# Patient Record
Sex: MALE | Race: WHITE | NOT HISPANIC OR LATINO | Employment: OTHER | URBAN - METROPOLITAN AREA
[De-identification: names, ages, dates, MRNs, and addresses within clinical notes are randomized per-mention and may not be internally consistent; named-entity substitution may affect disease eponyms.]

---

## 2018-09-20 DEVICE — LEAD NOVUS CAPSURE FIX 45CM: Type: IMPLANTABLE DEVICE | Site: CHEST | Status: FUNCTIONAL

## 2018-09-20 DEVICE — IMPLANTABLE DEVICE: Type: IMPLANTABLE DEVICE | Site: CHEST | Status: FUNCTIONAL

## 2018-09-26 DEVICE — IMPLANTABLE DEVICE: Type: IMPLANTABLE DEVICE | Site: CHEST | Status: FUNCTIONAL

## 2018-10-09 PROBLEM — I50.9 HEART FAILURE: Status: ACTIVE | Noted: 2018-10-09

## 2018-10-10 ENCOUNTER — TELEPHONE (OUTPATIENT)
Dept: TRANSPLANT | Facility: CLINIC | Age: 73
End: 2018-10-10

## 2018-10-10 ENCOUNTER — HOSPITAL ENCOUNTER (INPATIENT)
Facility: HOSPITAL | Age: 73
LOS: 13 days | Discharge: HOME | DRG: 286 | End: 2018-10-23
Attending: INTERNAL MEDICINE | Admitting: INTERNAL MEDICINE
Payer: MEDICARE

## 2018-10-10 DIAGNOSIS — R00.0 WIDE-COMPLEX TACHYCARDIA: ICD-10-CM

## 2018-10-10 DIAGNOSIS — I25.5 ISCHEMIC CARDIOMYOPATHY: ICD-10-CM

## 2018-10-10 DIAGNOSIS — I25.10 CORONARY ARTERY DISEASE INVOLVING NATIVE CORONARY ARTERY OF NATIVE HEART WITHOUT ANGINA PECTORIS: ICD-10-CM

## 2018-10-10 DIAGNOSIS — I47.19 ATRIAL TACHYCARDIA: ICD-10-CM

## 2018-10-10 DIAGNOSIS — I50.9 CHF (CONGESTIVE HEART FAILURE): ICD-10-CM

## 2018-10-10 DIAGNOSIS — N17.9 AKI (ACUTE KIDNEY INJURY): ICD-10-CM

## 2018-10-10 DIAGNOSIS — I50.23 ACUTE ON CHRONIC SYSTOLIC HEART FAILURE: ICD-10-CM

## 2018-10-10 DIAGNOSIS — I25.10 CORONARY ARTERY DISEASE, ANGINA PRESENCE UNSPECIFIED, UNSPECIFIED VESSEL OR LESION TYPE, UNSPECIFIED WHETHER NATIVE OR TRANSPLANTED HEART: ICD-10-CM

## 2018-10-10 DIAGNOSIS — R00.0 SINUS TACHYCARDIA: ICD-10-CM

## 2018-10-10 DIAGNOSIS — K76.1 HEPATIC CONGESTION: ICD-10-CM

## 2018-10-10 DIAGNOSIS — Z79.899 MEDICATION MANAGEMENT: ICD-10-CM

## 2018-10-10 DIAGNOSIS — I50.43 ACUTE ON CHRONIC COMBINED SYSTOLIC AND DIASTOLIC CONGESTIVE HEART FAILURE: ICD-10-CM

## 2018-10-10 DIAGNOSIS — Z51.81 VISIT FOR MONITORING TIKOSYN THERAPY: ICD-10-CM

## 2018-10-10 DIAGNOSIS — I49.9 CARDIAC RHYTHM DISORDER OR DISTURBANCE OR CHANGE: ICD-10-CM

## 2018-10-10 DIAGNOSIS — Z79.899 VISIT FOR MONITORING TIKOSYN THERAPY: ICD-10-CM

## 2018-10-10 DIAGNOSIS — I49.9 ARRHYTHMIA: ICD-10-CM

## 2018-10-10 DIAGNOSIS — Z01.818 PRE-TRANSPLANT EVALUATION FOR HEART TRANSPLANT: ICD-10-CM

## 2018-10-10 DIAGNOSIS — I50.40 CHF (CONGESTIVE HEART FAILURE), NYHA CLASS I, UNSPECIFIED FAILURE CHRONICITY, COMBINED: ICD-10-CM

## 2018-10-10 DIAGNOSIS — Z95.810 ICD (IMPLANTABLE CARDIOVERTER-DEFIBRILLATOR) IN PLACE: ICD-10-CM

## 2018-10-10 DIAGNOSIS — I50.9 HEART FAILURE: ICD-10-CM

## 2018-10-10 DIAGNOSIS — E11.9 TYPE 2 DIABETES MELLITUS WITHOUT COMPLICATION, UNSPECIFIED WHETHER LONG TERM INSULIN USE: ICD-10-CM

## 2018-10-10 DIAGNOSIS — I50.20 SYSTOLIC CONGESTIVE HEART FAILURE: ICD-10-CM

## 2018-10-10 DIAGNOSIS — I50.20 HFREF (HEART FAILURE WITH REDUCED EJECTION FRACTION): ICD-10-CM

## 2018-10-10 DIAGNOSIS — I48.91 ATRIAL FIBRILLATION STATUS POST CARDIOVERSION: ICD-10-CM

## 2018-10-10 DIAGNOSIS — I48.92 ATRIAL FLUTTER: ICD-10-CM

## 2018-10-10 LAB
ALBUMIN SERPL BCP-MCNC: 3.4 G/DL
ALP SERPL-CCNC: 117 U/L
ALT SERPL W/O P-5'-P-CCNC: 17 U/L
ANION GAP SERPL CALC-SCNC: 16 MMOL/L
APTT BLDCRRT: 22.3 SEC
AST SERPL-CCNC: 20 U/L
BASOPHILS # BLD AUTO: 0.08 K/UL
BASOPHILS # BLD AUTO: 0.09 K/UL
BASOPHILS NFR BLD: 0.7 %
BASOPHILS NFR BLD: 0.8 %
BILIRUB SERPL-MCNC: 1.6 MG/DL
BNP SERPL-MCNC: 4674 PG/ML
BUN SERPL-MCNC: 54 MG/DL
CALCIUM SERPL-MCNC: 10.9 MG/DL
CHLORIDE SERPL-SCNC: 99 MMOL/L
CO2 SERPL-SCNC: 24 MMOL/L
CREAT SERPL-MCNC: 1.8 MG/DL
DIASTOLIC DYSFUNCTION: YES
DIFFERENTIAL METHOD: ABNORMAL
DIFFERENTIAL METHOD: ABNORMAL
EOSINOPHIL # BLD AUTO: 0.1 K/UL
EOSINOPHIL # BLD AUTO: 0.2 K/UL
EOSINOPHIL NFR BLD: 1.3 %
EOSINOPHIL NFR BLD: 1.5 %
ERYTHROCYTE [DISTWIDTH] IN BLOOD BY AUTOMATED COUNT: 14.8 %
ERYTHROCYTE [DISTWIDTH] IN BLOOD BY AUTOMATED COUNT: 14.9 %
EST. GFR  (AFRICAN AMERICAN): 42.8 ML/MIN/1.73 M^2
EST. GFR  (NON AFRICAN AMERICAN): 37 ML/MIN/1.73 M^2
ESTIMATED PA SYSTOLIC PRESSURE: 26.04
GLUCOSE SERPL-MCNC: 108 MG/DL
HCT VFR BLD AUTO: 41 %
HCT VFR BLD AUTO: 41.2 %
HGB BLD-MCNC: 12.7 G/DL
HGB BLD-MCNC: 12.8 G/DL
IMM GRANULOCYTES # BLD AUTO: 0.03 K/UL
IMM GRANULOCYTES # BLD AUTO: 0.04 K/UL
IMM GRANULOCYTES NFR BLD AUTO: 0.3 %
IMM GRANULOCYTES NFR BLD AUTO: 0.4 %
INR PPP: 1.1
LYMPHOCYTES # BLD AUTO: 1.8 K/UL
LYMPHOCYTES # BLD AUTO: 1.9 K/UL
LYMPHOCYTES NFR BLD: 16.8 %
LYMPHOCYTES NFR BLD: 17.4 %
MAGNESIUM SERPL-MCNC: 2.1 MG/DL
MCH RBC QN AUTO: 29.4 PG
MCH RBC QN AUTO: 29.5 PG
MCHC RBC AUTO-ENTMCNC: 30.8 G/DL
MCHC RBC AUTO-ENTMCNC: 31.2 G/DL
MCV RBC AUTO: 94 FL
MCV RBC AUTO: 96 FL
MITRAL VALVE REGURGITATION: ABNORMAL
MONOCYTES # BLD AUTO: 0.9 K/UL
MONOCYTES # BLD AUTO: 1 K/UL
MONOCYTES NFR BLD: 8.7 %
MONOCYTES NFR BLD: 8.9 %
NEUTROPHILS # BLD AUTO: 7.8 K/UL
NEUTROPHILS # BLD AUTO: 7.8 K/UL
NEUTROPHILS NFR BLD: 71.3 %
NEUTROPHILS NFR BLD: 71.9 %
NRBC BLD-RTO: 0 /100 WBC
NRBC BLD-RTO: 0 /100 WBC
PHOSPHATE SERPL-MCNC: 5.8 MG/DL
PLATELET # BLD AUTO: 183 K/UL
PLATELET # BLD AUTO: 196 K/UL
PMV BLD AUTO: 11.4 FL
PMV BLD AUTO: 11.9 FL
POCT GLUCOSE: 116 MG/DL (ref 70–110)
POCT GLUCOSE: 264 MG/DL (ref 70–110)
POTASSIUM SERPL-SCNC: 4.8 MMOL/L
PROT SERPL-MCNC: 7.8 G/DL
PROTHROMBIN TIME: 11.1 SEC
RBC # BLD AUTO: 4.31 M/UL
RBC # BLD AUTO: 4.35 M/UL
RETIRED EF AND QEF - SEE NOTES: 9 (ref 55–65)
SODIUM SERPL-SCNC: 139 MMOL/L
TRICUSPID VALVE REGURGITATION: ABNORMAL
WBC # BLD AUTO: 10.86 K/UL
WBC # BLD AUTO: 10.92 K/UL

## 2018-10-10 PROCEDURE — 93005 ELECTROCARDIOGRAM TRACING: CPT

## 2018-10-10 PROCEDURE — 85730 THROMBOPLASTIN TIME PARTIAL: CPT

## 2018-10-10 PROCEDURE — 25000003 PHARM REV CODE 250: Performed by: INTERNAL MEDICINE

## 2018-10-10 PROCEDURE — 93306 TTE W/DOPPLER COMPLETE: CPT | Mod: 26,,, | Performed by: INTERNAL MEDICINE

## 2018-10-10 PROCEDURE — 93010 ELECTROCARDIOGRAM REPORT: CPT | Mod: ,,, | Performed by: INTERNAL MEDICINE

## 2018-10-10 PROCEDURE — 20600001 HC STEP DOWN PRIVATE ROOM

## 2018-10-10 PROCEDURE — 63600175 PHARM REV CODE 636 W HCPCS: Performed by: INTERNAL MEDICINE

## 2018-10-10 PROCEDURE — A4216 STERILE WATER/SALINE, 10 ML: HCPCS | Performed by: INTERNAL MEDICINE

## 2018-10-10 PROCEDURE — 83880 ASSAY OF NATRIURETIC PEPTIDE: CPT

## 2018-10-10 PROCEDURE — 85610 PROTHROMBIN TIME: CPT

## 2018-10-10 PROCEDURE — 80053 COMPREHEN METABOLIC PANEL: CPT

## 2018-10-10 PROCEDURE — 36415 COLL VENOUS BLD VENIPUNCTURE: CPT

## 2018-10-10 PROCEDURE — 83735 ASSAY OF MAGNESIUM: CPT

## 2018-10-10 PROCEDURE — C8929 TTE W OR WO FOL WCON,DOPPLER: HCPCS

## 2018-10-10 PROCEDURE — 85025 COMPLETE CBC W/AUTO DIFF WBC: CPT | Mod: 91

## 2018-10-10 PROCEDURE — 84100 ASSAY OF PHOSPHORUS: CPT

## 2018-10-10 PROCEDURE — 99223 1ST HOSP IP/OBS HIGH 75: CPT | Mod: GC,,, | Performed by: INTERNAL MEDICINE

## 2018-10-10 RX ORDER — INSULIN ASPART 100 [IU]/ML
0-5 INJECTION, SOLUTION INTRAVENOUS; SUBCUTANEOUS EVERY 6 HOURS PRN
Status: DISCONTINUED | OUTPATIENT
Start: 2018-10-10 | End: 2018-10-11 | Stop reason: SDUPTHER

## 2018-10-10 RX ORDER — DOXYCYCLINE HYCLATE 100 MG
100 TABLET ORAL EVERY 12 HOURS
Status: ON HOLD | COMMUNITY
End: 2018-10-23 | Stop reason: HOSPADM

## 2018-10-10 RX ORDER — METOPROLOL SUCCINATE 25 MG/1
25 TABLET, EXTENDED RELEASE ORAL 2 TIMES DAILY
Status: ON HOLD | COMMUNITY
End: 2018-10-23 | Stop reason: SDUPTHER

## 2018-10-10 RX ORDER — ATORVASTATIN CALCIUM 80 MG/1
80 TABLET, FILM COATED ORAL NIGHTLY
COMMUNITY

## 2018-10-10 RX ORDER — ATORVASTATIN CALCIUM 20 MG/1
80 TABLET, FILM COATED ORAL DAILY
Status: DISCONTINUED | OUTPATIENT
Start: 2018-10-10 | End: 2018-10-23 | Stop reason: HOSPADM

## 2018-10-10 RX ORDER — ENOXAPARIN SODIUM 100 MG/ML
40 INJECTION SUBCUTANEOUS EVERY 24 HOURS
Status: DISCONTINUED | OUTPATIENT
Start: 2018-10-10 | End: 2018-10-18

## 2018-10-10 RX ORDER — INSULIN ASPART 100 [IU]/ML
2-10 INJECTION, SOLUTION INTRAVENOUS; SUBCUTANEOUS
Status: ON HOLD | COMMUNITY
End: 2018-10-23 | Stop reason: HOSPADM

## 2018-10-10 RX ORDER — GLUCAGON 1 MG
1 KIT INJECTION
Status: DISCONTINUED | OUTPATIENT
Start: 2018-10-10 | End: 2018-10-11 | Stop reason: SDUPTHER

## 2018-10-10 RX ORDER — AZELASTINE 1 MG/ML
1 SPRAY, METERED NASAL
COMMUNITY

## 2018-10-10 RX ORDER — NAPROXEN SODIUM 220 MG/1
81 TABLET, FILM COATED ORAL DAILY
COMMUNITY

## 2018-10-10 RX ORDER — FUROSEMIDE 40 MG/1
40 TABLET ORAL DAILY
Status: ON HOLD | COMMUNITY
End: 2018-10-23 | Stop reason: SDUPTHER

## 2018-10-10 RX ORDER — FUROSEMIDE 10 MG/ML
80 INJECTION INTRAMUSCULAR; INTRAVENOUS ONCE
Status: COMPLETED | OUTPATIENT
Start: 2018-10-10 | End: 2018-10-10

## 2018-10-10 RX ORDER — DEXTROMETHORPHAN HYDROBROMIDE, GUAIFENESIN 5; 100 MG/5ML; MG/5ML
650 LIQUID ORAL EVERY 4 HOURS PRN
Status: ON HOLD | COMMUNITY
End: 2018-10-23 | Stop reason: HOSPADM

## 2018-10-10 RX ORDER — GLIPIZIDE 2.5 MG/1
5 TABLET, EXTENDED RELEASE ORAL 2 TIMES DAILY
COMMUNITY
End: 2018-11-27 | Stop reason: DRUGHIGH

## 2018-10-10 RX ORDER — BENZONATATE 200 MG/1
200 CAPSULE ORAL 3 TIMES DAILY PRN
COMMUNITY

## 2018-10-10 RX ORDER — SODIUM CHLORIDE 0.9 % (FLUSH) 0.9 %
3 SYRINGE (ML) INJECTION EVERY 8 HOURS
Status: DISCONTINUED | OUTPATIENT
Start: 2018-10-10 | End: 2018-10-23 | Stop reason: HOSPADM

## 2018-10-10 RX ORDER — ASPIRIN 81 MG/1
81 TABLET ORAL DAILY
Status: DISCONTINUED | OUTPATIENT
Start: 2018-10-10 | End: 2018-10-23 | Stop reason: HOSPADM

## 2018-10-10 RX ORDER — DOCUSATE SODIUM 100 MG/1
100 CAPSULE, LIQUID FILLED ORAL 2 TIMES DAILY PRN
COMMUNITY

## 2018-10-10 RX ORDER — FLUTICASONE PROPIONATE 50 MCG
2 SPRAY, SUSPENSION (ML) NASAL
COMMUNITY

## 2018-10-10 RX ORDER — ENOXAPARIN SODIUM 150 MG/ML
40 INJECTION SUBCUTANEOUS DAILY
Status: ON HOLD | COMMUNITY
End: 2018-10-23 | Stop reason: HOSPADM

## 2018-10-10 RX ORDER — PRASUGREL 10 MG/1
10 TABLET, FILM COATED ORAL DAILY
Status: DISCONTINUED | OUTPATIENT
Start: 2018-10-10 | End: 2018-10-23 | Stop reason: HOSPADM

## 2018-10-10 RX ORDER — METOPROLOL SUCCINATE 25 MG/1
25 TABLET, EXTENDED RELEASE ORAL DAILY
Status: DISCONTINUED | OUTPATIENT
Start: 2018-10-10 | End: 2018-10-14

## 2018-10-10 RX ADMIN — METOPROLOL SUCCINATE 25 MG: 25 TABLET, EXTENDED RELEASE ORAL at 08:10

## 2018-10-10 RX ADMIN — FUROSEMIDE 80 MG: 10 INJECTION, SOLUTION INTRAMUSCULAR; INTRAVENOUS at 01:10

## 2018-10-10 RX ADMIN — ENOXAPARIN SODIUM 40 MG: 100 INJECTION SUBCUTANEOUS at 05:10

## 2018-10-10 RX ADMIN — Medication 3 ML: at 09:10

## 2018-10-10 RX ADMIN — FUROSEMIDE 10 MG/HR: 10 INJECTION, SOLUTION INTRAMUSCULAR; INTRAVENOUS at 01:10

## 2018-10-10 RX ADMIN — ASPIRIN 81 MG: 81 TABLET, COATED ORAL at 08:10

## 2018-10-10 RX ADMIN — Medication 3 ML: at 01:10

## 2018-10-10 RX ADMIN — INSULIN ASPART 1 UNITS: 100 INJECTION, SOLUTION INTRAVENOUS; SUBCUTANEOUS at 11:10

## 2018-10-10 RX ADMIN — PRASUGREL 10 MG: 10 TABLET, FILM COATED ORAL at 08:10

## 2018-10-10 RX ADMIN — ATORVASTATIN CALCIUM 80 MG: 20 TABLET, FILM COATED ORAL at 08:10

## 2018-10-10 NOTE — PLAN OF CARE
Problem: Patient Care Overview  Goal: Plan of Care Review  Outcome: Ongoing (interventions implemented as appropriate)  Plan of care reviewed with pt. Pt remained free of falls, trauma, and injury. Lasix gtt initiated. Advanced options w/u continued. ECHO done, EF: 9%. Will continue to monitor.

## 2018-10-10 NOTE — NURSING
Patient arrived on unit via stretcher accompanied by EMS transport. He appears to be in no distress. He states he can walk, assisted to his bed. Fall precautions discussed. Patient given new gown allergies checked, telemetry applied and MD called to inform of patient arrival. Patient oriented to unit including how to use the call bell and other precautions. Will continue to monitor.

## 2018-10-10 NOTE — NURSING
Received report from attending nurse at [facility]. She can be reached at [phone].

## 2018-10-10 NOTE — HPI
"72 y/o male with PMH of CAD s/p CABG, ICM, HFrEF who presents as a transfer from OSH after he presented there for SUMNER, weight gain and palpitations. He presented to OSH after being discharged from the same facility 4 days prior to Oct 8. He was there initially for ADHF and KASEY. The patient recently had a revision of his CRT-D. The patient became SOB on Oct 7 but did not have CP. The patient felt his pulse and noted it to be rapid. He was brought to the ED at OSH by EMS; his HR was ~170. The ECG was c/w WCT (later documentation notes SVT with aberrancy after interrogation was negative for VT), and he was given one dose of adenosine. The HR came down to 110. His CXR was c/w pulmonary edema. A pro-BNP was at one point 20084.The patient's WBC was 17 and sCr 1.6. tBili was 1.5. An ECHO on 10/8: LVIDd 6.5 cm, EF 15%, IVC normal collapsibility and normal IVC size. An undated stress test notes: "mild reversibility noted in the cardiac apex and there lateral wall of the heart from cardiac apex to approximately mid wall suspicion for ischemia..mild reversibility noted in the mid to basilar inferior/inferoseptal wall of the heart also suspicious for ischemia". He had a LHC 6/2018: LIMA-LAD patent, % occluded at ostium, SVG-% occluded, SVG-D 100% occluded, pLCx 30%, 100% occluded OM, 100% LAD occlusion after takeoff of D1, D1 is tiny with 80% disease, SVG-OM 80% proximal stenosis with 50% at site of anastomosis. An SVG-OM OKSANA was placed at that time. The patient's WBC was 9.6 as of 10/9. Aldactone was held at some point 2/2 hyperkalemia. The patient believes he has been diuresed adequately and has no fluid left to remove.   "

## 2018-10-10 NOTE — SUBJECTIVE & OBJECTIVE
Past Medical History:   Diagnosis Date    CHF (congestive heart failure)     COPD (chronic obstructive pulmonary disease)     Coronary artery disease     Diabetes mellitus        Past Surgical History:   Procedure Laterality Date    APPENDECTOMY      CARDIAC SURGERY      EYE SURGERY      FRACTURE SURGERY      TONSILLECTOMY         Review of patient's allergies indicates:   Allergen Reactions    Amiodarone analogues Anaphylaxis    Ativan [lorazepam] Anxiety       Current Facility-Administered Medications   Medication    aspirin EC tablet 81 mg    atorvastatin tablet 80 mg    dextrose 50% injection 12.5 g    enoxaparin injection 40 mg    glucagon (human recombinant) injection 1 mg    insulin aspart U-100 pen 0-5 Units    metoprolol succinate (TOPROL-XL) 24 hr tablet 25 mg    prasugrel tablet 10 mg    sodium chloride 0.9% flush 3 mL     Family History     Problem Relation (Age of Onset)    Heart disease Father, Sister, Brother    Stroke Father        Tobacco Use    Smoking status: Former Smoker     Types: Cigarettes    Smokeless tobacco: Never Used   Substance and Sexual Activity    Alcohol use: No     Frequency: Never    Drug use: No    Sexual activity: Not on file     Review of Systems   Constitutional: Negative.    HENT: Negative.    Eyes: Negative.    Respiratory: Negative for shortness of breath.    Cardiovascular: Negative for chest pain, palpitations and leg swelling.   Gastrointestinal: Negative.    Endocrine: Negative.    Genitourinary: Negative.    Musculoskeletal: Negative.    Skin: Negative.    Allergic/Immunologic: Negative.    Neurological: Negative.    Hematological: Negative.    Psychiatric/Behavioral: Negative.      Objective:     Vital Signs (Most Recent):  Temp: 98.7 °F (37.1 °C) (10/10/18 0334)  Pulse: 103 (10/10/18 0334)  Resp: 20 (10/10/18 0334)  BP: 100/65 (10/10/18 0334)  SpO2: (!) 92 % (10/10/18 0334) Vital Signs (24h Range):  Temp:  [98 °F (36.7 °C)-98.7 °F (37.1 °C)]  98.7 °F (37.1 °C)  Pulse:  [101-103] 103  Resp:  [20] 20  SpO2:  [92 %-95 %] 92 %  BP: (100-102)/(51-65) 100/65     Patient Vitals for the past 72 hrs (Last 3 readings):   Weight   10/10/18 0334 76.2 kg (168 lb 1.6 oz)     Body mass index is 27.13 kg/m².    No intake or output data in the 24 hours ending 10/10/18 0426    Physical Exam   Constitutional: He is oriented to person, place, and time. He appears well-developed and well-nourished.   HENT:   Head: Normocephalic and atraumatic.   Eyes: Right eye exhibits no discharge. Left eye exhibits no discharge.   Neck: No tracheal deviation present.   Cardiovascular: Normal rate and regular rhythm. Exam reveals no gallop and no friction rub.   Pulmonary/Chest: Effort normal and breath sounds normal.   Abdominal: Soft. Bowel sounds are normal.   Musculoskeletal: He exhibits no edema.   Neurological: He is alert and oriented to person, place, and time.   Skin: Skin is warm and dry.   Psychiatric: He has a normal mood and affect.       Significant Labs:  CBC:  No results for input(s): WBC, RBC, HGB, HCT, PLT, MCV, MCH, MCHC in the last 168 hours.  BNP:  No results for input(s): BNP in the last 168 hours.    Invalid input(s): BNPTRIAGELBLO  CMP:  No results for input(s): GLU, CALCIUM, ALBUMIN, PROT, NA, K, CO2, CL, BUN, CREATININE, ALKPHOS, ALT, AST, BILITOT in the last 168 hours.   Coagulation:   No results for input(s): PT, INR, APTT in the last 168 hours.  LDH:  No results for input(s): LDH in the last 72 hours.  Microbiology:  Microbiology Results (last 7 days)     ** No results found for the last 168 hours. **

## 2018-10-10 NOTE — PROGRESS NOTES
Admit Note     Met with patient and spouse to assess needs. Patient is a 73 y.o.  male, admitted for heart failure, SUMNER, increased WT gain, palpitations. DM and CAD.    Pt's spouse reports the pt was in somewhat good health until his heart attack in June 2018. The pt's spouse reports the pt has been admitted into the hospital several times.  Pt's spouse reports the pt has only been home for six days since June.       Patient transfered from [facility] on 10/10/2018 .  At this time, patient presents as alert and oriented x 4, pleasant and calm.  At this time, patients caregiver presents as alert and oriented x 4, good eye contact and communicative.    Household/Family Systems     Patient resides with patient's spouse, at     [address] [place] [address].        Support system includes spouse, daughter and 10 yr old grand daughter.  The pt also has a son, however he lives out of town and information not provided by spouse.     Patient does not have dependents that are need of being cared for.     NOTE: The pt's spouse reports her daughter and granddaughter are planning on moving into the pt's home in order to provided assistance and support.        Patients primary caregiver is Yanira Alfred, patients spouse, phone number [phone].    No land line.  The pt has a cell phone, but usually does not answer the phone.  Emergency contact:  Jania Simon (daughter) [phone]    During admission, patient's caregiver plans to stay in the pt's home during the day, but return home at night.   Confirmed patient and patients caregivers do have access to reliable transportation.    Cognitive Status/Learning     Patient's spouse reports pt's reading ability as college and states patient does have difficulty with hearing and memory. The pt disagrees with spouse about hearing (no hearing aids). The pt's spouse reports the pt has experienced difficulty with short term memory since first heart attack in 1995. The pt's  spouse reports the pt will act like he will remember information, but he does not. Pt's spouse reports no difficulty with eyesight, reading, writing or learning.   Patient reports patient learns best by visual.     Needed: No.   Highest education level: Attended College/Technical School    Vocation/Disability   .  Working for Income: No  If no, reason not working: Patient Choice - Retired  Patient used to work as teacher in the workplace.  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    The pt's spouse was laid off earlier this year.      Adherence     Patient reports a high level of adherence to patients health care regimen. Pt has not been home long enough to keep up MD appointments.   Adherence counseling and education provided. Patient verbalizes understanding.    Substance Use    Patient reports the following substance usage.    Tobacco: none.  Pt quit smoking 1995.  Pt used to smoke 1ppd.   Alcohol: none since June, prior to June pt was drinking socially  Illicit Drugs/Non-prescribed Medications: none, patient denies any use.  Patient states clear understanding of the potential impact of substance use.  Substance abstinence/cessation counseling, education and resources provided and reviewed.     Services Utilizing/ADLS    Infusion Service: Prior to admission, patient utilizing? no  Home Health: Prior to admission, patient utilizing? no  DME: Prior to admission, no  Pulmonary/Cardiac Rehab: Prior to admission, no  Dialysis:  Prior to admission, no  Transplant Specialty Pharmacy:  Prior to admission, no.    Prior to admission, patient reports patient was not independent with ADLS and was not driving since June.  Pt's spouse drives.   Patient reports patient is not able to care for self at this time due to compromised medical condition (as documented in medical record) and physical weakness..  Patient indicates a willingness to care for self once medically cleared to do  so.    Insurance/Medications    Insured by   Payor/Plan Subscr  Sex Relation Sub. Ins. ID Effective Group Num   1. VET[employer] ADMI* EUGENIO JADE 1945 Male Self [id number] 1998                                    [address]   2. [employer] MANAGE* EUGENIO JADE 1945 Male  [id number] 1/1/18 [id number]                                   [address] [address]      Primary Insurance (for UNOS reporting): Veterans   Secondary Insurance (for UNOS reporting): Public Insurance - Medicare FFS (Fee For Service)     The pt's spouse reports when the pt is in the hospital the pt's primary insurance in Veterans and secondary is [employer], however when the pt is out pt the pt's primary insurance in [employer] and the secondary insurance is Veterans. Pt's spouse reports currently all home medications are being provided by [facility], however the pt's spouse is trying to get medications to be set up through [employer], but this has not been completed.     Patient reports patient is able to obtain and afford medications at this time and at time of discharge.    Living Will/Healthcare Power of     Patient states patient has a LW and/or HCPA.  Pt's spouse is the HCPA.  provided education regarding LW and HCPA and the completion of forms.    Coping/Mental Health    Patient is coping adequately with the aid of  family members.  Patient denies mental health difficulties.   Pt's spouse reports the pt is claustrophobic. Pt's spouse reports if the pt needs a LVAD she will be the caregiver and the pt's spouse would like the daughter to also be a caregiver.  The pt's spouse reports concerns about spending multiple nights in the hospital.  Pt's spouse reports she sleeps with a cpap and has multiple animals ( three dogs, one cat and a gerbil) that need care. The pt's spouse reports anxiety due to caregiver fatigue. Worker provided support.     Discharge Planning    At time of discharge, patient plans to return to patient's home under  the care of spouse.  Patients spouse will transport patient.  Per rounds today, expected discharge date has not been medically determined at this time. Patient and patients caregiver  verbalize understanding and are involved in treatment planning and discharge process.    Additional Concerns    Patient is being followed for needs, education, resources, information, emotional support, supportive counseling, and for supportive and skilled discharge plan of care.  providing ongoing psychosocial support, education, resources and d/c planning as needed.  SW remains available. Patient's caregiver verbalizes understanding and agreement with information reviewed,  availability and how to access available resources as needed.

## 2018-10-10 NOTE — H&P
"[facility]  Heart Transplant  H&P    Patient Name: Bharat Coker  MRN: [id number]  Admission Date: 10/10/2018  Attending Physician: Jony Hendrickson Jr.,*  Primary Care Provider: Ronnie Richardson Jr, MD  Principal Problem:<principal problem not specified>    Subjective:     History of Present Illness:  74 y/o male with PMH of CAD s/p CABG, ICM, HFrEF who presents as a transfer from OSH after he presented there for SUMNER, weight gain and palpitations. He presented to OSH after being discharged from the same facility 4 days prior to Oct 8. He was there initially for ADHF and KASEY. The patient recently had a revision of his CRT-D. The patient became SOB on Oct 7 but did not have CP. The patient felt his pulse and noted it to be rapid. He was brought to the ED at OSH by EMS; his HR was ~170. The ECG was c/w WCT (later documentation notes SVT with aberrancy after interrogation was negative for VT), and he was given one dose of adenosine. The HR came down to 110. His CXR was c/w pulmonary edema. A pro-BNP was at one point 20084.The patient's WBC was 17 and sCr 1.6. tBili was 1.5. An ECHO on 10/8: LVIDd 6.5 cm, EF 15%, IVC normal collapsibility and normal IVC size. An undated stress test notes: "mild reversibility noted in the cardiac apex and there lateral wall of the heart from cardiac apex to approximately mid wall suspicion for ischemia..mild reversibility noted in the mid to basilar inferior/inferoseptal wall of the heart also suspicious for ischemia". He had a LHC 6/2018: LIMA-LAD patent, % occluded at ostium, SVG-% occluded, SVG-D 100% occluded, pLCx 30%, 100% occluded OM, 100% LAD occlusion after takeoff of D1, D1 is tiny with 80% disease, SVG-OM 80% proximal stenosis with 50% at site of anastomosis. An SVG-OM OKSANA was placed at that time. The patient's WBC was 9.6 as of 10/9. Aldactone was held at some point 2/2 hyperkalemia. The patient believes he has been diuresed adequately " and has no fluid left to remove.     Past Medical History:   Diagnosis Date    CHF (congestive heart failure)     COPD (chronic obstructive pulmonary disease)     Coronary artery disease     Diabetes mellitus        Past Surgical History:   Procedure Laterality Date    APPENDECTOMY      CARDIAC SURGERY      EYE SURGERY      FRACTURE SURGERY      TONSILLECTOMY         Review of patient's allergies indicates:   Allergen Reactions    Amiodarone analogues Anaphylaxis    Ativan [lorazepam] Anxiety       Current Facility-Administered Medications   Medication    aspirin EC tablet 81 mg    atorvastatin tablet 80 mg    dextrose 50% injection 12.5 g    enoxaparin injection 40 mg    glucagon (human recombinant) injection 1 mg    insulin aspart U-100 pen 0-5 Units    metoprolol succinate (TOPROL-XL) 24 hr tablet 25 mg    prasugrel tablet 10 mg    sodium chloride 0.9% flush 3 mL     Family History     Problem Relation (Age of Onset)    Heart disease Father, Sister, Brother    Stroke Father        Tobacco Use    Smoking status: Former Smoker     Types: Cigarettes    Smokeless tobacco: Never Used   Substance and Sexual Activity    Alcohol use: No     Frequency: Never    Drug use: No    Sexual activity: Not on file     Review of Systems   Constitutional: Negative.    HENT: Negative.    Eyes: Negative.    Respiratory: Negative for shortness of breath.    Cardiovascular: Negative for chest pain, palpitations and leg swelling.   Gastrointestinal: Negative.    Endocrine: Negative.    Genitourinary: Negative.    Musculoskeletal: Negative.    Skin: Negative.    Allergic/Immunologic: Negative.    Neurological: Negative.    Hematological: Negative.    Psychiatric/Behavioral: Negative.      Objective:     Vital Signs (Most Recent):  Temp: 98.7 °F (37.1 °C) (10/10/18 0334)  Pulse: 103 (10/10/18 0334)  Resp: 20 (10/10/18 0334)  BP: 100/65 (10/10/18 0334)  SpO2: (!) 92 % (10/10/18 0334) Vital Signs (24h Range):  Temp:   [98 °F (36.7 °C)-98.7 °F (37.1 °C)] 98.7 °F (37.1 °C)  Pulse:  [101-103] 103  Resp:  [20] 20  SpO2:  [92 %-95 %] 92 %  BP: (100-102)/(51-65) 100/65     Patient Vitals for the past 72 hrs (Last 3 readings):   Weight   10/10/18 0334 76.2 kg (168 lb 1.6 oz)     Body mass index is 27.13 kg/m².    No intake or output data in the 24 hours ending 10/10/18 0426    Physical Exam   Constitutional: He is oriented to person, place, and time. He appears well-developed and well-nourished.   HENT:   Head: Normocephalic and atraumatic.   Eyes: Right eye exhibits no discharge. Left eye exhibits no discharge.   Neck: No tracheal deviation present.   Cardiovascular: Normal rate and regular rhythm. Exam reveals no gallop and no friction rub.   Pulmonary/Chest: Effort normal and breath sounds normal.   Abdominal: Soft. Bowel sounds are normal.   Musculoskeletal: He exhibits no edema.   Neurological: He is alert and oriented to person, place, and time.   Skin: Skin is warm and dry.   Psychiatric: He has a normal mood and affect.       Significant Labs:  CBC:  No results for input(s): WBC, RBC, HGB, HCT, PLT, MCV, MCH, MCHC in the last 168 hours.  BNP:  No results for input(s): BNP in the last 168 hours.    Invalid input(s): BNPTRIAGELBLO  CMP:  No results for input(s): GLU, CALCIUM, ALBUMIN, PROT, NA, K, CO2, CL, BUN, CREATININE, ALKPHOS, ALT, AST, BILITOT in the last 168 hours.   Coagulation:   No results for input(s): PT, INR, APTT in the last 168 hours.  LDH:  No results for input(s): LDH in the last 72 hours.  Microbiology:  Microbiology Results (last 7 days)     ** No results found for the last 168 hours. **            Assessment/Plan:     DM (diabetes mellitus)    - SSI        CAD (coronary artery disease)    - c/w DAPT, statin, and BB  - consider ACEi once renal function panel returns        HFrEF (heart failure with reduced ejection fraction)    - appear euvolemic on exam  - f/u BNP and CXR to confirm exam findings  - c/w BB and  consider ACEi once renal panel returns  - hold aldactone for now given past hyperkalemia issues  - f/u TTE with CFD            Pelon Venegas MD  Heart Transplant  [facility]

## 2018-10-10 NOTE — ASSESSMENT & PLAN NOTE
- appear euvolemic on exam  - f/u BNP and CXR to confirm exam findings  - c/w BB and consider ACEi once renal panel returns  - hold aldactone for now given past hyperkalemia issues  - f/u TTE with CFD

## 2018-10-10 NOTE — PROGRESS NOTES
Consent obtained. optison given ivp via left forearm sl for imaging. Denies transfusion rxn. Tolerated well. Sl flushed before and after with 10 cc ns.

## 2018-10-11 ENCOUNTER — EDUCATION (OUTPATIENT)
Dept: TRANSPLANT | Facility: CLINIC | Age: 73
End: 2018-10-11

## 2018-10-11 PROBLEM — Z95.810 ICD (IMPLANTABLE CARDIOVERTER-DEFIBRILLATOR) IN PLACE: Status: ACTIVE | Noted: 2018-10-11

## 2018-10-11 LAB
ABO + RH BLD: NORMAL
ALBUMIN SERPL BCP-MCNC: 3.3 G/DL
ALBUMIN SERPL BCP-MCNC: 3.3 G/DL
ALP SERPL-CCNC: 113 U/L
ALP SERPL-CCNC: 119 U/L
ALT SERPL W/O P-5'-P-CCNC: 16 U/L
ALT SERPL W/O P-5'-P-CCNC: 17 U/L
ANION GAP SERPL CALC-SCNC: 13 MMOL/L
ANION GAP SERPL CALC-SCNC: 14 MMOL/L
ANION GAP SERPL CALC-SCNC: 16 MMOL/L
AST SERPL-CCNC: 19 U/L
AST SERPL-CCNC: 22 U/L
BASOPHILS # BLD AUTO: 0.07 K/UL
BASOPHILS NFR BLD: 0.7 %
BILIRUB SERPL-MCNC: 0.9 MG/DL
BILIRUB SERPL-MCNC: 1.2 MG/DL
BLD GP AB SCN CELLS X3 SERPL QL: NORMAL
BUN SERPL-MCNC: 55 MG/DL
BUN SERPL-MCNC: 58 MG/DL
BUN SERPL-MCNC: 60 MG/DL
CALCIUM SERPL-MCNC: 10 MG/DL
CALCIUM SERPL-MCNC: 10.3 MG/DL
CALCIUM SERPL-MCNC: 9.9 MG/DL
CHLORIDE SERPL-SCNC: 96 MMOL/L
CHLORIDE SERPL-SCNC: 96 MMOL/L
CHLORIDE SERPL-SCNC: 97 MMOL/L
CO2 SERPL-SCNC: 22 MMOL/L
CO2 SERPL-SCNC: 26 MMOL/L
CO2 SERPL-SCNC: 28 MMOL/L
CREAT SERPL-MCNC: 1.7 MG/DL
CREAT SERPL-MCNC: 1.7 MG/DL
CREAT SERPL-MCNC: 1.8 MG/DL
DIFFERENTIAL METHOD: ABNORMAL
EOSINOPHIL # BLD AUTO: 0.2 K/UL
EOSINOPHIL NFR BLD: 2.4 %
ERYTHROCYTE [DISTWIDTH] IN BLOOD BY AUTOMATED COUNT: 14.7 %
EST. GFR  (AFRICAN AMERICAN): 42.8 ML/MIN/1.73 M^2
EST. GFR  (AFRICAN AMERICAN): 45.9 ML/MIN/1.73 M^2
EST. GFR  (AFRICAN AMERICAN): 45.9 ML/MIN/1.73 M^2
EST. GFR  (NON AFRICAN AMERICAN): 37 ML/MIN/1.73 M^2
EST. GFR  (NON AFRICAN AMERICAN): 39.7 ML/MIN/1.73 M^2
EST. GFR  (NON AFRICAN AMERICAN): 39.7 ML/MIN/1.73 M^2
ESTIMATED AVG GLUCOSE: 151 MG/DL
GLUCOSE SERPL-MCNC: 183 MG/DL
GLUCOSE SERPL-MCNC: 228 MG/DL
GLUCOSE SERPL-MCNC: 257 MG/DL
HBA1C MFR BLD HPLC: 6.9 %
HCT VFR BLD AUTO: 41.8 %
HGB BLD-MCNC: 13.2 G/DL
IMM GRANULOCYTES # BLD AUTO: 0.03 K/UL
IMM GRANULOCYTES NFR BLD AUTO: 0.3 %
LYMPHOCYTES # BLD AUTO: 1.2 K/UL
LYMPHOCYTES NFR BLD: 12.1 %
MAGNESIUM SERPL-MCNC: 1.8 MG/DL
MAGNESIUM SERPL-MCNC: 1.8 MG/DL
MCH RBC QN AUTO: 29.5 PG
MCHC RBC AUTO-ENTMCNC: 31.6 G/DL
MCV RBC AUTO: 94 FL
MONOCYTES # BLD AUTO: 0.8 K/UL
MONOCYTES NFR BLD: 8.2 %
NEUTROPHILS # BLD AUTO: 7.5 K/UL
NEUTROPHILS NFR BLD: 76.3 %
NRBC BLD-RTO: 0 /100 WBC
PHOSPHATE SERPL-MCNC: 5.4 MG/DL
PLATELET # BLD AUTO: 210 K/UL
PMV BLD AUTO: 11.4 FL
POCT GLUCOSE: 187 MG/DL (ref 70–110)
POCT GLUCOSE: 200 MG/DL (ref 70–110)
POCT GLUCOSE: 202 MG/DL (ref 70–110)
POCT GLUCOSE: 339 MG/DL (ref 70–110)
POTASSIUM SERPL-SCNC: 3.7 MMOL/L
POTASSIUM SERPL-SCNC: 3.9 MMOL/L
POTASSIUM SERPL-SCNC: 4.3 MMOL/L
PROT SERPL-MCNC: 7.7 G/DL
PROT SERPL-MCNC: 8 G/DL
RBC # BLD AUTO: 4.47 M/UL
SODIUM SERPL-SCNC: 134 MMOL/L
SODIUM SERPL-SCNC: 137 MMOL/L
SODIUM SERPL-SCNC: 137 MMOL/L
TSH SERPL DL<=0.005 MIU/L-ACNC: 2.12 UIU/ML
WBC # BLD AUTO: 9.82 K/UL

## 2018-10-11 PROCEDURE — 80048 BASIC METABOLIC PNL TOTAL CA: CPT

## 2018-10-11 PROCEDURE — 93005 ELECTROCARDIOGRAM TRACING: CPT

## 2018-10-11 PROCEDURE — 36415 COLL VENOUS BLD VENIPUNCTURE: CPT

## 2018-10-11 PROCEDURE — 85025 COMPLETE CBC W/AUTO DIFF WBC: CPT

## 2018-10-11 PROCEDURE — 83036 HEMOGLOBIN GLYCOSYLATED A1C: CPT

## 2018-10-11 PROCEDURE — 86850 RBC ANTIBODY SCREEN: CPT

## 2018-10-11 PROCEDURE — 83735 ASSAY OF MAGNESIUM: CPT

## 2018-10-11 PROCEDURE — 63600175 PHARM REV CODE 636 W HCPCS: Performed by: INTERNAL MEDICINE

## 2018-10-11 PROCEDURE — 25000003 PHARM REV CODE 250: Performed by: STUDENT IN AN ORGANIZED HEALTH CARE EDUCATION/TRAINING PROGRAM

## 2018-10-11 PROCEDURE — 20600001 HC STEP DOWN PRIVATE ROOM

## 2018-10-11 PROCEDURE — 63600175 PHARM REV CODE 636 W HCPCS: Performed by: STUDENT IN AN ORGANIZED HEALTH CARE EDUCATION/TRAINING PROGRAM

## 2018-10-11 PROCEDURE — 25000003 PHARM REV CODE 250: Performed by: INTERNAL MEDICINE

## 2018-10-11 PROCEDURE — 99233 SBSQ HOSP IP/OBS HIGH 50: CPT | Mod: GC,,, | Performed by: INTERNAL MEDICINE

## 2018-10-11 PROCEDURE — 84100 ASSAY OF PHOSPHORUS: CPT

## 2018-10-11 PROCEDURE — 80053 COMPREHEN METABOLIC PANEL: CPT | Mod: 91

## 2018-10-11 PROCEDURE — 93010 ELECTROCARDIOGRAM REPORT: CPT | Mod: ,,, | Performed by: INTERNAL MEDICINE

## 2018-10-11 PROCEDURE — A4216 STERILE WATER/SALINE, 10 ML: HCPCS | Performed by: INTERNAL MEDICINE

## 2018-10-11 PROCEDURE — 83735 ASSAY OF MAGNESIUM: CPT | Mod: 91

## 2018-10-11 PROCEDURE — 84443 ASSAY THYROID STIM HORMONE: CPT

## 2018-10-11 PROCEDURE — 80053 COMPREHEN METABOLIC PANEL: CPT

## 2018-10-11 RX ORDER — FUROSEMIDE 10 MG/ML
80 INJECTION INTRAMUSCULAR; INTRAVENOUS ONCE
Status: DISCONTINUED | OUTPATIENT
Start: 2018-10-11 | End: 2018-10-11

## 2018-10-11 RX ORDER — IBUPROFEN 200 MG
16 TABLET ORAL
Status: DISCONTINUED | OUTPATIENT
Start: 2018-10-11 | End: 2018-10-23 | Stop reason: HOSPADM

## 2018-10-11 RX ORDER — GLUCAGON 1 MG
1 KIT INJECTION
Status: DISCONTINUED | OUTPATIENT
Start: 2018-10-11 | End: 2018-10-11 | Stop reason: SDUPTHER

## 2018-10-11 RX ORDER — LANOLIN ALCOHOL/MO/W.PET/CERES
400 CREAM (GRAM) TOPICAL ONCE
Status: COMPLETED | OUTPATIENT
Start: 2018-10-11 | End: 2018-10-11

## 2018-10-11 RX ORDER — GLUCAGON 1 MG
1 KIT INJECTION
Status: DISCONTINUED | OUTPATIENT
Start: 2018-10-11 | End: 2018-10-23 | Stop reason: HOSPADM

## 2018-10-11 RX ORDER — IBUPROFEN 200 MG
16 TABLET ORAL
Status: DISCONTINUED | OUTPATIENT
Start: 2018-10-11 | End: 2018-10-11 | Stop reason: SDUPTHER

## 2018-10-11 RX ORDER — INSULIN ASPART 100 [IU]/ML
0-5 INJECTION, SOLUTION INTRAVENOUS; SUBCUTANEOUS
Status: DISCONTINUED | OUTPATIENT
Start: 2018-10-11 | End: 2018-10-11 | Stop reason: SDUPTHER

## 2018-10-11 RX ORDER — FUROSEMIDE 10 MG/ML
80 INJECTION INTRAMUSCULAR; INTRAVENOUS ONCE
Status: COMPLETED | OUTPATIENT
Start: 2018-10-11 | End: 2018-10-11

## 2018-10-11 RX ORDER — IBUPROFEN 200 MG
24 TABLET ORAL
Status: DISCONTINUED | OUTPATIENT
Start: 2018-10-11 | End: 2018-10-11 | Stop reason: SDUPTHER

## 2018-10-11 RX ORDER — POTASSIUM CHLORIDE 20 MEQ/1
40 TABLET, EXTENDED RELEASE ORAL DAILY
Status: DISCONTINUED | OUTPATIENT
Start: 2018-10-11 | End: 2018-10-12

## 2018-10-11 RX ORDER — IBUPROFEN 200 MG
24 TABLET ORAL
Status: DISCONTINUED | OUTPATIENT
Start: 2018-10-11 | End: 2018-10-23 | Stop reason: HOSPADM

## 2018-10-11 RX ORDER — INSULIN ASPART 100 [IU]/ML
1-10 INJECTION, SOLUTION INTRAVENOUS; SUBCUTANEOUS
Status: DISCONTINUED | OUTPATIENT
Start: 2018-10-11 | End: 2018-10-23 | Stop reason: HOSPADM

## 2018-10-11 RX ADMIN — INSULIN ASPART 4 UNITS: 100 INJECTION, SOLUTION INTRAVENOUS; SUBCUTANEOUS at 10:10

## 2018-10-11 RX ADMIN — FUROSEMIDE 80 MG: 10 INJECTION, SOLUTION INTRAMUSCULAR; INTRAVENOUS at 11:10

## 2018-10-11 RX ADMIN — POTASSIUM CHLORIDE 40 MEQ: 1500 TABLET, EXTENDED RELEASE ORAL at 08:10

## 2018-10-11 RX ADMIN — FUROSEMIDE 10 MG/HR: 10 INJECTION, SOLUTION INTRAMUSCULAR; INTRAVENOUS at 06:10

## 2018-10-11 RX ADMIN — METOPROLOL SUCCINATE 25 MG: 25 TABLET, EXTENDED RELEASE ORAL at 08:10

## 2018-10-11 RX ADMIN — FUROSEMIDE 10 MG/HR: 10 INJECTION, SOLUTION INTRAMUSCULAR; INTRAVENOUS at 12:10

## 2018-10-11 RX ADMIN — Medication 3 ML: at 04:10

## 2018-10-11 RX ADMIN — Medication 3 ML: at 09:10

## 2018-10-11 RX ADMIN — ATORVASTATIN CALCIUM 80 MG: 20 TABLET, FILM COATED ORAL at 08:10

## 2018-10-11 RX ADMIN — ENOXAPARIN SODIUM 40 MG: 100 INJECTION SUBCUTANEOUS at 05:10

## 2018-10-11 RX ADMIN — INSULIN ASPART 4 UNITS: 100 INJECTION, SOLUTION INTRAVENOUS; SUBCUTANEOUS at 11:10

## 2018-10-11 RX ADMIN — MAGNESIUM OXIDE TAB 400 MG (241.3 MG ELEMENTAL MG) 400 MG: 400 (241.3 MG) TAB at 09:10

## 2018-10-11 RX ADMIN — ASPIRIN 81 MG: 81 TABLET, COATED ORAL at 08:10

## 2018-10-11 RX ADMIN — PRASUGREL 10 MG: 10 TABLET, FILM COATED ORAL at 08:10

## 2018-10-11 RX ADMIN — FUROSEMIDE 20 MG/HR: 10 INJECTION, SOLUTION INTRAMUSCULAR; INTRAVENOUS at 09:10

## 2018-10-11 NOTE — PROGRESS NOTES
"[facility] Hospital Medicine  Progress Note    Patient Name: Bharat Coker  MRN: [id number]  Patient Class: IP- Inpatient   Admission Date: 10/10/2018  Length of Stay: 1 days  Attending Physician: Jony Hendrickson Jr.,*  Primary Care Provider: Ronnie Richardson Jr, MD    [facility] Medicine Team: Networked reference to record PCT  Isaiah Sheppard MD    Subjective:     Principal Problem:HFrEF (heart failure with reduced ejection fraction)    Hospital Course:  74 y/o male with PMH of CAD s/p CABG, ICM, HFrEF who presents as a transfer from OSH after he presented there for SUMNER, weight gain and palpitations. He presented to OSH after being discharged from the same facility 4 days prior to Oct 8. He was there initially for ADHF and KASEY. The patient recently had a revision of his CRT-D. The patient became SOB on Oct 7 but did not have CP. The patient felt his pulse and noted it to be rapid. He was brought to the ED at OSH by EMS; his HR was ~170. The ECG was c/w WCT (later documentation notes SVT with aberrancy after interrogation was negative for VT), and he was given one dose of adenosine. The HR came down to 110. His CXR was c/w pulmonary edema. A pro-BNP was at one point 20084.The patient's WBC was 17 and sCr 1.6. tBili was 1.5. An ECHO on 10/8: LVIDd 6.5 cm, EF 15%, IVC normal collapsibility and normal IVC size. An undated stress test notes: "mild reversibility noted in the cardiac apex and there lateral wall of the heart from cardiac apex to approximately mid wall suspicion for ischemia..mild reversibility noted in the mid to basilar inferior/inferoseptal wall of the heart also suspicious for ischemia". He had a LHC 6/2018: LIMA-LAD patent, % occluded at ostium, SVG-% occluded, SVG-D 100% occluded, pLCx 30%, 100% occluded OM, 100% LAD occlusion after takeoff of D1, D1 is tiny with 80% disease, SVG-OM 80% proximal stenosis with 50% at site of anastomosis. An SVG-OM OKSANA was placed " at that time. The patient's WBC was 9.6 as of 10/9. Aldactone was held at some point 2/2 hyperkalemia. The patient believes he has been diuresed adequately and has no fluid left to remove.         Interval History:   NAEON. Patient reports SOB is breathing. Diuresed 2L over 24 hrs, negative 870cc.   Tele: NS VT & PVCs      Objective:     Vital Signs (Most Recent):  Temp: 96.9 °F (36.1 °C) (10/11/18 1146)  Pulse: 103 (10/11/18 1146)  Resp: 20 (10/11/18 1146)  BP: 98/67 (10/11/18 1146)  SpO2: 98 % (10/11/18 1146) Vital Signs (24h Range):  Temp:  [96.4 °F (35.8 °C)-97.8 °F (36.6 °C)] 96.9 °F (36.1 °C)  Pulse:  [] 103  Resp:  [16-20] 20  SpO2:  [92 %-98 %] 98 %  BP: ()/(58-71) 98/67     Weight: 71.8 kg (158 lb 4.6 oz)  Body mass index is 25.55 kg/m².    Intake/Output Summary (Last 24 hours) at 10/11/2018 1459  Last data filed at 10/11/2018 0622  Gross per 24 hour   Intake 545.92 ml   Output 1705 ml   Net -1159.08 ml      Physical Exam   Constitutional: He is oriented to person, place, and time. He appears well-developed and well-nourished.   HENT:   Head: Normocephalic.   Eyes: EOM are normal. Pupils are equal, round, and reactive to light.   Neck: Normal range of motion. Neck supple. JVD present.   Cardiovascular: Normal rate. Exam reveals gallop and S3.   Pulmonary/Chest: He has rales in the right lower field and the left lower field.   Abdominal: Soft. Bowel sounds are normal. He exhibits no distension.   Musculoskeletal: Normal range of motion. He exhibits no edema.   Neurological: He is alert and oriented to person, place, and time.       Significant Labs:   Bilirubin:   Recent Labs   Lab  10/10/18   0453  10/11/18   0458   BILITOT  1.6*  1.2*     Blood Culture: No results for input(s): LABBLOO in the last 48 hours.  CBC:   Recent Labs   Lab  10/10/18   0453  10/11/18   0458   WBC  10.92  10.86  9.82   HGB  12.8*  12.7*  13.2*   HCT  41.0  41.2  41.8   PLT  183  196  210     CMP:   Recent Labs   Lab   10/10/18   0453  10/11/18   0458   NA  139  137   K  4.8  3.7   CL  99  97   CO2  24  26   GLU  108  183*   BUN  54*  55*   CREATININE  1.8*  1.7*   CALCIUM  10.9*  10.0   PROT  7.8  7.7   ALBUMIN  3.4*  3.3*   BILITOT  1.6*  1.2*   ALKPHOS  117  119   AST  20  19   ALT  17  16   ANIONGAP  16  14   EGFRNONAA  37.0*  39.7*     Cardiac Markers:   Recent Labs   Lab  10/10/18   0453   BNP  4,674*     Lactic Acid: No results for input(s): LACTATE in the last 48 hours.  Troponin: No results for input(s): TROPONINI in the last 48 hours.  All pertinent labs within the past 24 hours have been reviewed.    Significant Imaging: I have reviewed all pertinent imaging results/findings within the past 24 hours.    Assessment/Plan:      * HFrEF (heart failure with reduced ejection fraction)    Mr. Coker is a 73 y.o.  M with a history of CAD s/p CABG, ICM (EF >10, LVEDD 7.8cm), who was transferred to [facility] for decompensated HF, +/- cardiogenic shock, MODS & evaluation for adanced tx options.                - BNP 4674    - 2D Echo (10/10/18) EF <10%, Diastolic dysfunction elevated LAP (grade 2), RV 4.5cm & TAPSE 1.3, LVEDD 7.8 cm                 - Admit to HTS service, Dr. Mathis   - Lasix 80mg IV bolus followed by IV 20mg/hr  - Will need RHC once euvolemic; planning for Monday  - Start patient on pathway 1 to LVAD/Transplant  - Consult CTS surgery for transplant/LVAD  - Cardiac diet 2g NS, 1.5L fluid restriction, daily weights, strict I/Os  - BMP, keep K>4.0, Mg>2.0  - Resume HF medications   - Maintain on telemetry and daily EKGs   - SCDs, TEDs, Nursing communication to elevated LE           ICD (implantable cardioverter-defibrillator) in place    - Episodes of NS VT & PVC on tele  - ICD-D programming               DM (diabetes mellitus)    Moderate insulin dose sliding scale  POCT glucose with meals and bedtime             CAD (coronary artery disease)    CAD s/p CABG,                    - Troponin negative on admission   -  Continue GDMT ASA, lipitor, metoprolol, effient              VTE Risk Mitigation (From admission, onward)        Ordered     enoxaparin injection 40 mg  Daily      10/10/18 0345     IP VTE HIGH RISK PATIENT  Once      10/10/18 0345              Isaiah Sheppard MD  Department of Hospital Medicine   [facility]

## 2018-10-11 NOTE — HOSPITAL COURSE
"74 y/o male with PMH of CAD s/p CABG, ICM, HFrEF who presents as a transfer from OSH after he presented there for SUMNER, weight gain and palpitations. He presented to OSH after being discharged from the same facility 4 days prior to Oct 8. He was there initially for ADHF and KASEY. The patient recently had a revision of his CRT-D. The patient became SOB on Oct 7 but did not have CP. The patient felt his pulse and noted it to be rapid. He was brought to the ED at OSH by EMS; his HR was ~170. The ECG was c/w WCT (later documentation notes SVT with aberrancy after interrogation was negative for VT), and he was given one dose of adenosine. The HR came down to 110. His CXR was c/w pulmonary edema. A pro-BNP was at one point 20084.The patient's WBC was 17 and sCr 1.6. tBili was 1.5. An ECHO on 10/8: LVIDd 6.5 cm, EF 15%, IVC normal collapsibility and normal IVC size. An undated stress test notes: "mild reversibility noted in the cardiac apex and there lateral wall of the heart from cardiac apex to approximately mid wall suspicion for ischemia..mild reversibility noted in the mid to basilar inferior/inferoseptal wall of the heart also suspicious for ischemia". He had a LHC 6/2018: LIMA-LAD patent, % occluded at ostium, SVG-% occluded, SVG-D 100% occluded, pLCx 30%, 100% occluded OM, 100% LAD occlusion after takeoff of D1, D1 is tiny with 80% disease, SVG-OM 80% proximal stenosis with 50% at site of anastomosis. An SVG-OM OKSANA was placed at that time. The patient's WBC was 9.6 as of 10/9. Aldactone was held at some point 2/2 hyperkalemia. The patient believes he has been diuresed adequately and has no fluid left to remove.       "

## 2018-10-11 NOTE — PROGRESS NOTES
PRE-EDUCATION BOOKLET NOTE:    Met with Bharat Coker and his wife, Nava Simon, and had a brief discussion regarding the advanced heart failure evaluation process including options for heart transplantation and/or left ventricular assist device (LVAD) implantation.     Heart Transplant Educational Booklet given to patient, which included the following handouts:  · Treatment options for Advanced Heart Failure: A Referral Guide for patients  · Pre Heart Transplant Coordinator Team Contact Information   · Recipient Informed Consent   · Wellness Contract  · Multiple Listing Protocol and [facility] toll free numbers   · VAD Education Packet   · Advanced Directives  · 8 Step Plan for Heart Failure Patients     Significant History:  · Prior sternotomies: yes  · ICD (if yes, indicate brand of device): Yes: Medtronic  · Blood transfusions (if yes, enter date and location): Yes: 1995, [facility]  · Angiography (if yes, enter date and location): Yes: 2018 [facility]  · Colonoscopy (if yes, enter date and location): Yes: Unknown, [facility]  · Pap smear (if yes, enter date and location): N/A  · Mammogram (if yes, enter date and location): N/A  · Tobacco history (if yes, enter amount and date quit if applicable): yes, 7 packs per week for ? years. Pt reports quitting [id number].    Question and answer session was conducted.  Patient was advised to bring the educational packet to future appointments and/or admissions.  Patient was encouraged to contact the coordinator team with any questions or concerns.  Understanding verbalized. Tx Coordinator contact information/business card provided.

## 2018-10-11 NOTE — SUBJECTIVE & OBJECTIVE
Interval History:   NAEON. Patient reports SOB is breathing. Diuresed 2L over 24 hrs, negative 870cc.   Tele: NS VT & PVCs      Objective:     Vital Signs (Most Recent):  Temp: 96.9 °F (36.1 °C) (10/11/18 1146)  Pulse: 103 (10/11/18 1146)  Resp: 20 (10/11/18 1146)  BP: 98/67 (10/11/18 1146)  SpO2: 98 % (10/11/18 1146) Vital Signs (24h Range):  Temp:  [96.4 °F (35.8 °C)-97.8 °F (36.6 °C)] 96.9 °F (36.1 °C)  Pulse:  [] 103  Resp:  [16-20] 20  SpO2:  [92 %-98 %] 98 %  BP: ()/(58-71) 98/67     Weight: 71.8 kg (158 lb 4.6 oz)  Body mass index is 25.55 kg/m².    Intake/Output Summary (Last 24 hours) at 10/11/2018 1459  Last data filed at 10/11/2018 0622  Gross per 24 hour   Intake 545.92 ml   Output 1705 ml   Net -1159.08 ml      Physical Exam   Constitutional: He is oriented to person, place, and time. He appears well-developed and well-nourished.   HENT:   Head: Normocephalic.   Eyes: EOM are normal. Pupils are equal, round, and reactive to light.   Neck: Normal range of motion. Neck supple. JVD present.   Cardiovascular: Normal rate. Exam reveals gallop and S3.   Pulmonary/Chest: He has rales in the right lower field and the left lower field.   Abdominal: Soft. Bowel sounds are normal. He exhibits no distension.   Musculoskeletal: Normal range of motion. He exhibits no edema.   Neurological: He is alert and oriented to person, place, and time.       Significant Labs:   Bilirubin:   Recent Labs   Lab  10/10/18   0453  10/11/18   0458   BILITOT  1.6*  1.2*     Blood Culture: No results for input(s): LABBLOO in the last 48 hours.  CBC:   Recent Labs   Lab  10/10/18   0453  10/11/18   0458   WBC  10.92  10.86  9.82   HGB  12.8*  12.7*  13.2*   HCT  41.0  41.2  41.8   PLT  183  196  210     CMP:   Recent Labs   Lab  10/10/18   0453  10/11/18   0458   NA  139  137   K  4.8  3.7   CL  99  97   CO2  24  26   GLU  108  183*   BUN  54*  55*   CREATININE  1.8*  1.7*   CALCIUM  10.9*  10.0   PROT  7.8  7.7    ALBUMIN  3.4*  3.3*   BILITOT  1.6*  1.2*   ALKPHOS  117  119   AST  20  19   ALT  17  16   ANIONGAP  16  14   EGFRNONAA  37.0*  39.7*     Cardiac Markers:   Recent Labs   Lab  10/10/18   0453   BNP  4,674*     Lactic Acid: No results for input(s): LACTATE in the last 48 hours.  Troponin: No results for input(s): TROPONINI in the last 48 hours.  All pertinent labs within the past 24 hours have been reviewed.    Significant Imaging: I have reviewed all pertinent imaging results/findings within the past 24 hours.

## 2018-10-11 NOTE — ASSESSMENT & PLAN NOTE
CAD s/p CABG,                    - Troponin negative on admission   - Continue GDMT ASA, lipitor, metoprolol, effient

## 2018-10-11 NOTE — ASSESSMENT & PLAN NOTE
Mr. Coker is a 74 y/o male with PMH of CAD s/p CABG, ICM/HFrEF (LVef <10%, LVEDD 7.8) who was a transfer for cardiogenic shock / MODS:    - BNP 4674   - [facility] 6/2018: LIMA-LAD patent, % occluded at ostium, SVG-% occluded, SVG-D 100% occluded, pLCx 30%, 100% occluded OM, 100% LAD occlusion after takeoff of D1, D1 is tiny with 80% disease, SVG-OM 80% proximal stenosis with 50% at site of anastomosis. An SVG-OM OKSANA was placed at that time.    - TTE EF <10%, G2DD, LVH (LVEDD 7.8 / LVESD 7.3 ), reduced RVSF (LV 4.5cm / TAPSE 1.3), PASP 26mmHG, Mod MR     - Started on Pathway for VAD only, pending CT H/CAP  - CT surgery consultation pending, appreciate assistance   - Increased Lasix 20mg/hr, continue closely monitor UOP,   - Continue PT / OT, ambulate as tolerated, elevated LE   - Fluid restriction at 1500 cc with strict I/Os and daily STANDING weights  - Check Electrolytes, keep Mag >2 & K+ >4  - Continue PTA HF GDMT

## 2018-10-11 NOTE — SUBJECTIVE & OBJECTIVE
Interval History:   No overnight events, doing well   -1L on Lasix ggt  Spouse at bedside  Ambulatory     Continuous Infusions:   furosemide (LASIX) 2 mg/mL infusion (non-titrating) 20 mg/hr (10/11/18 1125)     Scheduled Meds:   aspirin  81 mg Oral Daily    atorvastatin  80 mg Oral Daily    enoxaparin  40 mg Subcutaneous Daily    metoprolol succinate  25 mg Oral Daily    potassium chloride  40 mEq Oral Daily    prasugrel  10 mg Oral Daily    sodium chloride 0.9%  3 mL Intravenous Q8H     PRN Meds:dextrose 50%, dextrose 50%, glucagon (human recombinant), glucose, glucose, insulin aspart U-100    Review of patient's allergies indicates:   Allergen Reactions    Amiodarone analogues Anaphylaxis    Ativan [lorazepam] Anxiety     Objective:     Vital Signs (Most Recent):  Temp: 97.9 °F (36.6 °C) (10/11/18 1530)  Pulse: (!) 126 (10/11/18 1530)  Resp: 20 (10/11/18 1530)  BP: 96/63 (10/11/18 1530)  SpO2: 97 % (10/11/18 1530) Vital Signs (24h Range):  Temp:  [96.4 °F (35.8 °C)-97.9 °F (36.6 °C)] 97.9 °F (36.6 °C)  Pulse:  [] 126  Resp:  [16-20] 20  SpO2:  [92 %-98 %] 97 %  BP: ()/(58-71) 96/63     Patient Vitals for the past 72 hrs (Last 3 readings):   Weight   10/11/18 0800 71.8 kg (158 lb 4.6 oz)   10/10/18 0804 72.9 kg (160 lb 11.5 oz)   10/10/18 0334 76.2 kg (168 lb 1.6 oz)     Body mass index is 25.55 kg/m².      Intake/Output Summary (Last 24 hours) at 10/11/2018 1619  Last data filed at 10/11/2018 0622  Gross per 24 hour   Intake 545.92 ml   Output 1705 ml   Net -1159.08 ml       Hemodynamic Parameters:       Telemetry:   Sinus tach with frequent ectopy (NSVT / PVCs)    Physical Exam   Constitutional: He is oriented to person, place, and time. He appears well-developed and well-nourished.   HENT:   Head: Normocephalic.   Eyes: EOM are normal. Pupils are equal, round, and reactive to light.   Neck: Normal range of motion. Neck supple. JVD present.   Cardiovascular: Normal rate. Exam reveals gallop  and S3.   Pulmonary/Chest: He has rales in the right lower field and the left lower field.   Abdominal: Soft. Bowel sounds are normal. He exhibits no distension.   Musculoskeletal: Normal range of motion. He exhibits no edema.   Neurological: He is alert and oriented to person, place, and time.         Significant Labs:  CBC:  Recent Labs   Lab  10/10/18   0453  10/11/18   0458   WBC  10.92  10.86  9.82   RBC  4.35*  4.31*  4.47*   HGB  12.8*  12.7*  13.2*   HCT  41.0  41.2  41.8   PLT  183  196  210   MCV  94  96  94   MCH  29.4  29.5  29.5   MCHC  31.2*  30.8*  31.6*     BNP:  Recent Labs   Lab  10/10/18   0453   BNP  4,674*     CMP:  Recent Labs   Lab  10/10/18   0453  10/11/18   0458   GLU  108  183*   CALCIUM  10.9*  10.0   ALBUMIN  3.4*  3.3*   PROT  7.8  7.7   NA  139  137   K  4.8  3.7   CO2  24  26   CL  99  97   BUN  54*  55*   CREATININE  1.8*  1.7*   ALKPHOS  117  119   ALT  17  16   AST  20  19   BILITOT  1.6*  1.2*      Coagulation:   Recent Labs   Lab  10/10/18   0453   INR  1.1   APTT  22.3     LDH:  No results for input(s): LDH in the last 72 hours.  Microbiology:  Microbiology Results (last 7 days)     ** No results found for the last 168 hours. **          ABGs: No results for input(s): PH, PCO2, HCO3, POCSATURATED, BE in the last 72 hours.  BMP:   Recent Labs   Lab  10/10/18   0453  10/11/18   0458   GLU  108  183*   NA  139  137   K  4.8  3.7   CL  99  97   CO2  24  26   BUN  54*  55*   CREATININE  1.8*  1.7*   CALCIUM  10.9*  10.0   MG  2.1   --      Cardiac Markers: No results for input(s): CKMB, TROPONINT, MYOGLOBIN in the last 72 hours.  Coagulation:   Recent Labs   Lab  10/10/18   0453   INR  1.1   APTT  22.3     I have reviewed all pertinent labs within the past 24 hours.    Estimated Creatinine Clearance: 36 mL/min (A) (based on SCr of 1.7 mg/dL (H)).    Diagnostic Results:  I have reviewed all pertinent imaging results/findings within the past 24 hours.

## 2018-10-11 NOTE — NURSING
Pt HR sustained in the 120's notified Dr. Viramontes. HR resulted on its own. Will continue to monitor.

## 2018-10-11 NOTE — NURSING
Pt had 6 beats of vtach and pt heart rate is sustained in the 130s. Notified Jeremy. Will continue to monitor.

## 2018-10-11 NOTE — ASSESSMENT & PLAN NOTE
Medtronic CRT-D (09/20/18)  - Interrogated today consistent with NSVT, Afib / AF  - Closely  Monitor for further ectopy   - Will consider EP

## 2018-10-11 NOTE — PLAN OF CARE
Problem: Patient Care Overview  Goal: Plan of Care Review  Outcome: Ongoing (interventions implemented as appropriate)  Plan of care reviewed with pt. Pt remained free of falls, trauma, and injury. VSS. Pt having vtach, electrolytes replaced. Bolus of lasix given and lasix gtt increased to 20 mg. VSS. Will continue to monitor.

## 2018-10-11 NOTE — PROGRESS NOTES
Pt has a nonsustained run of vtach x 15.  Pt asymptomatic resting comfortably; VSS.  Dr. Venegas notifed;draw morning labs early replace mag/k as needed.  no further orders at this time.  Will continue to monitor.

## 2018-10-11 NOTE — ASSESSMENT & PLAN NOTE
Mr. Coker is a 73 y.o. M with a history of CAD s/p CABG, ICM (EF >10, LVEDD 7.8cm), who was transferred to [facility] for decompensated HF, +/- cardiogenic shock, MODS & evaluation for adanced tx options.                - BNP 4674    - 2D Echo (10/10/18) EF <10%, Diastolic dysfunction elevated LAP (grade 2), RV 4.5cm & TAPSE 1.3, LVEDD 7.8 cm                 - Admit to HTS service, Dr. Mathis   - Lasix 80mg IV bolus followed by IV 20mg/hr  - Will need RHC once euvolemic; planning for Monday  - Start patient on pathway 1 to LVAD/Transplant  - Consult CTS surgery for transplant/LVAD  - Cardiac diet 2g NS, 1.5L fluid restriction, daily weights, strict I/Os  - BMP, keep K>4.0, Mg>2.0  - Resume HF medications   - Maintain on telemetry and daily EKGs   - SCDs, TEDs, Nursing communication to elevated KRISTEL

## 2018-10-11 NOTE — PROGRESS NOTES
[facility]  Heart Transplant  Progress Note    Patient Name: Bharat Coker  MRN: [id number]  Admission Date: 10/10/2018  Hospital Length of Stay: 1 days  Attending Physician: Jony Hendrickson Jr.,*  Primary Care Provider: Ronnie Richardson Jr, MD  Principal Problem:HFrEF (heart failure with reduced ejection fraction)    Subjective:     Interval History:   No overnight events, doing well   -1L on Lasix ggt  Spouse at bedside  Ambulatory     Continuous Infusions:   furosemide (LASIX) 2 mg/mL infusion (non-titrating) 20 mg/hr (10/11/18 1125)     Scheduled Meds:   aspirin  81 mg Oral Daily    atorvastatin  80 mg Oral Daily    enoxaparin  40 mg Subcutaneous Daily    metoprolol succinate  25 mg Oral Daily    potassium chloride  40 mEq Oral Daily    prasugrel  10 mg Oral Daily    sodium chloride 0.9%  3 mL Intravenous Q8H     PRN Meds:dextrose 50%, dextrose 50%, glucagon (human recombinant), glucose, glucose, insulin aspart U-100    Review of patient's allergies indicates:   Allergen Reactions    Amiodarone analogues Anaphylaxis    Ativan [lorazepam] Anxiety     Objective:     Vital Signs (Most Recent):  Temp: 97.9 °F (36.6 °C) (10/11/18 1530)  Pulse: (!) 126 (10/11/18 1530)  Resp: 20 (10/11/18 1530)  BP: 96/63 (10/11/18 1530)  SpO2: 97 % (10/11/18 1530) Vital Signs (24h Range):  Temp:  [96.4 °F (35.8 °C)-97.9 °F (36.6 °C)] 97.9 °F (36.6 °C)  Pulse:  [] 126  Resp:  [16-20] 20  SpO2:  [92 %-98 %] 97 %  BP: ()/(58-71) 96/63     Patient Vitals for the past 72 hrs (Last 3 readings):   Weight   10/11/18 0800 71.8 kg (158 lb 4.6 oz)   10/10/18 0804 72.9 kg (160 lb 11.5 oz)   10/10/18 0334 76.2 kg (168 lb 1.6 oz)     Body mass index is 25.55 kg/m².      Intake/Output Summary (Last 24 hours) at 10/11/2018 1619  Last data filed at 10/11/2018 0622  Gross per 24 hour   Intake 545.92 ml   Output 1705 ml   Net -1159.08 ml       Hemodynamic Parameters:       Telemetry:   Sinus tach with  frequent ectopy (NSVT / PVCs)    Physical Exam   Constitutional: He is oriented to person, place, and time. He appears well-developed and well-nourished.   HENT:   Head: Normocephalic.   Eyes: EOM are normal. Pupils are equal, round, and reactive to light.   Neck: Normal range of motion. Neck supple. JVD present.   Cardiovascular: Normal rate. Exam reveals gallop and S3.   Pulmonary/Chest: He has rales in the right lower field and the left lower field.   Abdominal: Soft. Bowel sounds are normal. He exhibits no distension.   Musculoskeletal: Normal range of motion. He exhibits no edema.   Neurological: He is alert and oriented to person, place, and time.         Significant Labs:  CBC:  Recent Labs   Lab  10/10/18   0453  10/11/18   0458   WBC  10.92  10.86  9.82   RBC  4.35*  4.31*  4.47*   HGB  12.8*  12.7*  13.2*   HCT  41.0  41.2  41.8   PLT  183  196  210   MCV  94  96  94   MCH  29.4  29.5  29.5   MCHC  31.2*  30.8*  31.6*     BNP:  Recent Labs   Lab  10/10/18   0453   BNP  4,674*     CMP:  Recent Labs   Lab  10/10/18   0453  10/11/18   0458   GLU  108  183*   CALCIUM  10.9*  10.0   ALBUMIN  3.4*  3.3*   PROT  7.8  7.7   NA  139  137   K  4.8  3.7   CO2  24  26   CL  99  97   BUN  54*  55*   CREATININE  1.8*  1.7*   ALKPHOS  117  119   ALT  17  16   AST  20  19   BILITOT  1.6*  1.2*      Coagulation:   Recent Labs   Lab  10/10/18   0453   INR  1.1   APTT  22.3     LDH:  No results for input(s): LDH in the last 72 hours.  Microbiology:  Microbiology Results (last 7 days)     ** No results found for the last 168 hours. **          ABGs: No results for input(s): PH, PCO2, HCO3, POCSATURATED, BE in the last 72 hours.  BMP:   Recent Labs   Lab  10/10/18   0453  10/11/18   0458   GLU  108  183*   NA  139  137   K  4.8  3.7   CL  99  97   CO2  24  26   BUN  54*  55*   CREATININE  1.8*  1.7*   CALCIUM  10.9*  10.0   MG  2.1   --      Cardiac Markers: No results for input(s): CKMB, TROPONINT, MYOGLOBIN in the  "last 72 hours.  Coagulation:   Recent Labs   Lab  10/10/18   0453   INR  1.1   APTT  22.3     I have reviewed all pertinent labs within the past 24 hours.    Estimated Creatinine Clearance: 36 mL/min (A) (based on SCr of 1.7 mg/dL (H)).    Diagnostic Results:  I have reviewed all pertinent imaging results/findings within the past 24 hours.    Assessment and Plan:     74 y/o male with PMH of CAD s/p CABG, ICM, HFrEF who presents as a transfer from OSH after he presented there for SUMNER, weight gain and palpitations. He presented to OSH after being discharged from the same facility 4 days prior to Oct 8. He was there initially for ADHF and KASEY. The patient recently had a revision of his CRT-D. The patient became SOB on Oct 7 but did not have CP. The patient felt his pulse and noted it to be rapid. He was brought to the ED at OSH by EMS; his HR was ~170. The ECG was c/w WCT (later documentation notes SVT with aberrancy after interrogation was negative for VT), and he was given one dose of adenosine. The HR came down to 110. His CXR was c/w pulmonary edema. A pro-BNP was at one point 20084.The patient's WBC was 17 and sCr 1.6. tBili was 1.5. An ECHO on 10/8: LVIDd 6.5 cm, EF 15%, IVC normal collapsibility and normal IVC size. An undated stress test notes: "mild reversibility noted in the cardiac apex and there lateral wall of the heart from cardiac apex to approximately mid wall suspicion for ischemia..mild reversibility noted in the mid to basilar inferior/inferoseptal wall of the heart also suspicious for ischemia". He had a LHC 6/2018: LIMA-LAD patent, % occluded at ostium, SVG-% occluded, SVG-D 100% occluded, pLCx 30%, 100% occluded OM, 100% LAD occlusion after takeoff of D1, D1 is tiny with 80% disease, SVG-OM 80% proximal stenosis with 50% at site of anastomosis. An SVG-OM OKSANA was placed at that time. The patient's WBC was 9.6 as of 10/9. Aldactone was held at some point 2/2 hyperkalemia. The patient " believes he has been diuresed adequately and has no fluid left to remove.     * HFrEF (heart failure with reduced ejection fraction)    Mr. Coker is a 72 y/o male with PMH of CAD s/p CABG, ICM/HFrEF (LVef <10%, LVEDD 7.8) who was a transfer for cardiogenic shock / MODS:    - BNP 4674   - C 6/2018: LIMA-LAD patent, % occluded at ostium, SVG-% occluded, SVG-D 100% occluded, pLCx 30%, 100% occluded OM, 100% LAD occlusion after takeoff of D1, D1 is tiny with 80% disease, SVG-OM 80% proximal stenosis with 50% at site of anastomosis. An SVG-OM OKSANA was placed at that time.    - TTE EF <10%, G2DD, LVH (LVEDD 7.8 / LVESD 7.3 ), reduced RVSF (LV 4.5cm / TAPSE 1.3), PASP 26mmHG, Mod MR     - Started on Pathway for VAD only, pending CT H/CAP  - CT surgery consultation pending, appreciate assistance   - Increased Lasix 20mg/hr, continue closely monitor UOP,   - Continue PT / OT, ambulate as tolerated, elevated LE   - Fluid restriction at 1500 cc with strict I/Os and daily STANDING weights  - Check Electrolytes, keep Mag >2 & K+ >4  - Continue PTA HF GDMT          ICD (implantable cardioverter-defibrillator) in place    Medtronic CRT-D (09/20/18)  - Interrogated today consistent with NSVT, Afib / AF  - Closely  Monitor for further ectopy   - Will consider EP          DM (diabetes mellitus)     A1c 6.9,   - SSI with accuchecks  - will consider endocrine consult with VAD work up         CAD (coronary artery disease)    Recent MI 06/2018, ICM / HFrEF / CAD s/p CABG:   - Continue DAPT, statin, and BB  - consider ACEi once renal function panel returns            Rj Pacheco MD  Heart Transplant  [facility]

## 2018-10-11 NOTE — ASSESSMENT & PLAN NOTE
Recent MI 06/2018, ICM / HFrEF / CAD s/p CABG:   - Continue DAPT, statin, and BB  - consider ACEi once renal function panel returns

## 2018-10-11 NOTE — PLAN OF CARE
Problem: Patient Care Overview  Goal: Plan of Care Review  Outcome: Revised  Pt free of falls/trauma/injuries.  Denies c/o SOB, CP, or discomfort.  Generalized skin remains CDI; No edema noted.  Pt being diuresed with IV Lasix gtts; diuresing well.   Wt decreased since yesterday.  Pt tolerating plan of care.

## 2018-10-12 PROBLEM — J18.9 PNA (PNEUMONIA): Status: ACTIVE | Noted: 2018-10-12

## 2018-10-12 LAB
ALBUMIN SERPL BCP-MCNC: 3.5 G/DL
ALP SERPL-CCNC: 114 U/L
ALT SERPL W/O P-5'-P-CCNC: 17 U/L
ANION GAP SERPL CALC-SCNC: 14 MMOL/L
ANION GAP SERPL CALC-SCNC: 18 MMOL/L
AST SERPL-CCNC: 21 U/L
BASOPHILS # BLD AUTO: 0.05 K/UL
BASOPHILS NFR BLD: 0.5 %
BILIRUB SERPL-MCNC: 1.1 MG/DL
BUN SERPL-MCNC: 55 MG/DL
BUN SERPL-MCNC: 57 MG/DL
CALCIUM SERPL-MCNC: 10.1 MG/DL
CALCIUM SERPL-MCNC: 9.8 MG/DL
CHLORIDE SERPL-SCNC: 96 MMOL/L
CHLORIDE SERPL-SCNC: 96 MMOL/L
CO2 SERPL-SCNC: 26 MMOL/L
CO2 SERPL-SCNC: 29 MMOL/L
CREAT SERPL-MCNC: 1.6 MG/DL
CREAT SERPL-MCNC: 1.6 MG/DL
DIFFERENTIAL METHOD: ABNORMAL
EOSINOPHIL # BLD AUTO: 0.3 K/UL
EOSINOPHIL NFR BLD: 2.8 %
ERYTHROCYTE [DISTWIDTH] IN BLOOD BY AUTOMATED COUNT: 14.6 %
EST. GFR  (AFRICAN AMERICAN): 49.4 ML/MIN/1.73 M^2
EST. GFR  (AFRICAN AMERICAN): 49.4 ML/MIN/1.73 M^2
EST. GFR  (NON AFRICAN AMERICAN): 42.7 ML/MIN/1.73 M^2
EST. GFR  (NON AFRICAN AMERICAN): 42.7 ML/MIN/1.73 M^2
GLUCOSE SERPL-MCNC: 157 MG/DL
GLUCOSE SERPL-MCNC: 179 MG/DL
HCT VFR BLD AUTO: 43.2 %
HGB BLD-MCNC: 14.1 G/DL
IMM GRANULOCYTES # BLD AUTO: 0.03 K/UL
IMM GRANULOCYTES NFR BLD AUTO: 0.3 %
LYMPHOCYTES # BLD AUTO: 1.2 K/UL
LYMPHOCYTES NFR BLD: 13 %
MAGNESIUM SERPL-MCNC: 1.7 MG/DL
MAGNESIUM SERPL-MCNC: 1.7 MG/DL
MCH RBC QN AUTO: 29.7 PG
MCHC RBC AUTO-ENTMCNC: 32.6 G/DL
MCV RBC AUTO: 91 FL
MONOCYTES # BLD AUTO: 0.8 K/UL
MONOCYTES NFR BLD: 8.6 %
NEUTROPHILS # BLD AUTO: 6.8 K/UL
NEUTROPHILS NFR BLD: 74.8 %
NRBC BLD-RTO: 0 /100 WBC
PHOSPHATE SERPL-MCNC: 5.4 MG/DL
PLATELET # BLD AUTO: 190 K/UL
PMV BLD AUTO: 11.9 FL
POCT GLUCOSE: 174 MG/DL (ref 70–110)
POCT GLUCOSE: 188 MG/DL (ref 70–110)
POCT GLUCOSE: 294 MG/DL (ref 70–110)
POTASSIUM SERPL-SCNC: 3.3 MMOL/L
POTASSIUM SERPL-SCNC: 3.4 MMOL/L
PROT SERPL-MCNC: 8.1 G/DL
RBC # BLD AUTO: 4.74 M/UL
SODIUM SERPL-SCNC: 139 MMOL/L
SODIUM SERPL-SCNC: 140 MMOL/L
WBC # BLD AUTO: 9.15 K/UL

## 2018-10-12 PROCEDURE — 99232 SBSQ HOSP IP/OBS MODERATE 35: CPT | Mod: ,,, | Performed by: NURSE PRACTITIONER

## 2018-10-12 PROCEDURE — 63600175 PHARM REV CODE 636 W HCPCS: Performed by: STUDENT IN AN ORGANIZED HEALTH CARE EDUCATION/TRAINING PROGRAM

## 2018-10-12 PROCEDURE — 63600175 PHARM REV CODE 636 W HCPCS: Performed by: INTERNAL MEDICINE

## 2018-10-12 PROCEDURE — 25000003 PHARM REV CODE 250: Performed by: INTERNAL MEDICINE

## 2018-10-12 PROCEDURE — 97165 OT EVAL LOW COMPLEX 30 MIN: CPT

## 2018-10-12 PROCEDURE — 84100 ASSAY OF PHOSPHORUS: CPT

## 2018-10-12 PROCEDURE — 80053 COMPREHEN METABOLIC PANEL: CPT

## 2018-10-12 PROCEDURE — 85025 COMPLETE CBC W/AUTO DIFF WBC: CPT

## 2018-10-12 PROCEDURE — 97161 PT EVAL LOW COMPLEX 20 MIN: CPT

## 2018-10-12 PROCEDURE — 80048 BASIC METABOLIC PNL TOTAL CA: CPT

## 2018-10-12 PROCEDURE — 20600001 HC STEP DOWN PRIVATE ROOM

## 2018-10-12 PROCEDURE — A4216 STERILE WATER/SALINE, 10 ML: HCPCS | Performed by: INTERNAL MEDICINE

## 2018-10-12 PROCEDURE — 25000003 PHARM REV CODE 250: Performed by: STUDENT IN AN ORGANIZED HEALTH CARE EDUCATION/TRAINING PROGRAM

## 2018-10-12 PROCEDURE — 83735 ASSAY OF MAGNESIUM: CPT

## 2018-10-12 PROCEDURE — G8978 MOBILITY CURRENT STATUS: HCPCS | Mod: CJ

## 2018-10-12 PROCEDURE — 99232 SBSQ HOSP IP/OBS MODERATE 35: CPT | Mod: GC,,, | Performed by: INTERNAL MEDICINE

## 2018-10-12 PROCEDURE — G8979 MOBILITY GOAL STATUS: HCPCS | Mod: CH

## 2018-10-12 PROCEDURE — 36415 COLL VENOUS BLD VENIPUNCTURE: CPT

## 2018-10-12 RX ORDER — POTASSIUM CHLORIDE 20 MEQ/1
60 TABLET, EXTENDED RELEASE ORAL ONCE
Status: DISCONTINUED | OUTPATIENT
Start: 2018-10-12 | End: 2018-10-12

## 2018-10-12 RX ORDER — LANOLIN ALCOHOL/MO/W.PET/CERES
800 CREAM (GRAM) TOPICAL ONCE
Status: COMPLETED | OUTPATIENT
Start: 2018-10-12 | End: 2018-10-12

## 2018-10-12 RX ORDER — POTASSIUM CHLORIDE 20 MEQ/1
40 TABLET, EXTENDED RELEASE ORAL DAILY
Status: DISCONTINUED | OUTPATIENT
Start: 2018-10-12 | End: 2018-10-14

## 2018-10-12 RX ORDER — POTASSIUM CHLORIDE 20 MEQ/1
80 TABLET, EXTENDED RELEASE ORAL ONCE
Status: COMPLETED | OUTPATIENT
Start: 2018-10-12 | End: 2018-10-12

## 2018-10-12 RX ORDER — LANOLIN ALCOHOL/MO/W.PET/CERES
400 CREAM (GRAM) TOPICAL 2 TIMES DAILY
Status: DISCONTINUED | OUTPATIENT
Start: 2018-10-12 | End: 2018-10-15

## 2018-10-12 RX ADMIN — FUROSEMIDE 20 MG/HR: 10 INJECTION, SOLUTION INTRAMUSCULAR; INTRAVENOUS at 08:10

## 2018-10-12 RX ADMIN — INSULIN ASPART 2 UNITS: 100 INJECTION, SOLUTION INTRAVENOUS; SUBCUTANEOUS at 08:10

## 2018-10-12 RX ADMIN — POTASSIUM CHLORIDE 40 MEQ: 1500 TABLET, EXTENDED RELEASE ORAL at 08:10

## 2018-10-12 RX ADMIN — INSULIN ASPART 3 UNITS: 100 INJECTION, SOLUTION INTRAVENOUS; SUBCUTANEOUS at 09:10

## 2018-10-12 RX ADMIN — INSULIN ASPART 2 UNITS: 100 INJECTION, SOLUTION INTRAVENOUS; SUBCUTANEOUS at 05:10

## 2018-10-12 RX ADMIN — POTASSIUM CHLORIDE 80 MEQ: 1500 TABLET, EXTENDED RELEASE ORAL at 09:10

## 2018-10-12 RX ADMIN — ENOXAPARIN SODIUM 40 MG: 100 INJECTION SUBCUTANEOUS at 05:10

## 2018-10-12 RX ADMIN — ATORVASTATIN CALCIUM 80 MG: 20 TABLET, FILM COATED ORAL at 08:10

## 2018-10-12 RX ADMIN — ASPIRIN 81 MG: 81 TABLET, COATED ORAL at 08:10

## 2018-10-12 RX ADMIN — PRASUGREL 10 MG: 10 TABLET, FILM COATED ORAL at 08:10

## 2018-10-12 RX ADMIN — Medication 3 ML: at 02:10

## 2018-10-12 RX ADMIN — INSULIN DETEMIR 5 UNITS: 100 INJECTION, SOLUTION SUBCUTANEOUS at 09:10

## 2018-10-12 RX ADMIN — FUROSEMIDE 20 MG/HR: 10 INJECTION, SOLUTION INTRAMUSCULAR; INTRAVENOUS at 09:10

## 2018-10-12 RX ADMIN — MAGNESIUM OXIDE TAB 400 MG (241.3 MG ELEMENTAL MG) 400 MG: 400 (241.3 MG) TAB at 08:10

## 2018-10-12 RX ADMIN — METOPROLOL SUCCINATE 25 MG: 25 TABLET, EXTENDED RELEASE ORAL at 08:10

## 2018-10-12 RX ADMIN — INSULIN ASPART 6 UNITS: 100 INJECTION, SOLUTION INTRAVENOUS; SUBCUTANEOUS at 12:10

## 2018-10-12 RX ADMIN — MAGNESIUM OXIDE TAB 400 MG (241.3 MG ELEMENTAL MG) 800 MG: 400 (241.3 MG) TAB at 09:10

## 2018-10-12 NOTE — NURSING
Consulted with Dr. Luna regarding standing order of extra dose of potassium 60meq po x1. Physician order to d/c extra dose due to pt already had 40meq potassium on board.

## 2018-10-12 NOTE — PT/OT/SLP EVAL
"Physical Therapy Evaluation    Patient Name:  Bharat Coker   MRN:  [id number]    Co-Eval with OT   Recommendations:     Discharge Recommendations:  home with home health   Discharge Equipment Recommendations: none   Barriers to discharge: None    Assessment:     Bharat Coker is a 73 y.o. male admitted with a medical diagnosis of HFrEF (heart failure with reduced ejection fraction).  He presents with the following impairments/functional limitations:  impaired endurance, impaired functional mobilty, gait instability, impaired balance, impaired sensation, impaired cardiopulmonary response to activity. Pt tolerated activity with no SOB, no dizziness. Pt reported legs "feeling funny" and "off" while ambulating. Upon examination, pt demo'd symptoms consistent with drop foot in R LE, including trace contraction of R ankle dorsiflexion, diminished sensation over dorsum of R foot. Pt with scissoring gait pattern, with 1 LOB requiring CGA to recover. Pt demo's decreased safety awareness with impulsive behavior.  Pt would continue to benefit from acute skilled therapy intervention to address deficits and progress toward prior level of function.       Rehab Prognosis:  good; patient would benefit from acute skilled PT services to address these deficits and reach maximum level of function.      Recent Surgery: * No surgery found *      Plan:     During this hospitalization, patient to be seen 3 x/week to address the above listed problems via gait training, therapeutic activities, therapeutic exercises, neuromuscular re-education  · Plan of Care Expires:  11/12/18   Plan of Care Reviewed with: patient    Subjective     Communicated with RN prior to session.  Patient found standin gin room, attempting to walk, IV pole plugged into wall, IV line wrapped around table upon PT entry to room, agreeable to evaluation.      Chief Complaint: soreness indicated in low back, attributes to sitting up for prolonged time   Patient " comments/goals: to get better and return home   Pain/Comfort:  · Pain Rating 1: 0/10  · Pain Rating Post-Intervention 1: (Pt reporting soreness in low back, attributes from sitting up, does not quantify. )    Patients cultural, spiritual, Temple conflicts given the current situation: none reported     Living Environment:  Pt lives with wife in [facility] with 3 SILVIANO with R HR.   Prior to admission, patients level of function was independent with all mobility and ADLs, pt drives independently.  Patient has the following equipment: none.  DME owned (not currently used): none.  Upon discharge, patient will have assistance from wife.    Objective:     Patient found with: telemetry, peripheral IV     General Precautions: Standard, fall   Orthopedic Precautions:N/A   Braces: N/A     Exams:  · Cognitive Exam:  Patient is oriented to Person, Place, Time and Situation  · Sensation:  Intact, except diminished sensation over dorsum of R foot over 3rd metatarsal   · RLE ROM: WFL  · RLE Strength: WFL except trace contraction for R ankle dorsiflexion   · LLE ROM: WFL  · LLE Strength: WFL    Functional Mobility:  · Bed Mobility:   NT 2/2 pt standing upon arrival   · Transfers:   Stand to sit- independent   · Gait: Pt ambulated 300 ft with no AD and SBA. Pt demo'd significant scissoring gait pattern with decreased clearance of R foot. Pt experienced one significant LOB, able to recover with CGA. Pt unaware of narrowed base of support or scissoring gait, did not improve with verbal cuing.     AM-PAC 6 CLICK MOBILITY  Total Score:22       Therapeutic Activities and Exercises:   Pt educated on role of PT/POC, safety with mobility. Pt educated and encouraged not to perform OOB activity with out assistance from nursing/therapy. Pt verbalized understanding.     Patient left sitting EOB  with all lines intact and call button in reach. RN notified.     GOALS:   Multidisciplinary Problems     Physical Therapy Goals        Problem: Physical  Therapy Goal    Goal Priority Disciplines Outcome Goal Variances Interventions   Physical Therapy Goal     PT, PT/OT Ongoing (interventions implemented as appropriate)     Description:  Goals to be met by: 10/26/2018     Patient will increase functional independence with mobility by performin. Supine to sit with [place]  2. Sit to stand transfer with [place]  3. Gait  x 400 feet with [place] using no AD with no LOB.   4. Ascend/descend 3 stair with left Handrails Supervision using no AD.                       History:     Past Medical History:   Diagnosis Date    CHF (congestive heart failure)     COPD (chronic obstructive pulmonary disease)     Coronary artery disease     Diabetes mellitus        Past Surgical History:   Procedure Laterality Date    APPENDECTOMY      CARDIAC SURGERY      EYE SURGERY      FRACTURE SURGERY      TONSILLECTOMY         Clinical Decision Making:     History  Co-morbidities and personal factors that may impact the plan of care Examination  Body Structures and Functions, activity limitations and participation restrictions that may impact the plan of care Clinical Presentation   Decision Making/ Complexity Score   Co-morbidities:   [] Time since onset of injury / illness / exacerbation  [] Status of current condition  []Patient's cognitive status and safety concerns    [] Multiple Medical Problems (see med hx)  Personal Factors:   [] Patient's age  [] Prior Level of function   [] Patient's home situation (environment and family support)  [] Patient's level of motivation  [] Expected progression of patient      HISTORY:(criteria)    [] 94688 - no personal factors/history    [] 35039 - has 1-2 personal factor/comorbidity     [] 22136 - has >3 personal factor/comorbidity     Body Regions:  [] Objective examination findings  [] Head     []  Neck  [] Trunk   [] Upper Extremity  [] Lower Extremity    Body Systems:  [] For communication ability, affect, cognition,  language, and learning style: the assessment of the ability to make needs known, consciousness, orientation (person, place, and time), expected emotional /behavioral responses, and learning preferences (eg, learning barriers, education  needs)  [] For the neuromuscular system: a general assessment of gross coordinated movement (eg, balance, gait, locomotion, transfers, and transitions) and motor function  (motor control and motor learning)  [] For the musculoskeletal system: the assessment of gross symmetry, gross range of motion, gross strength, height, and weight  [] For the integumentary system: the assessment of pliability(texture), presence of scar formation, skin color, and skin integrity  [] For cardiovascular/pulmonary system: the assessment of heart rate, respiratory rate, blood pressure, and edema     Activity limitations:    [] Patient's cognitive status and saf ety concerns          [] Status of current condition      [] Weight bearing restriction  [] Cardiopulmunary Restriction    Participation Restrictions:   [] Goals and goal agreement with the patient     [] Rehab potential (prognosis) and probable outcome      Examination of Body System: (criteria)    [] 98264 - addressing 1-2 elements    [] 38893 - addressing a total of 3 or more elements     [] 04545 -  Addressing a total of 4 or more elements         Clinical Presentation: (criteria)  Choose one     On examination of body system using standardized tests and measures patient presents with 1-2 elements from any of the following: body structures and functions, activity limitations, and/or participation restrictions.  Leading to a clinical presentation that is considered stable and/or uncomplicated                              Clinical Decision Making  (Eval Complexity):  Low- 23124     Time Tracking:     PT Received On: 10/12/18  PT Start Time: 0902     PT Stop Time: 0916  PT Total Time (min): 14 min     Billable Minutes: Evaluation 14  mins      Liliane Mulligan, PT  10/12/2018

## 2018-10-12 NOTE — PLAN OF CARE
Problem: Occupational Therapy Goal  Goal: Occupational Therapy Goal  Outcome: Outcome(s) achieved Date Met: 10/12/18  OT evaluation completed and pt d/c'ed 10/12/2018 as pt is at/close to their functional baseline with no further acute OT needs at this time. Please re-consult if there is a change in functional status.

## 2018-10-12 NOTE — PLAN OF CARE
Problem: Physical Therapy Goal  Goal: Physical Therapy Goal  Goals to be met by: 10/26/2018     Patient will increase functional independence with mobility by performin. Supine to sit with [place]  2. Sit to stand transfer with [place]  3. Gait  x 400 feet with [place] using no AD with no LOB.   4. Ascend/descend 3 stair with left Handrails Supervision using no AD.     Outcome: Ongoing (interventions implemented as appropriate)  Pt evaluated and appropriate goals established.

## 2018-10-12 NOTE — SUBJECTIVE & OBJECTIVE
Interval History:   No issues overnight,   Continues to diurese minimally   Ambulatory  Tolerating PO diet     Continuous Infusions:   furosemide (LASIX) 2 mg/mL infusion (non-titrating) 20 mg/hr (10/11/18 2108)     Scheduled Meds:   aspirin  81 mg Oral Daily    atorvastatin  80 mg Oral Daily    enoxaparin  40 mg Subcutaneous Daily    magnesium oxide  400 mg Oral BID    metoprolol succinate  25 mg Oral Daily    potassium chloride  40 mEq Oral Daily    potassium chloride  60 mEq Oral Once    prasugrel  10 mg Oral Daily    sodium chloride 0.9%  3 mL Intravenous Q8H     PRN Meds:dextrose 50%, dextrose 50%, glucagon (human recombinant), glucose, glucose, insulin aspart U-100    Review of patient's allergies indicates:   Allergen Reactions    Amiodarone analogues Anaphylaxis    Ativan [lorazepam] Anxiety     Objective:     Vital Signs (Most Recent):  Temp: 97.5 °F (36.4 °C) (10/12/18 0441)  Pulse: 88 (10/12/18 0739)  Resp: 18 (10/12/18 0739)  BP: 111/80 (10/12/18 0739)  SpO2: (!) 90 % (10/12/18 0739) Vital Signs (24h Range):  Temp:  [96.9 °F (36.1 °C)-97.9 °F (36.6 °C)] 97.5 °F (36.4 °C)  Pulse:  [] 88  Resp:  [16-20] 18  SpO2:  [90 %-100 %] 90 %  BP: ()/(54-80) 111/80     Patient Vitals for the past 72 hrs (Last 3 readings):   Weight   10/12/18 0700 71.8 kg (158 lb 4.8 oz)   10/11/18 0800 71.8 kg (158 lb 4.6 oz)   10/10/18 0804 72.9 kg (160 lb 11.5 oz)     Body mass index is 25.55 kg/m².      Intake/Output Summary (Last 24 hours) at 10/12/2018 0817  Last data filed at 10/12/2018 0500  Gross per 24 hour   Intake 1110 ml   Output 1250 ml   Net -140 ml       Hemodynamic Parameters:       Telemetry:   NSR/ST with frequent NSVT, PVCs HR 90-110s    Physical Exam   Constitutional: He is oriented to person, place, and time. He appears well-developed and well-nourished.   HENT:   Head: Normocephalic.   Eyes: EOM are normal. Pupils are equal, round, and reactive to light.   Neck: Normal range of motion.  Neck supple. JVD present.   Cardiovascular: Normal rate. Exam reveals gallop and S3.   Pulmonary/Chest: He has rales in the right lower field and the left lower field.       Abdominal: Soft. Bowel sounds are normal. He exhibits no distension.   Musculoskeletal: Normal range of motion. He exhibits no edema.   Neurological: He is alert and oriented to person, place, and time.          Significant Labs:  CBC:  Recent Labs   Lab  10/10/18   0453  10/11/18   0458  10/12/18   0411   WBC  10.92  10.86  9.82  9.15   RBC  4.35*  4.31*  4.47*  4.74   HGB  12.8*  12.7*  13.2*  14.1   HCT  41.0  41.2  41.8  43.2   PLT  183  196  210  190   MCV  94  96  94  91   MCH  29.4  29.5  29.5  29.7   MCHC  31.2*  30.8*  31.6*  32.6     BNP:  Recent Labs   Lab  10/10/18   0453   BNP  4,674*     CMP:  Recent Labs   Lab  10/11/18   0458  10/11/18   1801  10/11/18   2303  10/12/18   0411   GLU  183*  257*  228*  157*   CALCIUM  10.0  9.9  10.3  10.1   ALBUMIN  3.3*   --   3.3*  3.5   PROT  7.7   --   8.0  8.1   NA  137  134*  137  140   K  3.7  3.9  4.3  3.3*   CO2  26  22*  28  26   CL  97  96  96  96   BUN  55*  58*  60*  55*   CREATININE  1.7*  1.7*  1.8*  1.6*   ALKPHOS  119   --   113  114   ALT  16   --   17  17   AST  19   --   22  21   BILITOT  1.2*   --   0.9  1.1*      Coagulation:   Recent Labs   Lab  10/10/18   0453   INR  1.1   APTT  22.3     LDH:  No results for input(s): LDH in the last 72 hours.  Microbiology:  Microbiology Results (last 7 days)     ** No results found for the last 168 hours. **          ABGs: No results for input(s): PH, PCO2, HCO3, POCSATURATED, BE in the last 72 hours.  BMP:   Recent Labs   Lab  10/12/18   0411   GLU  157*   NA  140   K  3.3*   CL  96   CO2  26   BUN  55*   CREATININE  1.6*   CALCIUM  10.1   MG  1.7     Cardiac Markers: No results for input(s): CKMB, TROPONINT, MYOGLOBIN in the last 72 hours.  Coagulation:   Recent Labs   Lab  10/10/18   0453   INR  1.1   APTT  22.3     I have  reviewed all pertinent labs within the past 24 hours.    Estimated Creatinine Clearance: 38.2 mL/min (A) (based on SCr of 1.6 mg/dL (H)).    Diagnostic Results:  I have reviewed all pertinent imaging results/findings within the past 24 hours.

## 2018-10-12 NOTE — PROGRESS NOTES
RN Proactive Rounding Note  Time of Visit:     Admit Date: 10/10/2018  LOS: 1  Code Status: Full Code   Date of Visit: 10/11/2018  : 1945  Age: 73 y.o.  Sex: male  Bed: 328/328 A:   MRN: [id number]  Was the patient discharged from an ICU this admission? no   Was the patient discharged from a PACU within last 24 hours? no  Did the patient receive conscious sedation/general anesthesia in last 24 hours? no  Was the patient in the ED within the past 24 hours? yes  Was the patient started on NIPPV within the past 24 hours? no  Attending Physician: Jony Hendrickson Jr.,*  Primary Service: Networked reference to record PCT       ASSESSMENT:     Abnormal Vital Signs:   Clinical Issues: Circulatory     INTERVENTIONS/ RECOMMENDATIONS:     Asked to see patient due to increase HR on telemetry to low 100s. Pt in hospital for LVAD w/u and CHF decompensation. Pt resting comfortably in chair on exam, crackles noted to LLL, LS otherwise clear. Pt denies SOB. Patient diuresing well on current lasix gtt. Frequent ectopy noted to telemetry orders for CMP, Mg obtained.      Discussed plan of care with NICOL Perez, contact MD for decrease in UO or increase in WOB    PHYSICIAN ESCALATION:     Yes/No Yes    Orders received and case discussed with Dr Venegas     Disposition: remain on floor    FOLLOW-UP/CONTINGENCY:       Call back the Rapid Response Nurse at [phone]  for additional questions or concerns

## 2018-10-12 NOTE — ASSESSMENT & PLAN NOTE
Medtronic CRT-D (09/20/18)  - Interrogated today consistent with NSVT, Afib / AF  - Closely  Monitor for further ectopy   - Will consider EP consultation if continues to persist   - Replete electrolytes

## 2018-10-12 NOTE — PROGRESS NOTES
Update:    SW to pt's room for update. Pt and wife aaox4, calm, and pleasant. Pt's 10 year old granddtr present as well. Pt's wife tearful at times when discussing LVAD surgery. Pt's wife reports she has discussed care giving with her dtr, and pt's dtr will move into pt's home to assist with with pt's care post VAD. Pt's wife reports pt has had poor short term memory for over 20 years, and she and pt have developed many work arounds to assist pt with daily life. Pt's wife reports pt is able to retain important information with much repetition.   Pt's wife had many questions about LVAD. SW answered all questions. Pt reports no questions for SW at this time. Pt reports coping well at this time. Pt's wife expressed some feeling of overwhelm. SW providing emotional support to pt's wife. SW encouraging self care and encouraging pt's wife to sleep at home in the evenings. Pt's wife reports no other questions or concerns for SW at this time. SW providing ongoing psychosocial and counseling support, education, assistance, resources, and discharge planning as indicated. SW continuing to follow and remains available.

## 2018-10-12 NOTE — ASSESSMENT & PLAN NOTE
Mr. Coker is a 72 y/o male with PMH of CAD s/p CABG, ICM/HFrEF (LVef <10%, LVEDD 7.8) who was a transfer for cardiogenic shock / MODS:    - BNP 4674   - [facility] 6/2018: LIMA-LAD patent, % occluded at ostium, SVG-% occluded, SVG-D 100% occluded, pLCx 30%, 100% occluded OM, 100% LAD occlusion after takeoff of D1, D1 is tiny with 80% disease, SVG-OM 80% proximal stenosis with 50% at site of anastomosis. An SVG-OM OKSANA was placed at that time.    - TTE EF <10%, G2DD, LVH (LVEDD 7.8 / LVESD 7.3 ), reduced RVSF (LV 4.5cm / TAPSE 1.3), PASP 26mmHG, Mod MR    - CT head: no acute changes, old lacunar infract   - CT CAP: Moderate atherosclerosis of the thoracic, abdominal aorta and CAD.  Mild calcification of the aortic valve and aortic and mitral valve annuli.    - Started on Pathway for VAD only,   - CT surgery consultation pending, appreciate assistance    - MELISSA/DEAN to assist with financial clearance  - Need OSH records from   - Plan for RHC on Monday   - Continue Lasix 20mg/hr, continue closely monitor UOP,   - Continue PT / OT, ambulate as tolerated, elevated LE   - Fluid restriction at 1500 cc with strict I/Os and daily STANDING weights  - Check Electrolytes, keep Mag >2 & K+ >4  - Continue PTA HF GDMT

## 2018-10-12 NOTE — HPI
"74 y/o male with PMH of CAD s/p CABG, ICM, HFrEF who presents as a transfer from OSH after he presented there for SUMNER, weight gain and palpitations. He presented to OSH after being discharged from the same facility 4 days prior to Oct 8. He was there initially for ADHF and KASEY. The patient recently had a revision of his CRT-D. The patient became SOB on Oct 7 but did not have CP. The patient felt his pulse and noted it to be rapid. He was brought to the ED at OSH by EMS; his HR was ~170. The ECG was c/w WCT (later documentation notes SVT with aberrancy after interrogation was negative for VT), and he was given one dose of adenosine. The HR came down to 110. His CXR was c/w pulmonary edema. A pro-BNP was at one point 20084.The patient's WBC was 17 and sCr 1.6. tBili was 1.5. An ECHO on 10/8: LVIDd 6.5 cm, EF 15%, IVC normal collapsibility and normal IVC size. An undated stress test notes: "mild reversibility noted in the cardiac apex and there lateral wall of the heart from cardiac apex to approximately mid wall suspicion for ischemia..mild reversibility noted in the mid to basilar inferior/inferoseptal wall of the heart also suspicious for ischemia". He had a LHC 6/2018: LIMA-LAD patent, % occluded at ostium, SVG-% occluded, SVG-D 100% occluded, pLCx 30%, 100% occluded OM, 100% LAD occlusion after takeoff of D1, D1 is tiny with 80% disease, SVG-OM 80% proximal stenosis with 50% at site of anastomosis. An SVG-OM OKSANA was placed at that time.      "

## 2018-10-12 NOTE — SUBJECTIVE & OBJECTIVE
No current facility-administered medications on file prior to encounter.      Current Outpatient Medications on File Prior to Encounter   Medication Sig    aspirin 81 MG Chew Take 81 mg by mouth once daily.    atorvastatin (LIPITOR) 80 MG tablet Take 80 mg by mouth nightly.    azelastine (ASTELIN) 137 mcg (0.1 %) nasal spray 1 spray by Nasal route 2 (two) times daily.    benzonatate (TESSALON) 200 MG capsule Take 200 mg by mouth 3 (three) times daily as needed for Cough.    docusate sodium (COLACE) 100 MG capsule Take 100 mg by mouth 2 (two) times daily as needed for Constipation.    doxycycline (VIBRA-TABS) 100 MG tablet Take 100 mg by mouth every 12 (twelve) hours.    enoxaparin (LOVENOX) 150 mg/mL Syrg Inject 40 mg into the skin once daily.    fluticasone (FLONASE) 50 mcg/actuation nasal spray 2 sprays by Each Nare route once daily.    furosemide (LASIX) 40 MG tablet Take 40 mg by mouth once daily.    glipiZIDE (GLUCOTROL) 2.5 MG TR24 Take 5 mg by mouth 2 (two) times daily.    insulin aspart U-100 (NOVOLOG) 100 unit/mL injection Inject 2-10 Units into the skin 4 (four) times daily with meals and nightly.    metoprolol succinate (TOPROL-XL) 25 MG 24 hr tablet Take 25 mg by mouth 2 (two) times daily.    prasugrel HCl (EFFIENT ORAL) Take 10 mg by mouth once daily.    ranitidine (ZANTAC) 150 MG tablet Take 150 mg by mouth once daily.    SITagliptin (JANUVIA) 50 MG Tab Take 100 mg by mouth once daily.    acetaminophen (TYLENOL) 650 MG TbSR Take 650 mg by mouth every 4 (four) hours as needed.       Review of patient's allergies indicates:   Allergen Reactions    Amiodarone analogues Anaphylaxis    Ativan [lorazepam] Anxiety       Past Medical History:   Diagnosis Date    CHF (congestive heart failure)     COPD (chronic obstructive pulmonary disease)     Coronary artery disease     Diabetes mellitus      Past Surgical History:   Procedure Laterality Date    APPENDECTOMY      CARDIAC SURGERY       EYE SURGERY      FRACTURE SURGERY      TONSILLECTOMY       Family History     Problem Relation (Age of Onset)    Heart disease Father, Sister, Brother    Stroke Father        Tobacco Use    Smoking status: Former Smoker     Types: Cigarettes    Smokeless tobacco: Never Used   Substance and Sexual Activity    Alcohol use: No     Frequency: Never    Drug use: No    Sexual activity: Not on file     Review of Systems   Constitutional: Negative for activity change and fatigue.   Respiratory: Negative for shortness of breath.    Cardiovascular: Negative for chest pain, palpitations and leg swelling.   Gastrointestinal: Negative for abdominal pain, diarrhea and vomiting.   Endocrine: Negative for polydipsia, polyphagia and polyuria.   Genitourinary: Negative for dysuria.   Musculoskeletal: Negative for gait problem.   Skin: Negative for rash.   Allergic/Immunologic: Negative for immunocompromised state.   Neurological: Negative for dizziness, syncope and weakness.   Hematological: Does not bruise/bleed easily.   Psychiatric/Behavioral: Negative for behavioral problems.     Objective:     Vital Signs (Most Recent):  Temp: 97.5 °F (36.4 °C) (10/12/18 0441)  Pulse: 88 (10/12/18 0739)  Resp: 18 (10/12/18 0739)  BP: 111/80 (10/12/18 0739)  SpO2: (!) 90 % (10/12/18 0739) Vital Signs (24h Range):  Temp:  [96.9 °F (36.1 °C)-97.9 °F (36.6 °C)] 97.5 °F (36.4 °C)  Pulse:  [] 88  Resp:  [16-20] 18  SpO2:  [90 %-100 %] 90 %  BP: ()/(54-80) 111/80     Weight: 71.8 kg (158 lb 4.8 oz)  Body mass index is 25.55 kg/m².    SpO2: (!) 90 %  O2 Device (Oxygen Therapy): nasal cannula     Intake/Output - Last 3 Shifts       10/10 0700 - 10/11 0659 10/11 0700 - 10/12 0659 10/12 0700 - 10/13 0659    P.O. 1140 1650     I.V. (mL/kg) 65.9 (0.9)      Total Intake(mL/kg) 1205.9 (16.5) 1650 (23)     Urine (mL/kg/hr) 2255 (1.3) 2100 (1.2)     Total Output 2255 2100     Net -1049.1 -450                   Lines/Drains/Airways      Peripheral Intravenous Line                 Peripheral IV - Double Lumen 10/08/18 Anterior;Left Antecubital 4 days         Peripheral IV - Single Lumen 10/11/18 1711 Right;Posterior Forearm less than 1 day              Physical Exam   Constitutional: He is oriented to person, place, and time. He appears well-developed and well-nourished.   Cardiovascular: Normal rate, regular rhythm and normal heart sounds.   Pulmonary/Chest: Effort normal and breath sounds normal.   Abdominal: Soft.   Neurological: He is alert and oriented to person, place, and time.   Skin: Skin is warm, dry and intact.   Psychiatric: He has a normal mood and affect.       Significant Labs:  BMP:   Recent Labs   Lab  10/12/18   0411   GLU  157*   NA  140   K  3.3*   CL  96   CO2  26   BUN  55*   CREATININE  1.6*   CALCIUM  10.1   MG  1.7     CBC:   Recent Labs   Lab  10/12/18   0411   WBC  9.15   RBC  4.74   HGB  14.1   HCT  43.2   PLT  190   MCV  91   MCH  29.7   MCHC  32.6       Significant Diagnostics:  CT: I have reviewed all pertinent results/findings within the past 24 hours     ECHO CONCLUSIONS     1 - Severely depressed left ventricular systolic function (EF <10%).     2 - Eccentric hypertrophy.     3 - Biatrial enlargement.     4 - Impaired LV relaxation, elevated LAP (grade 2 diastolic dysfunction).     5 - Right ventricular enlargement with moderately depressed systolic function.     6 - The estimated PA systolic pressure is 26 mmHg.     7 - Moderate mitral regurgitation.     8 - Mild tricuspid regurgitation.   LVEDD 7.8cm  TAPSE 1.3cm

## 2018-10-12 NOTE — CONSULTS
"[facility]  Cardiothoracic Surgery  Consult Note    Patient Name: Bharat Coker  MRN: [id number]  Admission Date: 10/10/2018  Attending Physician: Jony Hendrickson Jr.,*  Referring Provider: Ronnie Richardson Jr.,*    Patient information was obtained from patient and past medical records.     Inpatient consult to Cardiothoracic Surgery  Consult performed by: Kiah Herrera NP  Consult ordered by: Kristina De Oliveira PA-C  Reason for consult: Advanced options        Subjective:     Principal Problem: HFrEF (heart failure with reduced ejection fraction)    History of Present Illness: 72 y/o male with PMH of CAD s/p CABG, ICM, HFrEF who presents as a transfer from OSH after he presented there for SUMNER, weight gain and palpitations. He presented to OSH after being discharged from the same facility 4 days prior to Oct 8. He was there initially for ADHF and KASEY. The patient recently had a revision of his CRT-D. The patient became SOB on Oct 7 but did not have CP. The patient felt his pulse and noted it to be rapid. He was brought to the ED at OSH by EMS; his HR was ~170. The ECG was c/w WCT (later documentation notes SVT with aberrancy after interrogation was negative for VT), and he was given one dose of adenosine. The HR came down to 110. His CXR was c/w pulmonary edema. A pro-BNP was at one point 72981.The patient's WBC was 17 and sCr 1.6. tBili was 1.5. An ECHO on 10/8: LVIDd 6.5 cm, EF 15%, IVC normal collapsibility and normal IVC size. An undated stress test notes: "mild reversibility noted in the cardiac apex and there lateral wall of the heart from cardiac apex to approximately mid wall suspicion for ischemia..mild reversibility noted in the mid to basilar inferior/inferoseptal wall of the heart also suspicious for ischemia". He had a LHC 6/2018: LIMA-LAD patent, % occluded at ostium, SVG-% occluded, SVG-D 100% occluded, pLCx 30%, 100% occluded OM, 100% LAD occlusion after " takeoff of D1, D1 is tiny with 80% disease, SVG-OM 80% proximal stenosis with 50% at site of anastomosis. An SVG-OM OKSANA was placed at that time.        No current facility-administered medications on file prior to encounter.      Current Outpatient Medications on File Prior to Encounter   Medication Sig    aspirin 81 MG Chew Take 81 mg by mouth once daily.    atorvastatin (LIPITOR) 80 MG tablet Take 80 mg by mouth nightly.    azelastine (ASTELIN) 137 mcg (0.1 %) nasal spray 1 spray by Nasal route 2 (two) times daily.    benzonatate (TESSALON) 200 MG capsule Take 200 mg by mouth 3 (three) times daily as needed for Cough.    docusate sodium (COLACE) 100 MG capsule Take 100 mg by mouth 2 (two) times daily as needed for Constipation.    doxycycline (VIBRA-TABS) 100 MG tablet Take 100 mg by mouth every 12 (twelve) hours.    enoxaparin (LOVENOX) 150 mg/mL Syrg Inject 40 mg into the skin once daily.    fluticasone (FLONASE) 50 mcg/actuation nasal spray 2 sprays by Each Nare route once daily.    furosemide (LASIX) 40 MG tablet Take 40 mg by mouth once daily.    glipiZIDE (GLUCOTROL) 2.5 MG TR24 Take 5 mg by mouth 2 (two) times daily.    insulin aspart U-100 (NOVOLOG) 100 unit/mL injection Inject 2-10 Units into the skin 4 (four) times daily with meals and nightly.    metoprolol succinate (TOPROL-XL) 25 MG 24 hr tablet Take 25 mg by mouth 2 (two) times daily.    prasugrel HCl (EFFIENT ORAL) Take 10 mg by mouth once daily.    ranitidine (ZANTAC) 150 MG tablet Take 150 mg by mouth once daily.    SITagliptin (JANUVIA) 50 MG Tab Take 100 mg by mouth once daily.    acetaminophen (TYLENOL) 650 MG TbSR Take 650 mg by mouth every 4 (four) hours as needed.       Review of patient's allergies indicates:   Allergen Reactions    Amiodarone analogues Anaphylaxis    Ativan [lorazepam] Anxiety       Past Medical History:   Diagnosis Date    CHF (congestive heart failure)     COPD (chronic obstructive pulmonary disease)      Coronary artery disease     Diabetes mellitus      Past Surgical History:   Procedure Laterality Date    APPENDECTOMY      CARDIAC SURGERY      EYE SURGERY      FRACTURE SURGERY      TONSILLECTOMY       Family History     Problem Relation (Age of Onset)    Heart disease Father, Sister, Brother    Stroke Father        Tobacco Use    Smoking status: Former Smoker     Types: Cigarettes    Smokeless tobacco: Never Used   Substance and Sexual Activity    Alcohol use: No     Frequency: Never    Drug use: No    Sexual activity: Not on file     Review of Systems   Constitutional: Negative for activity change and fatigue.   Respiratory: Negative for shortness of breath.    Cardiovascular: Negative for chest pain, palpitations and leg swelling.   Gastrointestinal: Negative for abdominal pain, diarrhea and vomiting.   Endocrine: Negative for polydipsia, polyphagia and polyuria.   Genitourinary: Negative for dysuria.   Musculoskeletal: Negative for gait problem.   Skin: Negative for rash.   Allergic/Immunologic: Negative for immunocompromised state.   Neurological: Negative for dizziness, syncope and weakness.   Hematological: Does not bruise/bleed easily.   Psychiatric/Behavioral: Negative for behavioral problems.     Objective:     Vital Signs (Most Recent):  Temp: 97.5 °F (36.4 °C) (10/12/18 0441)  Pulse: 88 (10/12/18 0739)  Resp: 18 (10/12/18 0739)  BP: 111/80 (10/12/18 0739)  SpO2: (!) 90 % (10/12/18 0739) Vital Signs (24h Range):  Temp:  [96.9 °F (36.1 °C)-97.9 °F (36.6 °C)] 97.5 °F (36.4 °C)  Pulse:  [] 88  Resp:  [16-20] 18  SpO2:  [90 %-100 %] 90 %  BP: ()/(54-80) 111/80     Weight: 71.8 kg (158 lb 4.8 oz)  Body mass index is 25.55 kg/m².    SpO2: (!) 90 %  O2 Device (Oxygen Therapy): nasal cannula     Intake/Output - Last 3 Shifts       10/10 0700 - 10/11 0659 10/11 0700 - 10/12 0659 10/12 0700 - 10/13 0659    P.O. 1140 1650     I.V. (mL/kg) 65.9 (0.9)      Total Intake(mL/kg) 1205.9 (16.5)  1650 (23)     Urine (mL/kg/hr) 2255 (1.3) 2100 (1.2)     Total Output 2255 2100     Net -1049.1 -450                   Lines/Drains/Airways     Peripheral Intravenous Line                 Peripheral IV - Double Lumen 10/08/18 Anterior;Left Antecubital 4 days         Peripheral IV - Single Lumen 10/11/18 1711 Right;Posterior Forearm less than 1 day              Physical Exam   Constitutional: He is oriented to person, place, and time. He appears well-developed and well-nourished.   Cardiovascular: Normal rate, regular rhythm and normal heart sounds.   Pulmonary/Chest: Effort normal and breath sounds normal.   Abdominal: Soft.   Neurological: He is alert and oriented to person, place, and time.   Skin: Skin is warm, dry and intact.   Psychiatric: He has a normal mood and affect.       Significant Labs:  BMP:   Recent Labs   Lab  10/12/18   0411   GLU  157*   NA  140   K  3.3*   CL  96   CO2  26   BUN  55*   CREATININE  1.6*   CALCIUM  10.1   MG  1.7     CBC:   Recent Labs   Lab  10/12/18   0411   WBC  9.15   RBC  4.74   HGB  14.1   HCT  43.2   PLT  190   MCV  91   MCH  29.7   MCHC  32.6       Significant Diagnostics:  CT: I have reviewed all pertinent results/findings within the past 24 hours     ECHO CONCLUSIONS     1 - Severely depressed left ventricular systolic function (EF <10%).     2 - Eccentric hypertrophy.     3 - Biatrial enlargement.     4 - Impaired LV relaxation, elevated LAP (grade 2 diastolic dysfunction).     5 - Right ventricular enlargement with moderately depressed systolic function.     6 - The estimated PA systolic pressure is 26 mmHg.     7 - Moderate mitral regurgitation.     8 - Mild tricuspid regurgitation.   LVEDD 7.8cm  TAPSE 1.3cm      Assessment/Plan:     NYHA Score: NYHA IV: inability to carry on any physical activity without discomfort    * HFrEF (heart failure with reduced ejection fraction)    Pt is s/p CABG with diffuse calcifications in CT of chest. Dr. Khan to review CT and  staff            Thank you for your consult. I will follow-up with patient. Please contact us if you have any additional questions.    Kiah Herrera NP  Cardiothoracic Surgery  [facility]

## 2018-10-12 NOTE — PROGRESS NOTES
[facility]  Heart Transplant  Progress Note    Patient Name: Bharat Coker  MRN: [id number]  Admission Date: 10/10/2018  Hospital Length of Stay: 2 days  Attending Physician: Jony Hendrickson Jr.,*  Primary Care Provider: Ronnie Richardson Jr, MD  Principal Problem:HFrEF (heart failure with reduced ejection fraction)    Subjective:     Interval History:   No issues overnight,   Continues to diurese minimally   Ambulatory  Tolerating PO diet     Continuous Infusions:   furosemide (LASIX) 2 mg/mL infusion (non-titrating) 20 mg/hr (10/11/18 2108)     Scheduled Meds:   aspirin  81 mg Oral Daily    atorvastatin  80 mg Oral Daily    enoxaparin  40 mg Subcutaneous Daily    magnesium oxide  400 mg Oral BID    metoprolol succinate  25 mg Oral Daily    potassium chloride  40 mEq Oral Daily    potassium chloride  60 mEq Oral Once    prasugrel  10 mg Oral Daily    sodium chloride 0.9%  3 mL Intravenous Q8H     PRN Meds:dextrose 50%, dextrose 50%, glucagon (human recombinant), glucose, glucose, insulin aspart U-100    Review of patient's allergies indicates:   Allergen Reactions    Amiodarone analogues Anaphylaxis    Ativan [lorazepam] Anxiety     Objective:     Vital Signs (Most Recent):  Temp: 97.5 °F (36.4 °C) (10/12/18 0441)  Pulse: 88 (10/12/18 0739)  Resp: 18 (10/12/18 0739)  BP: 111/80 (10/12/18 0739)  SpO2: (!) 90 % (10/12/18 0739) Vital Signs (24h Range):  Temp:  [96.9 °F (36.1 °C)-97.9 °F (36.6 °C)] 97.5 °F (36.4 °C)  Pulse:  [] 88  Resp:  [16-20] 18  SpO2:  [90 %-100 %] 90 %  BP: ()/(54-80) 111/80     Patient Vitals for the past 72 hrs (Last 3 readings):   Weight   10/12/18 0700 71.8 kg (158 lb 4.8 oz)   10/11/18 0800 71.8 kg (158 lb 4.6 oz)   10/10/18 0804 72.9 kg (160 lb 11.5 oz)     Body mass index is 25.55 kg/m².      Intake/Output Summary (Last 24 hours) at 10/12/2018 0817  Last data filed at 10/12/2018 0500  Gross per 24 hour   Intake 1110 ml   Output 1250 ml   Net  -140 ml       Hemodynamic Parameters:       Telemetry:   NSR/ST with frequent NSVT, PVCs HR 90-110s    Physical Exam   Constitutional: He is oriented to person, place, and time. He appears well-developed and well-nourished.   HENT:   Head: Normocephalic.   Eyes: EOM are normal. Pupils are equal, round, and reactive to light.   Neck: Normal range of motion. Neck supple. JVD present.   Cardiovascular: Normal rate. Exam reveals gallop and S3.   Pulmonary/Chest: He has rales in the right lower field and the left lower field.       Abdominal: Soft. Bowel sounds are normal. He exhibits no distension.   Musculoskeletal: Normal range of motion. He exhibits no edema.   Neurological: He is alert and oriented to person, place, and time.          Significant Labs:  CBC:  Recent Labs   Lab  10/10/18   0453  10/11/18   0458  10/12/18   0411   WBC  10.92  10.86  9.82  9.15   RBC  4.35*  4.31*  4.47*  4.74   HGB  12.8*  12.7*  13.2*  14.1   HCT  41.0  41.2  41.8  43.2   PLT  183  196  210  190   MCV  94  96  94  91   MCH  29.4  29.5  29.5  29.7   MCHC  31.2*  30.8*  31.6*  32.6     BNP:  Recent Labs   Lab  10/10/18   0453   BNP  4,674*     CMP:  Recent Labs   Lab  10/11/18   0458  10/11/18   1801  10/11/18   2303  10/12/18   0411   GLU  183*  257*  228*  157*   CALCIUM  10.0  9.9  10.3  10.1   ALBUMIN  3.3*   --   3.3*  3.5   PROT  7.7   --   8.0  8.1   NA  137  134*  137  140   K  3.7  3.9  4.3  3.3*   CO2  26  22*  28  26   CL  97  96  96  96   BUN  55*  58*  60*  55*   CREATININE  1.7*  1.7*  1.8*  1.6*   ALKPHOS  119   --   113  114   ALT  16   --   17  17   AST  19   --   22  21   BILITOT  1.2*   --   0.9  1.1*      Coagulation:   Recent Labs   Lab  10/10/18   0453   INR  1.1   APTT  22.3     LDH:  No results for input(s): LDH in the last 72 hours.  Microbiology:  Microbiology Results (last 7 days)     ** No results found for the last 168 hours. **          ABGs: No results for input(s): PH, PCO2, HCO3, POCSATURATED,  "BE in the last 72 hours.  BMP:   Recent Labs   Lab  10/12/18   0411   GLU  157*   NA  140   K  3.3*   CL  96   CO2  26   BUN  55*   CREATININE  1.6*   CALCIUM  10.1   MG  1.7     Cardiac Markers: No results for input(s): CKMB, TROPONINT, MYOGLOBIN in the last 72 hours.  Coagulation:   Recent Labs   Lab  10/10/18   0453   INR  1.1   APTT  22.3     I have reviewed all pertinent labs within the past 24 hours.    Estimated Creatinine Clearance: 38.2 mL/min (A) (based on SCr of 1.6 mg/dL (H)).    Diagnostic Results:  I have reviewed all pertinent imaging results/findings within the past 24 hours.    Assessment and Plan:     72 y/o male with PMH of CAD s/p CABG, ICM, HFrEF who presents as a transfer from OSH after he presented there for [facility], weight gain and palpitations. He presented to OSH after being discharged from the same facility 4 days prior to Oct 8. He was there initially for ADHF and KASEY. The patient recently had a revision of his CRT-D. The patient became SOB on Oct 7 but did not have CP. The patient felt his pulse and noted it to be rapid. He was brought to the ED at OSH by EMS; his HR was ~170. The ECG was c/w WCT (later documentation notes SVT with aberrancy after interrogation was negative for VT), and he was given one dose of adenosine. The HR came down to 110. His CXR was c/w pulmonary edema. A pro-BNP was at one point 20084.The patient's WBC was 17 and sCr 1.6. tBili was 1.5. An ECHO on 10/8: LVIDd 6.5 cm, EF 15%, IVC normal collapsibility and normal IVC size. An undated stress test notes: "mild reversibility noted in the cardiac apex and there lateral wall of the heart from cardiac apex to approximately mid wall suspicion for ischemia..mild reversibility noted in the mid to basilar inferior/inferoseptal wall of the heart also suspicious for ischemia". He had a LHC 6/2018: LIMA-LAD patent, % occluded at ostium, SVG-% occluded, SVG-D 100% occluded, pLCx 30%, 100% occluded OM, 100% LAD " occlusion after takeoff of D1, D1 is tiny with 80% disease, SVG-OM 80% proximal stenosis with 50% at site of anastomosis. An SVG-OM OKSANA was placed at that time. The patient's WBC was 9.6 as of 10/9. Aldactone was held at some point 2/2 hyperkalemia. The patient believes he has been diuresed adequately and has no fluid left to remove.     * HFrEF (heart failure with reduced ejection fraction)    Mr. Coker is a 74 y/o male with PMH of CAD s/p CABG, ICM/HFrEF (LVef <10%, LVEDD 7.8) who was a transfer for cardiogenic shock / MODS:    - BNP 4674   - [facility] 6/2018: LIMA-LAD patent, % occluded at ostium, SVG-% occluded, SVG-D 100% occluded, pLCx 30%, 100% occluded OM, 100% LAD occlusion after takeoff of D1, D1 is tiny with 80% disease, SVG-OM 80% proximal stenosis with 50% at site of anastomosis. An SVG-OM OKSANA was placed at that time.    - TTE EF <10%, G2DD, LVH (LVEDD 7.8 / LVESD 7.3 ), reduced RVSF (LV 4.5cm / TAPSE 1.3), PASP 26mmHG, Mod MR    - CT head: no acute changes, old lacunar infract   - CT CAP: Moderate atherosclerosis of the thoracic, abdominal aorta and CAD.  Mild calcification of the aortic valve and aortic and mitral valve annuli.    - Started on Pathway for VAD only,   - CT surgery consultation pending, appreciate assistance    - MELISSA/DEAN to assist with financial clearance  - Need OSH records from   - Plan for RHC on Monday   - Continue Lasix 20mg/hr, continue closely monitor UOP,   - Continue PT / OT, ambulate as tolerated, elevated LE   - Fluid restriction at 1500 cc with strict I/Os and daily STANDING weights  - Check Electrolytes, keep Mag >2 & K+ >4  - Continue PTA HF GDMT          ICD (implantable cardioverter-defibrillator) in place    [employer] CRT-D (09/20/18)  - Interrogated today consistent with NSVT, Afib / AF  - Closely  Monitor for further ectopy   - Will consider EP consultation if continues to persist   - Replete electrolytes         DM (diabetes mellitus)     A1c 6.9,   - SSI  with accuchecks  - will consider endocrine consult with VAD work up         CAD (coronary artery disease)    Recent MI 06/2018, ICM / HFrEF / CAD s/p CABG:   - Continue DAPT, statin, and BB  - consider ACEi once renal function panel returns            Rj Pacheco MD  Heart Transplant  [facility]

## 2018-10-12 NOTE — PT/OT/SLP EVAL
Occupational Therapy   Evaluation and Discharge Note    Name: Bharat Coker  MRN: [id number]  Admitting Diagnosis:  HFrEF (heart failure with reduced ejection fraction)      Recommendations:     Discharge Recommendations: home with home health  Discharge Equipment Recommendations:  none  Barriers to discharge:  None    History:     Occupational Profile:  Living Environment: Pt lives with spouse in a H with 3STE and L Hrs. Bathroom has a tub/shower combo and walk in shower.   Previous level of function: PTA, pt was independent with functional mobility, ADLs, driving, and additional IADLs. Pt was driving and enjoys fishing red fish. 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   Roles and Routines: Pt is a father and  who enjoys fishing and values his independence.   Equipment Owned:  none  Assistance upon Discharge: Pt's spouse will assist at d/c.     Past Medical History:   Diagnosis Date    CHF (congestive heart failure)     COPD (chronic obstructive pulmonary disease)     Coronary artery disease     Diabetes mellitus        Past Surgical History:   Procedure Laterality Date    APPENDECTOMY      CARDIAC SURGERY      EYE SURGERY      FRACTURE SURGERY      TONSILLECTOMY         Subjective     Chief Complaint: RLE impairment, negatively impacting balance during mobility   Patient/Family stated goals: return home and to PLOF  Communicated with: RN prior to session. Pt agreeable to therapy evaluation. Pt noted to be in standing within room at start of session   Pain/Comfort:  · Pain Rating 1: 0/10  · Pain Addressed 1: Nurse notified  · Pain Rating Post-Intervention 1: (pt reporting pain in lumbar back s/t laying in the bed, however pain not rated )    Patients cultural, spiritual, [employer]ybeterian conflicts given the current situation: none reported     Objective:     Patient found with: telemetry, peripheral IV    General Precautions: Standard, fall   Orthopedic Precautions:N/A   Braces:  N/A     Occupational Performance:    Bed Mobility:    · N/A as pt found standing room with IV pole connected to wall outlet, attempting to engage in mobility to the bathroom for water     Functional Mobility/Transfers:  · Sit<>stand:  · x2 trials from/onto EOB with bed in lowest position and w/o the use of any AD   · Functional Mobility: Pt engaging in functional mobility to simulate household distances approx 300 ft with CGA and no AD in order to assess functional activity tolerance and balance required for engagement in occupations of choice.  · Pt noted with adduction with LE's during mobility, increasing instability with one LOB and CGA to recover      Activities of Daily Living:  · UB Dressing: setup assistance   · To don back gown while seated at EOB  · LB Dressing: CGA  · Pt with ability to access BLE's via anterior trunk flexion while seated at EOB  · CGA for standing dynamic balance     Cognitive/Visual Perceptual:  · Pt alert and oriented x4  · Pt with fair insight into condition and with good motivation to engage in therapy session  · Pt able to follow multi-step commands 100% of the time  · Effective verbal communication    · Intact short and long term memory  · Impaired safety awareness as pt noted with impulsivity with mobility as pt standing without assistance and unable to recognize instability with mobility initially   · No visual deficits present    Physical Exam:  Balance:  · Sitting: independent sitting at EOB  · Standing: CGA   Postural Examination: no deviations noted   Skin Integrity: intact   Edema: none noted   Sensation: pt reporting impaired sensation (numbness) in dorsal aspect of R foot   UE ROM:  · Right: WFL  · Left: WFL  UE Strength:  · Right: WFL  · Left: WFL   Strength:  · Right: WFL  · Left: WFL  Fine Motor Control: WFL  Gross Motor Control: WFL    Patient left seated at EOB with all lines intact, call button in reach and RN notified    AMPAC 6 Click:  AMPAC Total Score:  "22    Treatment & Education:  · Communicated OT POC  · Updated communication board  · Educated on importance of OOB mobility with assist, sitting up in chair, maximizing independence with ADLs, and continued engagement with therapy  · No family present during session  · Pt noted to have impaired dorsiflexion in RLE, impacting safety with functional mobility-RN notified and aware at end of session   Education:    Assessment:     Bharat Coker is a 73 y.o. male with a medical diagnosis of HFrEF (heart failure with reduced ejection fraction). At this time, patient is functioning at their prior level of function and does not require further acute OT services.     Clinical Decision Makin.  OT Low:  "Pt evaluation falls under low complexity for evaluation coding due to performance deficits noted in 1-3 areas as stated above and 0 co-morbities affecting current functional status. Data obtained from problem focused assessments. No modifications or assistance was required for completion of evaluation. Only brief occupational profile and history review completed."     Plan:     During this hospitalization, patient does not require further acute OT services.  Please re-consult if situation changes.    · Plan of Care Reviewed with: patient    This Plan of care has been discussed with the patient who was involved in its development and understands and is in agreement with the identified goals and treatment plan    GOALS:   Multidisciplinary Problems     Occupational Therapy Goals     Not on file          Multidisciplinary Problems (Resolved)        Problem: Occupational Therapy Goal    Goal Priority Disciplines Outcome Interventions   Occupational Therapy Goal   (Resolved)     OT, PT/OT Outcome(s) achieved                    Time Tracking:     OT Date of Treatment: 10/12/18  OT Start Time: 902  OT Stop Time: 916  OT Total Time (min): 14 min    Billable Minutes:Evaluation 14 minutes    Twyla Thurman, " OT  10/12/2018

## 2018-10-12 NOTE — PLAN OF CARE
Problem: Patient Care Overview  Goal: Plan of Care Review  Outcome: Ongoing (interventions implemented as appropriate)  Plan of Care reviewed/acknowledged with pt. Uneventful shift. Lasix continues at 20mg/hr (10 ml/hr). VSS with SBP maintaining 90's to low 100's. Pt sat up in chair most of the day. Pt on fluid restriction of 1500ml.  Fall precaution continues.

## 2018-10-13 LAB
ALBUMIN SERPL BCP-MCNC: 3.3 G/DL
ALP SERPL-CCNC: 110 U/L
ALT SERPL W/O P-5'-P-CCNC: 21 U/L
ANION GAP SERPL CALC-SCNC: 14 MMOL/L
AST SERPL-CCNC: 25 U/L
BASOPHILS # BLD AUTO: 0.07 K/UL
BASOPHILS NFR BLD: 0.7 %
BILIRUB SERPL-MCNC: 1 MG/DL
BUN SERPL-MCNC: 52 MG/DL
CALCIUM SERPL-MCNC: 10 MG/DL
CHLORIDE SERPL-SCNC: 96 MMOL/L
CO2 SERPL-SCNC: 29 MMOL/L
CREAT SERPL-MCNC: 1.6 MG/DL
DIFFERENTIAL METHOD: ABNORMAL
EOSINOPHIL # BLD AUTO: 0.3 K/UL
EOSINOPHIL NFR BLD: 3.2 %
ERYTHROCYTE [DISTWIDTH] IN BLOOD BY AUTOMATED COUNT: 14.7 %
EST. GFR  (AFRICAN AMERICAN): 49.4 ML/MIN/1.73 M^2
EST. GFR  (NON AFRICAN AMERICAN): 42.7 ML/MIN/1.73 M^2
GLUCOSE SERPL-MCNC: 203 MG/DL
HCT VFR BLD AUTO: 41.3 %
HGB BLD-MCNC: 13.2 G/DL
IMM GRANULOCYTES # BLD AUTO: 0.02 K/UL
IMM GRANULOCYTES NFR BLD AUTO: 0.2 %
LYMPHOCYTES # BLD AUTO: 1.1 K/UL
LYMPHOCYTES NFR BLD: 11.5 %
MAGNESIUM SERPL-MCNC: 1.8 MG/DL
MCH RBC QN AUTO: 29.5 PG
MCHC RBC AUTO-ENTMCNC: 32 G/DL
MCV RBC AUTO: 92 FL
MONOCYTES # BLD AUTO: 0.9 K/UL
MONOCYTES NFR BLD: 9.6 %
NEUTROPHILS # BLD AUTO: 7 K/UL
NEUTROPHILS NFR BLD: 74.8 %
NRBC BLD-RTO: 0 /100 WBC
PHOSPHATE SERPL-MCNC: 3.8 MG/DL
PLATELET # BLD AUTO: 202 K/UL
PMV BLD AUTO: 11.1 FL
POCT GLUCOSE: 202 MG/DL (ref 70–110)
POCT GLUCOSE: 210 MG/DL (ref 70–110)
POCT GLUCOSE: 291 MG/DL (ref 70–110)
POTASSIUM SERPL-SCNC: 4.3 MMOL/L
PROT SERPL-MCNC: 7.8 G/DL
RBC # BLD AUTO: 4.48 M/UL
SODIUM SERPL-SCNC: 139 MMOL/L
WBC # BLD AUTO: 9.4 K/UL

## 2018-10-13 PROCEDURE — 80053 COMPREHEN METABOLIC PANEL: CPT

## 2018-10-13 PROCEDURE — 63600175 PHARM REV CODE 636 W HCPCS: Performed by: STUDENT IN AN ORGANIZED HEALTH CARE EDUCATION/TRAINING PROGRAM

## 2018-10-13 PROCEDURE — 85025 COMPLETE CBC W/AUTO DIFF WBC: CPT

## 2018-10-13 PROCEDURE — 20600001 HC STEP DOWN PRIVATE ROOM

## 2018-10-13 PROCEDURE — 36415 COLL VENOUS BLD VENIPUNCTURE: CPT

## 2018-10-13 PROCEDURE — 83735 ASSAY OF MAGNESIUM: CPT

## 2018-10-13 PROCEDURE — A4216 STERILE WATER/SALINE, 10 ML: HCPCS | Performed by: INTERNAL MEDICINE

## 2018-10-13 PROCEDURE — 63600175 PHARM REV CODE 636 W HCPCS: Performed by: INTERNAL MEDICINE

## 2018-10-13 PROCEDURE — 25000003 PHARM REV CODE 250: Performed by: INTERNAL MEDICINE

## 2018-10-13 PROCEDURE — 84100 ASSAY OF PHOSPHORUS: CPT

## 2018-10-13 PROCEDURE — 99232 SBSQ HOSP IP/OBS MODERATE 35: CPT | Mod: GC,,, | Performed by: INTERNAL MEDICINE

## 2018-10-13 PROCEDURE — 25000003 PHARM REV CODE 250: Performed by: STUDENT IN AN ORGANIZED HEALTH CARE EDUCATION/TRAINING PROGRAM

## 2018-10-13 RX ADMIN — FUROSEMIDE 20 MG/HR: 10 INJECTION, SOLUTION INTRAMUSCULAR; INTRAVENOUS at 06:10

## 2018-10-13 RX ADMIN — POTASSIUM CHLORIDE 40 MEQ: 1500 TABLET, EXTENDED RELEASE ORAL at 08:10

## 2018-10-13 RX ADMIN — INSULIN ASPART 10 UNITS: 100 INJECTION, SOLUTION INTRAVENOUS; SUBCUTANEOUS at 12:10

## 2018-10-13 RX ADMIN — INSULIN ASPART 4 UNITS: 100 INJECTION, SOLUTION INTRAVENOUS; SUBCUTANEOUS at 05:10

## 2018-10-13 RX ADMIN — INSULIN ASPART 4 UNITS: 100 INJECTION, SOLUTION INTRAVENOUS; SUBCUTANEOUS at 08:10

## 2018-10-13 RX ADMIN — MAGNESIUM OXIDE TAB 400 MG (241.3 MG ELEMENTAL MG) 400 MG: 400 (241.3 MG) TAB at 08:10

## 2018-10-13 RX ADMIN — MAGNESIUM OXIDE TAB 400 MG (241.3 MG ELEMENTAL MG) 400 MG: 400 (241.3 MG) TAB at 09:10

## 2018-10-13 RX ADMIN — Medication 3 ML: at 05:10

## 2018-10-13 RX ADMIN — ASPIRIN 81 MG: 81 TABLET, COATED ORAL at 08:10

## 2018-10-13 RX ADMIN — ATORVASTATIN CALCIUM 80 MG: 20 TABLET, FILM COATED ORAL at 08:10

## 2018-10-13 RX ADMIN — ENOXAPARIN SODIUM 40 MG: 100 INJECTION SUBCUTANEOUS at 05:10

## 2018-10-13 RX ADMIN — INSULIN ASPART 3 UNITS: 100 INJECTION, SOLUTION INTRAVENOUS; SUBCUTANEOUS at 09:10

## 2018-10-13 RX ADMIN — METOPROLOL SUCCINATE 25 MG: 25 TABLET, EXTENDED RELEASE ORAL at 08:10

## 2018-10-13 RX ADMIN — PRASUGREL 10 MG: 10 TABLET, FILM COATED ORAL at 08:10

## 2018-10-13 NOTE — ASSESSMENT & PLAN NOTE
Mr. Coker is a 73 y.o. M with a history of CAD s/p CABG, ICM (EF >10, LVEDD 7.8cm), who was transferred to [facility] for decompensated HF, +/- cardiogenic shock, MODS & evaluation for adanced tx options.                - BNP 4674    - 2D Echo (10/10/18) EF <10%, Diastolic dysfunction elevated LAP (grade 2), RV 4.5cm & TAPSE 1.3, LVEDD 7.8 cm                 - Admit to HTS service, Dr. Mathis   - continue Lasix 80mg IV bolus followed by IV 20mg/hr  - Will need RHC once euvolemic; planning for Monday  - Start patient on pathway 1 to LVAD/Transplant  - Consult CTS surgery for transplant/LVAD  - Cardiac diet 2g NS, 1.5L fluid restriction, daily weights, strict I/Os  - BMP, keep K>4.0, Mg>2.0  - Resume HF medications   - Maintain on telemetry and daily EKGs   - SCDs, TEDs, Nursing communication to elevated KRISTEL

## 2018-10-13 NOTE — SUBJECTIVE & OBJECTIVE
Interval History  NAEON. Patient diuresing well; continue lasix ggt. Initiate pathway today.     Objective:     Vital Signs (Most Recent):  Temp: 97.6 °F (36.4 °C) (10/12/18 2116)  Pulse: 100 (10/12/18 2116)  Resp: 16 (10/12/18 2116)  BP: (!) 83/46 (10/12/18 2116)  SpO2: 96 % (10/12/18 2116) Vital Signs (24h Range):  Temp:  [97.3 °F (36.3 °C)-97.8 °F (36.6 °C)] 97.6 °F (36.4 °C)  Pulse:  [] 100  Resp:  [16-18] 16  SpO2:  [90 %-96 %] 96 %  BP: ()/(46-80) 83/46     Weight: 71.8 kg (158 lb 4.8 oz)  Body mass index is 25.55 kg/m².    Intake/Output Summary (Last 24 hours) at 10/12/2018 2259  Last data filed at 10/12/2018 1900  Gross per 24 hour   Intake 993.67 ml   Output 1725 ml   Net -731.33 ml      Physical Exam   Constitutional: He is oriented to person, place, and time. He appears well-developed and well-nourished.   HENT:   Head: Normocephalic.   Eyes: EOM are normal. Pupils are equal, round, and reactive to light.   Neck: Normal range of motion. Neck supple. JVD present.   Cardiovascular: Normal rate. Exam reveals gallop and S3.   Pulmonary/Chest: He has rales in the right lower field and the left lower field.   Abdominal: Soft. Bowel sounds are normal. He exhibits no distension.   Musculoskeletal: Normal range of motion. He exhibits no edema.   Neurological: He is alert and oriented to person, place, and time.       Significant Labs: All pertinent labs within the past 24 hours have been reviewed.    Significant Imaging: I have reviewed all pertinent imaging results/findings within the past 24 hours.

## 2018-10-13 NOTE — PLAN OF CARE
Problem: Patient Care Overview  Goal: Plan of Care Review  Outcome: Ongoing (interventions implemented as appropriate)  Lasix drip at 20mg/h, with daily lab, monitor need for electrolyte replacement, cbg per sliding scale, fall precautions maIntained, VSS, TELE A-paced, hr . [facility] Monday, [facility] w/o in progress. Will continue to monitor.

## 2018-10-13 NOTE — PROGRESS NOTES
[facility]  Heart Transplant  Progress Note    Patient Name: Bharat Coker  MRN: [id number]  Admission Date: 10/10/2018  Hospital Length of Stay: 3 days  Attending Physician: Jony Hendrickson Jr.,*  Primary Care Provider: Ronnie Richardson Jr, MD  Principal Problem:HFrEF (heart failure with reduced ejection fraction)    Subjective:     Interval History:   No overnight issues, doing well   Tolerating diet, likes nutritional supplements  Excellent diuresis       Continuous Infusions:   furosemide (LASIX) 2 mg/mL infusion (non-titrating) 20 mg/hr (10/13/18 0659)     Scheduled Meds:   aspirin  81 mg Oral Daily    atorvastatin  80 mg Oral Daily    enoxaparin  40 mg Subcutaneous Daily    insulin detemir U-100  10 Units Subcutaneous QHS    magnesium oxide  400 mg Oral BID    metoprolol succinate  25 mg Oral Daily    potassium chloride  40 mEq Oral Daily    prasugrel  10 mg Oral Daily    sodium chloride 0.9%  3 mL Intravenous Q8H     PRN Meds:dextrose 50%, dextrose 50%, glucagon (human recombinant), glucose, glucose, insulin aspart U-100    Review of patient's allergies indicates:   Allergen Reactions    Amiodarone analogues Anaphylaxis    Ativan [lorazepam] Anxiety     Objective:     Vital Signs (Most Recent):  Temp: 96.2 °F (35.7 °C) (10/13/18 0804)  Pulse: (!) 125 (10/13/18 0858)  Resp: 18 (10/13/18 0804)  BP: 125/81 (10/13/18 0858)  SpO2: 96 % (10/13/18 0808) Vital Signs (24h Range):  Temp:  [96.2 °F (35.7 °C)-97.8 °F (36.6 °C)] 96.2 °F (35.7 °C)  Pulse:  [] 125  Resp:  [12-18] 18  SpO2:  [94 %-99 %] 96 %  BP: ()/(46-81) 125/81     Patient Vitals for the past 72 hrs (Last 3 readings):   Weight   10/13/18 0800 72.1 kg (158 lb 14.4 oz)   10/12/18 0700 71.8 kg (158 lb 4.8 oz)   10/11/18 0800 71.8 kg (158 lb 4.6 oz)     Body mass index is 25.65 kg/m².      Intake/Output Summary (Last 24 hours) at 10/13/2018 1054  Last data filed at 10/13/2018 0400  Gross per 24 hour   Intake  1015 ml   Output 1500 ml   Net -485 ml       Hemodynamic Parameters:       Telemetry:   ST HR 90-120s with ongoing NSVT, PVCs    Physical Exam   Constitutional: He is oriented to person, place, and time. He appears well-developed and well-nourished.   HENT:   Head: Normocephalic.   Eyes: EOM are normal. Pupils are equal, round, and reactive to light.   Neck: Normal range of motion. Neck supple. JVD present.   Cardiovascular: Normal rate. Exam reveals no gallop.   Pulmonary/Chest: He has no rales.       Abdominal: Soft. Bowel sounds are normal. He exhibits no distension.   Musculoskeletal: Normal range of motion. He exhibits no edema.   Neurological: He is alert and oriented to person, place, and time.       Significant Labs:  CBC:  Recent Labs   Lab  10/11/18   0458  10/12/18   0411  10/13/18   0601   WBC  9.82  9.15  9.40   RBC  4.47*  4.74  4.48*   HGB  13.2*  14.1  13.2*   HCT  41.8  43.2  41.3   PLT  210  190  202   MCV  94  91  92   MCH  29.5  29.7  29.5   MCHC  31.6*  32.6  32.0     BNP:  Recent Labs   Lab  10/10/18   0453   BNP  4,674*     CMP:  Recent Labs   Lab  10/11/18   2303  10/12/18   0411  10/12/18   1415  10/13/18   0601   GLU  228*  157*  179*  203*   CALCIUM  10.3  10.1  9.8  10.0   ALBUMIN  3.3*  3.5   --   3.3*   PROT  8.0  8.1   --   7.8   NA  137  140  139  139   K  4.3  3.3*  3.4*  4.3   CO2  28  26  29  29   CL  96  96  96  96   BUN  60*  55*  57*  52*   CREATININE  1.8*  1.6*  1.6*  1.6*   ALKPHOS  113  114   --   110   ALT  17  17   --   21   AST  22  21   --   25   BILITOT  0.9  1.1*   --   1.0      Coagulation:   Recent Labs   Lab  10/10/18   0453   INR  1.1   APTT  22.3     LDH:  No results for input(s): LDH in the last 72 hours.  Microbiology:  Microbiology Results (last 7 days)     Procedure Component Value Units Date/Time    Blood culture [[id number]]     Order Status:  Canceled Specimen:  Blood     Blood culture [[id number]]     Order Status:  Canceled Specimen:  Blood           ABGs:  "No results for input(s): PH, PCO2, HCO3, POCSATURATED, BE in the last 72 hours.  BMP:   Recent Labs   Lab  10/13/18   0601   GLU  203*   NA  139   K  4.3   CL  96   CO2  29   BUN  52*   CREATININE  1.6*   CALCIUM  10.0   MG  1.8     Cardiac Markers: No results for input(s): CKMB, TROPONINT, MYOGLOBIN in the last 72 hours.  Coagulation: No results for input(s): PT, INR, APTT in the last 72 hours.  I have reviewed all pertinent labs within the past 24 hours.    Estimated Creatinine Clearance: 38.2 mL/min (A) (based on SCr of 1.6 mg/dL (H)).    Diagnostic Results:  I have reviewed all pertinent imaging results/findings within the past 24 hours.    Assessment and Plan:     72 y/o male with PMH of CAD s/p CABG, ICM, HFrEF who presents as a transfer from OSH after he presented there for SUMNER, weight gain and palpitations. He presented to OSH after being discharged from the same facility 4 days prior to Oct 8. He was there initially for ADHF and KASEY. The patient recently had a revision of his CRT-D. The patient became SOB on Oct 7 but did not have CP. The patient felt his pulse and noted it to be rapid. He was brought to the ED at OSH by EMS; his HR was ~170. The ECG was c/w WCT (later documentation notes SVT with aberrancy after interrogation was negative for VT), and he was given one dose of adenosine. The HR came down to 110. His CXR was c/w pulmonary edema. A pro-BNP was at one point 20084.The patient's WBC was 17 and sCr 1.6. tBili was 1.5. An ECHO on 10/8: LVIDd 6.5 cm, EF 15%, IVC normal collapsibility and normal IVC size. An undated stress test notes: "mild reversibility noted in the cardiac apex and there lateral wall of the heart from cardiac apex to approximately mid wall suspicion for ischemia..mild reversibility noted in the mid to basilar inferior/inferoseptal wall of the heart also suspicious for ischemia". He had a LHC 6/2018: LIMA-LAD patent, % occluded at ostium, SVG-% occluded, SVG-D 100% " occluded, pLCx 30%, 100% occluded OM, 100% LAD occlusion after takeoff of D1, D1 is tiny with 80% disease, SVG-OM 80% proximal stenosis with 50% at site of anastomosis. An SVG-OM OKSANA was placed at that time. The patient's WBC was 9.6 as of 10/9. Aldactone was held at some point 2/2 hyperkalemia. The patient believes he has been diuresed adequately and has no fluid left to remove.     * HFrEF (heart failure with reduced ejection fraction)    Mr. Coker is a 72 y/o male with PMH of CAD s/p CABG, ICM/HFrEF (LVef <10%, LVEDD 7.8) who was a transfer for cardiogenic shock / MODS:    - BNP 4674   - [facility] 6/2018: LIMA-LAD patent, % occluded at ostium, SVG-% occluded, SVG-D 100% occluded, pLCx 30%, 100% occluded OM, 100% LAD occlusion after takeoff of D1, D1 is tiny with 80% disease, SVG-OM 80% proximal stenosis with 50% at site of anastomosis. An SVG-OM OKSANA was placed at that time.    - TTE EF <10%, G2DD, LVH (LVEDD 7.8 / LVESD 7.3 ), reduced RVSF (LV 4.5cm / TAPSE 1.3), PASP 26mmHG, Mod MR    - CT head: no acute changes, old lacunar infract   - CT CAP: Moderate atherosclerosis of the thoracic, abdominal aorta and CAD.  Mild calcification of the aortic valve and aortic and mitral valve annuli.    - Started on Pathway for VAD only, currently on step 2    - CT surgery consultation pending, appreciate assistance    - MELISSA/EDAN to assist with financial clearance  - Need OSH records from ,  - Plan for RHC on Monday   - Continue Lasix 20mg/hr, continue closely monitor UOP,   - Continue PT / OT, ambulate as tolerated, elevated LE   - Fluid restriction at 1500 cc with strict I/Os and daily STANDING weights  - Check Electrolytes, keep Mag >2 & K+ >4  - Continue PTA HF GDMT          ICD (implantable cardioverter-defibrillator) in place    [employer] CRT-D (09/20/18)  - Interrogated today consistent with NSVT, Afib / AF  - Closely  Monitor for further ectopy   - Will consider EP consultation if continues to persist   -  Replete electrolytes         DM (diabetes mellitus)     A1c 6.9,   - SSI with accuchecks  - will consider endocrine consult with VAD work up         CAD (coronary artery disease)    Recent MI 06/2018, ICM / HFrEF / CAD s/p CABG:   - Continue DAPT, statin, and BB  - consider ACEi once renal function panel returns            Rj Pacheco MD  Heart Transplant  [facility]

## 2018-10-13 NOTE — ASSESSMENT & PLAN NOTE
Mr. Coker is a 72 y/o male with PMH of CAD s/p CABG, ICM/HFrEF (LVef <10%, LVEDD 7.8) who was a transfer for cardiogenic shock / MODS:    - BNP 4674   - [facility] 6/2018: LIMA-LAD patent, % occluded at ostium, SVG-% occluded, SVG-D 100% occluded, pLCx 30%, 100% occluded OM, 100% LAD occlusion after takeoff of D1, D1 is tiny with 80% disease, SVG-OM 80% proximal stenosis with 50% at site of anastomosis. An SVG-OM OKSANA was placed at that time.    - TTE EF <10%, G2DD, LVH (LVEDD 7.8 / LVESD 7.3 ), reduced RVSF (LV 4.5cm / TAPSE 1.3), PASP 26mmHG, Mod MR    - CT head: no acute changes, old lacunar infract   - CT CAP: Moderate atherosclerosis of the thoracic, abdominal aorta and CAD.  Mild calcification of the aortic valve and aortic and mitral valve annuli.    - Started on Pathway for VAD only, currently on step 2    - CT surgery consultation pending, appreciate assistance    - MELISSA/DEAN to assist with financial clearance  - Need OSH records from ,  - Plan for RHC on Monday   - Continue Lasix 20mg/hr, continue closely monitor UOP,   - Continue PT / OT, ambulate as tolerated, elevated LE   - Fluid restriction at 1500 cc with strict I/Os and daily STANDING weights  - Check Electrolytes, keep Mag >2 & K+ >4  - Continue PTA HF GDMT

## 2018-10-13 NOTE — PROGRESS NOTES
"[facility] Hospital Medicine  Progress Note    Patient Name: Bharat Coker  MRN: [id number]  Patient Class: IP- Inpatient   Admission Date: 10/10/2018  Length of Stay: 2 days  Attending Physician: Jony Hendrickson Jr.,*  Primary Care Provider: Ronnie Richardson Jr, MD    [facility] Medicine Team: Networked reference to record PCT  Isaiah Sheppard MD    Subjective:     Principal Problem:HFrEF (heart failure with reduced ejection fraction)    HPI:  No notes on file    Hospital Course:  72 y/o male with PMH of CAD s/p CABG, ICM, HFrEF who presents as a transfer from OSH after he presented there for SUMNER, weight gain and palpitations. He presented to OSH after being discharged from the same facility 4 days prior to Oct 8. He was there initially for ADHF and KASEY. The patient recently had a revision of his CRT-D. The patient became SOB on Oct 7 but did not have CP. The patient felt his pulse and noted it to be rapid. He was brought to the ED at OSH by EMS; his HR was ~170. The ECG was c/w WCT (later documentation notes SVT with aberrancy after interrogation was negative for VT), and he was given one dose of adenosine. The HR came down to 110. His CXR was c/w pulmonary edema. A pro-BNP was at one point 20084.The patient's WBC was 17 and sCr 1.6. tBili was 1.5. An ECHO on 10/8: LVIDd 6.5 cm, EF 15%, IVC normal collapsibility and normal IVC size. An undated stress test notes: "mild reversibility noted in the cardiac apex and there lateral wall of the heart from cardiac apex to approximately mid wall suspicion for ischemia..mild reversibility noted in the mid to basilar inferior/inferoseptal wall of the heart also suspicious for ischemia". He had a LHC 6/2018: LIMA-LAD patent, % occluded at ostium, SVG-% occluded, SVG-D 100% occluded, pLCx 30%, 100% occluded OM, 100% LAD occlusion after takeoff of D1, D1 is tiny with 80% disease, SVG-OM 80% proximal stenosis with 50% at site of " anastomosis. An SVG-OM OKSANA was placed at that time. The patient's WBC was 9.6 as of 10/9. Aldactone was held at some point 2/2 hyperkalemia. The patient believes he has been diuresed adequately and has no fluid left to remove.         Interval History  NAEON. Patient diuresing well; continue lasix ggt. Initiate pathway today.     Objective:     Vital Signs (Most Recent):  Temp: 97.6 °F (36.4 °C) (10/12/18 2116)  Pulse: 100 (10/12/18 2116)  Resp: 16 (10/12/18 2116)  BP: (!) 83/46 (10/12/18 2116)  SpO2: 96 % (10/12/18 2116) Vital Signs (24h Range):  Temp:  [97.3 °F (36.3 °C)-97.8 °F (36.6 °C)] 97.6 °F (36.4 °C)  Pulse:  [] 100  Resp:  [16-18] 16  SpO2:  [90 %-96 %] 96 %  BP: ()/(46-80) 83/46     Weight: 71.8 kg (158 lb 4.8 oz)  Body mass index is 25.55 kg/m².    Intake/Output Summary (Last 24 hours) at 10/12/2018 2259  Last data filed at 10/12/2018 1900  Gross per 24 hour   Intake 993.67 ml   Output 1725 ml   Net -731.33 ml      Physical Exam   Constitutional: He is oriented to person, place, and time. He appears well-developed and well-nourished.   HENT:   Head: Normocephalic.   Eyes: EOM are normal. Pupils are equal, round, and reactive to light.   Neck: Normal range of motion. Neck supple. JVD present.   Cardiovascular: Normal rate. Exam reveals gallop and S3.   Pulmonary/Chest: He has rales in the right lower field and the left lower field.   Abdominal: Soft. Bowel sounds are normal. He exhibits no distension.   Musculoskeletal: Normal range of motion. He exhibits no edema.   Neurological: He is alert and oriented to person, place, and time.       Significant Labs: All pertinent labs within the past 24 hours have been reviewed.    Significant Imaging: I have reviewed all pertinent imaging results/findings within the past 24 hours.    Assessment/Plan:      * HFrEF (heart failure with reduced ejection fraction)    Mr. Coker is a 73 y.o.  M with a history of CAD s/p CABG, ICM (EF >10, LVEDD 7.8cm), who  was transferred [place] [facility] [place] decompensated HF, +/- cardiogenic shock, MODS & evaluation for adanced tx options.                - BNP 4674    - 2D Echo (10/10/18) EF <10%, Diastolic dysfunction elevated LAP (grade 2), RV 4.5cm & TAPSE 1.3, LVEDD 7.8 cm                 - Admit to HTS service, Dr. Mathis   - Lasix 80mg IV bolus followed by IV 20mg/hr  - Will need RHC once euvolemic; planning for Monday  - Start patient on pathway 1 to LVAD/Transplant  - Consult CTS surgery for transplant/LVAD  - Cardiac diet 2g NS, 1.5L fluid restriction, daily weights, strict I/Os  - BMP, keep K>4.0, Mg>2.0  - Resume HF medications   - Maintain on telemetry and daily EKGs   - SCDs, TEDs, Nursing communication to elevated LE           ICD (implantable cardioverter-defibrillator) in place    - Episodes of NS VT & PVC on tele  - ICD-D programming               DM (diabetes mellitus)    Moderate insulin dose sliding scale  POCT glucose with meals and bedtime             CAD (coronary artery disease)    CAD s/p CABG,                    - Troponin negative on admission   - Continue GDMT ASA, lipitor, metoprolol, effient              VTE Risk Mitigation (From admission, onward)        Ordered     enoxaparin injection 40 mg  Daily      10/10/18 0345     IP VTE HIGH RISK PATIENT  Once      10/10/18 0345              Isaiah Sheppard MD  Department of Hospital Medicine   [facility]

## 2018-10-13 NOTE — SUBJECTIVE & OBJECTIVE
Interval History:   No overnight issues, doing well   Tolerating diet, likes nutritional supplements  Excellent diuresis       Continuous Infusions:   furosemide (LASIX) 2 mg/mL infusion (non-titrating) 20 mg/hr (10/13/18 0659)     Scheduled Meds:   aspirin  81 mg Oral Daily    atorvastatin  80 mg Oral Daily    enoxaparin  40 mg Subcutaneous Daily    insulin detemir U-100  10 Units Subcutaneous QHS    magnesium oxide  400 mg Oral BID    metoprolol succinate  25 mg Oral Daily    potassium chloride  40 mEq Oral Daily    prasugrel  10 mg Oral Daily    sodium chloride 0.9%  3 mL Intravenous Q8H     PRN Meds:dextrose 50%, dextrose 50%, glucagon (human recombinant), glucose, glucose, insulin aspart U-100    Review of patient's allergies indicates:   Allergen Reactions    Amiodarone analogues Anaphylaxis    Ativan [lorazepam] Anxiety     Objective:     Vital Signs (Most Recent):  Temp: 96.2 °F (35.7 °C) (10/13/18 0804)  Pulse: (!) 125 (10/13/18 0858)  Resp: 18 (10/13/18 0804)  BP: 125/81 (10/13/18 0858)  SpO2: 96 % (10/13/18 0808) Vital Signs (24h Range):  Temp:  [96.2 °F (35.7 °C)-97.8 °F (36.6 °C)] 96.2 °F (35.7 °C)  Pulse:  [] 125  Resp:  [12-18] 18  SpO2:  [94 %-99 %] 96 %  BP: ()/(46-81) 125/81     Patient Vitals for the past 72 hrs (Last 3 readings):   Weight   10/13/18 0800 72.1 kg (158 lb 14.4 oz)   10/12/18 0700 71.8 kg (158 lb 4.8 oz)   10/11/18 0800 71.8 kg (158 lb 4.6 oz)     Body mass index is 25.65 kg/m².      Intake/Output Summary (Last 24 hours) at 10/13/2018 1054  Last data filed at 10/13/2018 0400  Gross per 24 hour   Intake 1015 ml   Output 1500 ml   Net -485 ml       Hemodynamic Parameters:       Telemetry:   ST HR 90-120s with ongoing NSVT, PVCs    Physical Exam   Constitutional: He is oriented to person, place, and time. He appears well-developed and well-nourished.   HENT:   Head: Normocephalic.   Eyes: EOM are normal. Pupils are equal, round, and reactive to light.   Neck:  Normal range of motion. Neck supple. JVD present.   Cardiovascular: Normal rate. Exam reveals no gallop.   Pulmonary/Chest: He has no rales.       Abdominal: Soft. Bowel sounds are normal. He exhibits no distension.   Musculoskeletal: Normal range of motion. He exhibits no edema.   Neurological: He is alert and oriented to person, place, and time.       Significant Labs:  CBC:  Recent Labs   Lab  10/11/18   0458  10/12/18   0411  10/13/18   0601   WBC  9.82  9.15  9.40   RBC  4.47*  4.74  4.48*   HGB  13.2*  14.1  13.2*   HCT  41.8  43.2  41.3   PLT  210  190  202   MCV  94  91  92   MCH  29.5  29.7  29.5   MCHC  31.6*  32.6  32.0     BNP:  Recent Labs   Lab  10/10/18   0453   BNP  4,674*     CMP:  Recent Labs   Lab  10/11/18   2303  10/12/18   0411  10/12/18   1415  10/13/18   0601   GLU  228*  157*  179*  203*   CALCIUM  10.3  10.1  9.8  10.0   ALBUMIN  3.3*  3.5   --   3.3*   PROT  8.0  8.1   --   7.8   NA  137  140  139  139   K  4.3  3.3*  3.4*  4.3   CO2  28  26  29  29   CL  96  96  96  96   BUN  60*  55*  57*  52*   CREATININE  1.8*  1.6*  1.6*  1.6*   ALKPHOS  113  114   --   110   ALT  17  17   --   21   AST  22  21   --   25   BILITOT  0.9  1.1*   --   1.0      Coagulation:   Recent Labs   Lab  10/10/18   0453   INR  1.1   APTT  22.3     LDH:  No results for input(s): LDH in the last 72 hours.  Microbiology:  Microbiology Results (last 7 days)     Procedure Component Value Units Date/Time    Blood culture [[id number]]     Order Status:  Canceled Specimen:  Blood     Blood culture [[id number]]     Order Status:  Canceled Specimen:  Blood           ABGs: No results for input(s): PH, PCO2, HCO3, POCSATURATED, BE in the last 72 hours.  BMP:   Recent Labs   Lab  10/13/18   0601   GLU  203*   NA  139   K  4.3   CL  96   CO2  29   BUN  52*   CREATININE  1.6*   CALCIUM  10.0   MG  1.8     Cardiac Markers: No results for input(s): CKMB, TROPONINT, MYOGLOBIN in the last 72 hours.  Coagulation: No results for  input(s): PT, INR, APTT in the last 72 hours.  I have reviewed all pertinent labs within the past 24 hours.    Estimated Creatinine Clearance: 38.2 mL/min (A) (based on SCr of 1.6 mg/dL (H)).    Diagnostic Results:  I have reviewed all pertinent imaging results/findings within the past 24 hours.

## 2018-10-14 PROBLEM — I47.20 VT (VENTRICULAR TACHYCARDIA): Status: ACTIVE | Noted: 2018-10-14

## 2018-10-14 PROBLEM — R21 SKIN RASH: Status: ACTIVE | Noted: 2018-10-14

## 2018-10-14 LAB
ALBUMIN SERPL BCP-MCNC: 3.5 G/DL
ALP SERPL-CCNC: 111 U/L
ALT SERPL W/O P-5'-P-CCNC: 20 U/L
ANION GAP SERPL CALC-SCNC: 13 MMOL/L
AST SERPL-CCNC: 23 U/L
BASOPHILS # BLD AUTO: 0.08 K/UL
BASOPHILS NFR BLD: 0.7 %
BILIRUB SERPL-MCNC: 1.2 MG/DL
BUN SERPL-MCNC: 50 MG/DL
CALCIUM SERPL-MCNC: 9.8 MG/DL
CHLORIDE SERPL-SCNC: 95 MMOL/L
CO2 SERPL-SCNC: 27 MMOL/L
CREAT SERPL-MCNC: 1.4 MG/DL
DIFFERENTIAL METHOD: ABNORMAL
EOSINOPHIL # BLD AUTO: 0.4 K/UL
EOSINOPHIL NFR BLD: 3.6 %
ERYTHROCYTE [DISTWIDTH] IN BLOOD BY AUTOMATED COUNT: 14.6 %
EST. GFR  (AFRICAN AMERICAN): 58 ML/MIN/1.73 M^2
EST. GFR  (NON AFRICAN AMERICAN): 50.2 ML/MIN/1.73 M^2
GLUCOSE SERPL-MCNC: 180 MG/DL
HCT VFR BLD AUTO: 40.4 %
HGB BLD-MCNC: 12.9 G/DL
IMM GRANULOCYTES # BLD AUTO: 0.05 K/UL
IMM GRANULOCYTES NFR BLD AUTO: 0.5 %
LYMPHOCYTES # BLD AUTO: 1.3 K/UL
LYMPHOCYTES NFR BLD: 12.2 %
MAGNESIUM SERPL-MCNC: 1.7 MG/DL
MCH RBC QN AUTO: 29.6 PG
MCHC RBC AUTO-ENTMCNC: 31.9 G/DL
MCV RBC AUTO: 93 FL
MONOCYTES # BLD AUTO: 1.6 K/UL
MONOCYTES NFR BLD: 14.4 %
NEUTROPHILS # BLD AUTO: 7.6 K/UL
NEUTROPHILS NFR BLD: 68.6 %
NRBC BLD-RTO: 0 /100 WBC
PHOSPHATE SERPL-MCNC: 4 MG/DL
PLATELET # BLD AUTO: 189 K/UL
PMV BLD AUTO: 11.3 FL
POCT GLUCOSE: 103 MG/DL (ref 70–110)
POCT GLUCOSE: 210 MG/DL (ref 70–110)
POCT GLUCOSE: 257 MG/DL (ref 70–110)
POCT GLUCOSE: 344 MG/DL (ref 70–110)
POTASSIUM SERPL-SCNC: 3.8 MMOL/L
PROT SERPL-MCNC: 8.1 G/DL
RBC # BLD AUTO: 4.36 M/UL
SODIUM SERPL-SCNC: 135 MMOL/L
WBC # BLD AUTO: 11.01 K/UL

## 2018-10-14 PROCEDURE — 36415 COLL VENOUS BLD VENIPUNCTURE: CPT

## 2018-10-14 PROCEDURE — 63600175 PHARM REV CODE 636 W HCPCS: Performed by: INTERNAL MEDICINE

## 2018-10-14 PROCEDURE — 84100 ASSAY OF PHOSPHORUS: CPT

## 2018-10-14 PROCEDURE — 85025 COMPLETE CBC W/AUTO DIFF WBC: CPT

## 2018-10-14 PROCEDURE — 63600175 PHARM REV CODE 636 W HCPCS: Performed by: STUDENT IN AN ORGANIZED HEALTH CARE EDUCATION/TRAINING PROGRAM

## 2018-10-14 PROCEDURE — 99232 SBSQ HOSP IP/OBS MODERATE 35: CPT | Mod: GC,,, | Performed by: INTERNAL MEDICINE

## 2018-10-14 PROCEDURE — 25000003 PHARM REV CODE 250: Performed by: STUDENT IN AN ORGANIZED HEALTH CARE EDUCATION/TRAINING PROGRAM

## 2018-10-14 PROCEDURE — 25000003 PHARM REV CODE 250: Performed by: INTERNAL MEDICINE

## 2018-10-14 PROCEDURE — 83735 ASSAY OF MAGNESIUM: CPT | Mod: 91

## 2018-10-14 PROCEDURE — 20600001 HC STEP DOWN PRIVATE ROOM

## 2018-10-14 PROCEDURE — 80053 COMPREHEN METABOLIC PANEL: CPT

## 2018-10-14 PROCEDURE — 83735 ASSAY OF MAGNESIUM: CPT

## 2018-10-14 PROCEDURE — 25000003 PHARM REV CODE 250: Performed by: NURSE PRACTITIONER

## 2018-10-14 PROCEDURE — 84132 ASSAY OF SERUM POTASSIUM: CPT

## 2018-10-14 RX ORDER — LANOLIN ALCOHOL/MO/W.PET/CERES
400 CREAM (GRAM) TOPICAL ONCE
Status: COMPLETED | OUTPATIENT
Start: 2018-10-14 | End: 2018-10-14

## 2018-10-14 RX ORDER — METOPROLOL SUCCINATE 100 MG/1
100 TABLET, EXTENDED RELEASE ORAL DAILY
Status: DISCONTINUED | OUTPATIENT
Start: 2018-10-15 | End: 2018-10-15

## 2018-10-14 RX ORDER — POTASSIUM CHLORIDE 20 MEQ/1
40 TABLET, EXTENDED RELEASE ORAL 2 TIMES DAILY
Status: DISCONTINUED | OUTPATIENT
Start: 2018-10-14 | End: 2018-10-20

## 2018-10-14 RX ORDER — INSULIN ASPART 100 [IU]/ML
3 INJECTION, SOLUTION INTRAVENOUS; SUBCUTANEOUS
Status: DISCONTINUED | OUTPATIENT
Start: 2018-10-14 | End: 2018-10-23 | Stop reason: HOSPADM

## 2018-10-14 RX ADMIN — INSULIN ASPART 4 UNITS: 100 INJECTION, SOLUTION INTRAVENOUS; SUBCUTANEOUS at 08:10

## 2018-10-14 RX ADMIN — ATORVASTATIN CALCIUM 80 MG: 20 TABLET, FILM COATED ORAL at 08:10

## 2018-10-14 RX ADMIN — POTASSIUM CHLORIDE 40 MEQ: 1500 TABLET, EXTENDED RELEASE ORAL at 08:10

## 2018-10-14 RX ADMIN — MAGNESIUM OXIDE TAB 400 MG (241.3 MG ELEMENTAL MG) 400 MG: 400 (241.3 MG) TAB at 08:10

## 2018-10-14 RX ADMIN — POTASSIUM CHLORIDE 40 MEQ: 1500 TABLET, EXTENDED RELEASE ORAL at 09:10

## 2018-10-14 RX ADMIN — ENOXAPARIN SODIUM 40 MG: 100 INJECTION SUBCUTANEOUS at 05:10

## 2018-10-14 RX ADMIN — MAGNESIUM OXIDE TAB 400 MG (241.3 MG ELEMENTAL MG) 400 MG: 400 (241.3 MG) TAB at 10:10

## 2018-10-14 RX ADMIN — INSULIN ASPART 3 UNITS: 100 INJECTION, SOLUTION INTRAVENOUS; SUBCUTANEOUS at 09:10

## 2018-10-14 RX ADMIN — FUROSEMIDE 20 MG/HR: 10 INJECTION, SOLUTION INTRAMUSCULAR; INTRAVENOUS at 05:10

## 2018-10-14 RX ADMIN — METOPROLOL SUCCINATE 25 MG: 25 TABLET, EXTENDED RELEASE ORAL at 08:10

## 2018-10-14 RX ADMIN — INSULIN ASPART 8 UNITS: 100 INJECTION, SOLUTION INTRAVENOUS; SUBCUTANEOUS at 12:10

## 2018-10-14 RX ADMIN — ASPIRIN 81 MG: 81 TABLET, COATED ORAL at 08:10

## 2018-10-14 RX ADMIN — INSULIN ASPART 3 UNITS: 100 INJECTION, SOLUTION INTRAVENOUS; SUBCUTANEOUS at 12:10

## 2018-10-14 RX ADMIN — PRASUGREL 10 MG: 10 TABLET, FILM COATED ORAL at 08:10

## 2018-10-14 RX ADMIN — INSULIN DETEMIR 15 UNITS: 100 INJECTION, SOLUTION SUBCUTANEOUS at 09:10

## 2018-10-14 RX ADMIN — MAGNESIUM OXIDE TAB 400 MG (241.3 MG ELEMENTAL MG) 400 MG: 400 (241.3 MG) TAB at 09:10

## 2018-10-14 RX ADMIN — INSULIN ASPART 3 UNITS: 100 INJECTION, SOLUTION INTRAVENOUS; SUBCUTANEOUS at 08:10

## 2018-10-14 NOTE — NURSING
Tele: multiple episodes of v-tach, longest was 15 beats. Asymptomatic, results of am bmp reported to SLAVA Adames.NP; bp 107/58. NP to write order for mag replacement. Will continue to monitor.

## 2018-10-14 NOTE — ASSESSMENT & PLAN NOTE
Mr. Coker is a 73 y.o. M with a history of CAD s/p CABG, ICM (EF >10, LVEDD 7.8cm), who was transferred to [facility] for decompensated HF, +/- cardiogenic shock, MODS & evaluation for adanced tx options.                - BNP 4674    - 2D Echo (10/10/18) EF <10%, Diastolic dysfunction elevated LAP (grade 2), RV 4.5cm & TAPSE 1.3, LVEDD 7.8 cm                 - Admit to HTS service, Dr. Mathis   - continue Lasix 80mg IV bolus followed by IV 20mg/hr  - Will need RHC once euvolemic; planning for Monday  - Pathway 2 to LVAD/Transplant  - Consult CTS surgery for transplant/LVAD  - Cardiac diet 2g NS, 1.5L fluid restriction, daily weights, strict I/Os  - BMP, keep K>4.0, Mg>2.0  - Resume HF medications   - Maintain on telemetry and daily EKGs   - SCDs, TEDs, Nursing communication to elevated LE

## 2018-10-14 NOTE — NURSING
Tele : Multiple episodes v-tach reported to  and Delfina during rounds, reported that hr gest as high as 150bpm, no new orders at present, however team to decide whether to increase bb or decrease dose of lasix infusion. Prt is asymptomatic. Will continue to monitor.

## 2018-10-14 NOTE — PROGRESS NOTES
[facility]  Heart Transplant  Progress Note    Patient Name: Bharat Coker  MRN: [id number]  Admission Date: 10/10/2018  Hospital Length of Stay: 4 days  Attending Physician: Jony Hendrickson Jr.,*  Primary Care Provider: Ronnie Richardson Jr, MD  Principal Problem:HFrEF (heart failure with reduced ejection fraction)    Subjective:     Interval History:   No overnight events    Continuous Infusions:   furosemide (LASIX) 2 mg/mL infusion (non-titrating) 20 mg/hr (10/14/18 0545)     Scheduled Meds:   aspirin  81 mg Oral Daily    atorvastatin  80 mg Oral Daily    enoxaparin  40 mg Subcutaneous Daily    insulin aspart U-100  3 Units Subcutaneous TIDWM    insulin detemir U-100  15 Units Subcutaneous QHS    magnesium oxide  400 mg Oral BID    [START ON 10/15/2018] metoprolol succinate  100 mg Oral Daily    potassium chloride  40 mEq Oral BID    prasugrel  10 mg Oral Daily    sodium chloride 0.9%  3 mL Intravenous Q8H     PRN Meds:dextrose 50%, dextrose 50%, glucagon (human recombinant), glucose, glucose, insulin aspart U-100    Review of patient's allergies indicates:   Allergen Reactions    Amiodarone analogues Anaphylaxis    Ativan [lorazepam] Anxiety     Objective:     Vital Signs (Most Recent):  Temp: 96.6 °F (35.9 °C) (10/14/18 1153)  Pulse: 100 (10/14/18 1153)  Resp: 18 (10/14/18 0946)  BP: 112/62 (10/14/18 1153)  SpO2: 97 % (10/14/18 1153) Vital Signs (24h Range):  Temp:  [96.4 °F (35.8 °C)-97.7 °F (36.5 °C)] 96.6 °F (35.9 °C)  Pulse:  [] 100  Resp:  [16-18] 18  SpO2:  [91 %-99 %] 97 %  BP: ()/(56-72) 112/62     Patient Vitals for the past 72 hrs (Last 3 readings):   Weight   10/13/18 0800 72.1 kg (158 lb 14.4 oz)   10/12/18 0700 71.8 kg (158 lb 4.8 oz)     Body mass index is 25.65 kg/m².      Intake/Output Summary (Last 24 hours) at 10/14/2018 1203  Last data filed at 10/14/2018 0600  Gross per 24 hour   Intake 760 ml   Output 2250 ml   Net -1490 ml       Hemodynamic  Parameters:       Telemetry:   ST HR 90-140s with frequent NSVTs    Physical Exam   Constitutional: He is oriented to person, place, and time. He appears well-developed and well-nourished.   HENT:   Head: Normocephalic.   Eyes: EOM are normal. Pupils are equal, round, and reactive to light.   Neck: Normal range of motion. Neck supple. JVD present.   Cardiovascular: Normal rate. An irregularly irregular rhythm present. Exam reveals no gallop.   Pulmonary/Chest: He has no rales.       Abdominal: Soft. Bowel sounds are normal. He exhibits no distension.   Musculoskeletal: Normal range of motion. He exhibits no edema.   Neurological: He is alert and oriented to person, place, and time.       Significant Labs:  CBC:  Recent Labs   Lab  10/12/18   0411  10/13/18   0601  10/14/18   0336   WBC  9.15  9.40  11.01   RBC  4.74  4.48*  4.36*   HGB  14.1  13.2*  12.9*   HCT  43.2  41.3  40.4   PLT  190  202  189   MCV  91  92  93   MCH  29.7  29.5  29.6   MCHC  32.6  32.0  31.9*     BNP:  Recent Labs   Lab  10/10/18   0453   BNP  4,674*     CMP:  Recent Labs   Lab  10/12/18   0411  10/12/18   1415  10/13/18   0601  10/14/18   0336   GLU  157*  179*  203*  180*   CALCIUM  10.1  9.8  10.0  9.8   ALBUMIN  3.5   --   3.3*  3.5   PROT  8.1   --   7.8  8.1   NA  140  139  139  135*   K  3.3*  3.4*  4.3  3.8   CO2  26  29  29  27   CL  96  96  96  95   BUN  55*  57*  52*  50*   CREATININE  1.6*  1.6*  1.6*  1.4   ALKPHOS  114   --   110  111   ALT  17   --   21  20   AST  21   --   25  23   BILITOT  1.1*   --   1.0  1.2*      Coagulation:   Recent Labs   Lab  10/10/18   0453   INR  1.1   APTT  22.3     LDH:  No results for input(s): LDH in the last 72 hours.  Microbiology:  Microbiology Results (last 7 days)     Procedure Component Value Units Date/Time    Blood culture [[id number]]     Order Status:  Canceled Specimen:  Blood     Blood culture [[id number]]     Order Status:  Canceled Specimen:  Blood           ABGs: No results for  "input(s): PH, PCO2, HCO3, POCSATURATED, BE in the last 72 hours.  BMP:   Recent Labs   Lab  10/14/18   0336   GLU  180*   NA  135*   K  3.8   CL  95   CO2  27   BUN  50*   CREATININE  1.4   CALCIUM  9.8   MG  1.7     Cardiac Markers: No results for input(s): CKMB, TROPONINT, MYOGLOBIN in the last 72 hours.  Coagulation: No results for input(s): PT, INR, APTT in the last 72 hours.  I have reviewed all pertinent labs within the past 24 hours.    Estimated Creatinine Clearance: 43.7 mL/min (based on SCr of 1.4 mg/dL).    Diagnostic Results:  I have reviewed all pertinent imaging results/findings within the past 24 hours.    Assessment and Plan:     74 y/o male with PMH of CAD s/p CABG, ICM, HFrEF who presents as a transfer from OSH after he presented there for SUMNER, weight gain and palpitations. He presented to OSH after being discharged from the same facility 4 days prior to Oct 8. He was there initially for ADHF and KASEY. The patient recently had a revision of his CRT-D. The patient became SOB on Oct 7 but did not have CP. The patient felt his pulse and noted it to be rapid. He was brought to the ED at OSH by EMS; his HR was ~170. The ECG was c/w WCT (later documentation notes SVT with aberrancy after interrogation was negative for VT), and he was given one dose of adenosine. The HR came down to 110. His CXR was c/w pulmonary edema. A pro-BNP was at one point 20084.The patient's WBC was 17 and sCr 1.6. tBili was 1.5. An ECHO on 10/8: LVIDd 6.5 cm, EF 15%, IVC normal collapsibility and normal IVC size. An undated stress test notes: "mild reversibility noted in the cardiac apex and there lateral wall of the heart from cardiac apex to approximately mid wall suspicion for ischemia..mild reversibility noted in the mid to basilar inferior/inferoseptal wall of the heart also suspicious for ischemia". He had a LHC 6/2018: LIMA-LAD patent, % occluded at ostium, SVG-% occluded, SVG-D 100% occluded, pLCx 30%, 100% " occluded OM, 100% LAD occlusion after takeoff of D1, D1 is tiny with 80% disease, SVG-OM 80% proximal stenosis with 50% at site of anastomosis. An SVG-OM OKSANA was placed at that time. The patient's WBC was 9.6 as of 10/9. Aldactone was held at some point 2/2 hyperkalemia. The patient believes he has been diuresed adequately and has no fluid left to remove.     * HFrEF (heart failure with reduced ejection fraction)    Mr. Coker is a 74 y/o male with PMH of CAD s/p CABG, ICM/HFrEF (LVef <10%, LVEDD 7.8) who was a transfer for cardiogenic shock / MODS:    - BNP 4674   - C 6/2018: LIMA-LAD patent, % occluded at ostium, SVG-% occluded, SVG-D 100% occluded, pLCx 30%, 100% occluded OM, 100% LAD occlusion after takeoff of D1, D1 is tiny with 80% disease, SVG-OM 80% proximal stenosis with 50% at site of anastomosis. An SVG-OM OKSANA was placed at that time.    - TTE EF <10%, G2DD, LVH (LVEDD 7.8 / LVESD 7.3 ), reduced RVSF (LV 4.5cm / TAPSE 1.3), PASP 26mmHG, Mod MR    - CT head: no acute changes, old lacunar infract   - CT CAP: Moderate atherosclerosis of the thoracic, abdominal aorta and CAD.  Mild calcification of the aortic valve and aortic and mitral valve annuli.    - Started on Pathway for VAD only, pending CT surgery evaluation by Dr. Khan, prior to Step 2, appreciate assistance    - SW/CM to assist with financial clearance  - Need OSH records from [facility],  - RHC when euvolemic    - Decreased Lasix 10mg/hr, continue closely monitor UOP,   - Continue PT / OT, ambulate as tolerated, elevated LE   - Fluid restriction at 1500 cc with strict I/Os and daily STANDING weights  - Check Electrolytes, keep Mag >2 & K+ >4  - Continue PTA HF GDMT          VT (ventricular tachycardia)    Frequent NSVT with underlying ST:   - Increase Toprol 100mg qday   - Follow up HR  - Consider EP consult if continues to have ST         Skin rash    Posterior neck, likely eczema:  - Continue supportive care with topical         ICD  (implantable cardioverter-defibrillator) in place    [employer] CRT-D (09/20/18)  - Interrogated today consistent with NSVT, Afib / AF  - Closely  Monitor for further ectopy   - Will consider EP consultation if continues to persist   - Replete electrolytes         DM (diabetes mellitus)     A1c 6.9,   - SSI with accuchecks  - will consider endocrine consult with VAD work up         CAD (coronary artery disease)    Recent MI 06/2018, ICM / HFrEF / CAD s/p CABG:   - Continue DAPT, statin, and BB  - consider ACEi once renal function panel returns            Rj Pacheco MD  Heart Transplant  [facility]

## 2018-10-14 NOTE — PLAN OF CARE
Problem: Patient Care Overview  Goal: Plan of Care Review  Outcome: Ongoing (interventions implemented as appropriate)  Tele with multiple runs ov V-tach throughout the day,,  BP stable, pt denies c/o palpitations, dizziness or chest discomfort. Lasix drip decreased to 10mg/h, Aicd programming pending. RHC when euvolemic. Continue to monitor daily wt, labs I&0. Advanced option w/u in progress.POC updated with pt and spouse, both vebalize understanding.

## 2018-10-14 NOTE — ASSESSMENT & PLAN NOTE
Mr. Coker is a 72 y/o male with PMH of CAD s/p CABG, ICM/HFrEF (LVef <10%, LVEDD 7.8) who was a transfer for cardiogenic shock / MODS:    - BNP 4674   - [facility] 6/2018: LIMA-LAD patent, % occluded at ostium, SVG-% occluded, SVG-D 100% occluded, pLCx 30%, 100% occluded OM, 100% LAD occlusion after takeoff of D1, D1 is tiny with 80% disease, SVG-OM 80% proximal stenosis with 50% at site of anastomosis. An SVG-OM OKSANA was placed at that time.    - TTE EF <10%, G2DD, LVH (LVEDD 7.8 / LVESD 7.3 ), reduced RVSF (LV 4.5cm / TAPSE 1.3), PASP 26mmHG, Mod MR    - CT head: no acute changes, old lacunar infract   - CT CAP: Moderate atherosclerosis of the thoracic, abdominal aorta and CAD.  Mild calcification of the aortic valve and aortic and mitral valve annuli.    - Started on Pathway for VAD only, pending CT surgery evaluation by Dr. Khan, prior to Step 2, appreciate assistance    - SW/CM to assist with financial clearance  - Need OSH records from [facility],  - RHC when euvolemic    - Decreased Lasix 10mg/hr, continue closely monitor UOP,   - Continue PT / OT, ambulate as tolerated, elevated LE   - Fluid restriction at 1500 cc with strict I/Os and daily STANDING weights  - Check Electrolytes, keep Mag >2 & K+ >4  - Continue PTA HF GDMT

## 2018-10-14 NOTE — PROGRESS NOTES
"[facility] Hospital Medicine  Progress Note    Patient Name: Bharat Coker  MRN: [id number]  Patient Class: IP- Inpatient   Admission Date: 10/10/2018  Length of Stay: 4 days  Attending Physician: Jony Hendrickson Jr.,*  Primary Care Provider: Ronnie Richardson Jr, MD    [facility] Medicine Team: Networked reference to record PCT  Isaiah Sheppard MD    Subjective:     Principal Problem:HFrEF (heart failure with reduced ejection fraction)    Hospital Course:  72 y/o male with PMH of CAD s/p CABG, ICM, HFrEF who presents as a transfer from OSH after he presented there for SUMNER, weight gain and palpitations. He presented to OSH after being discharged from the same facility 4 days prior to Oct 8. He was there initially for ADHF and KASEY. The patient recently had a revision of his CRT-D. The patient became SOB on Oct 7 but did not have CP. The patient felt his pulse and noted it to be rapid. He was brought to the ED at OSH by EMS; his HR was ~170. The ECG was c/w WCT (later documentation notes SVT with aberrancy after interrogation was negative for VT), and he was given one dose of adenosine. The HR came down to 110. His CXR was c/w pulmonary edema. A pro-BNP was at one point 20084.The patient's WBC was 17 and sCr 1.6. tBili was 1.5. An ECHO on 10/8: LVIDd 6.5 cm, EF 15%, IVC normal collapsibility and normal IVC size. An undated stress test notes: "mild reversibility noted in the cardiac apex and there lateral wall of the heart from cardiac apex to approximately mid wall suspicion for ischemia..mild reversibility noted in the mid to basilar inferior/inferoseptal wall of the heart also suspicious for ischemia". He had a LHC 6/2018: LIMA-LAD patent, % occluded at ostium, SVG-% occluded, SVG-D 100% occluded, pLCx 30%, 100% occluded OM, 100% LAD occlusion after takeoff of D1, D1 is tiny with 80% disease, SVG-OM 80% proximal stenosis with 50% at site of anastomosis. An SVG-OM OKSANA was placed " at that time. The patient's WBC was 9.6 as of 10/9. Aldactone was held at some point 2/2 hyperkalemia. The patient believes he has been diuresed adequately and has no fluid left to remove.         Interval History:  NAEON. SOB improving. Cr. Improving.     Objective:     Vital Signs (Most Recent):  Temp: 96.6 °F (35.9 °C) (10/14/18 1153)  Pulse: 100 (10/14/18 1153)  Resp: 18 (10/14/18 0946)  BP: 112/62 (10/14/18 1153)  SpO2: 97 % (10/14/18 1153) Vital Signs (24h Range):  Temp:  [96.4 °F (35.8 °C)-97.7 °F (36.5 °C)] 96.6 °F (35.9 °C)  Pulse:  [] 100  Resp:  [16-18] 18  SpO2:  [91 %-99 %] 97 %  BP: ()/(56-72) 112/62     Weight: 72.1 kg (158 lb 14.4 oz)  Body mass index is 25.65 kg/m².    Intake/Output Summary (Last 24 hours) at 10/14/2018 1315  Last data filed at 10/14/2018 0600  Gross per 24 hour   Intake 760 ml   Output 2250 ml   Net -1490 ml      Physical Exam   Constitutional: He is oriented to person, place, and time. He appears well-developed and well-nourished.   HENT:   Head: Normocephalic.   Eyes: EOM are normal. Pupils are equal, round, and reactive to light.   Neck: Normal range of motion. Neck supple. No JVD present.   Cardiovascular: Normal rate.   Pulmonary/Chest: Effort normal. He has no rales.   Abdominal: Soft. Bowel sounds are normal. He exhibits no distension.   Musculoskeletal: Normal range of motion. He exhibits no edema.   Neurological: He is alert and oriented to person, place, and time.       Significant Labs:   CBC:   Recent Labs   Lab  10/13/18   0601  10/14/18   0336   WBC  9.40  11.01   HGB  13.2*  12.9*   HCT  41.3  40.4   PLT  202  189     CMP:   Recent Labs   Lab  10/12/18   1415  10/13/18   0601  10/14/18   0336   NA  139  139  135*   K  3.4*  4.3  3.8   CL  96  96  95   CO2  29  29  27   GLU  179*  203*  180*   BUN  57*  52*  50*   CREATININE  1.6*  1.6*  1.4   CALCIUM  9.8  10.0  9.8   PROT   --   7.8  8.1   ALBUMIN   --   3.3*  3.5   BILITOT   --   1.0  1.2*   ALKPHOS    --   110  111   AST   --   25  23   ALT   --   21  20   ANIONGAP  14  14  13   EGFRNONAA  42.7*  42.7*  50.2*     POCT Glucose:   Recent Labs   Lab  10/13/18   2115  10/14/18   0729  10/14/18   1202   POCTGLUCOSE  291*  210*  344*     All pertinent labs within the past 24 hours have been reviewed.    Significant Imaging: I have reviewed all pertinent imaging results/findings within the past 24 hours.    Assessment/Plan:      * HFrEF (heart failure with reduced ejection fraction)    Mr. Coker is a 73 y.o. M with a history of CAD s/p CABG, ICM (EF >10, LVEDD 7.8cm), who was transferred to [facility] for decompensated HF, +/- cardiogenic shock, MODS & evaluation for adanced tx options.                - BNP 4674    - 2D Echo (10/10/18) EF <10%, Diastolic dysfunction elevated LAP (grade 2), RV 4.5cm & TAPSE 1.3, LVEDD 7.8 cm                 - Admit to HTS service, Dr. Mathis   - decrease Lasix ggt 10mg/hr  - Will need RHC once euvolemic; planning for Monday  - Pathway 2 to LVAD (not a transplant candidate secondary to age)   - Consulted CTS surgery for transplant/LVAD, pending evaluation   - Cardiac diet 2g NS, 1.5L fluid restriction, daily weights, strict I/Os  - BMP, keep K>4.0, Mg>2.0  - Resume HF medications   - Maintain on telemetry and daily EKGs   - SCDs, TEDs, Nursing communication to elevated LE           ICD (implantable cardioverter-defibrillator) in place    - Episodes of NS VT & PVC on tele  - ICD-D programming               DM (diabetes mellitus)    Moderate insulin dose sliding scale  POCT glucose with meals and bedtime             CAD (coronary artery disease)    CAD s/p CABG,                    - Troponin negative on admission   - Continue GDMT ASA, lipitor, metoprolol, effient              VTE Risk Mitigation (From admission, onward)        Ordered     enoxaparin injection 40 mg  Daily      10/10/18 0345     IP VTE HIGH RISK PATIENT  Once      10/10/18 0345              Isaiah Sheppard MD  Department of  Hospital Medicine [facility]

## 2018-10-14 NOTE — SUBJECTIVE & OBJECTIVE
Interval History  NAEON. Patient diuresing well; continue lasix ggt.    Step 2 of pathway today.     Objective:     Vital Signs (Most Recent):  Temp: 97.7 °F (36.5 °C) (10/13/18 2014)  Pulse: (!) 113 (10/13/18 2200)  Resp: 18 (10/13/18 2014)  BP: 90/67 (10/13/18 2014)  SpO2: 99 % (10/13/18 2014) Vital Signs (24h Range):  Temp:  [96.2 °F (35.7 °C)-97.8 °F (36.6 °C)] 97.7 °F (36.5 °C)  Pulse:  [] 113  Resp:  [12-18] 18  SpO2:  [94 %-99 %] 99 %  BP: ()/(59-81) 90/67     Weight: 72.1 kg (158 lb 14.4 oz)  Body mass index is 25.65 kg/m².    Intake/Output Summary (Last 24 hours) at 10/13/2018 2236  Last data filed at 10/13/2018 1742  Gross per 24 hour   Intake 940 ml   Output 1950 ml   Net -1010 ml      Physical Exam   Constitutional: He is oriented to person, place, and time. He appears well-developed and well-nourished.   HENT:   Head: Normocephalic.   Eyes: EOM are normal. Pupils are equal, round, and reactive to light.   Neck: Normal range of motion. Neck supple. JVD present.   Cardiovascular: Normal rate. Exam reveals gallop and S3.   Pulmonary/Chest: He has rales in the right lower field and the left lower field.   Abdominal: Soft. Bowel sounds are normal. He exhibits no distension.   Musculoskeletal: Normal range of motion. He exhibits no edema.   Neurological: He is alert and oriented to person, place, and time.       Significant Labs: All pertinent labs within the past 24 hours have been reviewed.    Significant Imaging: I have reviewed all pertinent imaging results/findings within the past 24 hours.

## 2018-10-14 NOTE — PLAN OF CARE
Problem: Patient Care Overview  Goal: Plan of Care Review  Outcome: Ongoing (interventions implemented as appropriate)  Plan of care reviewed with pt. VS stable. No acute events at this time. No complaints. Advanced workup. Lasix gtt maintained.  Pt remains free from falls or injury. Bed low and locked, call light and personal belongings within reach. Will continue to monitor. Lindsey Langley RN

## 2018-10-14 NOTE — SUBJECTIVE & OBJECTIVE
Interval History:  NAEON. SOB improving. Cr. Improving.     Objective:     Vital Signs (Most Recent):  Temp: 96.6 °F (35.9 °C) (10/14/18 1153)  Pulse: 100 (10/14/18 1153)  Resp: 18 (10/14/18 0946)  BP: 112/62 (10/14/18 1153)  SpO2: 97 % (10/14/18 1153) Vital Signs (24h Range):  Temp:  [96.4 °F (35.8 °C)-97.7 °F (36.5 °C)] 96.6 °F (35.9 °C)  Pulse:  [] 100  Resp:  [16-18] 18  SpO2:  [91 %-99 %] 97 %  BP: ()/(56-72) 112/62     Weight: 72.1 kg (158 lb 14.4 oz)  Body mass index is 25.65 kg/m².    Intake/Output Summary (Last 24 hours) at 10/14/2018 1315  Last data filed at 10/14/2018 0600  Gross per 24 hour   Intake 760 ml   Output 2250 ml   Net -1490 ml      Physical Exam   Constitutional: He is oriented to person, place, and time. He appears well-developed and well-nourished.   HENT:   Head: Normocephalic.   Eyes: EOM are normal. Pupils are equal, round, and reactive to light.   Neck: Normal range of motion. Neck supple. No JVD present.   Cardiovascular: Normal rate.   Pulmonary/Chest: Effort normal. He has no rales.   Abdominal: Soft. Bowel sounds are normal. He exhibits no distension.   Musculoskeletal: Normal range of motion. He exhibits no edema.   Neurological: He is alert and oriented to person, place, and time.       Significant Labs:   CBC:   Recent Labs   Lab  10/13/18   0601  10/14/18   0336   WBC  9.40  11.01   HGB  13.2*  12.9*   HCT  41.3  40.4   PLT  202  189     CMP:   Recent Labs   Lab  10/12/18   1415  10/13/18   0601  10/14/18   0336   NA  139  139  135*   K  3.4*  4.3  3.8   CL  96  96  95   CO2  29  29  27   GLU  179*  203*  180*   BUN  57*  52*  50*   CREATININE  1.6*  1.6*  1.4   CALCIUM  9.8  10.0  9.8   PROT   --   7.8  8.1   ALBUMIN   --   3.3*  3.5   BILITOT   --   1.0  1.2*   ALKPHOS   --   110  111   AST   --   25  23   ALT   --   21  20   ANIONGAP  14  14  13   EGFRNONAA  42.7*  42.7*  50.2*     POCT Glucose:   Recent Labs   Lab  10/13/18   2115  10/14/18   0729  10/14/18   1202    POCTGLUCOSE  291*  210*  344*     All pertinent labs within the past 24 hours have been reviewed.    Significant Imaging: I have reviewed all pertinent imaging results/findings within the past 24 hours.

## 2018-10-14 NOTE — PROGRESS NOTES
"[facility] Hospital Medicine  Progress Note    Patient Name: Bharat Coker  MRN: [id number]  Patient Class: IP- Inpatient   Admission Date: 10/10/2018  Length of Stay: 3 days  Attending Physician: Jony Hendrickson Jr.,*  Primary Care Provider: Ronnie Richardson Jr, MD    [facility] Medicine Team: Networked reference to record PCT  Isaiah Sheppard MD    Subjective:     Principal Problem:HFrEF (heart failure with reduced ejection fraction)    HPI:  No notes on file    Hospital Course:  74 y/o male with PMH of CAD s/p CABG, ICM, HFrEF who presents as a transfer from OSH after he presented there for SUMNER, weight gain and palpitations. He presented to OSH after being discharged from the same facility 4 days prior to Oct 8. He was there initially for ADHF and KASEY. The patient recently had a revision of his CRT-D. The patient became SOB on Oct 7 but did not have CP. The patient felt his pulse and noted it to be rapid. He was brought to the ED at OSH by EMS; his HR was ~170. The ECG was c/w WCT (later documentation notes SVT with aberrancy after interrogation was negative for VT), and he was given one dose of adenosine. The HR came down to 110. His CXR was c/w pulmonary edema. A pro-BNP was at one point 20084.The patient's WBC was 17 and sCr 1.6. tBili was 1.5. An ECHO on 10/8: LVIDd 6.5 cm, EF 15%, IVC normal collapsibility and normal IVC size. An undated stress test notes: "mild reversibility noted in the cardiac apex and there lateral wall of the heart from cardiac apex to approximately mid wall suspicion for ischemia..mild reversibility noted in the mid to basilar inferior/inferoseptal wall of the heart also suspicious for ischemia". He had a LHC 6/2018: LIMA-LAD patent, % occluded at ostium, SVG-% occluded, SVG-D 100% occluded, pLCx 30%, 100% occluded OM, 100% LAD occlusion after takeoff of D1, D1 is tiny with 80% disease, SVG-OM 80% proximal stenosis with 50% at site of " anastomosis. An SVG-OM OKSANA was placed at that time. The patient's WBC was 9.6 as of 10/9. Aldactone was held at some point 2/2 hyperkalemia. The patient believes he has been diuresed adequately and has no fluid left to remove.         Interval History  NAEON. Patient diuresing well; continue lasix ggt.    Step 2 of pathway today.     Objective:     Vital Signs (Most Recent):  Temp: 97.7 °F (36.5 °C) (10/13/18 2014)  Pulse: (!) 113 (10/13/18 2200)  Resp: 18 (10/13/18 2014)  BP: 90/67 (10/13/18 2014)  SpO2: 99 % (10/13/18 2014) Vital Signs (24h Range):  Temp:  [96.2 °F (35.7 °C)-97.8 °F (36.6 °C)] 97.7 °F (36.5 °C)  Pulse:  [] 113  Resp:  [12-18] 18  SpO2:  [94 %-99 %] 99 %  BP: ()/(59-81) 90/67     Weight: 72.1 kg (158 lb 14.4 oz)  Body mass index is 25.65 kg/m².    Intake/Output Summary (Last 24 hours) at 10/13/2018 2236  Last data filed at 10/13/2018 1742  Gross per 24 hour   Intake 940 ml   Output 1950 ml   Net -1010 ml      Physical Exam   Constitutional: He is oriented to person, place, and time. He appears well-developed and well-nourished.   HENT:   Head: Normocephalic.   Eyes: EOM are normal. Pupils are equal, round, and reactive to light.   Neck: Normal range of motion. Neck supple. JVD present.   Cardiovascular: Normal rate. Exam reveals gallop and S3.   Pulmonary/Chest: He has rales in the right lower field and the left lower field.   Abdominal: Soft. Bowel sounds are normal. He exhibits no distension.   Musculoskeletal: Normal range of motion. He exhibits no edema.   Neurological: He is alert and oriented to person, place, and time.       Significant Labs: All pertinent labs within the past 24 hours have been reviewed.    Significant Imaging: I have reviewed all pertinent imaging results/findings within the past 24 hours.        Assessment/Plan:      * HFrEF (heart failure with reduced ejection fraction)    Mr. Coker is a 73 y.o. M with a history of CAD s/p CABG, ICM (EF >10, LVEDD 7.8cm),  who was transferred to [facility] for decompensated HF, +/- cardiogenic shock, MODS & evaluation for adanced tx options.                - BNP 4674    - 2D Echo (10/10/18) EF <10%, Diastolic dysfunction elevated LAP (grade 2), RV 4.5cm & TAPSE 1.3, LVEDD 7.8 cm                 - Admit to HTS service, Dr. Mathis   - continue Lasix 80mg IV bolus followed by IV 20mg/hr  - Will need RHC once euvolemic; planning for Monday  - Pathway 2 to LVAD/Transplant  - Consult CTS surgery for transplant/LVAD  - Cardiac diet 2g NS, 1.5L fluid restriction, daily weights, strict I/Os  - BMP, keep K>4.0, Mg>2.0  - Resume HF medications   - Maintain on telemetry and daily EKGs   - SCDs, TEDs, Nursing communication to elevated LE           ICD (implantable cardioverter-defibrillator) in place    - Episodes of NS VT & PVC on tele  - ICD-D programming               DM (diabetes mellitus)    Moderate insulin dose sliding scale  POCT glucose with meals and bedtime             CAD (coronary artery disease)    CAD s/p CABG,                    - Troponin negative on admission   - Continue GDMT ASA, lipitor, metoprolol, effient              VTE Risk Mitigation (From admission, onward)        Ordered     enoxaparin injection 40 mg  Daily      10/10/18 0345     IP VTE HIGH RISK PATIENT  Once      10/10/18 0345              Isaiah Sheppard MD  Department of Hospital Medicine   [facility]

## 2018-10-14 NOTE — SUBJECTIVE & OBJECTIVE
Interval History:   No overnight events    Continuous Infusions:   furosemide (LASIX) 2 mg/mL infusion (non-titrating) 20 mg/hr (10/14/18 0545)     Scheduled Meds:   aspirin  81 mg Oral Daily    atorvastatin  80 mg Oral Daily    enoxaparin  40 mg Subcutaneous Daily    insulin aspart U-100  3 Units Subcutaneous TIDWM    insulin detemir U-100  15 Units Subcutaneous QHS    magnesium oxide  400 mg Oral BID    [START ON 10/15/2018] metoprolol succinate  100 mg Oral Daily    potassium chloride  40 mEq Oral BID    prasugrel  10 mg Oral Daily    sodium chloride 0.9%  3 mL Intravenous Q8H     PRN Meds:dextrose 50%, dextrose 50%, glucagon (human recombinant), glucose, glucose, insulin aspart U-100    Review of patient's allergies indicates:   Allergen Reactions    Amiodarone analogues Anaphylaxis    Ativan [lorazepam] Anxiety     Objective:     Vital Signs (Most Recent):  Temp: 96.6 °F (35.9 °C) (10/14/18 1153)  Pulse: 100 (10/14/18 1153)  Resp: 18 (10/14/18 0946)  BP: 112/62 (10/14/18 1153)  SpO2: 97 % (10/14/18 1153) Vital Signs (24h Range):  Temp:  [96.4 °F (35.8 °C)-97.7 °F (36.5 °C)] 96.6 °F (35.9 °C)  Pulse:  [] 100  Resp:  [16-18] 18  SpO2:  [91 %-99 %] 97 %  BP: ()/(56-72) 112/62     Patient Vitals for the past 72 hrs (Last 3 readings):   Weight   10/13/18 0800 72.1 kg (158 lb 14.4 oz)   10/12/18 0700 71.8 kg (158 lb 4.8 oz)     Body mass index is 25.65 kg/m².      Intake/Output Summary (Last 24 hours) at 10/14/2018 1203  Last data filed at 10/14/2018 0600  Gross per 24 hour   Intake 760 ml   Output 2250 ml   Net -1490 ml       Hemodynamic Parameters:       Telemetry:   ST HR 90-140s with frequent NSVTs    Physical Exam   Constitutional: He is oriented to person, place, and time. He appears well-developed and well-nourished.   HENT:   Head: Normocephalic.   Eyes: EOM are normal. Pupils are equal, round, and reactive to light.   Neck: Normal range of motion. Neck supple. JVD present.    Cardiovascular: Normal rate. An irregularly irregular rhythm present. Exam reveals no gallop.   Pulmonary/Chest: He has no rales.       Abdominal: Soft. Bowel sounds are normal. He exhibits no distension.   Musculoskeletal: Normal range of motion. He exhibits no edema.   Neurological: He is alert and oriented to person, place, and time.       Significant Labs:  CBC:  Recent Labs   Lab  10/12/18   0411  10/13/18   0601  10/14/18   0336   WBC  9.15  9.40  11.01   RBC  4.74  4.48*  4.36*   HGB  14.1  13.2*  12.9*   HCT  43.2  41.3  40.4   PLT  190  202  189   MCV  91  92  93   MCH  29.7  29.5  29.6   MCHC  32.6  32.0  31.9*     BNP:  Recent Labs   Lab  10/10/18   0453   BNP  4,674*     CMP:  Recent Labs   Lab  10/12/18   0411  10/12/18   1415  10/13/18   0601  10/14/18   0336   GLU  157*  179*  203*  180*   CALCIUM  10.1  9.8  10.0  9.8   ALBUMIN  3.5   --   3.3*  3.5   PROT  8.1   --   7.8  8.1   NA  140  139  139  135*   K  3.3*  3.4*  4.3  3.8   CO2  26  29  29  27   CL  96  96  96  95   BUN  55*  57*  52*  50*   CREATININE  1.6*  1.6*  1.6*  1.4   ALKPHOS  114   --   110  111   ALT  17   --   21  20   AST  21   --   25  23   BILITOT  1.1*   --   1.0  1.2*      Coagulation:   Recent Labs   Lab  10/10/18   0453   INR  1.1   APTT  22.3     LDH:  No results for input(s): LDH in the last 72 hours.  Microbiology:  Microbiology Results (last 7 days)     Procedure Component Value Units Date/Time    Blood culture [[id number]]     Order Status:  Canceled Specimen:  Blood     Blood culture [[id number]]     Order Status:  Canceled Specimen:  Blood           ABGs: No results for input(s): PH, PCO2, HCO3, POCSATURATED, BE in the last 72 hours.  BMP:   Recent Labs   Lab  10/14/18   0336   GLU  180*   NA  135*   K  3.8   CL  95   CO2  27   BUN  50*   CREATININE  1.4   CALCIUM  9.8   MG  1.7     Cardiac Markers: No results for input(s): CKMB, TROPONINT, MYOGLOBIN in the last 72 hours.  Coagulation: No results for input(s): PT, INR,  APTT in the last 72 hours.  I have reviewed all pertinent labs within the past 24 hours.    Estimated Creatinine Clearance: 43.7 mL/min (based on SCr of 1.4 mg/dL).    Diagnostic Results:  I have reviewed all pertinent imaging results/findings within the past 24 hours.

## 2018-10-14 NOTE — ASSESSMENT & PLAN NOTE
Frequent NSVT with underlying ST:   - Increase Toprol 100mg qday   - Follow up HR  - Consider EP consult if continues to have ST

## 2018-10-15 LAB
ALBUMIN SERPL BCP-MCNC: 3.2 G/DL
ALP SERPL-CCNC: 106 U/L
ALT SERPL W/O P-5'-P-CCNC: 24 U/L
ANION GAP SERPL CALC-SCNC: 9 MMOL/L
AST SERPL-CCNC: 29 U/L
BASOPHILS # BLD AUTO: 0.05 K/UL
BASOPHILS NFR BLD: 0.6 %
BILIRUB SERPL-MCNC: 1.2 MG/DL
BUN SERPL-MCNC: 48 MG/DL
CALCIUM SERPL-MCNC: 9.8 MG/DL
CHLORIDE SERPL-SCNC: 97 MMOL/L
CO2 SERPL-SCNC: 31 MMOL/L
CREAT SERPL-MCNC: 1.4 MG/DL
DIFFERENTIAL METHOD: ABNORMAL
EOSINOPHIL # BLD AUTO: 0.4 K/UL
EOSINOPHIL NFR BLD: 4.7 %
ERYTHROCYTE [DISTWIDTH] IN BLOOD BY AUTOMATED COUNT: 14.8 %
EST. GFR  (AFRICAN AMERICAN): 58 ML/MIN/1.73 M^2
EST. GFR  (NON AFRICAN AMERICAN): 50.2 ML/MIN/1.73 M^2
GLUCOSE SERPL-MCNC: 191 MG/DL
HCT VFR BLD AUTO: 39.8 %
HGB BLD-MCNC: 12.4 G/DL
IMM GRANULOCYTES # BLD AUTO: 0.04 K/UL
IMM GRANULOCYTES NFR BLD AUTO: 0.5 %
LYMPHOCYTES # BLD AUTO: 1.2 K/UL
LYMPHOCYTES NFR BLD: 14.2 %
MAGNESIUM SERPL-MCNC: 1.9 MG/DL
MAGNESIUM SERPL-MCNC: 2.8 MG/DL
MCH RBC QN AUTO: 29.1 PG
MCHC RBC AUTO-ENTMCNC: 31.2 G/DL
MCV RBC AUTO: 93 FL
MONOCYTES # BLD AUTO: 0.9 K/UL
MONOCYTES NFR BLD: 10.6 %
NEUTROPHILS # BLD AUTO: 6 K/UL
NEUTROPHILS NFR BLD: 69.4 %
NRBC BLD-RTO: 0 /100 WBC
PHOSPHATE SERPL-MCNC: 3.6 MG/DL
PLATELET # BLD AUTO: 179 K/UL
PMV BLD AUTO: 11.3 FL
POCT GLUCOSE: 156 MG/DL (ref 70–110)
POCT GLUCOSE: 190 MG/DL (ref 70–110)
POCT GLUCOSE: 213 MG/DL (ref 70–110)
POCT GLUCOSE: 331 MG/DL (ref 70–110)
POCT GLUCOSE: 338 MG/DL (ref 70–110)
POCT GLUCOSE: 351 MG/DL (ref 70–110)
POTASSIUM SERPL-SCNC: 3.6 MMOL/L
POTASSIUM SERPL-SCNC: 5.4 MMOL/L
PROT SERPL-MCNC: 7.5 G/DL
RBC # BLD AUTO: 4.26 M/UL
SODIUM SERPL-SCNC: 137 MMOL/L
WBC # BLD AUTO: 8.6 K/UL

## 2018-10-15 PROCEDURE — 93451 RIGHT HEART CATH: CPT | Mod: 26,,, | Performed by: INTERNAL MEDICINE

## 2018-10-15 PROCEDURE — 63600175 PHARM REV CODE 636 W HCPCS: Performed by: INTERNAL MEDICINE

## 2018-10-15 PROCEDURE — 25000003 PHARM REV CODE 250: Performed by: INTERNAL MEDICINE

## 2018-10-15 PROCEDURE — 25000003 PHARM REV CODE 250: Performed by: STUDENT IN AN ORGANIZED HEALTH CARE EDUCATION/TRAINING PROGRAM

## 2018-10-15 PROCEDURE — C1894 INTRO/SHEATH, NON-LASER: HCPCS

## 2018-10-15 PROCEDURE — 85025 COMPLETE CBC W/AUTO DIFF WBC: CPT

## 2018-10-15 PROCEDURE — 84100 ASSAY OF PHOSPHORUS: CPT

## 2018-10-15 PROCEDURE — 80053 COMPREHEN METABOLIC PANEL: CPT

## 2018-10-15 PROCEDURE — A4216 STERILE WATER/SALINE, 10 ML: HCPCS | Performed by: INTERNAL MEDICINE

## 2018-10-15 PROCEDURE — 83735 ASSAY OF MAGNESIUM: CPT

## 2018-10-15 PROCEDURE — 25000003 PHARM REV CODE 250

## 2018-10-15 PROCEDURE — 93451 RIGHT HEART CATH: CPT

## 2018-10-15 PROCEDURE — 36415 COLL VENOUS BLD VENIPUNCTURE: CPT

## 2018-10-15 PROCEDURE — 63600175 PHARM REV CODE 636 W HCPCS: Performed by: STUDENT IN AN ORGANIZED HEALTH CARE EDUCATION/TRAINING PROGRAM

## 2018-10-15 PROCEDURE — 99233 SBSQ HOSP IP/OBS HIGH 50: CPT | Mod: 25,,, | Performed by: INTERNAL MEDICINE

## 2018-10-15 PROCEDURE — 20600001 HC STEP DOWN PRIVATE ROOM

## 2018-10-15 PROCEDURE — 4A023N6 MEASUREMENT OF CARDIAC SAMPLING AND PRESSURE, RIGHT HEART, PERCUTANEOUS APPROACH: ICD-10-PCS | Performed by: INTERNAL MEDICINE

## 2018-10-15 RX ORDER — METOPROLOL SUCCINATE 50 MG/1
50 TABLET, EXTENDED RELEASE ORAL DAILY
Status: DISCONTINUED | OUTPATIENT
Start: 2018-10-16 | End: 2018-10-23 | Stop reason: HOSPADM

## 2018-10-15 RX ORDER — LANOLIN ALCOHOL/MO/W.PET/CERES
800 CREAM (GRAM) TOPICAL 2 TIMES DAILY
Status: COMPLETED | OUTPATIENT
Start: 2018-10-15 | End: 2018-10-17

## 2018-10-15 RX ADMIN — MAGNESIUM OXIDE TAB 400 MG (241.3 MG ELEMENTAL MG) 800 MG: 400 (241.3 MG) TAB at 10:10

## 2018-10-15 RX ADMIN — INSULIN ASPART 8 UNITS: 100 INJECTION, SOLUTION INTRAVENOUS; SUBCUTANEOUS at 02:10

## 2018-10-15 RX ADMIN — INSULIN DETEMIR 15 UNITS: 100 INJECTION, SOLUTION SUBCUTANEOUS at 10:10

## 2018-10-15 RX ADMIN — ATORVASTATIN CALCIUM 80 MG: 20 TABLET, FILM COATED ORAL at 09:10

## 2018-10-15 RX ADMIN — ENOXAPARIN SODIUM 40 MG: 100 INJECTION SUBCUTANEOUS at 05:10

## 2018-10-15 RX ADMIN — MAGNESIUM OXIDE TAB 400 MG (241.3 MG ELEMENTAL MG) 800 MG: 400 (241.3 MG) TAB at 09:10

## 2018-10-15 RX ADMIN — INSULIN ASPART 3 UNITS: 100 INJECTION, SOLUTION INTRAVENOUS; SUBCUTANEOUS at 05:10

## 2018-10-15 RX ADMIN — METOPROLOL SUCCINATE 100 MG: 100 TABLET, EXTENDED RELEASE ORAL at 09:10

## 2018-10-15 RX ADMIN — FUROSEMIDE 10 MG/HR: 10 INJECTION, SOLUTION INTRAMUSCULAR; INTRAVENOUS at 06:10

## 2018-10-15 RX ADMIN — PRASUGREL 10 MG: 10 TABLET, FILM COATED ORAL at 09:10

## 2018-10-15 RX ADMIN — INSULIN ASPART 4 UNITS: 100 INJECTION, SOLUTION INTRAVENOUS; SUBCUTANEOUS at 05:10

## 2018-10-15 RX ADMIN — INSULIN ASPART 2 UNITS: 100 INJECTION, SOLUTION INTRAVENOUS; SUBCUTANEOUS at 08:10

## 2018-10-15 RX ADMIN — ASPIRIN 81 MG: 81 TABLET, COATED ORAL at 09:10

## 2018-10-15 RX ADMIN — Medication 3 ML: at 06:10

## 2018-10-15 RX ADMIN — POTASSIUM CHLORIDE 40 MEQ: 1500 TABLET, EXTENDED RELEASE ORAL at 10:10

## 2018-10-15 RX ADMIN — POTASSIUM CHLORIDE 40 MEQ: 1500 TABLET, EXTENDED RELEASE ORAL at 09:10

## 2018-10-15 NOTE — PROGRESS NOTES
Pt with more runs of NSVT, HR reaching 140-160s when pt up to BR or moving. Pt denies lightheadedness, dizziness, palpitations or SOB at this time. Dr. Alvarez notified, orders to inform if pt sustains for >30 seconds or becomes symptomatic at anytime.

## 2018-10-15 NOTE — PROGRESS NOTES
[facility]  Heart Transplant  Progress Note    Patient Name: Bharat Coker  MRN: [id number]  Admission Date: 10/10/2018  Hospital Length of Stay: 5 days  Attending Physician: Jony Hendrickson Jr.,*  Primary Care Provider: Ronnie Richardson Jr, MD  Principal Problem:HFrEF (heart failure with reduced ejection fraction)    Subjective:     Interval History: 10/15/2018: Patient was seen and examined at bedside. Patient with no major complaints. Pending for RHC, will try to do it today, but cath lab busy, if not tomorrow. Will continue with current diuresis strategy. Will consult for PT/OT. Pending discussion with CT surgeon regarding PAD disease noted on chest CT to decide if will proceed on pathway. Renal function is improving. Cell counts are stable. Few episode of short NSVT noted less than 8 beats, will optimize electrolytes. Good UOP.    Continuous Infusions:   furosemide (LASIX) 2 mg/mL infusion (non-titrating) 10 mg/hr (10/14/18 1217)     Scheduled Meds:   aspirin  81 mg Oral Daily    atorvastatin  80 mg Oral Daily    enoxaparin  40 mg Subcutaneous Daily    insulin aspart U-100  3 Units Subcutaneous TIDWM    insulin detemir U-100  15 Units Subcutaneous QHS    magnesium oxide  800 mg Oral BID    metoprolol succinate  100 mg Oral Daily    potassium chloride  40 mEq Oral BID    prasugrel  10 mg Oral Daily    sodium chloride 0.9%  3 mL Intravenous Q8H     PRN Meds:dextrose 50%, dextrose 50%, glucagon (human recombinant), glucose, glucose, insulin aspart U-100    Review of patient's allergies indicates:   Allergen Reactions    Amiodarone analogues Anaphylaxis    Ativan [lorazepam] Anxiety     Objective:     Vital Signs (Most Recent):  Temp: 96.2 °F (35.7 °C) (10/15/18 1146)  Pulse: 100 (10/15/18 1146)  Resp: 20 (10/15/18 1146)  BP: (!) 84/50 (10/15/18 1146)  SpO2: (!) 92 % (10/15/18 1146) Vital Signs (24h Range):  Temp:  [96 °F (35.6 °C)-98.1 °F (36.7 °C)] 96.2 °F (35.7 °C)  Pulse:   [] 100  Resp:  [16-22] 20  SpO2:  [90 %-99 %] 92 %  BP: ()/(50-69) 84/50     Patient Vitals for the past 72 hrs (Last 3 readings):   Weight   10/15/18 0808 72.7 kg (160 lb 2.6 oz)   10/13/18 0800 72.1 kg (158 lb 14.4 oz)     Body mass index is 25.85 kg/m².      Intake/Output Summary (Last 24 hours) at 10/15/2018 1154  Last data filed at 10/15/2018 0500  Gross per 24 hour   Intake 1300 ml   Output 1625 ml   Net -325 ml       Hemodynamic Parameters:       Telemetry:Few episode of NSVT noted, no evidence of any additional arrhythmias.    Physical Exam    Significant Labs:  CBC:  Recent Labs   Lab  10/13/18   0601  10/14/18   0336  10/15/18   0536   WBC  9.40  11.01  8.60   RBC  4.48*  4.36*  4.26*   HGB  13.2*  12.9*  12.4*   HCT  41.3  40.4  39.8*   PLT  202  189  179   MCV  92  93  93   MCH  29.5  29.6  29.1   MCHC  32.0  31.9*  31.2*     BNP:  Recent Labs   Lab  10/10/18   0453   BNP  4,674*     CMP:  Recent Labs   Lab  10/13/18   0601  10/14/18   0336  10/14/18   2335  10/15/18   0536   GLU  203*  180*   --   191*   CALCIUM  10.0  9.8   --   9.8   ALBUMIN  3.3*  3.5   --   3.2*   PROT  7.8  8.1   --   7.5   NA  139  135*   --   137   K  4.3  3.8  5.4*  3.6   CO2  29  27   --   31*   CL  96  95   --   97   BUN  52*  50*   --   48*   CREATININE  1.6*  1.4   --   1.4   ALKPHOS  110  111   --   106   ALT  21  20   --   24   AST  25  23   --   29   BILITOT  1.0  1.2*   --   1.2*      Coagulation:   Recent Labs   Lab  10/10/18   0453   INR  1.1   APTT  22.3     LDH:  No results for input(s): LDH in the last 72 hours.  Microbiology:  Microbiology Results (last 7 days)     Procedure Component Value Units Date/Time    Blood culture [[id number]]     Order Status:  Canceled Specimen:  Blood     Blood culture [[id number]]     Order Status:  Canceled Specimen:  Blood           I have reviewed all pertinent labs within the past 24 hours.    Estimated Creatinine Clearance: 43.7 mL/min (based on SCr of 1.4  "mg/dL).    Diagnostic Results:  I have reviewed all pertinent imaging results/findings within the past 24 hours.    Assessment and Plan:     72 y/o male with PMH of CAD s/p CABG, ICM, HFrEF who presents as a transfer from OSH after he presented there for SUMNER, weight gain and palpitations. He presented to OSH after being discharged from the same facility 4 days prior to Oct 8. He was there initially for ADHF and KASEY. The patient recently had a revision of his CRT-D. The patient became SOB on Oct 7 but did not have CP. The patient felt his pulse and noted it to be rapid. He was brought to the ED at OSH by EMS; his HR was ~170. The ECG was c/w WCT (later documentation notes SVT with aberrancy after interrogation was negative for VT), and he was given one dose of adenosine. The HR came down to 110. His CXR was c/w pulmonary edema. A pro-BNP was at one point 20084.The patient's WBC was 17 and sCr 1.6. tBili was 1.5. An ECHO on 10/8: LVIDd 6.5 cm, EF 15%, IVC normal collapsibility and normal IVC size. An undated stress test notes: "mild reversibility noted in the cardiac apex and there lateral wall of the heart from cardiac apex to approximately mid wall suspicion for ischemia..mild reversibility noted in the mid to basilar inferior/inferoseptal wall of the heart also suspicious for ischemia". He had a LHC 6/2018: LIMA-LAD patent, % occluded at ostium, SVG-% occluded, SVG-D 100% occluded, pLCx 30%, 100% occluded OM, 100% LAD occlusion after takeoff of D1, D1 is tiny with 80% disease, SVG-OM 80% proximal stenosis with 50% at site of anastomosis. An SVG-OM OKSANA was placed at that time. The patient's WBC was 9.6 as of 10/9. Aldactone was held at some point 2/2 hyperkalemia. The patient believes he has been diuresed adequately and has no fluid left to remove.     * HFrEF (heart failure with reduced ejection fraction)    Mr. Coker is a 72 y/o male with PMH of CAD s/p CABG, ICM/HFrEF (LVef <10%, LVEDD 7.8) who " was a transfer for cardiogenic shock / MODS:    - BNP 4674   - [facility] 6/2018: LIMA-LAD patent, % occluded at ostium, SVG-% occluded, SVG-D 100% occluded, pLCx 30%, 100% occluded OM, 100% LAD occlusion after takeoff of D1, D1 is tiny with 80% disease, SVG-OM 80% proximal stenosis with 50% at site of anastomosis. An SVG-OM OKSANA was placed at that time.    - TTE EF <10%, G2DD, LVH (LVEDD 7.8 / LVESD 7.3 ), reduced RVSF (LV 4.5cm / TAPSE 1.3), PASP 26mmHG, Mod MR    - CT head: no acute changes, old lacunar infract   - CT CAP: Moderate atherosclerosis of the thoracic, abdominal aorta and CAD.  Mild calcification of the aortic valve and aortic and mitral valve annuli.    - Started on Pathway for VAD only, pending CT surgery evaluation by Dr. Khan, prior to Step 2, appreciate assistance    - / to assist with financial clearance  - Need OSH records from ,  - RHC when euvolemic    - Decreased Lasix 10mg/hr, continue closely monitor UOP,   - Continue PT / OT, ambulate as tolerated, elevated LE   - Fluid restriction at 1500 cc with strict I/Os and daily STANDING weights  - Check Electrolytes, keep Mag >2 & K+ >4  - Continue PTA HF GDMT          VT (ventricular tachycardia)    Frequent NSVT with underlying ST:   - Increase Toprol 100mg qday   - Follow up HR  - Consider EP consult if continues to have ST         Skin rash    Posterior neck, likely eczema:  - Continue supportive care with topical         ICD (implantable cardioverter-defibrillator) in place    [employer] CRT-D (09/20/18)  - Interrogated today consistent with NSVT, Afib / AF  - Closely  Monitor for further ectopy   - Will consider EP consultation if continues to persist   - Replete electrolytes         DM (diabetes mellitus)     A1c 6.9,   - SSI with accuchecks  - will consider endocrine consult with VAD work up         CAD (coronary artery disease)    Recent MI 06/2018, ICM / HFrEF / CAD s/p CABG:   - Continue DAPT, statin, and BB  - consider  ACEi once renal function panel returns            Timothy Chisholm MD  Heart Transplant  [facility]

## 2018-10-15 NOTE — SUBJECTIVE & OBJECTIVE
Interval History: 10/15/2018: Patient was seen and examined at bedside. Patient with no major complaints. Pending for RHC, will try to do it today, but cath lab busy, if not tomorrow. Will continue with current diuresis strategy. Will consult for PT/OT. Pending discussion with CT surgeon regarding PAD disease noted on chest CT to decide if will proceed on pathway. Renal function is improving. Cell counts are stable. Few episode of short NSVT noted less than 8 beats, will optimize electrolytes. Good UOP.    Continuous Infusions:   furosemide (LASIX) 2 mg/mL infusion (non-titrating) 10 mg/hr (10/14/18 1217)     Scheduled Meds:   aspirin  81 mg Oral Daily    atorvastatin  80 mg Oral Daily    enoxaparin  40 mg Subcutaneous Daily    insulin aspart U-100  3 Units Subcutaneous TIDWM    insulin detemir U-100  15 Units Subcutaneous QHS    magnesium oxide  800 mg Oral BID    metoprolol succinate  100 mg Oral Daily    potassium chloride  40 mEq Oral BID    prasugrel  10 mg Oral Daily    sodium chloride 0.9%  3 mL Intravenous Q8H     PRN Meds:dextrose 50%, dextrose 50%, glucagon (human recombinant), glucose, glucose, insulin aspart U-100    Review of patient's allergies indicates:   Allergen Reactions    Amiodarone analogues Anaphylaxis    Ativan [lorazepam] Anxiety     Objective:     Vital Signs (Most Recent):  Temp: 96.2 °F (35.7 °C) (10/15/18 1146)  Pulse: 100 (10/15/18 1146)  Resp: 20 (10/15/18 1146)  BP: (!) 84/50 (10/15/18 1146)  SpO2: (!) 92 % (10/15/18 1146) Vital Signs (24h Range):  Temp:  [96 °F (35.6 °C)-98.1 °F (36.7 °C)] 96.2 °F (35.7 °C)  Pulse:  [] 100  Resp:  [16-22] 20  SpO2:  [90 %-99 %] 92 %  BP: ()/(50-69) 84/50     Patient Vitals for the past 72 hrs (Last 3 readings):   Weight   10/15/18 0808 72.7 kg (160 lb 2.6 oz)   10/13/18 0800 72.1 kg (158 lb 14.4 oz)     Body mass index is 25.85 kg/m².      Intake/Output Summary (Last 24 hours) at 10/15/2018 1154  Last data filed at 10/15/2018  0500  Gross per 24 hour   Intake 1300 ml   Output 1625 ml   Net -325 ml       Hemodynamic Parameters:       Telemetry:Few episode of NSVT noted, no evidence of any additional arrhythmias.    Physical Exam    Significant Labs:  CBC:  Recent Labs   Lab  10/13/18   0601  10/14/18   0336  10/15/18   0536   WBC  9.40  11.01  8.60   RBC  4.48*  4.36*  4.26*   HGB  13.2*  12.9*  12.4*   HCT  41.3  40.4  39.8*   PLT  202  189  179   MCV  92  93  93   MCH  29.5  29.6  29.1   MCHC  32.0  31.9*  31.2*     BNP:  Recent Labs   Lab  10/10/18   0453   BNP  4,674*     CMP:  Recent Labs   Lab  10/13/18   0601  10/14/18   0336  10/14/18   2335  10/15/18   0536   GLU  203*  180*   --   191*   CALCIUM  10.0  9.8   --   9.8   ALBUMIN  3.3*  3.5   --   3.2*   PROT  7.8  8.1   --   7.5   NA  139  135*   --   137   K  4.3  3.8  5.4*  3.6   CO2  29  27   --   31*   CL  96  95   --   97   BUN  52*  50*   --   48*   CREATININE  1.6*  1.4   --   1.4   ALKPHOS  110  111   --   106   ALT  21  20   --   24   AST  25  23   --   29   BILITOT  1.0  1.2*   --   1.2*      Coagulation:   Recent Labs   Lab  10/10/18   0453   INR  1.1   APTT  22.3     LDH:  No results for input(s): LDH in the last 72 hours.  Microbiology:  Microbiology Results (last 7 days)     Procedure Component Value Units Date/Time    Blood culture [[id number]]     Order Status:  Canceled Specimen:  Blood     Blood culture [[id number]]     Order Status:  Canceled Specimen:  Blood           I have reviewed all pertinent labs within the past 24 hours.    Estimated Creatinine Clearance: 43.7 mL/min (based on SCr of 1.4 mg/dL).    Diagnostic Results:  I have reviewed all pertinent imaging results/findings within the past 24 hours.

## 2018-10-15 NOTE — PROGRESS NOTES
10/14/18 2257   Vital Signs   Temp 96.3 °F (35.7 °C)   Temp src Oral   Pulse (!) 114   Heart Rate Source Monitor   Resp 16   SpO2 98 %   O2 Device (Oxygen Therapy) nasal cannula   BP (!) 98/57   MAP (mmHg) 72   BP Location Left arm   Patient Position Lying     Pt with increasing runs of NSVT, remains on lasix gtt. Pt asymptomatic and no change in VS. MD on call notified, orders for Mg and K lab draws

## 2018-10-15 NOTE — ASSESSMENT & PLAN NOTE
Mr. Coker is a 72 y/o male with PMH of CAD s/p CABG, ICM/HFrEF (LVef <10%, LVEDD 7.8) who was a transfer for cardiogenic shock / MODS:    - BNP 4674   - [facility] 6/2018: LIMA-LAD patent, % occluded at ostium, SVG-% occluded, SVG-D 100% occluded, pLCx 30%, 100% occluded OM, 100% LAD occlusion after takeoff of D1, D1 is tiny with 80% disease, SVG-OM 80% proximal stenosis with 50% at site of anastomosis. An SVG-OM OKSANA was placed at that time.    - TTE EF <10%, G2DD, LVH (LVEDD 7.8 / LVESD 7.3 ), reduced RVSF (LV 4.5cm / TAPSE 1.3), PASP 26mmHG, Mod MR    - CT head: no acute changes, old lacunar infract   - CT CAP: Moderate atherosclerosis of the thoracic, abdominal aorta and CAD.  Mild calcification of the aortic valve and aortic and mitral valve annuli.    - Started on Pathway for VAD only, pending CT surgery evaluation by Dr. Khan, prior to Step 2, appreciate assistance    - SW/CM to assist with financial clearance  - Need OSH records from [facility],  - RHC when euvolemic    - Decreased Lasix 10mg/hr, continue closely monitor UOP,   - Continue PT / OT, ambulate as tolerated, elevated LE   - Fluid restriction at 1500 cc with strict I/Os and daily STANDING weights  - Check Electrolytes, keep Mag >2 & K+ >4  - Continue PTA HF GDMT

## 2018-10-15 NOTE — NURSING
RN Proactive Rounding Note  Time of Visit:     Admit Date: 10/10/2018  LOS: 4  Code Status: Full Code   Date of Visit: 10/14/2018  : 1945  Age: 73 y.o.  Sex: male  Bed: 328/328 A:   MRN: [id number]  Was the patient discharged from an ICU this admission? No   Was the patient discharged from a PACU within last 24 hours? No  Did the patient receive conscious sedation/general anesthesia in last 24 hours? No  Was the patient in the ED within the past 24 hours? No  Was the patient started on NIPPV within the past 24 hours? No  Attending Physician: Jony Hendrickson Jr.,*  Primary Service: Networked reference to record PCT       ASSESSMENT:     Abnormal Vital Signs: SpO2 90. Moving around, Now 95% on 2L/nc  Clinical Issues:Circulatory     INTERVENTIONS/ RECOMMENDATIONS:   Patient had runs of VTach during the day, lasix infusion decreased. Notes to keep K+ >4 and Mag >2. Only am labs ordered.     Discussed plan of care with RN    PHYSICIAN ESCALATION:   No         Disposition: Call if needed    FOLLOW-UP/CONTINGENCY:       Call back the Rapid Response Nurse at x [phone]  for additional questions or concerns

## 2018-10-15 NOTE — PLAN OF CARE
Problem: Patient Care Overview  Goal: Plan of Care Review  Outcome: Ongoing (interventions implemented as appropriate)  Plan of care reviewed with pt and spouse. VS stable. Pt remains paced and tachy on tele, frequent PVCs and frequent runs of NSVT. Plan for RHC in AM. No acute events at this time. No complaints. Pt remains free from falls or injury. Bed low and locked, call light and personal belongings within reach. Will continue to monitor.

## 2018-10-15 NOTE — PHYSICIAN QUERY
"PT Name: Bharat Coker  MR #: [id number]    Physician Query Form - Heart  Condition Clarification     CDS/: Aidan Pineda Jr, RN               Contact information:[email or web address]  This form is a permanent document in the medical record.     Query Date: October 15, 2018    By submitting this query, we are merely seeking further clarification of documentation. Please utilize your independent clinical judgment when addressing the question(s) below.    The medical record contains the following   Indicators     Supporting Clinical Findings Location in Medical Record   x BNP 4674 10/10 BNP    EF     x Radiology findings Cardiomegaly with bilateral edema.  10/10 Chest X Ray Report   x Echo Results Severely depressed left ventricular systolic function (EF <10%).     Impaired LV relaxation, elevated LAP (grade 2 diastolic dysfunction).  10/10 2D Echo Report      10/10 2D Echo Report    "Ascites" documented      "SOB" or "SUMNER" documented      "Hypoxia" documented     x Heart Failure documented Principal Problem:HFrEF (heart failure with reduced ejection fraction)   10/15 Heart Transplant Progress Note    "Edema" documented     x Diuretics/Meds furosemide (LASIX) 2 mg/mL infusion    10/10-10/15 MAR   x Treatment: In with setting of acute on chronic combined HF, NYHA FC IV with MULTIPLE recent admits for HF  feel it is appropriate to begin advanced HF pathway for VAD/OHT 10/10 H&P    Other:      Heart failure (HF) can be acute, chronic or both. It is generally further specificed as systolic, diastolic, or combined. Lastly, it is important to identify an underlying etiology if known or suspected.     Common clues to acute exacerbation:  Rapidly progressive symptoms (w/in 2 weeks of presentation), using IV diuretics to treat, using supplemental O2, pulmonary edema on Xray, MI w/in 4 weeks, and/or BNP >500    Systolic Heart Failure: is defined as chart documentation of a left ventricular ejection fraction (LVEF) " less than 40%     Diastolic Heart Failure: is defined as a left ventricular ejection fraction (LVEF) greater than 40%   +      Evidence of diastolic dysfunction on echocardiography OR    Right heart catheterization wedge pressure above 12 mm Hg OR    Left heart catheterization left ventricular end diastolic pressure 18 mm Hg or above.    References: *American Heart Association    The clinical guidelines noted below are only system guidelines, and do not replace the providers clinical judgment.     Provider, please specify the diagnosis associated with above clinical findings  Further Specify the Heart Failure Diagnosis   [x  ] Acute on Chronic Systolic Heart Failure- Pre-existing systolic HF diagnosis.  EF < 40%  and acute HF symptoms documented  [   ] Acute on Chronic Combined Systolic and Diastolic Heart Failure                   [   ] Other (please specify): ___________________________________  [   ] Clinically Undetermined                          Please document in your progress notes daily for the duration of treatment until resolved and include in your discharge summary.

## 2018-10-15 NOTE — BRIEF OP NOTE
[facility]  Brief Operative Note  Cardiology    SUMMARY     Surgery Date: 10/15/2018     Surgeon(s) and Role:     * Thony Rodriges MD - Primary    Assisting Surgeon: Isaiah Sheppard MD    Pre-op Diagnosis:  Heart failure [I50.9]    Post-op Diagnosis: Heart failure    Procedure Performed: RHC    Description of the findings of the procedure: RHC performed via the right IJ. Patient tolerated the procedure well.  Elevated left but normal right side filling pressures (RA= 4, PCWP=22 mm of Hg). Mild to moderate pulmonary HTN  (PA=52/20 mm of Hg, PA mean=33 mm of Hg) in the setting of elevated left sided filling pressures. Low cardiac index and output  (CI=1.65 L/min/m2, CO=2.9 L/min) off inotropes).    Estimated Blood Loss: < 10 cc    Plan:   - Consider inotrope therapy   - Further management as per primary team    Thony Rodriges MD

## 2018-10-15 NOTE — HOSPITAL COURSE
10/15/2018: Patient was seen and examined at bedside. Patient with no major complaints. Pending for RHC, will try to do it today, but cath lab busy, if not tomorrow. Will continue with current diuresis strategy. Will consult for PT/OT. Pending discussion with CT surgeon regarding PAD disease noted on chest CT to decide if will proceed on pathway. Renal function is improving. Cell counts are stable. Few episode of short NSVT noted less than 8 beats, will optimize electrolytes. Good UOP.    10/17/2018: Patient was seen and examined at bedside. Patient with no major complaints. In term of volume overload is better. Will d/c furosemide gtt and will start Lasix 80 mg IV BID. Patient on evaluation for VAD but decision for implant will be done on an outpatient basis. Meanwhile will optimize patient. Will consult EP for device interrogation and management recommendations due to evidence of NSVT on telemetry as well episodes what seems possible SVT with aberrant conduction. Unable to up titrate BB due to low BP. Will wait for recommendations. Patient complaining of nasal congestion, will treat accordingly. 6MWT with/without oxygen ordered to decide if patient will need oxygen.    10/18/2018: Patient was seen and examined today at bedside. Patient with no major complaints.Improved nasal congestion. Night events noted and telemetry was reviewed. Patient still with evidence of frequent paroxysmal SVT sometimes persistent from telemetry difficult to determine between AT vs. Aflutter from the telemetry tracings. Patient with low dose BB due to low BP. Amiodarone considered but patient with previous adverse reaction to it (dyspnea?? according to the patient). Patient started on digoxin by on duty staff at the [facility] after discussion with EP service, still with frequent paroxysmal arrhythmias. EP service contacted for reevaluation.Electrolyte are ok as well renal function.Good diuresis with Lasix bolus.    10/19/2018: Patient was  seen and examined at bedside. Patient wit no major complaints. Patient is scheduled for LAVERNE guided  DCCV today (instead of EPS). Cell counts has remained stable as well renal function. No lytes disturbances were noted. Will continue to follow. Possible starting antiarrhythmic after DCCV.    10/20/2018: Patient was seen and examined at bedside. Patient with no major complaints.Adequate UOP and negative fluid balance (-575). Weight has remained stable. Cell counts and renal function has remained stable. Telemetry reviewed and noted few PVCs but has remained on AsVp with sinus rhythm.QTc mildly prolonged from baseline, following EP recommendations for dofetilide. Will follow. Patient with [id number] s/p LAVERNE/DCCV due to flutter, will need OAC currently on heparin drip. 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 Will follow.Hydralazine d/c will try to add low dose lisinopril 2.5 mg PO daily and will watch for tolerance.    10/21/2018: Patient was seen and examined at bedside. Patient with no major complaints. Patient on dofetilide, has remained in sinus rhythm AsVp with no evidence of major ventricular arrhythmias. Patient started on low dose lisinopril with adequate tolerance, borderline BP. Renal function has remained stable and there is no evidence of major lytes disturbances.Patient started on VKA, will monitor INR. Patient started also on oral furosemide, good UOP.

## 2018-10-15 NOTE — INTERVAL H&P NOTE
Patient seen and examined. No interval change since last H&P. Here for a RHC to assess his hemodynamics.   Access via the right IJ  7 Fr venous sheath  Risks and benefits of the procedure were explained to the patient. All questions were answered.  Consents were signed and in the chart.    Thony Rodriges MD

## 2018-10-15 NOTE — PROGRESS NOTES
Notified Kathrine of below BP. Pt asymptomatic No new orders given. Will continue to monitor.        10/15/18 1146   Vital Signs   BP (!) 84/50   MAP (mmHg) 60   Patient Position Lying

## 2018-10-16 LAB
ALBUMIN SERPL BCP-MCNC: 3.2 G/DL
ALP SERPL-CCNC: 99 U/L
ALT SERPL W/O P-5'-P-CCNC: 23 U/L
ANION GAP SERPL CALC-SCNC: 12 MMOL/L
AST SERPL-CCNC: 28 U/L
BASOPHILS # BLD AUTO: 0.06 K/UL
BASOPHILS NFR BLD: 0.7 %
BILIRUB SERPL-MCNC: 1.3 MG/DL
BNP SERPL-MCNC: 3131 PG/ML
BUN SERPL-MCNC: 49 MG/DL
CALCIUM SERPL-MCNC: 10 MG/DL
CHLORIDE SERPL-SCNC: 98 MMOL/L
CO2 SERPL-SCNC: 27 MMOL/L
CREAT SERPL-MCNC: 1.2 MG/DL
DIFFERENTIAL METHOD: ABNORMAL
EOSINOPHIL # BLD AUTO: 0.3 K/UL
EOSINOPHIL NFR BLD: 4.1 %
ERYTHROCYTE [DISTWIDTH] IN BLOOD BY AUTOMATED COUNT: 14.6 %
EST. GFR  (AFRICAN AMERICAN): >60 ML/MIN/1.73 M^2
EST. GFR  (NON AFRICAN AMERICAN): >60 ML/MIN/1.73 M^2
GLUCOSE SERPL-MCNC: 124 MG/DL
HCT VFR BLD AUTO: 40.7 %
HGB BLD-MCNC: 12.7 G/DL
IMM GRANULOCYTES # BLD AUTO: 0.02 K/UL
IMM GRANULOCYTES NFR BLD AUTO: 0.2 %
LYMPHOCYTES # BLD AUTO: 1.3 K/UL
LYMPHOCYTES NFR BLD: 15.9 %
MAGNESIUM SERPL-MCNC: 2 MG/DL
MCH RBC QN AUTO: 28.9 PG
MCHC RBC AUTO-ENTMCNC: 31.2 G/DL
MCV RBC AUTO: 93 FL
MONOCYTES # BLD AUTO: 0.8 K/UL
MONOCYTES NFR BLD: 9.2 %
NEUTROPHILS # BLD AUTO: 5.7 K/UL
NEUTROPHILS NFR BLD: 69.9 %
NRBC BLD-RTO: 0 /100 WBC
PHOSPHATE SERPL-MCNC: 3.8 MG/DL
PLATELET # BLD AUTO: 166 K/UL
PMV BLD AUTO: 11.7 FL
POCT GLUCOSE: 128 MG/DL (ref 70–110)
POCT GLUCOSE: 310 MG/DL (ref 70–110)
POCT GLUCOSE: 341 MG/DL (ref 70–110)
POCT GLUCOSE: 92 MG/DL (ref 70–110)
POTASSIUM SERPL-SCNC: 3.8 MMOL/L
PROT SERPL-MCNC: 7.5 G/DL
RBC # BLD AUTO: 4.39 M/UL
SODIUM SERPL-SCNC: 137 MMOL/L
WBC # BLD AUTO: 8.22 K/UL

## 2018-10-16 PROCEDURE — 63600175 PHARM REV CODE 636 W HCPCS: Performed by: INTERNAL MEDICINE

## 2018-10-16 PROCEDURE — 25000003 PHARM REV CODE 250: Performed by: STUDENT IN AN ORGANIZED HEALTH CARE EDUCATION/TRAINING PROGRAM

## 2018-10-16 PROCEDURE — 97116 GAIT TRAINING THERAPY: CPT

## 2018-10-16 PROCEDURE — 84100 ASSAY OF PHOSPHORUS: CPT

## 2018-10-16 PROCEDURE — A4216 STERILE WATER/SALINE, 10 ML: HCPCS | Performed by: INTERNAL MEDICINE

## 2018-10-16 PROCEDURE — 83735 ASSAY OF MAGNESIUM: CPT

## 2018-10-16 PROCEDURE — 36415 COLL VENOUS BLD VENIPUNCTURE: CPT

## 2018-10-16 PROCEDURE — 83880 ASSAY OF NATRIURETIC PEPTIDE: CPT

## 2018-10-16 PROCEDURE — 99233 SBSQ HOSP IP/OBS HIGH 50: CPT | Mod: ,,, | Performed by: INTERNAL MEDICINE

## 2018-10-16 PROCEDURE — 80053 COMPREHEN METABOLIC PANEL: CPT

## 2018-10-16 PROCEDURE — 25000003 PHARM REV CODE 250: Performed by: INTERNAL MEDICINE

## 2018-10-16 PROCEDURE — 85025 COMPLETE CBC W/AUTO DIFF WBC: CPT

## 2018-10-16 PROCEDURE — 20600001 HC STEP DOWN PRIVATE ROOM

## 2018-10-16 PROCEDURE — 63600175 PHARM REV CODE 636 W HCPCS: Performed by: STUDENT IN AN ORGANIZED HEALTH CARE EDUCATION/TRAINING PROGRAM

## 2018-10-16 RX ADMIN — Medication 3 ML: at 03:10

## 2018-10-16 RX ADMIN — POTASSIUM CHLORIDE 40 MEQ: 1500 TABLET, EXTENDED RELEASE ORAL at 09:10

## 2018-10-16 RX ADMIN — MAGNESIUM OXIDE TAB 400 MG (241.3 MG ELEMENTAL MG) 800 MG: 400 (241.3 MG) TAB at 09:10

## 2018-10-16 RX ADMIN — METOPROLOL SUCCINATE 50 MG: 50 TABLET, EXTENDED RELEASE ORAL at 09:10

## 2018-10-16 RX ADMIN — ASPIRIN 81 MG: 81 TABLET, COATED ORAL at 09:10

## 2018-10-16 RX ADMIN — INSULIN ASPART 8 UNITS: 100 INJECTION, SOLUTION INTRAVENOUS; SUBCUTANEOUS at 12:10

## 2018-10-16 RX ADMIN — FUROSEMIDE 10 MG/HR: 10 INJECTION, SOLUTION INTRAMUSCULAR; INTRAVENOUS at 03:10

## 2018-10-16 RX ADMIN — INSULIN ASPART 4 UNITS: 100 INJECTION, SOLUTION INTRAVENOUS; SUBCUTANEOUS at 09:10

## 2018-10-16 RX ADMIN — ENOXAPARIN SODIUM 40 MG: 100 INJECTION SUBCUTANEOUS at 05:10

## 2018-10-16 RX ADMIN — INSULIN ASPART 3 UNITS: 100 INJECTION, SOLUTION INTRAVENOUS; SUBCUTANEOUS at 05:10

## 2018-10-16 RX ADMIN — PRASUGREL 10 MG: 10 TABLET, FILM COATED ORAL at 09:10

## 2018-10-16 RX ADMIN — INSULIN ASPART 3 UNITS: 100 INJECTION, SOLUTION INTRAVENOUS; SUBCUTANEOUS at 07:10

## 2018-10-16 RX ADMIN — INSULIN ASPART 3 UNITS: 100 INJECTION, SOLUTION INTRAVENOUS; SUBCUTANEOUS at 11:10

## 2018-10-16 RX ADMIN — ATORVASTATIN CALCIUM 80 MG: 20 TABLET, FILM COATED ORAL at 09:10

## 2018-10-16 RX ADMIN — INSULIN DETEMIR 15 UNITS: 100 INJECTION, SOLUTION SUBCUTANEOUS at 09:10

## 2018-10-16 NOTE — PLAN OF CARE
Problem: Patient Care Overview  Goal: Plan of Care Review  Outcome: Ongoing (interventions implemented as appropriate)  Plan of care reviewed with pt. Pt remained free of falls, trauma, and injury. VSS. Lasix gtt continued. Pt went for RHC today. Adv w/u continued. Will continue to monitor.

## 2018-10-16 NOTE — PLAN OF CARE
Problem: Patient Care Overview  Goal: Plan of Care Review  Outcome: Revised  Pt free of falls/trauma/injuries.  Denies c/o SOB, CP, or discomfort.  Generalized skin remains CDI; No edema noted.  Pt being diuresed with IV Lasix gtts; diuresing well.   Wt remains stable. Electrolytes replaced.  Pt tolerating plan of care.

## 2018-10-16 NOTE — SUBJECTIVE & OBJECTIVE
Interval History: 10/16/2018: Patient was seen and examined at bedside. Patient with no major complaints. No major arrhythmias noted on telemetry today except for sinus tachycardia. RHC done on 10/15/2018 revealing RA:4, mPAP:33, PCWP:22, CO/CI:2.95/1.62. Patient with borderline BP, this limits us for addition of further medical therapy. Already on Toprol 50 mg. Renal function continue improving. Patient with negative fluid balance. Will continue with current diuresis strategy. Will wait for CT surgeon recommendations in terms of evidence of PAD on chest CT before proceeding with pathway evaluation.    Continuous Infusions:   furosemide (LASIX) 2 mg/mL infusion (non-titrating) 10 mg/hr (10/15/18 1826)     Scheduled Meds:   aspirin  81 mg Oral Daily    atorvastatin  80 mg Oral Daily    enoxaparin  40 mg Subcutaneous Daily    insulin aspart U-100  3 Units Subcutaneous TIDWM    insulin detemir U-100  15 Units Subcutaneous QHS    magnesium oxide  800 mg Oral BID    metoprolol succinate  50 mg Oral Daily    potassium chloride  40 mEq Oral BID    prasugrel  10 mg Oral Daily    sodium chloride 0.9%  3 mL Intravenous Q8H     PRN Meds:dextrose 50%, dextrose 50%, glucagon (human recombinant), glucose, glucose, insulin aspart U-100    Review of patient's allergies indicates:   Allergen Reactions    Amiodarone analogues Anaphylaxis    Ativan [lorazepam] Anxiety     Objective:     Vital Signs (Most Recent):  Temp: 97.7 °F (36.5 °C) (10/16/18 1223)  Pulse: (!) 120 (10/16/18 1223)  Resp: 18 (10/16/18 1223)  BP: (!) 92/56 (10/16/18 0756)  SpO2: (!) 94 % (10/16/18 1223) Vital Signs (24h Range):  Temp:  [96.9 °F (36.1 °C)-97.7 °F (36.5 °C)] 97.7 °F (36.5 °C)  Pulse:  [] 120  Resp:  [16-22] 18  SpO2:  [92 %-98 %] 94 %  BP: (87-92)/(50-61) 92/56     Patient Vitals for the past 72 hrs (Last 3 readings):   Weight   10/16/18 0700 72 kg (158 lb 11.7 oz)   10/15/18 0808 72.7 kg (160 lb 2.6 oz)     Body mass index is 25.62  kg/m².      Intake/Output Summary (Last 24 hours) at 10/16/2018 1308  Last data filed at 10/16/2018 1100  Gross per 24 hour   Intake 1005 ml   Output 1250 ml   Net -245 ml       Hemodynamic Parameters:       Telemetry:no evidence of major arrhythmias except for sinus tachycardia.    Physical Exam     Constitutional: He is oriented to person, place, and time. He appears well-developed and well-nourished.   HENT:   Head: Normocephalic.   Eyes: EOM are normal. Pupils are equal, round, and reactive to light.   Neck: Normal range of motion. Neck supple.   Cardiovascular: Normal rate.  Exam reveals no gallop.   Pulmonary/Chest:Mild basal pulmonary rales noted.  Abdominal: Soft. Bowel sounds are normal. He exhibits no distension.   Musculoskeletal: Normal range of motion. He exhibits no edema.   Neurological: He is alert and oriented to person, place, and time.     Significant Labs:  CBC:  Recent Labs   Lab  10/14/18   0336  10/15/18   0536  10/16/18   0435   WBC  11.01  8.60  8.22   RBC  4.36*  4.26*  4.39*   HGB  12.9*  12.4*  12.7*   HCT  40.4  39.8*  40.7   PLT  189  179  166   MCV  93  93  93   MCH  29.6  29.1  28.9   MCHC  31.9*  31.2*  31.2*     BNP:  Recent Labs   Lab  10/10/18   0453  10/16/18   0435   BNP  4,674*  3,131*     CMP:  Recent Labs   Lab  10/14/18   0336  10/14/18   2335  10/15/18   0536  10/16/18   0435   GLU  180*   --   191*  124*   CALCIUM  9.8   --   9.8  10.0   ALBUMIN  3.5   --   3.2*  3.2*   PROT  8.1   --   7.5  7.5   NA  135*   --   137  137   K  3.8  5.4*  3.6  3.8   CO2  27   --   31*  27   CL  95   --   97  98   BUN  50*   --   48*  49*   CREATININE  1.4   --   1.4  1.2   ALKPHOS  111   --   106  99   ALT  20   --   24  23   AST  23   --   29  28   BILITOT  1.2*   --   1.2*  1.3*      Coagulation:   Recent Labs   Lab  10/10/18   0453   INR  1.1   APTT  22.3     LDH:  No results for input(s): LDH in the last 72 hours.  Microbiology:  Microbiology Results (last 7 days)     Procedure  Component Value Units Date/Time    Blood culture [[id number]]     Order Status:  Canceled Specimen:  Blood     Blood culture [[id number]]     Order Status:  Canceled Specimen:  Blood           I have reviewed all pertinent labs within the past 24 hours.    Estimated Creatinine Clearance: 51 mL/min (based on SCr of 1.2 mg/dL).    Diagnostic Results:  I have reviewed all pertinent imaging results/findings within the past 24 hours.

## 2018-10-16 NOTE — PT/OT/SLP PROGRESS
Physical Therapy Treatment    Patient Name:  Bharat Coker   MRN:  [id number]    Recommendations:     Discharge Recommendations:  home with home health   Discharge Equipment Recommendations: (possibly SPC, attempt at next session )   Barriers to discharge: None    Assessment:     Bharat Coker is a 73 y.o. male admitted with a medical diagnosis of HFrEF (heart failure with reduced ejection fraction).  He presents with the following impairments/functional limitations:  impaired functional mobilty, gait instability, impaired balance, decreased lower extremity function. Pt tolerated activity with continued balance deficits with scissoring gait pattern with R LE. Drop foot has improved with full active range of R ankle, sensory deficits persist. Pt reports this session that he has had a history of issues with his R sciatic nerve as well as R lumbar pain. Balance assessment revealed decreased SLS time on R LE compared to L LE. Pt would continue to benefit from acute skilled therapy intervention to address deficits and progress toward prior level of function.       Rehab Prognosis:  good; patient would benefit from acute skilled PT services to address these deficits and reach maximum level of function.      Recent Surgery: Procedure(s) (LRB):  INSERTION, CATHETER, RIGHT HEART (Right)      Plan:     During this hospitalization, patient to be seen 3 x/week to address the above listed problems via gait training, therapeutic activities, therapeutic exercises, neuromuscular re-education  · Plan of Care Expires:  11/12/18   Plan of Care Reviewed with: patient    Subjective     Communicated with RN prior to session.  Patient found supine upon PT entry to room, agreeable to treatment.      Chief Complaint: Pt reports feeling off balance   Patient comments/goals: to get better and return home   Pain/Comfort:  · Pain Rating 1: 0/10  · Pain Rating Post-Intervention 1: 0/10    Patients cultural, spiritual, Anabaptist conflicts  given the current situation: none reported     Objective:     Patient found with: peripheral IV, telemetry     General Precautions: Standard, fall   Orthopedic Precautions:N/A   Braces: N/A     Functional Mobility:  · Transfers:     · Sit to Stand:  modified independence with no AD using bed rail   · Gait: Pt ambulated 375 feet with no AD and CGA. Pt demo'd intermittent scissoring gait with R LE crossing midline. Pt experienced 3 LOB, requiring min A and hallway railing to recover.   · Balance:   · Feet together eyes open: >30 sec   · Feet together eyes closed: >30 sec   · Tandem stance R foot in front: >30 sec   · SLS L LE: 18 sec   · SLS R LE: <3 sec       AM-PAC 6 CLICK MOBILITY  Turning over in bed (including adjusting bedclothes, sheets and blankets)?: 4  Sitting down on and standing up from a chair with arms (e.g., wheelchair, bedside commode, etc.): 4  Moving from lying on back to sitting on the side of the bed?: 4  Moving to and from a bed to a chair (including a wheelchair)?: 4  Need to walk in hospital room?: 3  Climbing 3-5 steps with a railing?: 3  Basic Mobility Total Score: 22       Therapeutic Activities and Exercises:   Pt educated on role of PT/POC, safety with mobility.   Discussed option to try ambulating with SPC for balance, pt agreeable.     Re-assessed AROM of R foot: Pt able to perform full ROM of ankle DF/PF against gravity     Patient left sitting EOB  with all lines intact and call button in reach..    GOALS:   Multidisciplinary Problems     Physical Therapy Goals        Problem: Physical Therapy Goal    Goal Priority Disciplines Outcome Goal Variances Interventions   Physical Therapy Goal     PT, PT/OT Ongoing (interventions implemented as appropriate)     Description:  Goals to be met by: 10/26/2018     Patient will increase functional independence with mobility by performin. Supine to sit with [place]  2. Sit to stand transfer with [place]  3. Gait  x 400 feet with  [place] using no AD with no LOB.   4. Ascend/descend 3 stair with left Handrails Supervision using no AD.                       Time Tracking:     PT Received On: 10/16/18  PT Start Time: 1035     PT Stop Time: 1050  PT Total Time (min): 15 min     Billable Minutes: Gait Training 15 mins     Treatment Type: Treatment  PT/PTA: PT           Liliane Mulligan, PT  10/16/2018

## 2018-10-16 NOTE — PLAN OF CARE
Problem: Physical Therapy Goal  Goal: Physical Therapy Goal  Goals to be met by: 10/26/2018     Patient will increase functional independence with mobility by performin. Supine to sit with [place]  2. Sit to stand transfer with [place]  3. Gait  x 400 feet with [place] using no AD with no LOB.   4. Ascend/descend 3 stair with left Handrails Supervision using no AD.      Outcome: Ongoing (interventions implemented as appropriate)  Pt is progressing toward goals. All goals remain appropriate.

## 2018-10-16 NOTE — PROGRESS NOTES
Patient and caregiver have read and signed Phase 1 VAD education.  I have read and reviewed the contents of this in detail and all questions have been answered to patient and caregiver's satisfaction as evidence by verbal acknowledgement.     Per Wife, Patient has short term memory loss.     Verbal and written VAD Education:     Please carefully read and review the following statement, and, sign to indicate you have been fully informed about the candidacy and evaluation process for the mechanical circulatory support device (MCSD) also known as the Ventricular Assist Device (VAD).     Why a VAD Is Needed   Heart failure is defined as a condition in which your heart is unable to pump enough blood to support the basic needs of your body. This can make you feel tired, have abnormal rhythms, and shortness of breath. Heart failure can also lead to failure of other organs (e.g. liver or kidneys). You are being offered this treatment option because you have a marked increase risk of irreversible end-organ damage or death. For this reason, you are being considered for placement of a Left Ventricular Assist Device (VAD) at [facility]. The heart pump is designed to take over the pumping action of your heart.   Prior to undergoing this procedure, it is important that you and your family understand the options, benefits, risks, and expectations associated with having a VAD. It is required that you and your proposed caregiver(s) understand and agree with the treatment plan and are willing to participate in the guidelines outlined in the following pages.     Bridge to transplant (BTT) is when a VAD is used to help support heart function for someone waiting for a heart transplant. This treatment plan is subject to change pending the results from your evaluation and your physicians decision. If your medical condition worsens after you receive the VAD, you may not be a transplant candidate.   The information within  this document pertains only to VAD therapy. You will receive information regarding heart transplantation allocation, procedures, and risks from the Pre-Transplant when appropriate.   OR   Destination therapy is when a VAD is used as a long-term treatment for patients who are not candidates for transplant, such as those with end-stage congestive heart failure. In these patients, the pumps are placed permanently to help the heart work better. This is subject to change pending the results from your evaluation and your physicians decision.     Types of Ventricular Assist Devices:   There are several different types of mechanical circulatory support devices:   -Left ventricular assist devices (LVAD) help the left side of the heart pump blood to the largest artery of the body, the aorta.     Your VAD Team may also talk with you about:   -Right ventricular assist devices (RVAD) help the right side of the heart pump blood to the lungs.   -Total Artificial Heart (LATHA) that replaces the heart and pumps blood to the body.   A VAD is a long-term device utilized by patients for months to years. Some patients may receive another device prior to receiving a VAD depending on their treatment and health status.   When Is a VAD Used?   When medications can no longer help, and other surgical options have been exhausted, a physician may recommend a VAD. A VAD is used to assist the pumping action of a severely weakened heart. It works with your heart to improve and to increase blood flow; it does not replace your own heart. VADs are most often used for patients experiencing New York Heart Association (NYHA) Class III-IV heart failure symptoms.     Alternative Options:   If you are not found to be a candidate for VAD implantation or if you decide that a VAD is not the best option for you, you will continue to receive customary standard of care. You may also continue optimal medical management alone including the use of inotrope therapy.  An inotrope is an IV medication that helps the strength of the hearts contraction. However, the reason that you are being considered for VAD implantation is because optimal medical management has not been adequate and without a VAD, your condition is likely to deteriorate over time. While going straight to transplant may be a possibility, death is a possibility as well.   You May Not Be Eligible To Receive A VAD If You Are Found To Have Any Of The Following:      Any irreversible non-cardiac disease state with less than 2-year survival rate    Inadequate social support to be successful at home after surgery    Irreversible pulmonary disease or fixed pulmonary hypertension    Irreversible renal disease    Irreversible liver disease    Unresolved stroke or uncorrected cerebral vascular disease    A history of chronic noncompliance    Using illicit drugs or alcohol    Uncorrected thyroid disease    Significant right ventricular failure    Obstructive or restrictive cardiomyopathy    Amyloidosis    Active pericardial disease    Untreated aortic aneurysm    Irreversible cognitive dysfunction    History of psychiatric disease, uncontrolled affective disorder, or any cognitive dysfunction that may prevent you from managing self-care    Diabetes with severe retinopathy or peripheral neuropathy    Obesity with BMI (body mass index) greater than 35    Severe chronic malnutrition    Uncorrected blood disorders    Active uncontrolled infection    Pregnancy (positive pregnancy test)    HIV positive or immunosuppression that could result in device infection    Muscular dystrophy, MS or similar disease states    Active systemic infection    Cancer    Insufficient funding     Possible Benefits:   The overall goal is improved health and quality of life. In most cases, because circulation has been restored as a result of the VAD, you can expect to have more energy and also experience less heart failure  symptoms. Since VADs help deliver more oxygen rich blood, you may feel well enough to resume many of the usual activities and hobbies that you enjoy. In fact, many patients return to work and are no longer disabled, depending on the type of work they do. Be aware that you may lose your disability post VAD based on review of your records and disability benefits. It is important that you speak with a  so that you may plan for this should it occur.   The improved circulation may prolong life and may improve some organ damage caused by your heart failure. This is supported by some studies that have shown that VAD patients have a longer survival than patients treated with medications alone. Although the VAD can improve your chances for survival, the type and severity of your heart disease may outweigh any benefits from the device and you may still die.     Possible Surgical/ Anesthesia Risks:   As with any surgery or procedure, there are risks and the possibility of complications or death. There are also risks related to the operation itself and undergoing anesthesia, and risks related to the device itself. You will discuss the risks in detail with the Cardiac Surgeon who intends to perform your surgery. Your surgeon and anesthesia provider will review consent forms prior to surgery.     Operative Procedure Risks:   There are many risks with this operation including but not limited to: death, heart attack, stroke, nerve injury, blood clots, damage to arteries needing limb amputation, bleeding and hemorrhage, hemolysis, infection, development of new antibodies in your blood, mediastinitis, arrhythmia, right heart failure, heart block, or the need for pacemaker or ICD implantation. The need for re-operation for any reason may cause: renal, hepatic or pulmonary failure resulting in death or long-term need of ventilation or dialysis, and blood transfusion with its risk of HIV, and hepatitis. Studies  have also shown that patients may have problems with memory, attention, and speed of processing thoughts after a cardiac surgical procedure. Any of these complications will be explained to you in more detail if you desire. In addition to these potential complications, there may be other risks that are currently unknown.     Risks Related to VAD Therapy:   Include but not limited to: death, need for re-operation, device malfunction or device infection, renal failure requiring dialysis, blood clots, stroke, pain, or bleeding. These risks may lead to prolonged hospital stay or re-hospitalization. Pump exchange due to complications is a possibility. Patients may also experience a potential decrease in their quality of life including limitations of their normal activities. Patients may reach a point where quality of life is so impaired, that the decision to terminate VAD-support will need to be addressed. The longer you are on the device, the greater the chances that complications will develop.   Please see addendum for likelihood of these risks impacting you in your VAD therapy journey.     Evaluation Process:   There will be many people involved in the evaluation process to assure that this is the best choice for you. You will receive a number of tests and consultations. Some of the people that may help evaluate you include Heart Failure Physicians, Cardiac Surgeons, Social Workers and VAD Coordinators. During the evaluation process, you will be given education about the VAD and the care you would require. After the evaluation, the group will decide if you meet the criteria to have a VAD implanted. You may require or have already been implanted with a short-term VAD prior to surgery for a long-term VAD.     Care Team   Additionally, you will meet with a number of different team members to coordinate your care: financial counselor to discuss insurance and dressing supplies, research coordinators, anesthesiologist,  psychiatrist/psychologist, physical therapist or occupational therapist to discuss rehabilitation after VAD, and dietician to improve nutrition. Some patients may be referred to other services for consultation. These may include, but are not limited to: infectious disease doctor, gastroenterologist, nephrologist (kidney doctor), pulmonologist (lung doctor), hepatologist (liver doctor), or an ophthalmologist (eye doctor). It is recommended that you see your dentist to ensure no infection is present and all needed dental work is completed before surgery. Cavities and rotten teeth can cause an infection which can lead to death.   Testing is required to determine VAD candidacy. These include but are not limited to the following:   Laboratory studies of blood and urine, chest x-ray, abdominal ultrasound, CT scan, echocardiogram, cardiopulmonary treadmill, right heart catheterization, electrocardiogram, pulmonary function tests, colonoscopy or endoscopy, vascular studies, and pulmonary studies.     Device Choice:   VADs are currently approved by the Food and Drug Administration (FDA) to be used as Bridge to Transplantation (BTT) or Destination Therapy (DT). A full list will be provided for you and your family to review if desired. This [facility] also participates in clinical trials with devices that are considered investigational, and are not yet FDA approved for BTT/DT. Your Surgeon and Cardiologist will discuss with you which device is the best option for you. VADs have four main parts: the implantable heart pump, a tube that passes through the skin of your abdomen (driveline), a controller (small computer) that controls the pump operation, and an external power source (batteries or power device). In addition, there are other VADs that are used temporarily when patients are in cardiogenic shock.   You have the right to refuse surgery at any time. This educational document consent will help you to make an informed  decision about your VAD option. If you decide this is not the best option for you , please make your physician aware. You and your family make the decision to proceed.   Pre-operative Care expectations:    Your surgeon will request a tentative operative date in your name in the event that VAD therapy is the right option defined by you, your family, and provider team. You may see this date on [facility].    Informed surgical and anesthesia consents will be completed prior to the procedure.    You will be admitted to the hospital a few days before the day of surgery for optimization before surgery (unless already hospitalized).    Intra-aortic balloon pump or impella will be placed before surgery    If you are listed for transplant, once you go for the VAD your listing status will change. This will be discussed with you by your Transplant team.    If you have an ICD (defibrillator), it will be turned off prior to surgery, and then turned back on after surgery. It will NOT be removed.     Surgical Procedure:   The surgical procedure to implant the VAD will require open-heart surgery and can take on average between 6-12 hours. The surgeon will need to make an incision down the front of your chest to reach your heart. You will have a breathing tube and ventilator while under general anesthesia. The VAD is placed below the heart and the surgeon will connect the pump to your heart and secure it in place with sutures. Once the pump is in place, the VAD along with your natural heart will resume pumping blood through your body. After the surgery is completed, you will receive care in the ICU.     Post-Operative Care Expectations:   Upon arrival to the ICU, you will receive close monitoring and support from the following medical mechanisms:    Upon arrival to ICU, please adhere to our ICU (intensive care unit) visiting hours. You will need to rest and we ask that family not stay the night for a few days.    Heart  monitor (telemetry) to monitor heart rate and rhythm.    A breathing tube (endotracheal tube) and ventilator to assist with breathing and maintain and open airway.    An oral-gastric tube will be utilized to keep the stomach empty when connected to suction, as well as to give the nursing staff the capability to administer oral medications directly into the stomach.    A Calabrese catheter to measure urinary output.    A [place]-Renetta Catheter to measure pressures within the heart and lungs.    An arterial line catheter in order to measure arterial blood pressure.    Chest tubes to collect and measure drainage from surgery.    A VAD driveline that exits the skin in the abdominal area and is connected to the VAD power source.    Your chest may be left open for 24 to 72 hours after implantation, after which you will be taken back to the operating room to close your chest.     You will receive medications for sedation and to control your pain in order to achieve a tolerable level of comfort. You will also be on IV medications until your blood pressure and fluid status are stable. Your home medications will be resumed as soon as possible if still medically relevant. In addition to your previous taken medications, patients with VADs are commonly prescribed medications for anticoagulation/anti-platelet, antibiotics, blood pressure, and vitamin/mineral supplements. Your length of stay in the ICU will depend on how fast you recover. Once you are more stable, breathing on your own with your lines and tubes out, you will be transferred to a general care unit where you can expect to stay for another 1-3 weeks. On average, your total length of stay will be about three or four weeks after your surgery. During this time, it is expected that you and your family will begin to learn to manage   the device and learn how to manage your care at home. Most patients are able to return home after VAD implantation, but this cannot be  guaranteed. Complications may require a prolonged period of hospitalization, long term acute care facility, or inpatient rehabilitation. Be aware, if you are unattended and the device fails, you may not be able to perform the emergency procedures yourself, which could result in death and/or blood clots in the device.     Education:   Verbal, written, and visual educational materials are provided throughout your hospitalization and are available to anyone involved in your care at home. You and your caregiver(s) will be trained by a VAD Coordinator on how to manage your care and device. Other staff such as your bedside Nurse, the Occupational and Physical Therapists will also provide training to you. You and your caregiver(s) must show ability to manage the device, understand how it operates, troubleshoot problems, and care for your driveline exit site. It is expected that a caregiver(s) will be present and available while you are in the hospital to learn how to manage the device and how to care for you when you are at home. The education will be an ongoing process while you are here at the hospital. Prior to your expected discharge, your family and/or caregivers will be required to show competency in the care and management of you and your VAD. In addition, a VAD Coordinator will ensure that your local fire department, emergency personnel, and any other community members will be given education materials and training as necessary. Your home must have consistent electricity and phone services; the outlets must be three pronged and grounded. Any additional safety needs are arranged during this time. You will need to have your glasses and hearing aids at the hospital in order to be educated, as soon as possible.     Caregiver Requirements:   VAD implantation is based on suitability, need, and a committed caregiver. The caregiver must agree to certain responsibilities for the VAD implant process to continue. This is not  negotiable. The VAD patient may be completely dependent on the caregiver. While the patient is in the hospital, the caregiver is expected to visit daily, preferably at a time between 8 am- 4 pm. The caregiver will need to learn the dressing change process by the nurses and consistently demonstrate the ability to perform VAD dressing change in addition to helping the patient learn about the VAD. If the patient is unable to meet education goals before discharge, they may need 24-hour care. 24-hour caregivers may experience increased stress in their day-to-day life resulting from caring for a loved one with a VAD. There are support groups available to help you through living with a VAD.   The patient will not be able to drive for several months. The caregiver will need to drive the patient to appointments, as frequently as two to three times weekly at first. The caregiver will need to assist the patient with medication management. The caregiver will need to encourage the patient to document VAD numbers daily and know when to call for a problem.   Education begins now and is on-going throughout the VAD patients life.   The VAD team recognizes that VAD patients have an increased satisfactory outcome with a dedicated and committed caregiver. The caregiver has a tremendous responsibility. It is essential for you, the caregiver, to understand what is expected prior to moving forward. Any concerns about being the caregiver should be discussed with the , the VAD coordinator, or the physician.     Discharge Process:   Your daily progress will be followed by a team of people involved in your care including your Surgeon, Cardiologist, VAD Coordinators, Staff Nurses, Nurse Practitioners, Physician Assistants, Physical/Occupational Therapy, Social Workers, and a Discharge Planner. They will monitor your recovery and help you to adjust to life with a VAD. You will need to demonstrate the ability to care for yourself,  such as grooming and exercise. You will also need to demonstrate the ability to care for your VAD, the equipment, and alarms. You must have a thermometer, weight scale, flashlight, and a blood pressure cuff at your home. Most patients return home however, some patients choose to live with a caregiver or need a rehabilitation facility for a short period before returning home. If insurance allows, a Home Health nurse will be recommended to come to your home and assist you in your care when you return home. The length of time that the Visiting Nurse will come to your home will depend on your overall recovery. It is recommended  after you return home that you enroll in a Cardiac Rehab program to continue to improve your physical health.     Follow up care:   After you are discharged, you will follow up with your Surgeon, your Heart Failure Cardiologist and your VAD coordinator. They will collaboratively care for you and make decisions about your treatment. Typically, your first visit will be 1 week after discharge. We will see you every week for 3-4 weeks, followed by every 2 weeks for 1 month, then monthly thereafter while you have the VAD. Once you are considered a stable established patient, your Physicians may decide that you can follow-up every 2-3 months. Along with seeing your Cardiologist and Surgeon, you will have laboratory testing, and other physiological testing done on a regular basis in order to monitor and maintain your progress and health. The types of testing that you may need and the frequency will be decided by your Physicians but can include blood tests, EKG, Echocardiogram, Right Heart Catheterization, V02 Treadmill Stress Test, and Implantable Cardioverter Defibrillator (ICD) device check. If you have received an investigational VAD, you will have other testing that will be required for the research study. You will be required to take anticoagulation medications, also known as blood thinning  medications (coumadin, warfarin). You will   be required to have frequent blood draws, up to 2-3 times a week, in order to monitor your blood count and blood thinning agents. You will also be in frequent contact with a VAD Coordinator who will make phone calls to assess how you are doing at home and assist you with any problems that may arise. A VAD Coordinator and a Heart Failure Cardiologist are also available 24 hours a day in the event that you have an emergency. On average, you can expect that within 12 weeks after surgery, you will be able to return to most activities, with the permission of your VAD Team.     Lifestyle Changes and Prohibited Activities:   You will have limitations and can resume most usual activities. Certain activities are hazardous or fatal after implant. Persons with implantable VADs must not allow their controller/computer and electrical equipment to submerge in water. Showering is possible with proper protective equipment. You may only resume showering once your driveline has healed and your VAD coordinator gives their permission. Swimming and baths are prohibited. You cannot sleep on your stomach or the side the driveline is exiting your abdomen. Contact sports, repetitive jumping, or impact with an airbag are examples of activities that may cause trauma to the pump attachments and must be avoided.   You may be sexually active but must care for your driveline. Female patients cannot get pregnant. Medical care after implant includes lifetime follow up to monitor device function and health status. You may not have a magnetic resonance imaging (MRI) test because of the magnetic fields. You may not vacuum, touch television or computer screens, or take hot clothes out of the dryer due to the static electricity. VAD therapy requires significant self-care responsibility and a willingness to participate with you VAD team. Driveline exit site dressings must be performed daily using sterile  technique outlined in your care of driveline documentation or as directed by your VAD team. Care of your driveline must be done daily by yourself or caregiver. Maintenance care of the device components, batteries, and driveline is necessary to prevent pump failure, infections, or other serious complications. You will continue to take medications for your heart failure although the dose and or medication name may change. You may continue to take your diuretic such as lasix or demedex. You may also continue to be on a fluid restriction. You may drive once approved by your VAD team. You may be able to travel with your VAD, depending on the situation.     VAD Equipment:   Along with the device that is implanted inside your body, you will have a number of other external pieces of equipment that will require care and maintenance. You will have a driveline that exits your body through your abdomen that will power a controller, which is the computer component that tells the heart pump how to perform. The controller will also tell you about alarms, sounds, and words, on how your pump is operating and if there are any problems. In order to power the device and the controller, you will have batteries and a battery charger and/or power device. The batteries allow you to be mobile and move freely without being attached to outlet power. The  or power device allows   you to be connected to power for long periods of time such as when you are sleeping. Different VADs have similar pieces of equipment, but will vary depending on the device you receive. You will receive education and teaching before you leave the hospital to make sure you understand clearly how to operate the equipment and troubleshoot problems that may occur.     Confidentiality:   Hospital personnel who are involved in the course of your care may review your medical record. Communication between you and [facility] remains confidential. If you do become a  candidate for transplant, data about your case, which will include your identity, will be sent to [employer]) and may be sent to other places involved in the transplant process as permitted by law. Data about your VAD, which will include your identity, will be shared with the  of the device. Device  may be involved in your care alongside your VAD Team in the hospital and/or clinic setting. Your information may also be entered into a registry for pump implants, known as INTERMACS. Your participation in this is voluntary, and will be discussed at greater length prior to implant.     End of Life:   If you are approaching the end of life, the VAD can be turned off. You may be in a hospital, home, or hospice setting. If you become very sick and do not have a chance to survive, we may talk about stopping the pump. The doctor would talk to you or your family about what is right for you. It is helpful to talk to your family about your goals before surgery so they know what your wishes are. A member of our Goals of Care team will visit you before surgery to help you learn your goals. You have the right to have the device turned off or to decline pump exchange if your pump fails or malfunctions.     Device Return:   At the time of either transplant or death, the surgeon may need to remove the device to return to the . You or a family member may need to sign a separate consent for removal of the device.   Questions   We encourage you to learn everything you can about the potential benefits and risks of having a VAD. If you or your family has any questions, you should feel free to contact your Transplant Coordinator or VAD Coordinator.   By signing this form, you understand and have reviewed the implant procedure as well as the potential benefits and risks involved with the getting a VAD. You also acknowledge and understand the care that will be  required to maintain this device and yourself, including changes in your lifestyle, and impact on your independence.

## 2018-10-16 NOTE — PROGRESS NOTES
Update:    SW to pt's room for update. Pt and wife aaox4, communicaitve, and pleasant. Pt and wife report some disappointment with not knowing if pt will be an LVAD candidate. Pt and wife report waiting on dr. Khan to visit and make determination on if surgery is an option. SW providing emotional support. Pt and wife report no other needs from SW at this time. SW providing ongoing psychosocial and counseling support, education, assistance, resources, and discharge planning as indicated. SW continuing to follow and remains available.

## 2018-10-16 NOTE — PLAN OF CARE
Problem: Patient Care Overview  Goal: Plan of Care Review  Outcome: Ongoing (interventions implemented as appropriate)  Plan of care reviewed with pt. Pt remained free of falls, trauma, and injury. VSS. Lasix gtt continued. Advanced options w/u continued. Will continue to monitor.

## 2018-10-16 NOTE — PROGRESS NOTES
[facility]  Heart Transplant  Progress Note    Patient Name: Bharat Coker  MRN: [id number]  Admission Date: 10/10/2018  Hospital Length of Stay: 6 days  Attending Physician: Jony Hendrickson Jr.,*  Primary Care Provider: Ronnie Richardson Jr, MD  Principal Problem:HFrEF (heart failure with reduced ejection fraction)    Subjective:     Interval History: 10/16/2018: Patient was seen and examined at bedside. Patient with no major complaints. No major arrhythmias noted on telemetry today except for sinus tachycardia. RHC done on 10/15/2018 revealing RA:4, mPAP:33, PCWP:22, CO/CI:2.95/1.62. Patient with borderline BP, this limits us for addition of further medical therapy. Already on Toprol 50 mg. Renal function continue improving. Patient with negative fluid balance. Will continue with current diuresis strategy. Will wait for CT surgeon recommendations in terms of evidence of PAD on chest CT before proceeding with pathway evaluation.    Continuous Infusions:   furosemide (LASIX) 2 mg/mL infusion (non-titrating) 10 mg/hr (10/15/18 1826)     Scheduled Meds:   aspirin  81 mg Oral Daily    atorvastatin  80 mg Oral Daily    enoxaparin  40 mg Subcutaneous Daily    insulin aspart U-100  3 Units Subcutaneous TIDWM    insulin detemir U-100  15 Units Subcutaneous QHS    magnesium oxide  800 mg Oral BID    metoprolol succinate  50 mg Oral Daily    potassium chloride  40 mEq Oral BID    prasugrel  10 mg Oral Daily    sodium chloride 0.9%  3 mL Intravenous Q8H     PRN Meds:dextrose 50%, dextrose 50%, glucagon (human recombinant), glucose, glucose, insulin aspart U-100    Review of patient's allergies indicates:   Allergen Reactions    Amiodarone analogues Anaphylaxis    Ativan [lorazepam] Anxiety     Objective:     Vital Signs (Most Recent):  Temp: 97.7 °F (36.5 °C) (10/16/18 1223)  Pulse: (!) 120 (10/16/18 1223)  Resp: 18 (10/16/18 1223)  BP: (!) 92/56 (10/16/18 0756)  SpO2: (!) 94 % (10/16/18  1223) Vital Signs (24h Range):  Temp:  [96.9 °F (36.1 °C)-97.7 °F (36.5 °C)] 97.7 °F (36.5 °C)  Pulse:  [] 120  Resp:  [16-22] 18  SpO2:  [92 %-98 %] 94 %  BP: (87-92)/(50-61) 92/56     Patient Vitals for the past 72 hrs (Last 3 readings):   Weight   10/16/18 0700 72 kg (158 lb 11.7 oz)   10/15/18 0808 72.7 kg (160 lb 2.6 oz)     Body mass index is 25.62 kg/m².      Intake/Output Summary (Last 24 hours) at 10/16/2018 1308  Last data filed at 10/16/2018 1100  Gross per 24 hour   Intake 1005 ml   Output 1250 ml   Net -245 ml       Hemodynamic Parameters:       Telemetry:no evidence of major arrhythmias except for sinus tachycardia.    Physical Exam     Constitutional: He is oriented to person, place, and time. He appears well-developed and well-nourished.   HENT:   Head: Normocephalic.   Eyes: EOM are normal. Pupils are equal, round, and reactive to light.   Neck: Normal range of motion. Neck supple.   Cardiovascular: Normal rate.  Exam reveals no gallop.   Pulmonary/Chest:Mild basal pulmonary rales noted.  Abdominal: Soft. Bowel sounds are normal. He exhibits no distension.   Musculoskeletal: Normal range of motion. He exhibits no edema.   Neurological: He is alert and oriented to person, place, and time.     Significant Labs:  CBC:  Recent Labs   Lab  10/14/18   0336  10/15/18   0536  10/16/18   0435   WBC  11.01  8.60  8.22   RBC  4.36*  4.26*  4.39*   HGB  12.9*  12.4*  12.7*   HCT  40.4  39.8*  40.7   PLT  189  179  166   MCV  93  93  93   MCH  29.6  29.1  28.9   MCHC  31.9*  31.2*  31.2*     BNP:  Recent Labs   Lab  10/10/18   0453  10/16/18   0435   BNP  4,674*  3,131*     CMP:  Recent Labs   Lab  10/14/18   0336  10/14/18   2335  10/15/18   0536  10/16/18   0435   GLU  180*   --   191*  124*   CALCIUM  9.8   --   9.8  10.0   ALBUMIN  3.5   --   3.2*  3.2*   PROT  8.1   --   7.5  7.5   NA  135*   --   137  137   K  3.8  5.4*  3.6  3.8   CO2  27   --   31*  27   CL  95   --   97  98   BUN  50*   --   48*   "49*   CREATININE  1.4   --   1.4  1.2   ALKPHOS  111   --   106  99   ALT  20   --   24  23   AST  23   --   29  28   BILITOT  1.2*   --   1.2*  1.3*      Coagulation:   Recent Labs   Lab  10/10/18   0453   INR  1.1   APTT  22.3     LDH:  No results for input(s): LDH in the last 72 hours.  Microbiology:  Microbiology Results (last 7 days)     Procedure Component Value Units Date/Time    Blood culture [[id number]]     Order Status:  Canceled Specimen:  Blood     Blood culture [[id number]]     Order Status:  Canceled Specimen:  Blood           I have reviewed all pertinent labs within the past 24 hours.    Estimated Creatinine Clearance: 51 mL/min (based on SCr of 1.2 mg/dL).    Diagnostic Results:  I have reviewed all pertinent imaging results/findings within the past 24 hours.    Assessment and Plan:     74 y/o male with PMH of CAD s/p CABG, ICM, HFrEF who presents as a transfer from OSH after he presented there for [facility], weight gain and palpitations. He presented to OSH after being discharged from the same facility 4 days prior to Oct 8. He was there initially for ADHF and KASEY. The patient recently had a revision of his CRT-D. The patient became SOB on Oct 7 but did not have CP. The patient felt his pulse and noted it to be rapid. He was brought to the ED at OSH by EMS; his HR was ~170. The ECG was c/w WCT (later documentation notes SVT with aberrancy after interrogation was negative for VT), and he was given one dose of adenosine. The HR came down to 110. His CXR was c/w pulmonary edema. A pro-BNP was at one point 20084.The patient's WBC was 17 and sCr 1.6. tBili was 1.5. An ECHO on 10/8: LVIDd 6.5 cm, EF 15%, IVC normal collapsibility and normal IVC size. An undated stress test notes: "mild reversibility noted in the cardiac apex and there lateral wall of the heart from cardiac apex to approximately mid wall suspicion for ischemia..mild reversibility noted in the mid to basilar inferior/inferoseptal wall of the " "heart also suspicious for ischemia". He had a C 6/2018: LIMA-LAD patent, % occluded at ostium, SVG-% occluded, SVG-D 100% occluded, pLCx 30%, 100% occluded OM, 100% LAD occlusion after takeoff of D1, D1 is tiny with 80% disease, SVG-OM 80% proximal stenosis with 50% at site of anastomosis. An SVG-OM OKSANA was placed at that time. The patient's WBC was 9.6 as of 10/9. Aldactone was held at some point 2/2 hyperkalemia. The patient believes he has been diuresed adequately and has no fluid left to remove.     * HFrEF (heart failure with reduced ejection fraction)    Mr. Coker is a 74 y/o male with PMH of CAD s/p CABG, ICM/HFrEF (LVef <10%, LVEDD 7.8) who was a transfer for cardiogenic shock / MODS:    - BNP 4674   - C 6/2018: LIMA-LAD patent, % occluded at ostium, SVG-% occluded, SVG-D 100% occluded, pLCx 30%, 100% occluded OM, 100% LAD occlusion after takeoff of D1, D1 is tiny with 80% disease, SVG-OM 80% proximal stenosis with 50% at site of anastomosis. An SVG-OM OKSANA was placed at that time.    - TTE EF <10%, G2DD, LVH (LVEDD 7.8 / LVESD 7.3 ), reduced RVSF (LV 4.5cm / TAPSE 1.3), PASP 26mmHG, Mod MR    - CT head: no acute changes, old lacunar infract   - CT CAP: Moderate atherosclerosis of the thoracic, abdominal aorta and CAD.  Mild calcification of the aortic valve and aortic and mitral valve annuli.    - Started on Pathway for VAD only, pending CT surgery evaluation by Dr. Khan, prior to Step 2, appreciate assistance    - SW/CM to assist with financial clearance  - Need OSH records from ,  - RHC when euvolemic    - Decreased Lasix 10mg/hr, continue closely monitor UOP,   - Continue PT / OT, ambulate as tolerated, elevated LE   - Fluid restriction at 1500 cc with strict I/Os and daily STANDING weights  - Check Electrolytes, keep Mag >2 & K+ >4  - Continue PTA HF GDMT          VT (ventricular tachycardia)    Frequent NSVT with underlying ST:   - Increase Toprol 100mg qday   - " Follow up HR  - Consider EP consult if continues to have ST         Skin rash    Posterior neck, likely eczema:  - Continue supportive care with topical         ICD (implantable cardioverter-defibrillator) in place    [employer] CRT-D (09/20/18)  - Interrogated today consistent with NSVT, Afib / AF  - Closely  Monitor for further ectopy   - Will consider EP consultation if continues to persist   - Replete electrolytes         DM (diabetes mellitus)     A1c 6.9,   - SSI with accuchecks  - will consider endocrine consult with VAD work up         CAD (coronary artery disease)    Recent MI 06/2018, ICM / HFrEF / CAD s/p CABG:   - Continue DAPT, statin, and BB  - consider ACEi once renal function panel returns            Timothy Chisholm MD  Heart Transplant  [facility]

## 2018-10-17 ENCOUNTER — DOCUMENTATION ONLY (OUTPATIENT)
Dept: ELECTROPHYSIOLOGY | Facility: CLINIC | Age: 73
End: 2018-10-17

## 2018-10-17 PROBLEM — I47.19 ATRIAL TACHYCARDIA: Status: ACTIVE | Noted: 2018-10-17

## 2018-10-17 PROBLEM — Z95.810 CARDIAC RESYNCHRONIZATION THERAPY DEFIBRILLATOR (CRT-D) IN PLACE: Status: ACTIVE | Noted: 2018-10-17

## 2018-10-17 PROBLEM — Z51.5 PALLIATIVE CARE ENCOUNTER: Status: ACTIVE | Noted: 2018-10-17

## 2018-10-17 LAB
ALBUMIN SERPL BCP-MCNC: 3.3 G/DL
ALP SERPL-CCNC: 107 U/L
ALT SERPL W/O P-5'-P-CCNC: 23 U/L
ANION GAP SERPL CALC-SCNC: 11 MMOL/L
AST SERPL-CCNC: 23 U/L
BASOPHILS # BLD AUTO: 0.06 K/UL
BASOPHILS NFR BLD: 0.7 %
BILIRUB SERPL-MCNC: 1.3 MG/DL
BUN SERPL-MCNC: 42 MG/DL
CALCIUM SERPL-MCNC: 10 MG/DL
CHLORIDE SERPL-SCNC: 97 MMOL/L
CO2 SERPL-SCNC: 32 MMOL/L
CREAT SERPL-MCNC: 1.4 MG/DL
DIFFERENTIAL METHOD: ABNORMAL
EOSINOPHIL # BLD AUTO: 0.4 K/UL
EOSINOPHIL NFR BLD: 4.3 %
ERYTHROCYTE [DISTWIDTH] IN BLOOD BY AUTOMATED COUNT: 14.8 %
EST. GFR  (AFRICAN AMERICAN): 58 ML/MIN/1.73 M^2
EST. GFR  (NON AFRICAN AMERICAN): 50.2 ML/MIN/1.73 M^2
GLUCOSE SERPL-MCNC: 226 MG/DL
HCT VFR BLD AUTO: 43 %
HGB BLD-MCNC: 12.9 G/DL
IMM GRANULOCYTES # BLD AUTO: 0.02 K/UL
IMM GRANULOCYTES NFR BLD AUTO: 0.2 %
LYMPHOCYTES # BLD AUTO: 1.5 K/UL
LYMPHOCYTES NFR BLD: 16.2 %
MAGNESIUM SERPL-MCNC: 1.9 MG/DL
MCH RBC QN AUTO: 28.6 PG
MCHC RBC AUTO-ENTMCNC: 30 G/DL
MCV RBC AUTO: 95 FL
MONOCYTES # BLD AUTO: 0.8 K/UL
MONOCYTES NFR BLD: 9.3 %
NEUTROPHILS # BLD AUTO: 6.2 K/UL
NEUTROPHILS NFR BLD: 69.3 %
NRBC BLD-RTO: 0 /100 WBC
PHOSPHATE SERPL-MCNC: 3.5 MG/DL
PLATELET # BLD AUTO: 172 K/UL
PMV BLD AUTO: 12.1 FL
POCT GLUCOSE: 132 MG/DL (ref 70–110)
POCT GLUCOSE: 230 MG/DL (ref 70–110)
POCT GLUCOSE: 322 MG/DL (ref 70–110)
POCT GLUCOSE: 336 MG/DL (ref 70–110)
POCT GLUCOSE: 402 MG/DL (ref 70–110)
POTASSIUM SERPL-SCNC: 3.7 MMOL/L
PROT SERPL-MCNC: 7.6 G/DL
RBC # BLD AUTO: 4.51 M/UL
SODIUM SERPL-SCNC: 140 MMOL/L
WBC # BLD AUTO: 8.99 K/UL

## 2018-10-17 PROCEDURE — 63600175 PHARM REV CODE 636 W HCPCS: Performed by: INTERNAL MEDICINE

## 2018-10-17 PROCEDURE — 85025 COMPLETE CBC W/AUTO DIFF WBC: CPT

## 2018-10-17 PROCEDURE — 25000003 PHARM REV CODE 250: Performed by: INTERNAL MEDICINE

## 2018-10-17 PROCEDURE — 25000003 PHARM REV CODE 250: Performed by: STUDENT IN AN ORGANIZED HEALTH CARE EDUCATION/TRAINING PROGRAM

## 2018-10-17 PROCEDURE — 99233 SBSQ HOSP IP/OBS HIGH 50: CPT | Mod: ,,, | Performed by: INTERNAL MEDICINE

## 2018-10-17 PROCEDURE — 84100 ASSAY OF PHOSPHORUS: CPT

## 2018-10-17 PROCEDURE — 80053 COMPREHEN METABOLIC PANEL: CPT

## 2018-10-17 PROCEDURE — 20600001 HC STEP DOWN PRIVATE ROOM

## 2018-10-17 PROCEDURE — A4216 STERILE WATER/SALINE, 10 ML: HCPCS | Performed by: INTERNAL MEDICINE

## 2018-10-17 PROCEDURE — 83735 ASSAY OF MAGNESIUM: CPT

## 2018-10-17 PROCEDURE — 36415 COLL VENOUS BLD VENIPUNCTURE: CPT

## 2018-10-17 PROCEDURE — 93010 ELECTROCARDIOGRAM REPORT: CPT | Mod: ,,, | Performed by: INTERNAL MEDICINE

## 2018-10-17 PROCEDURE — 93005 ELECTROCARDIOGRAM TRACING: CPT

## 2018-10-17 PROCEDURE — 63600175 PHARM REV CODE 636 W HCPCS: Performed by: STUDENT IN AN ORGANIZED HEALTH CARE EDUCATION/TRAINING PROGRAM

## 2018-10-17 RX ORDER — METOPROLOL TARTRATE 1 MG/ML
5 INJECTION, SOLUTION INTRAVENOUS ONCE
Status: COMPLETED | OUTPATIENT
Start: 2018-10-18 | End: 2018-10-17

## 2018-10-17 RX ORDER — GUAIFENESIN 600 MG/1
600 TABLET, EXTENDED RELEASE ORAL 2 TIMES DAILY
Status: COMPLETED | OUTPATIENT
Start: 2018-10-17 | End: 2018-10-19

## 2018-10-17 RX ORDER — FUROSEMIDE 10 MG/ML
80 INJECTION INTRAMUSCULAR; INTRAVENOUS 2 TIMES DAILY
Status: DISCONTINUED | OUTPATIENT
Start: 2018-10-17 | End: 2018-10-20

## 2018-10-17 RX ORDER — CETIRIZINE HYDROCHLORIDE 5 MG/1
5 TABLET ORAL DAILY
Status: DISCONTINUED | OUTPATIENT
Start: 2018-10-17 | End: 2018-10-23 | Stop reason: HOSPADM

## 2018-10-17 RX ADMIN — INSULIN DETEMIR 15 UNITS: 100 INJECTION, SOLUTION SUBCUTANEOUS at 09:10

## 2018-10-17 RX ADMIN — ASPIRIN 81 MG: 81 TABLET, COATED ORAL at 09:10

## 2018-10-17 RX ADMIN — INSULIN ASPART 3 UNITS: 100 INJECTION, SOLUTION INTRAVENOUS; SUBCUTANEOUS at 12:10

## 2018-10-17 RX ADMIN — METOPROLOL TARTRATE 5 MG: 5 INJECTION, SOLUTION INTRAVENOUS at 11:10

## 2018-10-17 RX ADMIN — GUAIFENESIN 600 MG: 600 TABLET, EXTENDED RELEASE ORAL at 09:10

## 2018-10-17 RX ADMIN — GUAIFENESIN 600 MG: 600 TABLET, EXTENDED RELEASE ORAL at 03:10

## 2018-10-17 RX ADMIN — MAGNESIUM OXIDE TAB 400 MG (241.3 MG ELEMENTAL MG) 800 MG: 400 (241.3 MG) TAB at 09:10

## 2018-10-17 RX ADMIN — METOPROLOL SUCCINATE 50 MG: 50 TABLET, EXTENDED RELEASE ORAL at 09:10

## 2018-10-17 RX ADMIN — ATORVASTATIN CALCIUM 80 MG: 20 TABLET, FILM COATED ORAL at 09:10

## 2018-10-17 RX ADMIN — INSULIN ASPART 3 UNITS: 100 INJECTION, SOLUTION INTRAVENOUS; SUBCUTANEOUS at 08:10

## 2018-10-17 RX ADMIN — FUROSEMIDE 80 MG: 10 INJECTION, SOLUTION INTRAMUSCULAR; INTRAVENOUS at 03:10

## 2018-10-17 RX ADMIN — INSULIN ASPART 3 UNITS: 100 INJECTION, SOLUTION INTRAVENOUS; SUBCUTANEOUS at 05:10

## 2018-10-17 RX ADMIN — PRASUGREL 10 MG: 10 TABLET, FILM COATED ORAL at 09:10

## 2018-10-17 RX ADMIN — CETIRIZINE HYDROCHLORIDE 5 MG: 5 TABLET ORAL at 03:10

## 2018-10-17 RX ADMIN — INSULIN ASPART 5 UNITS: 100 INJECTION, SOLUTION INTRAVENOUS; SUBCUTANEOUS at 09:10

## 2018-10-17 RX ADMIN — POTASSIUM CHLORIDE 40 MEQ: 1500 TABLET, EXTENDED RELEASE ORAL at 09:10

## 2018-10-17 RX ADMIN — Medication 3 ML: at 09:10

## 2018-10-17 RX ADMIN — INSULIN ASPART 8 UNITS: 100 INJECTION, SOLUTION INTRAVENOUS; SUBCUTANEOUS at 12:10

## 2018-10-17 RX ADMIN — INSULIN ASPART 4 UNITS: 100 INJECTION, SOLUTION INTRAVENOUS; SUBCUTANEOUS at 08:10

## 2018-10-17 RX ADMIN — ENOXAPARIN SODIUM 40 MG: 100 INJECTION SUBCUTANEOUS at 06:10

## 2018-10-17 RX ADMIN — Medication 3 ML: at 03:10

## 2018-10-17 NOTE — CONSULTS
Palliative Care Consult.    Consult received for pre-LVAD goals of care. Will review chart and discuss with team prior to seeing patient. Full consult to follow.  Thank you for the consult..    María ROMERO, ACNS-BC, ACHPN

## 2018-10-17 NOTE — ASSESSMENT & PLAN NOTE
Discussed patient with Dr. Montes. Palliative care consult for pre-LVAD goals of care cancelled.  Patient will be seen at later date for further evaluation.  Please reconsult if we can be of further assistance.

## 2018-10-17 NOTE — PLAN OF CARE
Problem: Patient Care Overview  Goal: Plan of Care Review  Outcome: Ongoing (interventions implemented as appropriate)  Plan of care reviewed with pt. Pt remained free of falls, trauma, and injury. VSS. Lasix gtt DC. Pt on 80 mg   Lasix BID IVP. EP consult for vtach. 6 min walk done pt did not qualify for home O2. Pt complain of nasal congestion, decongestant ordered. Will continue to monitor.

## 2018-10-17 NOTE — PLAN OF CARE
Problem: Patient Care Overview  Goal: Plan of Care Review  Outcome: Revised  Pt free of falls/trauma/injuries.  Denies c/o SOB, CP, or discomfort.  Generalized skin remains CDI; No edema noted.  Pt being diuresed with IV Lasix gtts; diuresing well.   Wt remains stable. Electrolytes replaced.  Pt tolerating plan of care

## 2018-10-17 NOTE — HPI
Palliative Care team consulted for pre-LVAD goals of care for this 74 y/o male with PMH of DM, CAD s/p CABG, ICM, HFrEF 10%, AICD 9/2018 transfered from OSH for cardiogenic shock and evaluation for advanced cardiac therapies.

## 2018-10-17 NOTE — HPI
72 y/o male with PMH of CAD s/p CABG, ICM, HFrEF transferred here with ADHF, under workup for VAD. EP consulted for frequent episodes of SVT and NSVT, possible SVT with aberrancy. He recently underwent upgrade from single chamber ICD to CRT-D 9/20/18. Per initial notes, he was reported to have WCT- SVT with aberrancy at OSH. Here, he has been noted to have frequent nonsustained WCT in setting of underlying sinus tach-BiV pacing on telemetry. He is currently on metoprolol 50, which cannot be uptitrated further due to low BP. He previously was on amiodarone for 2 doses at a prior OSH and reports presenting with severe SOB which was reported as an allergy. Denies hives, rashes, throat closing to suggest anaphylaxis.    EKGs show sinus tachycardia with biventricular pacing. Tele with sinus tachycardia with intermittent runs of NSAT with sensed V (underlying with BBB). A device interrogation showed 74.1%  as well as episodes of nonsustained AT.

## 2018-10-17 NOTE — PROGRESS NOTES
[facility]  Heart Transplant  Progress Note    Patient Name: Bharat Coker  MRN: [id number]  Admission Date: 10/10/2018  Hospital Length of Stay: 7 days  Attending Physician: Jony Hendrickson Jr.,*  Primary Care Provider: Ronnie Richardson Jr, MD  Principal Problem:HFrEF (heart failure with reduced ejection fraction)    Subjective:     Interval History: 10/17/2018: Patient was seen and examined at bedside. Patient with no major complaints. In term of volume overload is better. Will d/c furosemide gtt and will start Lasix 80 mg IV BID. Patient on evaluation for VAD but decision for implant will be done on an outpatient basis. Meanwhile will optimize patient. Will consult EP for device interrogation and management recommendations due to evidence of NSVT on telemetry as well episodes what seems possible SVT with aberrant conduction. Unable to up titrate BB due to low BP. Will wait for recommendations. Patient complaining of nasal congestion, will treat accordingly. 6MWT with/without oxygen ordered to decide if patient will need oxygen.    Continuous Infusions:  Scheduled Meds:   aspirin  81 mg Oral Daily    atorvastatin  80 mg Oral Daily    cetirizine  5 mg Oral Daily    enoxaparin  40 mg Subcutaneous Daily    furosemide  80 mg Intravenous BID    guaiFENesin  600 mg Oral BID    insulin aspart U-100  3 Units Subcutaneous TIDWM    insulin detemir U-100  15 Units Subcutaneous QHS    magnesium oxide  800 mg Oral BID    metoprolol succinate  50 mg Oral Daily    potassium chloride  40 mEq Oral BID    prasugrel  10 mg Oral Daily    sodium chloride 0.9%  3 mL Intravenous Q8H     PRN Meds:dextrose 50%, dextrose 50%, glucagon (human recombinant), glucose, glucose, insulin aspart U-100    Review of patient's allergies indicates:   Allergen Reactions    Amiodarone analogues Anaphylaxis    Ativan [lorazepam] Anxiety     Objective:     Vital Signs (Most Recent):  Temp: 98.6 °F (37 °C) (10/17/18  1206)  Pulse: (!) 125 (10/17/18 1206)  Resp: 18 (10/17/18 0815)  BP: (!) 74/52 (10/17/18 1206)  SpO2: 97 % (10/17/18 1206) Vital Signs (24h Range):  Temp:  [97.3 °F (36.3 °C)-98.6 °F (37 °C)] 98.6 °F (37 °C)  Pulse:  [] 125  Resp:  [16-20] 18  SpO2:  [90 %-100 %] 97 %  BP: ()/(52-62) 74/52     Patient Vitals for the past 72 hrs (Last 3 readings):   Weight   10/17/18 0600 72.5 kg (159 lb 14.4 oz)   10/16/18 0700 72 kg (158 lb 11.7 oz)   10/15/18 0808 72.7 kg (160 lb 2.6 oz)     Body mass index is 25.81 kg/m².      Intake/Output Summary (Last 24 hours) at 10/17/2018 1305  Last data filed at 10/17/2018 0600  Gross per 24 hour   Intake 1180 ml   Output 2275 ml   Net -1095 ml       Hemodynamic Parameters:       Telemetry:Various episode of NSVT noted as well paroxysm that may suggest SVT with aberrant conduction.Will consult EP.    Physical Exam     Constitutional: He is oriented to person, place, and time. He appears well-developed and well-nourished.   HENT:   Head: Normocephalic.   Eyes: EOM are normal. Pupils are equal, round, and reactive to light.   Neck: Normal range of motion. Neck supple.   Cardiovascular: Normal rate.  Exam reveals no gallop.   Pulmonary/Chest:Mild basal pulmonary rales noted.  Abdominal: Soft. Bowel sounds are normal. He exhibits no distension.   Musculoskeletal: Normal range of motion. He exhibits no edema.   Neurological: He is alert and oriented to person, place, and time.       Significant Labs:  CBC:  Recent Labs   Lab  10/15/18   0536  10/16/18   0435  10/17/18   0541   WBC  8.60  8.22  8.99   RBC  4.26*  4.39*  4.51*   HGB  12.4*  12.7*  12.9*   HCT  39.8*  40.7  43.0   PLT  179  166  172   MCV  93  93  95   MCH  29.1  28.9  28.6   MCHC  31.2*  31.2*  30.0*     BNP:  Recent Labs   Lab  10/16/18   0435   BNP  3,131*     CMP:  Recent Labs   Lab  10/15/18   0536  10/16/18   0435  10/17/18   0751   GLU  191*  124*  226*   CALCIUM  9.8  10.0  10.0   ALBUMIN  3.2*  3.2*  3.3*  "  PROT  7.5  7.5  7.6   NA  137  137  140   K  3.6  3.8  3.7   CO2  31*  27  32*   CL  97  98  97   BUN  48*  49*  42*   CREATININE  1.4  1.2  1.4   ALKPHOS  106  99  107   ALT  24  23  23   AST  29  28  23   BILITOT  1.2*  1.3*  1.3*      Coagulation:   No results for input(s): PT, INR, APTT in the last 168 hours.  LDH:  No results for input(s): LDH in the last 72 hours.  Microbiology:  Microbiology Results (last 7 days)     Procedure Component Value Units Date/Time    Blood culture [[id number]]     Order Status:  Canceled Specimen:  Blood     Blood culture [[id number]]     Order Status:  Canceled Specimen:  Blood           I have reviewed all pertinent labs within the past 24 hours.    Estimated Creatinine Clearance: 43.7 mL/min (based on SCr of 1.4 mg/dL).    Diagnostic Results:  I have reviewed all pertinent imaging results/findings within the past 24 hours.    Assessment and Plan:     72 y/o male with PMH of CAD s/p CABG, ICM, HFrEF who presents as a transfer from OSH after he presented there for [facility], weight gain and palpitations. He presented to OSH after being discharged from the same facility 4 days prior to Oct 8. He was there initially for ADHF and KASEY. The patient recently had a revision of his CRT-D. The patient became SOB on Oct 7 but did not have CP. The patient felt his pulse and noted it to be rapid. He was brought to the ED at OSH by EMS; his HR was ~170. The ECG was c/w WCT (later documentation notes SVT with aberrancy after interrogation was negative for VT), and he was given one dose of adenosine. The HR came down to 110. His CXR was c/w pulmonary edema. A pro-BNP was at one point 20084.The patient's WBC was 17 and sCr 1.6. tBili was 1.5. An ECHO on 10/8: LVIDd 6.5 cm, EF 15%, IVC normal collapsibility and normal IVC size. An undated stress test notes: "mild reversibility noted in the cardiac apex and there lateral wall of the heart from cardiac apex to approximately mid wall suspicion for " "ischemia..mild reversibility noted in the mid to basilar inferior/inferoseptal wall of the heart also suspicious for ischemia". He had a C 6/2018: LIMA-LAD patent, % occluded at ostium, SVG-% occluded, SVG-D 100% occluded, pLCx 30%, 100% occluded OM, 100% LAD occlusion after takeoff of D1, D1 is tiny with 80% disease, SVG-OM 80% proximal stenosis with 50% at site of anastomosis. An SVG-OM OKSANA was placed at that time. The patient's WBC was 9.6 as of 10/9. Aldactone was held at some point 2/2 hyperkalemia. The patient believes he has been diuresed adequately and has no fluid left to remove.     * HFrEF (heart failure with reduced ejection fraction)    Mr. Coker is a 74 y/o male with PMH of CAD s/p CABG, ICM/HFrEF (LVef <10%, LVEDD 7.8) who was a transfer for cardiogenic shock / MODS:    - BNP 4674   - [facility] 6/2018: LIMA-LAD patent, % occluded at ostium, SVG-% occluded, SVG-D 100% occluded, pLCx 30%, 100% occluded OM, 100% LAD occlusion after takeoff of D1, D1 is tiny with 80% disease, SVG-OM 80% proximal stenosis with 50% at site of anastomosis. An SVG-OM OKSANA was placed at that time.    - TTE EF <10%, G2DD, LVH (LVEDD 7.8 / LVESD 7.3 ), reduced RVSF (LV 4.5cm / TAPSE 1.3), PASP 26mmHG, Mod MR    - CT head: no acute changes, old lacunar infract   - CT CAP: Moderate atherosclerosis of the thoracic, abdominal aorta and CAD.  Mild calcification of the aortic valve and aortic and mitral valve annuli.    - Started on Pathway for VAD only, pending CT surgery evaluation by Dr. Khan, prior to Step 2, appreciate assistance    - MELISSA/CM to assist with financial clearance  - Need OSH records from ,  - RHC when euvolemic    - Decreased Lasix 10mg/hr, continue closely monitor UOP,   - Continue PT / OT, ambulate as tolerated, elevated LE   - Fluid restriction at 1500 cc with strict I/Os and daily STANDING weights  - Check Electrolytes, keep Mag >2 & K+ >4  - Continue PTA HF GDMT          VT " (ventricular tachycardia)    Frequent NSVT with underlying ST:   - Increase Toprol 100mg qday   - Follow up HR  - Consider EP consult if continues to have ST         Skin rash    Posterior neck, likely eczema:  - Continue supportive care with topical         ICD (implantable cardioverter-defibrillator) in place    Medtronic CRT-D (09/20/18)  - Interrogated today consistent with NSVT, Afib / AF  - Closely  Monitor for further ectopy   - Will consider EP consultation if continues to persist   - Replete electrolytes   -Will consult EP for evaluation due to episodes that may suggest NSVT and additional episode of SVT with aberrant conduction.        DM (diabetes mellitus)     A1c 6.9,   - SSI with accuchecks  - will consider endocrine consult with VAD work up         CAD (coronary artery disease)    Recent MI 06/2018, ICM / HFrEF / CAD s/p CABG:   - Continue DAPT, statin, and BB  - consider ACEi once renal function panel returns            Timothy Chisholm MD  Heart Transplant  [facility]

## 2018-10-17 NOTE — NURSING
Home Oxygen Evaluation    Date Performed: 10/17/2018    1) Patient's Home O2 Sat on room air, while at rest: 97        If O2 sats on room air at rest are 88% or below, patient qualifies. No additional testing needed. Document N/A in steps 2 and 3. If 89% or above, complete steps 2.      2) Patient's O2 Sat on room air while exercisin          If O2 sats on room air while exercising remain 89% or above patient does not qualify, no further testing needed Document N/A in step 3. If O2 sats on room air while exercising are 88% or below, continue to step 3.

## 2018-10-17 NOTE — SUBJECTIVE & OBJECTIVE
Interval History: 10/17/2018: Patient was seen and examined at bedside. Patient with no major complaints. In term of volume overload is better. Will d/c furosemide gtt and will start Lasix 80 mg IV BID. Patient on evaluation for VAD but decision for implant will be done on an outpatient basis. Meanwhile will optimize patient. Will consult EP for device interrogation and management recommendations due to evidence of NSVT on telemetry as well episodes what seems possible SVT with aberrant conduction. Unable to up titrate BB due to low BP. Will wait for recommendations. Patient complaining of nasal congestion, will treat accordingly. 6MWT with/without oxygen ordered to decide if patient will need oxygen.    Continuous Infusions:  Scheduled Meds:   aspirin  81 mg Oral Daily    atorvastatin  80 mg Oral Daily    cetirizine  5 mg Oral Daily    enoxaparin  40 mg Subcutaneous Daily    furosemide  80 mg Intravenous BID    guaiFENesin  600 mg Oral BID    insulin aspart U-100  3 Units Subcutaneous TIDWM    insulin detemir U-100  15 Units Subcutaneous QHS    magnesium oxide  800 mg Oral BID    metoprolol succinate  50 mg Oral Daily    potassium chloride  40 mEq Oral BID    prasugrel  10 mg Oral Daily    sodium chloride 0.9%  3 mL Intravenous Q8H     PRN Meds:dextrose 50%, dextrose 50%, glucagon (human recombinant), glucose, glucose, insulin aspart U-100    Review of patient's allergies indicates:   Allergen Reactions    Amiodarone analogues Anaphylaxis    Ativan [lorazepam] Anxiety     Objective:     Vital Signs (Most Recent):  Temp: 98.6 °F (37 °C) (10/17/18 1206)  Pulse: (!) 125 (10/17/18 1206)  Resp: 18 (10/17/18 0815)  BP: (!) 74/52 (10/17/18 1206)  SpO2: 97 % (10/17/18 1206) Vital Signs (24h Range):  Temp:  [97.3 °F (36.3 °C)-98.6 °F (37 °C)] 98.6 °F (37 °C)  Pulse:  [] 125  Resp:  [16-20] 18  SpO2:  [90 %-100 %] 97 %  BP: ()/(52-62) 74/52     Patient Vitals for the past 72 hrs (Last 3  readings):   Weight   10/17/18 0600 72.5 kg (159 lb 14.4 oz)   10/16/18 0700 72 kg (158 lb 11.7 oz)   10/15/18 0808 72.7 kg (160 lb 2.6 oz)     Body mass index is 25.81 kg/m².      Intake/Output Summary (Last 24 hours) at 10/17/2018 1305  Last data filed at 10/17/2018 0600  Gross per 24 hour   Intake 1180 ml   Output 2275 ml   Net -1095 ml       Hemodynamic Parameters:       Telemetry:Various episode of NSVT noted as well paroxysm that may suggest SVT with aberrant conduction.Will consult EP.    Physical Exam     Constitutional: He is oriented to person, place, and time. He appears well-developed and well-nourished.   HENT:   Head: Normocephalic.   Eyes: EOM are normal. Pupils are equal, round, and reactive to light.   Neck: Normal range of motion. Neck supple.   Cardiovascular: Normal rate.  Exam reveals no gallop.   Pulmonary/Chest:Mild basal pulmonary rales noted.  Abdominal: Soft. Bowel sounds are normal. He exhibits no distension.   Musculoskeletal: Normal range of motion. He exhibits no edema.   Neurological: He is alert and oriented to person, place, and time.       Significant Labs:  CBC:  Recent Labs   Lab  10/15/18   0536  10/16/18   0435  10/17/18   0541   WBC  8.60  8.22  8.99   RBC  4.26*  4.39*  4.51*   HGB  12.4*  12.7*  12.9*   HCT  39.8*  40.7  43.0   PLT  179  166  172   MCV  93  93  95   MCH  29.1  28.9  28.6   MCHC  31.2*  31.2*  30.0*     BNP:  Recent Labs   Lab  10/16/18   0435   BNP  3,131*     CMP:  Recent Labs   Lab  10/15/18   0536  10/16/18   0435  10/17/18   0751   GLU  191*  124*  226*   CALCIUM  9.8  10.0  10.0   ALBUMIN  3.2*  3.2*  3.3*   PROT  7.5  7.5  7.6   NA  137  137  140   K  3.6  3.8  3.7   CO2  31*  27  32*   CL  97  98  97   BUN  48*  49*  42*   CREATININE  1.4  1.2  1.4   ALKPHOS  106  99  107   ALT  24  23  23   AST  29  28  23   BILITOT  1.2*  1.3*  1.3*      Coagulation:   No results for input(s): PT, INR, APTT in the last 168 hours.  LDH:  No results for input(s): LDH  in the last 72 hours.  Microbiology:  Microbiology Results (last 7 days)     Procedure Component Value Units Date/Time    Blood culture [[id number]]     Order Status:  Canceled Specimen:  Blood     Blood culture [[id number]]     Order Status:  Canceled Specimen:  Blood           I have reviewed all pertinent labs within the past 24 hours.    Estimated Creatinine Clearance: 43.7 mL/min (based on SCr of 1.4 mg/dL).    Diagnostic Results:  I have reviewed all pertinent imaging results/findings within the past 24 hours.

## 2018-10-17 NOTE — PROGRESS NOTES
Inpatient device interrogation and/or reprogramming orders received; the industry representative was contacted and provided with patient's name and room number.

## 2018-10-17 NOTE — PROGRESS NOTES
Cardiac device interrogation and/or reprogramming completed by industry representative, Lucila Florentino; please refer to report located in the media tab.

## 2018-10-17 NOTE — PHYSICIAN QUERY
PT Name: Bharat Coker  MR #: [id number]    Physician Query Form - Atrial Flutter Specificity Clarification     CDS/: Aidan Pineda Jr, RN              Contact information:[email or web address]  This form is a permanent document in the medical record.     Query Date: October 17, 2018    By submitting this query, we are merely seeking further clarification of documentation. Please utilize your independent clinical judgment when addressing the question(s) below.    The medical record contains the following:   Indicators     Supporting Clinical Findings Location in Medical Record   x Atrial Flutter ICD (implantable cardioverter-defibrillator) in place              Medtronic CRT-D (09/20/18)  - Interrogated today consistent with NSVT, Afib / AF  - Closely  Monitor for further ectopy   - Will consider EP consultation if continues to persist   - Replete electrolytes  10/16 Heart Transplant Progress Note   x EKG results Sinus rhythm  Atrial-sensed ventricular-paced rhythm with occasional Premature  ventricular complexes 10/11 EKG Report    Medication      Treatment      Other       Provider, please further specify the Atrial Flutter diagnosis.    [  ] Atypical  [  ] Typical  [  ] Other (please specify): ___________________________________  [  ] Clinically Undetermined    Please document in your progress notes daily for the duration of treatment until resolved, and include in your discharge summary.

## 2018-10-17 NOTE — PHYSICIAN QUERY
PT Name: Bharat Coker  MR #: [id number]    Physician Query Form - Atrial Fibrillation Specificity     CDS/: Aidan Pineda Jr, RN              Contact information:[email or web address]     This form is a permanent document in the medical record.     Query Date: October 17, 2018    By submitting this query, we are merely seeking further clarification of documentation. Please utilize your independent clinical judgment when addressing the question(s) below.    The medical record contains the following:   Indicators     Supporting Clinical Findings Location in Medical Record   x Atrial Fibrillation ICD (implantable cardioverter-defibrillator) in place    Medtronic CRT-D (09/20/18)  - Interrogated today consistent with NSVT, Afib / AF  - Closely  Monitor for further ectopy   - Will consider EP consultation if continues to persist   - Replete electrolytes      10/16 Heart Transplant Progress Note   x EKG results Sinus rhythm  Atrial-sensed ventricular-paced rhythm with occasional Premature  ventricular complexes 10/11 EKG Report    Medication      Treatment      Other         Provider, please further specify the Atrial Fibrillation diagnosis.    [  ] Chronic  [  ] Paroxysmal  [  ] Permanent  [  ] Persistent  [  ] Other (please specify): ____________________________  [  ] Clinically Undetermined    Please document in your progress notes daily for the duration of treatment until resolved, and include in your discharge summary.

## 2018-10-17 NOTE — ASSESSMENT & PLAN NOTE
Medtronic CRT-D (09/20/18)  - Interrogated today consistent with NSVT, Afib / AF  - Closely  Monitor for further ectopy   - Will consider EP consultation if continues to persist   - Replete electrolytes   -Will consult EP for evaluation due to episodes that may suggest NSVT and additional episode of SVT with aberrant conduction.

## 2018-10-18 PROBLEM — I47.10 SVT (SUPRAVENTRICULAR TACHYCARDIA): Status: ACTIVE | Noted: 2018-10-18

## 2018-10-18 LAB
ALBUMIN SERPL BCP-MCNC: 3.1 G/DL
ALP SERPL-CCNC: 104 U/L
ALT SERPL W/O P-5'-P-CCNC: 23 U/L
ANION GAP SERPL CALC-SCNC: 9 MMOL/L
APTT BLDCRRT: 23.8 SEC
AST SERPL-CCNC: 23 U/L
BASOPHILS # BLD AUTO: 0.06 K/UL
BASOPHILS # BLD AUTO: 0.07 K/UL
BASOPHILS NFR BLD: 0.7 %
BASOPHILS NFR BLD: 0.7 %
BILIRUB SERPL-MCNC: 1.1 MG/DL
BNP SERPL-MCNC: 3552 PG/ML
BUN SERPL-MCNC: 41 MG/DL
CALCIUM SERPL-MCNC: 9.9 MG/DL
CHLORIDE SERPL-SCNC: 97 MMOL/L
CO2 SERPL-SCNC: 30 MMOL/L
CREAT SERPL-MCNC: 1.3 MG/DL
DIFFERENTIAL METHOD: ABNORMAL
DIFFERENTIAL METHOD: ABNORMAL
EOSINOPHIL # BLD AUTO: 0.3 K/UL
EOSINOPHIL # BLD AUTO: 0.5 K/UL
EOSINOPHIL NFR BLD: 3.9 %
EOSINOPHIL NFR BLD: 4.9 %
ERYTHROCYTE [DISTWIDTH] IN BLOOD BY AUTOMATED COUNT: 14.7 %
ERYTHROCYTE [DISTWIDTH] IN BLOOD BY AUTOMATED COUNT: 14.8 %
EST. GFR  (AFRICAN AMERICAN): >60 ML/MIN/1.73 M^2
EST. GFR  (NON AFRICAN AMERICAN): 54.9 ML/MIN/1.73 M^2
GLUCOSE SERPL-MCNC: 201 MG/DL
HCT VFR BLD AUTO: 39 %
HCT VFR BLD AUTO: 39.7 %
HGB BLD-MCNC: 12.5 G/DL
HGB BLD-MCNC: 12.6 G/DL
IMM GRANULOCYTES # BLD AUTO: 0.05 K/UL
IMM GRANULOCYTES # BLD AUTO: 0.06 K/UL
IMM GRANULOCYTES NFR BLD AUTO: 0.6 %
IMM GRANULOCYTES NFR BLD AUTO: 0.6 %
INR PPP: 1
LYMPHOCYTES # BLD AUTO: 1.5 K/UL
LYMPHOCYTES # BLD AUTO: 1.8 K/UL
LYMPHOCYTES NFR BLD: 17.8 %
LYMPHOCYTES NFR BLD: 17.9 %
MAGNESIUM SERPL-MCNC: 2 MG/DL
MAGNESIUM SERPL-MCNC: 2.1 MG/DL
MCH RBC QN AUTO: 29.5 PG
MCH RBC QN AUTO: 30 PG
MCHC RBC AUTO-ENTMCNC: 31.7 G/DL
MCHC RBC AUTO-ENTMCNC: 32.1 G/DL
MCV RBC AUTO: 93 FL
MCV RBC AUTO: 94 FL
MONOCYTES # BLD AUTO: 0.7 K/UL
MONOCYTES # BLD AUTO: 0.9 K/UL
MONOCYTES NFR BLD: 8.3 %
MONOCYTES NFR BLD: 9.1 %
NEUTROPHILS # BLD AUTO: 5.9 K/UL
NEUTROPHILS # BLD AUTO: 6.8 K/UL
NEUTROPHILS NFR BLD: 66.9 %
NEUTROPHILS NFR BLD: 68.6 %
NRBC BLD-RTO: 0 /100 WBC
NRBC BLD-RTO: 0 /100 WBC
PHOSPHATE SERPL-MCNC: 3.5 MG/DL
PLATELET # BLD AUTO: 149 K/UL
PLATELET # BLD AUTO: 181 K/UL
PMV BLD AUTO: 11.7 FL
PMV BLD AUTO: 12 FL
POCT GLUCOSE: 101 MG/DL (ref 70–110)
POCT GLUCOSE: 216 MG/DL (ref 70–110)
POCT GLUCOSE: 268 MG/DL (ref 70–110)
POCT GLUCOSE: 358 MG/DL (ref 70–110)
POTASSIUM SERPL-SCNC: 4.2 MMOL/L
PROT SERPL-MCNC: 7.4 G/DL
PROTHROMBIN TIME: 10.1 SEC
RBC # BLD AUTO: 4.17 M/UL
RBC # BLD AUTO: 4.27 M/UL
SODIUM SERPL-SCNC: 136 MMOL/L
WBC # BLD AUTO: 10.08 K/UL
WBC # BLD AUTO: 8.54 K/UL

## 2018-10-18 PROCEDURE — 99233 SBSQ HOSP IP/OBS HIGH 50: CPT | Mod: ,,, | Performed by: INTERNAL MEDICINE

## 2018-10-18 PROCEDURE — 25000003 PHARM REV CODE 250: Performed by: INTERNAL MEDICINE

## 2018-10-18 PROCEDURE — 85025 COMPLETE CBC W/AUTO DIFF WBC: CPT | Mod: 91

## 2018-10-18 PROCEDURE — 97116 GAIT TRAINING THERAPY: CPT

## 2018-10-18 PROCEDURE — 83735 ASSAY OF MAGNESIUM: CPT | Mod: 91

## 2018-10-18 PROCEDURE — 63600175 PHARM REV CODE 636 W HCPCS: Performed by: INTERNAL MEDICINE

## 2018-10-18 PROCEDURE — 85610 PROTHROMBIN TIME: CPT

## 2018-10-18 PROCEDURE — 25000003 PHARM REV CODE 250: Performed by: STUDENT IN AN ORGANIZED HEALTH CARE EDUCATION/TRAINING PROGRAM

## 2018-10-18 PROCEDURE — 83735 ASSAY OF MAGNESIUM: CPT

## 2018-10-18 PROCEDURE — 80053 COMPREHEN METABOLIC PANEL: CPT

## 2018-10-18 PROCEDURE — 93005 ELECTROCARDIOGRAM TRACING: CPT

## 2018-10-18 PROCEDURE — 36415 COLL VENOUS BLD VENIPUNCTURE: CPT

## 2018-10-18 PROCEDURE — 63600175 PHARM REV CODE 636 W HCPCS: Performed by: STUDENT IN AN ORGANIZED HEALTH CARE EDUCATION/TRAINING PROGRAM

## 2018-10-18 PROCEDURE — 83880 ASSAY OF NATRIURETIC PEPTIDE: CPT

## 2018-10-18 PROCEDURE — 84100 ASSAY OF PHOSPHORUS: CPT

## 2018-10-18 PROCEDURE — 93010 ELECTROCARDIOGRAM REPORT: CPT | Mod: ,,, | Performed by: INTERNAL MEDICINE

## 2018-10-18 PROCEDURE — 20600001 HC STEP DOWN PRIVATE ROOM

## 2018-10-18 PROCEDURE — 85730 THROMBOPLASTIN TIME PARTIAL: CPT

## 2018-10-18 RX ORDER — HYDRALAZINE HYDROCHLORIDE 10 MG/1
10 TABLET, FILM COATED ORAL EVERY 8 HOURS
Status: DISCONTINUED | OUTPATIENT
Start: 2018-10-18 | End: 2018-10-20

## 2018-10-18 RX ORDER — DIGOXIN 0.25 MG/ML
250 INJECTION INTRAMUSCULAR; INTRAVENOUS ONCE
Status: COMPLETED | OUTPATIENT
Start: 2018-10-18 | End: 2018-10-18

## 2018-10-18 RX ORDER — HEPARIN SODIUM,PORCINE/D5W 25000/250
12 INTRAVENOUS SOLUTION INTRAVENOUS CONTINUOUS
Status: DISCONTINUED | OUTPATIENT
Start: 2018-10-18 | End: 2018-10-23

## 2018-10-18 RX ORDER — CEFAZOLIN SODIUM 1 G/3ML
2 INJECTION, POWDER, FOR SOLUTION INTRAMUSCULAR; INTRAVENOUS
Status: DISCONTINUED | OUTPATIENT
Start: 2018-10-18 | End: 2018-10-20

## 2018-10-18 RX ORDER — RAMELTEON 8 MG/1
8 TABLET ORAL NIGHTLY PRN
Status: DISCONTINUED | OUTPATIENT
Start: 2018-10-18 | End: 2018-10-23 | Stop reason: HOSPADM

## 2018-10-18 RX ORDER — DIGOXIN 125 MCG
0.12 TABLET ORAL DAILY
Status: DISCONTINUED | OUTPATIENT
Start: 2018-10-18 | End: 2018-10-19

## 2018-10-18 RX ADMIN — DIGOXIN 0.12 MG: 125 TABLET ORAL at 08:10

## 2018-10-18 RX ADMIN — FUROSEMIDE 80 MG: 10 INJECTION, SOLUTION INTRAMUSCULAR; INTRAVENOUS at 08:10

## 2018-10-18 RX ADMIN — METOPROLOL SUCCINATE 50 MG: 50 TABLET, EXTENDED RELEASE ORAL at 08:10

## 2018-10-18 RX ADMIN — ATORVASTATIN CALCIUM 80 MG: 20 TABLET, FILM COATED ORAL at 08:10

## 2018-10-18 RX ADMIN — ASPIRIN 81 MG: 81 TABLET, COATED ORAL at 08:10

## 2018-10-18 RX ADMIN — INSULIN ASPART 5 UNITS: 100 INJECTION, SOLUTION INTRAVENOUS; SUBCUTANEOUS at 10:10

## 2018-10-18 RX ADMIN — HYDRALAZINE HYDROCHLORIDE 10 MG: 10 TABLET, FILM COATED ORAL at 03:10

## 2018-10-18 RX ADMIN — RAMELTEON 8 MG: 8 TABLET, FILM COATED ORAL at 11:10

## 2018-10-18 RX ADMIN — INSULIN ASPART 3 UNITS: 100 INJECTION, SOLUTION INTRAVENOUS; SUBCUTANEOUS at 04:10

## 2018-10-18 RX ADMIN — INSULIN ASPART 3 UNITS: 100 INJECTION, SOLUTION INTRAVENOUS; SUBCUTANEOUS at 12:10

## 2018-10-18 RX ADMIN — DIGOXIN 250 MCG: 0.25 INJECTION INTRAMUSCULAR; INTRAVENOUS at 04:10

## 2018-10-18 RX ADMIN — FUROSEMIDE 80 MG: 10 INJECTION, SOLUTION INTRAMUSCULAR; INTRAVENOUS at 05:10

## 2018-10-18 RX ADMIN — INSULIN ASPART 4 UNITS: 100 INJECTION, SOLUTION INTRAVENOUS; SUBCUTANEOUS at 08:10

## 2018-10-18 RX ADMIN — POTASSIUM CHLORIDE 40 MEQ: 1500 TABLET, EXTENDED RELEASE ORAL at 10:10

## 2018-10-18 RX ADMIN — PRASUGREL 10 MG: 10 TABLET, FILM COATED ORAL at 08:10

## 2018-10-18 RX ADMIN — ENOXAPARIN SODIUM 40 MG: 100 INJECTION SUBCUTANEOUS at 04:10

## 2018-10-18 RX ADMIN — CETIRIZINE HYDROCHLORIDE 5 MG: 5 TABLET ORAL at 08:10

## 2018-10-18 RX ADMIN — GUAIFENESIN 600 MG: 600 TABLET, EXTENDED RELEASE ORAL at 10:10

## 2018-10-18 RX ADMIN — INSULIN ASPART 6 UNITS: 100 INJECTION, SOLUTION INTRAVENOUS; SUBCUTANEOUS at 12:10

## 2018-10-18 RX ADMIN — INSULIN DETEMIR 15 UNITS: 100 INJECTION, SOLUTION SUBCUTANEOUS at 10:10

## 2018-10-18 RX ADMIN — HEPARIN SODIUM AND DEXTROSE 12 UNITS/KG/HR: 10000; 5 INJECTION INTRAVENOUS at 05:10

## 2018-10-18 RX ADMIN — INSULIN ASPART 3 UNITS: 100 INJECTION, SOLUTION INTRAVENOUS; SUBCUTANEOUS at 08:10

## 2018-10-18 RX ADMIN — GUAIFENESIN 600 MG: 600 TABLET, EXTENDED RELEASE ORAL at 08:10

## 2018-10-18 RX ADMIN — POTASSIUM CHLORIDE 40 MEQ: 1500 TABLET, EXTENDED RELEASE ORAL at 08:10

## 2018-10-18 NOTE — PLAN OF CARE
Problem: Physical Therapy Goal  Goal: Physical Therapy Goal  Goals to be met by: 10/26/2018     Patient will increase functional independence with mobility by performin. Supine to sit with [place]  2. Sit to stand transfer with [place]  3. Gait  x 400 feet with [place] using no AD with no LOB.   4. Ascend/descend 3 stair with left Handrails Supervision using no AD.      Outcome: Ongoing (interventions implemented as appropriate)  Pt is progressing toward goals. All goals remain appropriate.

## 2018-10-18 NOTE — ASSESSMENT & PLAN NOTE
Mr. Coker is a 72 y/o male with PMH of CAD s/p CABG, ICM/HFrEF (LVef <10%, LVEDD 7.8) who was a transfer for cardiogenic shock / MODS:    - BNP 4674   - [facility] 6/2018: LIMA-LAD patent, % occluded at ostium, SVG-% occluded, SVG-D 100% occluded, pLCx 30%, 100% occluded OM, 100% LAD occlusion after takeoff of D1, D1 is tiny with 80% disease, SVG-OM 80% proximal stenosis with 50% at site of anastomosis. An SVG-OM OKSANA was placed at that time.    - TTE EF <10%, G2DD, LVH (LVEDD 7.8 / LVESD 7.3 ), reduced RVSF (LV 4.5cm / TAPSE 1.3), PASP 26mmHG, Mod MR    - CT head: no acute changes, old lacunar infract   - CT CAP: Moderate atherosclerosis of the thoracic, abdominal aorta and CAD.  Mild calcification of the aortic valve and aortic and mitral valve annuli.    - Started on Pathway for VAD only, pending CT surgery evaluation by Dr. Khan, prior to Step 2, appreciate assistance    - SW/CM to assist with financial clearance  - Need OSH records from [facility],  - RHC when euvolemic    - Decreased Lasix 10mg/hr, continue closely monitor UOP,   - Continue PT / OT, ambulate as tolerated, elevated LE   - Fluid restriction at 1500 cc with strict I/Os and daily STANDING weights  - Check Electrolytes, keep Mag >2 & K+ >4  - Continue PTA HF GDMT

## 2018-10-18 NOTE — PLAN OF CARE
Nursing notified telemetry with HR 140s. Telemetry review with NSVT, Atach, HR 100s,  EKG consistent with atrial flutter with variable block HR 130s,   Given Metoprolol 5mg IV, HR improved to 110-120s, patient asymptomatic sleeping.   BP 90s / 50s     EP note reviewed, will consider PO Bblocker/Amio if issues with RVR.    Rj Luna M.D.  Page # [phone]  Cardiovascular Fellow PGY-IV  [facility]

## 2018-10-18 NOTE — SUBJECTIVE & OBJECTIVE
Past Medical History:   Diagnosis Date    CHF (congestive heart failure)     COPD (chronic obstructive pulmonary disease)     Coronary artery disease     Diabetes mellitus        Past Surgical History:   Procedure Laterality Date    APPENDECTOMY      CARDIAC SURGERY      EYE SURGERY      FRACTURE SURGERY      TONSILLECTOMY         Review of patient's allergies indicates:   Allergen Reactions    Amiodarone analogues Anaphylaxis    Ativan [lorazepam] Anxiety       No current facility-administered medications on file prior to encounter.      Current Outpatient Medications on File Prior to Encounter   Medication Sig    aspirin 81 MG Chew Take 81 mg by mouth once daily.    atorvastatin (LIPITOR) 80 MG tablet Take 80 mg by mouth nightly.    azelastine (ASTELIN) 137 mcg (0.1 %) nasal spray 1 spray by Nasal route 2 (two) times daily.    benzonatate (TESSALON) 200 MG capsule Take 200 mg by mouth 3 (three) times daily as needed for Cough.    docusate sodium (COLACE) 100 MG capsule Take 100 mg by mouth 2 (two) times daily as needed for Constipation.    doxycycline (VIBRA-TABS) 100 MG tablet Take 100 mg by mouth every 12 (twelve) hours.    enoxaparin (LOVENOX) 150 mg/mL Syrg Inject 40 mg into the skin once daily.    fluticasone (FLONASE) 50 mcg/actuation nasal spray 2 sprays by Each Nare route once daily.    furosemide (LASIX) 40 MG tablet Take 40 mg by mouth once daily.    glipiZIDE (GLUCOTROL) 2.5 MG TR24 Take 5 mg by mouth 2 (two) times daily.    insulin aspart U-100 (NOVOLOG) 100 unit/mL injection Inject 2-10 Units into the skin 4 (four) times daily with meals and nightly.    metoprolol succinate (TOPROL-XL) 25 MG 24 hr tablet Take 25 mg by mouth 2 (two) times daily.    prasugrel HCl (EFFIENT ORAL) Take 10 mg by mouth once daily.    ranitidine (ZANTAC) 150 MG tablet Take 150 mg by mouth once daily.    SITagliptin (JANUVIA) 50 MG Tab Take 100 mg by mouth once daily.    acetaminophen (TYLENOL) 650  MG TbSR Take 650 mg by mouth every 4 (four) hours as needed.     Family History     Problem Relation (Age of Onset)    Heart disease Father, Sister, Brother    Stroke Father        Tobacco Use    Smoking status: Former Smoker     Types: Cigarettes    Smokeless tobacco: Never Used   Substance and Sexual Activity    Alcohol use: No     Frequency: Never    Drug use: No    Sexual activity: Not on file     Review of Systems   All other systems reviewed and are negative.    Objective:     Vital Signs (Most Recent):  Temp: 97.2 °F (36.2 °C) (10/17/18 1551)  Pulse: 61 (10/17/18 1551)  Resp: 18 (10/17/18 1206)  BP: 117/88 (10/17/18 1551)  SpO2: (!) 94 % (10/17/18 1551) Vital Signs (24h Range):  Temp:  [97.2 °F (36.2 °C)-98.6 °F (37 °C)] 97.2 °F (36.2 °C)  Pulse:  [] 61  Resp:  [16-18] 18  SpO2:  [93 %-100 %] 94 %  BP: ()/(50-88) 117/88       Weight: 72.5 kg (159 lb 14.4 oz)  Body mass index is 25.81 kg/m².    SpO2: (!) 94 %  O2 Device (Oxygen Therapy): room air    Physical Exam   Constitutional: He is oriented to person, place, and time. No distress.   HENT:   Head: Atraumatic.   Mouth/Throat: No oropharyngeal exudate.   Eyes: EOM are normal. No scleral icterus.   Neck: Neck supple. No JVD present.   Cardiovascular: Regular rhythm and normal heart sounds. Exam reveals no gallop and no friction rub.   No murmur heard.  Tachycardic   Pulmonary/Chest: Effort normal and breath sounds normal. No respiratory distress. He has no wheezes. He has no rales. He exhibits no tenderness.   Abdominal: Soft. Bowel sounds are normal. He exhibits no distension. There is no tenderness.   Musculoskeletal: He exhibits no edema.   Neurological: He is alert and oriented to person, place, and time. No cranial nerve deficit.   Skin: Skin is warm and dry. No erythema.   Psychiatric: He has a normal mood and affect.       Significant Labs:   CMP:   Recent Labs   Lab  10/16/18   0435  10/17/18   0751   NA  137  140   K  3.8  3.7   CL   98  97   CO2  27  32*   GLU  124*  226*   BUN  49*  42*   CREATININE  1.2  1.4   CALCIUM  10.0  10.0   PROT  7.5  7.6   ALBUMIN  3.2*  3.3*   BILITOT  1.3*  1.3*   ALKPHOS  99  107   AST  28  23   ALT  23  23   ANIONGAP  12  11   ESTGFRAFRICA  >60.0  58.0*   EGFRNONAA  >60.0  50.2*   , CBC:   Recent Labs   Lab  10/16/18   0435  10/17/18   0541   WBC  8.22  8.99   HGB  12.7*  12.9*   HCT  40.7  43.0   PLT  166  172    and INR: No results for input(s): INR, PROTIME in the last 48 hours.    Significant Imaging: Echocardiogram:   2D echo with color flow doppler:   Results for orders placed or performed during the hospital encounter of 10/10/18   2D echo with color flow doppler   Result Value Ref Range    Calculated EF 9 (A) 55 - 65    Mitral Valve Regurgitation MODERATE (A)     Diastolic Dysfunction Yes (A)     Est. PA Systolic Pressure 26.04     Tricuspid Valve Regurgitation MILD

## 2018-10-18 NOTE — PROGRESS NOTES
2330  Pt HR sustained in 140's with rhythm change. Pt asymptomatic resting comfortably; Spoke with Dr Luna, ordered EKG, 5mg IVP Metoprolol, and labs. Pt returned to his normal rhythm/BP after IVP.  Awaiting lab results.  came to pt's bedside. Advised will discuss adding  with day team.   No further orders at this time.  Will continue to monitor.     Update: Pt returned to Tachycardiac Rhythm, Jeremy Lovelace performed pacemaker interrogation advised to give pt one time dose of Digoxin 250 mcg IV.  No further orders at this time.  Will continue to monitor.

## 2018-10-18 NOTE — PROGRESS NOTES
[facility]  Heart Transplant  Progress Note    Patient Name: Bharat Coker  MRN: [id number]  Admission Date: 10/10/2018  Hospital Length of Stay: 8 days  Attending Physician: Jony Hendrickson Jr.,*  Primary Care Provider: Ronnie Richardson Jr, MD  Principal Problem:HFrEF (heart failure with reduced ejection fraction)    Subjective:     Interval History: 10/18/2018: Patient was seen and examined today at bedside. Patient with no major complaints.Improved nasal congestion. Night events noted and telemetry was reviewed. Patient still with evidence of frequent paroxysmal SVT sometimes persistent from telemetry difficult to determine between AT vs. Aflutter from the telemetry tracings. Patient with low dose BB due to low BP. Amiodarone considered but patient with previous adverse reaction to it (dyspnea?? according to the patient). Patient started on digoxin by on duty staff at the [facility] after discussion with EP service, still with frequent paroxysmal arrhythmias. EP service contacted for reevaluation.Electrolyte are ok as well renal function.Good diuresis with Lasix bolus.    Continuous Infusions:  Scheduled Meds:   aspirin  81 mg Oral Daily    atorvastatin  80 mg Oral Daily    cetirizine  5 mg Oral Daily    digoxin  0.125 mg Oral Daily    enoxaparin  40 mg Subcutaneous Daily    furosemide  80 mg Intravenous BID    guaiFENesin  600 mg Oral BID    hydrALAZINE  10 mg Oral Q8H    insulin aspart U-100  3 Units Subcutaneous TIDWM    insulin detemir U-100  15 Units Subcutaneous QHS    metoprolol succinate  50 mg Oral Daily    potassium chloride  40 mEq Oral BID    prasugrel  10 mg Oral Daily    sodium chloride 0.9%  3 mL Intravenous Q8H     PRN Meds:dextrose 50%, dextrose 50%, glucagon (human recombinant), glucose, glucose, insulin aspart U-100    Review of patient's allergies indicates:   Allergen Reactions    Amiodarone analogues Anaphylaxis    Ativan [lorazepam] Anxiety     Objective:      Vital Signs (Most Recent):  Temp: 98.4 °F (36.9 °C) (10/18/18 1150)  Pulse: (!) 138 (10/18/18 1150)  Resp: 16 (10/18/18 0830)  BP: 105/69 (10/18/18 1150)  SpO2: (!) 94 % (10/18/18 1150) Vital Signs (24h Range):  Temp:  [97.2 °F (36.2 °C)-99.6 °F (37.6 °C)] 98.4 °F (36.9 °C)  Pulse:  [] 138  Resp:  [16] 16  SpO2:  [93 %-98 %] 94 %  BP: ()/(56-88) 105/69     Patient Vitals for the past 72 hrs (Last 3 readings):   Weight   10/18/18 0800 73.1 kg (161 lb 4.3 oz)   10/17/18 0600 72.5 kg (159 lb 14.4 oz)   10/16/18 0700 72 kg (158 lb 11.7 oz)     Body mass index is 26.03 kg/m².      Intake/Output Summary (Last 24 hours) at 10/18/2018 1217  Last data filed at 10/18/2018 0400  Gross per 24 hour   Intake 360 ml   Output 1026 ml   Net -666 ml       Hemodynamic Parameters:       Telemetry:telemetry was reviewed with above findings including frequent SVT (difficculty to determine between Aflutter vs. AT from telemetry tracings).    Physical Exam     Constitutional: He is oriented to person, place, and time. No distress.   HENT:   Head: Atraumatic.   Mouth/Throat: No oropharyngeal exudate.   Eyes: EOM are normal. No scleral icterus.   Neck: Neck supple. No JVD present.   Cardiovascular: Regular rhythm and normal heart sounds. Exam reveals no gallop and no friction rub.   No murmur heard.  Tachycardic   Pulmonary/Chest: Effort normal and breath sounds normal. No respiratory distress. He has no wheezes. He has no rales. He exhibits no tenderness.   Abdominal: Soft. Bowel sounds are normal. He exhibits no distension. There is no tenderness.   Musculoskeletal: He exhibits no edema.   Neurological: He is alert and oriented to person, place, and time. No cranial nerve deficit.   Skin: Skin is warm and dry. No erythema.   Psychiatric: He has a normal mood and affect.     Significant Labs:  CBC:  Recent Labs   Lab 10/16/18  0435 10/17/18  0541 10/18/18  0520   WBC 8.22 8.99 8.54   RBC 4.39* 4.51* 4.27*   HGB 12.7* 12.9*  12.6*   HCT 40.7 43.0 39.7*    172 149*   MCV 93 95 93   MCH 28.9 28.6 29.5   MCHC 31.2* 30.0* 31.7*     BNP:  Recent Labs   Lab 10/16/18  0435 10/18/18  0016   BNP 3,131* 3,552*     CMP:  Recent Labs   Lab 10/16/18  0435 10/17/18  0751 10/18/18  0520   * 226* 201*   CALCIUM 10.0 10.0 9.9   ALBUMIN 3.2* 3.3* 3.1*   PROT 7.5 7.6 7.4    140 136   K 3.8 3.7 4.2   CO2 27 32* 30*   CL 98 97 97   BUN 49* 42* 41*   CREATININE 1.2 1.4 1.3   ALKPHOS 99 107 104   ALT 23 23 23   AST 28 23 23   BILITOT 1.3* 1.3* 1.1*      Coagulation:   No results for input(s): PT, INR, APTT in the last 168 hours.  LDH:  No results for input(s): LDH in the last 72 hours.  Microbiology:  Microbiology Results (last 7 days)     Procedure Component Value Units Date/Time    Blood culture [[id number]]     Order Status:  Canceled Specimen:  Blood     Blood culture [[id number]]     Order Status:  Canceled Specimen:  Blood           I have reviewed all pertinent labs within the past 24 hours.    Estimated Creatinine Clearance: 47 mL/min (based on SCr of 1.3 mg/dL).    Diagnostic Results:  I have reviewed all pertinent imaging results/findings within the past 24 hours.    Assessment and Plan:     74 y/o male with PMH of CAD s/p CABG, ICM, HFrEF who presents as a transfer from OSH after he presented there for [facility], weight gain and palpitations. He presented to OSH after being discharged from the same facility 4 days prior to Oct 8. He was there initially for ADHF and KASEY. The patient recently had a revision of his CRT-D. The patient became SOB on Oct 7 but did not have CP. The patient felt his pulse and noted it to be rapid. He was brought to the ED at OSH by EMS; his HR was ~170. The ECG was c/w WCT (later documentation notes SVT with aberrancy after interrogation was negative for VT), and he was given one dose of adenosine. The HR came down to 110. His CXR was c/w pulmonary edema. A pro-BNP was at one point 20084.The patient's WBC was  "17 and sCr 1.6. tBili was 1.5. An ECHO on 10/8: LVIDd 6.5 cm, EF 15%, IVC normal collapsibility and normal IVC size. An undated stress test notes: "mild reversibility noted in the cardiac apex and there lateral wall of the heart from cardiac apex to approximately mid wall suspicion for ischemia..mild reversibility noted in the mid to basilar inferior/inferoseptal wall of the heart also suspicious for ischemia". He had a C 6/2018: LIMA-LAD patent, % occluded at ostium, SVG-% occluded, SVG-D 100% occluded, pLCx 30%, 100% occluded OM, 100% LAD occlusion after takeoff of D1, D1 is tiny with 80% disease, SVG-OM 80% proximal stenosis with 50% at site of anastomosis. An SVG-OM OKSANA was placed at that time. The patient's WBC was 9.6 as of 10/9. Aldactone was held at some point 2/2 hyperkalemia. The patient believes he has been diuresed adequately and has no fluid left to remove.     * HFrEF (heart failure with reduced ejection fraction)    Mr. Coker is a 74 y/o male with PMH of CAD s/p CABG, ICM/HFrEF (LVef <10%, LVEDD 7.8) who was a transfer for cardiogenic shock / MODS:    - BNP 4674   - [facility] 6/2018: LIMA-LAD patent, % occluded at ostium, SVG-% occluded, SVG-D 100% occluded, pLCx 30%, 100% occluded OM, 100% LAD occlusion after takeoff of D1, D1 is tiny with 80% disease, SVG-OM 80% proximal stenosis with 50% at site of anastomosis. An SVG-OM OKSANA was placed at that time.    - TTE EF <10%, G2DD, LVH (LVEDD 7.8 / LVESD 7.3 ), reduced RVSF (LV 4.5cm / TAPSE 1.3), PASP 26mmHG, Mod MR    - CT head: no acute changes, old lacunar infract   - CT CAP: Moderate atherosclerosis of the thoracic, abdominal aorta and CAD.  Mild calcification of the aortic valve and aortic and mitral valve annuli.    - Started on Pathway for VAD only, pending CT surgery evaluation by Dr. Khan, prior to Step 2, appreciate assistance    - SW/CM to assist with financial clearance  - Need OSH records from ,  - [facility] when " euvolemic    - Decreased Lasix 10mg/hr, continue closely monitor UOP,   - Continue PT / OT, ambulate as tolerated, elevated LE   - Fluid restriction at 1500 cc with strict I/Os and daily STANDING weights  - Check Electrolytes, keep Mag >2 & K+ >4  - Continue PTA HF GDMT       VT (ventricular tachycardia)    Frequent NSVT with underlying ST:   - Increase Toprol 100mg qday   - Follow up HR  - Consider EP consult if continues to have ST      Skin rash    Posterior neck, likely eczema:  - Continue supportive care with topical      ICD (implantable cardioverter-defibrillator) in place    [employer] CRT-D (09/20/18)  - Interrogated today consistent with NSVT, Afib / AF  - Closely  Monitor for further ectopy   - Will consider EP consultation if continues to persist   - Replete electrolytes   -Will consult EP for evaluation due to episodes that may suggest NSVT and additional episode of SVT with aberrant conduction.     DM (diabetes mellitus)     A1c 6.9,   - SSI with accuchecks  - will consider endocrine consult with VAD work up      CAD (coronary artery disease)    Recent MI 06/2018, ICM / HFrEF / CAD s/p CABG:   - Continue DAPT, statin, and BB  - consider ACEi once renal function panel returns         Timothy Chisholm MD  Heart Transplant  [facility]

## 2018-10-18 NOTE — PROGRESS NOTES
Cardiac device interrogation and/or reprogramming completed by industry representative, Diane Schuster; please refer to report located in the media tab.

## 2018-10-18 NOTE — ASSESSMENT & PLAN NOTE
73M with ischemic cardiomyopathy, HFrEF, CAD s/p CABG transferred with ADHF, undergoing VAD workup. EP consulted for frequent runs of nonsustained WCT in setting of baseline sinus tachycardia with biventricular pacing. Recently upgraded 9/2018 to CRT-D device; most recently BiV pacing decreased. Tele and EGMs with nonsustained AT with V sensed. No VT on device or tele. Frequent AT decreasing rate of biventricular pacing.  - Will adjust device to try to maximize biventricular pacing (turn off adaptive CRT, maximize V sense response).  - Recommend primary team maximize beta blocker as tolerated, currently may be on max tolerated dose  - Can consider amiodarone as suspect not true allergy but will try above first  - Could consider AVN ablation in long term if above fails to help; will give CRT device time given recent implantation

## 2018-10-18 NOTE — SUBJECTIVE & OBJECTIVE
Interval History: HR increased to 140s overnight, responded initial to 5mg metoprolol IV. Had persistently wide complex tachycardia.     Tele: atrial flutter replacing sinus tachy with -140    ROS  Objective:     Vital Signs (Most Recent):  Temp: 98 °F (36.7 °C) (10/18/18 0830)  Pulse: (!) 131 (10/18/18 0830)  Resp: 16 (10/18/18 0830)  BP: (!) 96/59 (10/18/18 0830)  SpO2: (!) 93 % (10/18/18 0830) Vital Signs (24h Range):  Temp:  [97.2 °F (36.2 °C)-99.6 °F (37.6 °C)] 98 °F (36.7 °C)  Pulse:  [] 131  Resp:  [16-18] 16  SpO2:  [93 %-98 %] 93 %  BP: ()/(50-88) 96/59     Weight: 73.1 kg (161 lb 4.3 oz)  Body mass index is 26.03 kg/m².     SpO2: (!) 93 %  O2 Device (Oxygen Therapy): nasal cannula    Physical Exam   Constitutional: He is oriented to person, place, and time. No distress.   HENT:   Head: Atraumatic.   Mouth/Throat: No oropharyngeal exudate.   Eyes: EOM are normal. No scleral icterus.   Neck: Neck supple. No JVD present.   Cardiovascular: Regular rhythm and normal heart sounds. Exam reveals no gallop and no friction rub.   No murmur heard.  Tachycardic   Pulmonary/Chest: Effort normal and breath sounds normal. No respiratory distress. He has no wheezes. He has no rales. He exhibits no tenderness.   Abdominal: Soft. Bowel sounds are normal. He exhibits no distension. There is no tenderness.   Musculoskeletal: He exhibits no edema.   Neurological: He is alert and oriented to person, place, and time. No cranial nerve deficit.   Skin: Skin is warm and dry. No erythema.   Psychiatric: He has a normal mood and affect.       Significant Labs:   EP:   Recent Labs   Lab 10/17/18  0541 10/17/18  0751 10/18/18  0520   NA  --  140 136   K  --  3.7 4.2   CL  --  97 97   CO2  --  32* 30*   GLU  --  226* 201*   BUN  --  42* 41*   CREATININE  --  1.4 1.3   CALCIUM  --  10.0 9.9   PROT  --  7.6 7.4   ALBUMIN  --  3.3* 3.1*   BILITOT  --  1.3* 1.1*   ALKPHOS  --  107 104   AST  --  23 23   ALT  --  23 23    ANIONGAP  --  11 9   ESTGFRAFRICA  --  58.0* >60.0   EGFRNONAA  --  50.2* 54.9*   WBC 8.99  --  8.54   HGB 12.9*  --  12.6*   HCT 43.0  --  39.7*     --  149*       Significant Imaging:   EKG 11pm , with appearance of typical atrial flutter with 1:2 conduction

## 2018-10-18 NOTE — PT/OT/SLP PROGRESS
"Physical Therapy Treatment    Patient Name:  Bharat Coker   MRN:  [id number]    Recommendations:     Discharge Recommendations:  home health PT   Discharge Equipment Recommendations: none   Barriers to discharge: None    Assessment:     Bharat Coker is a 73 y.o. male admitted with a medical diagnosis of HFrEF (heart failure with reduced ejection fraction).  He presents with the following impairments/functional limitations:  gait instability, impaired balance, impaired endurance, impaired cardiopulmonary response to activity. Pt tolerated activity with improved balance requiring decreased assistance for ambulation. Pt is very close to baseline mobility, with mild persisting balance deficits. Pt would continue to benefit from acute skilled therapy intervention to address deficits and progress toward prior level of function.       Rehab Prognosis:  good; patient would benefit from acute skilled PT services to address these deficits and reach maximum level of function.      Recent Surgery: Procedure(s) (LRB):  INSERTION, CATHETER, RIGHT HEART (Right)      Plan:     During this hospitalization, patient to be seen 3 x/week to address the above listed problems via gait training, therapeutic activities, therapeutic exercises, neuromuscular re-education  · Plan of Care Expires:  11/12/18   Plan of Care Reviewed with: patient    Subjective     Communicated with RN prior to session.  Patient found supine upon PT entry to room, agreeable to treatment.      Chief Complaint: "I still feel that my balance is a little off"   Patient comments/goals: to get better and return home   Pain/Comfort:  · Pain Rating 1: 0/10  · Pain Rating Post-Intervention 1: 0/10    Patients cultural, spiritual, Mu-ism conflicts given the current situation: none reported    Objective:     Patient found with: telemetry     General Precautions: Standard, fall   Orthopedic Precautions:N/A   Braces: N/A     Functional Mobility:  · Bed Mobility:   "   · Supine to Sit: minimum assistance, pt refused to sit up without assistance, pulled to sit EOB with HHA with HOB elevated   · Transfers:     · Sit to Stand:  modified independence with no AD  · Gait: Pt ambulated 300 feet with supervision. Pt experienced one episode of scissoring gait with R LE crossing midline, no LOB experienced.       AM-PAC 6 CLICK MOBILITY  Turning over in bed (including adjusting bedclothes, sheets and blankets)?: 4  Sitting down on and standing up from a chair with arms (e.g., wheelchair, bedside commode, etc.): 4  Moving from lying on back to sitting on the side of the bed?: 4  Moving to and from a bed to a chair (including a wheelchair)?: 4  Need to walk in hospital room?: 4  Climbing 3-5 steps with a railing?: 3  Basic Mobility Total Score: 23       Therapeutic Activities and Exercises:   Pt educated on role of PT/POC, safety with mobility.     Patient left sitting EOB  with all lines intact, call button in reach and significant other  present..    GOALS:   Multidisciplinary Problems     Physical Therapy Goals        Problem: Physical Therapy Goal    Goal Priority Disciplines Outcome Goal Variances Interventions   Physical Therapy Goal     PT, PT/OT Ongoing (interventions implemented as appropriate)     Description:  Goals to be met by: 10/26/2018     Patient will increase functional independence with mobility by performin. Supine to sit with [place]  2. Sit to stand transfer with [place]  3. Gait  x 400 feet with [place] using no AD with no LOB.   4. Ascend/descend 3 stair with left Handrails Supervision using no AD.                       Time Tracking:     PT Received On: 10/18/18  PT Start Time: 1212     PT Stop Time: 1221  PT Total Time (min): 9 min     Billable Minutes: Gait Training 9 mins     Treatment Type: Treatment  PT/PTA: PT           Liliane Mulligan, PT  10/18/2018

## 2018-10-18 NOTE — PLAN OF CARE
Discussed with Cardiac Electrophysiology service, patient will undergo EPS tomorrow with ablation. Will keep patient NPO.

## 2018-10-18 NOTE — PLAN OF CARE
Problem: Patient Care Overview  Goal: Plan of Care Review  Plan of care discussed with patient. Patient is free of fall/trauma/injury. Denies CP, SOB, or pain/discomfort. Monitoring Heart rate. 130s-140s currently. MD aware. EP consulted. All questions addressed. Will continue to monitor

## 2018-10-18 NOTE — PROGRESS NOTES
0000 Pt HR sustained in 140's with rhythm change. Pt asymptomatic resting comfortably; Spoke with Dr Luna, ordered EKG, 5mg IVP Metoprolol, and labs. Pt returned to his normal rhythm/BP after IVP.  Awaiting lab results.  came to pt's bedside. Advised will discuss adding  with day team.   No further orders at this time.  Will continue to monitor.

## 2018-10-18 NOTE — CONSULTS
[facility]  Cardiac Electrophysiology  Consult Note    Admission Date: 10/10/2018  Code Status: Full Code   Attending Provider: Jony Hendrickson Jr.,*  Consulting Provider: William Alvarez MD  Principal Problem:HFrEF (heart failure with reduced ejection fraction)    Inpatient consult to Electrophysiology  Consult performed by: William Alvarez MD  Consult ordered by: Timothy Chisholm MD        Subjective:     Chief Complaint:  tachycardia    HPI:   72 y/o male with PMH of CAD s/p CABG, ICM, HFrEF transferred here with ADHF, under workup for VAD. EP consulted for frequent episodes of SVT and NSVT, possible SVT with aberrancy. He recently underwent upgrade from single chamber ICD to CRT-D 9/20/18. Per initial notes, he was reported to have WCT- SVT with aberrancy at OSH. Here, he has been noted to have frequent nonsustained WCT in setting of underlying sinus tach-BiV pacing on telemetry. He is currently on metoprolol 50, which cannot be uptitrated further due to low BP. He previously was on amiodarone for 2 doses at a prior OSH and reports presenting with severe SOB which was reported as an allergy. Denies hives, rashes, throat closing to suggest anaphylaxis.    EKGs show sinus tachycardia with biventricular pacing. Tele with sinus tachycardia with intermittent runs of NSAT with sensed V (underlying with BBB). A device interrogation showed 74.1%  as well as episodes of nonsustained AT.         Past Medical History:   Diagnosis Date    CHF (congestive heart failure)     COPD (chronic obstructive pulmonary disease)     Coronary artery disease     Diabetes mellitus        Past Surgical History:   Procedure Laterality Date    APPENDECTOMY      CARDIAC SURGERY      EYE SURGERY      FRACTURE SURGERY      TONSILLECTOMY         Review of patient's allergies indicates:   Allergen Reactions    Amiodarone analogues Anaphylaxis    Ativan [lorazepam] Anxiety       No current  facility-administered medications on file prior to encounter.      Current Outpatient Medications on File Prior to Encounter   Medication Sig    aspirin 81 MG Chew Take 81 mg by mouth once daily.    atorvastatin (LIPITOR) 80 MG tablet Take 80 mg by mouth nightly.    azelastine (ASTELIN) 137 mcg (0.1 %) nasal spray 1 spray by Nasal route 2 (two) times daily.    benzonatate (TESSALON) 200 MG capsule Take 200 mg by mouth 3 (three) times daily as needed for Cough.    docusate sodium (COLACE) 100 MG capsule Take 100 mg by mouth 2 (two) times daily as needed for Constipation.    doxycycline (VIBRA-TABS) 100 MG tablet Take 100 mg by mouth every 12 (twelve) hours.    enoxaparin (LOVENOX) 150 mg/mL Syrg Inject 40 mg into the skin once daily.    fluticasone (FLONASE) 50 mcg/actuation nasal spray 2 sprays by Each Nare route once daily.    furosemide (LASIX) 40 MG tablet Take 40 mg by mouth once daily.    glipiZIDE (GLUCOTROL) 2.5 MG TR24 Take 5 mg by mouth 2 (two) times daily.    insulin aspart U-100 (NOVOLOG) 100 unit/mL injection Inject 2-10 Units into the skin 4 (four) times daily with meals and nightly.    metoprolol succinate (TOPROL-XL) 25 MG 24 hr tablet Take 25 mg by mouth 2 (two) times daily.    prasugrel HCl (EFFIENT ORAL) Take 10 mg by mouth once daily.    ranitidine (ZANTAC) 150 MG tablet Take 150 mg by mouth once daily.    SITagliptin (JANUVIA) 50 MG Tab Take 100 mg by mouth once daily.    acetaminophen (TYLENOL) 650 MG TbSR Take 650 mg by mouth every 4 (four) hours as needed.     Family History     Problem Relation (Age of Onset)    Heart disease Father, Sister, Brother    Stroke Father        Tobacco Use    Smoking status: Former Smoker     Types: Cigarettes    Smokeless tobacco: Never Used   Substance and Sexual Activity    Alcohol use: No     Frequency: Never    Drug use: No    Sexual activity: Not on file     Review of Systems   All other systems reviewed and are negative.    Objective:      Vital Signs (Most Recent):  Temp: 97.2 °F (36.2 °C) (10/17/18 1551)  Pulse: 61 (10/17/18 1551)  Resp: 18 (10/17/18 1206)  BP: 117/88 (10/17/18 1551)  SpO2: (!) 94 % (10/17/18 1551) Vital Signs (24h Range):  Temp:  [97.2 °F (36.2 °C)-98.6 °F (37 °C)] 97.2 °F (36.2 °C)  Pulse:  [] 61  Resp:  [16-18] 18  SpO2:  [93 %-100 %] 94 %  BP: ()/(50-88) 117/88       Weight: 72.5 kg (159 lb 14.4 oz)  Body mass index is 25.81 kg/m².    SpO2: (!) 94 %  O2 Device (Oxygen Therapy): room air    Physical Exam   Constitutional: He is oriented to person, place, and time. No distress.   HENT:   Head: Atraumatic.   Mouth/Throat: No oropharyngeal exudate.   Eyes: EOM are normal. No scleral icterus.   Neck: Neck supple. No JVD present.   Cardiovascular: Regular rhythm and normal heart sounds. Exam reveals no gallop and no friction rub.   No murmur heard.  Tachycardic   Pulmonary/Chest: Effort normal and breath sounds normal. No respiratory distress. He has no wheezes. He has no rales. He exhibits no tenderness.   Abdominal: Soft. Bowel sounds are normal. He exhibits no distension. There is no tenderness.   Musculoskeletal: He exhibits no edema.   Neurological: He is alert and oriented to person, place, and time. No cranial nerve deficit.   Skin: Skin is warm and dry. No erythema.   Psychiatric: He has a normal mood and affect.       Significant Labs:   CMP:   Recent Labs   Lab  10/16/18   0435  10/17/18   0751   NA  137  140   K  3.8  3.7   CL  98  97   CO2  27  32*   GLU  124*  226*   BUN  49*  42*   CREATININE  1.2  1.4   CALCIUM  10.0  10.0   PROT  7.5  7.6   ALBUMIN  3.2*  3.3*   BILITOT  1.3*  1.3*   ALKPHOS  99  107   AST  28  23   ALT  23  23   ANIONGAP  12  11   ESTGFRAFRICA  >60.0  58.0*   EGFRNONAA  >60.0  50.2*   , CBC:   Recent Labs   Lab  10/16/18   0435  10/17/18   0541   WBC  8.22  8.99   HGB  12.7*  12.9*   HCT  40.7  43.0   PLT  166  172    and INR: No results for input(s): INR, PROTIME in the last 48  hours.    Significant Imaging: Echocardiogram:   2D echo with color flow doppler:   Results for orders placed or performed during the hospital encounter of 10/10/18   2D echo with color flow doppler   Result Value Ref Range    Calculated EF 9 (A) 55 - 65    Mitral Valve Regurgitation MODERATE (A)     Diastolic Dysfunction Yes (A)     Est. PA Systolic Pressure 26.04     Tricuspid Valve Regurgitation MILD                Assessment and Plan:     Cardiac resynchronization therapy defibrillator (CRT-D) in place    73M with ischemic cardiomyopathy, HFrEF, CAD s/p CABG transferred with ADHF, undergoing VAD workup. EP consulted for frequent runs of nonsustained WCT in setting of baseline sinus tachycardia with biventricular pacing. Recently upgraded 9/2018 to CRT-D device; most recently BiV pacing decreased. Tele and EGMs with nonsustained AT with V sensed. No VT on device or tele. Frequent AT decreasing rate of biventricular pacing.  - Will adjust device to try to maximize biventricular pacing (turn off adaptive CRT, maximize V sense response).  - Recommend primary team maximize beta blocker as tolerated, currently may be on max tolerated dose  - Can consider amiodarone as suspect not true allergy but will try above first  - Could consider AVN ablation in long term if above fails to help; will give CRT device time given recent implantation  - Maintain electrolytes, diuresis as per primary            Thank you for your consult. I will follow-up with patient. Please contact us if you have any additional questions.    William Alvarez MD  Cardiac Electrophysiology  [facility]

## 2018-10-18 NOTE — PLAN OF CARE
Interval update:     Continued tachycardia on telemetry HR 130s, [employer] ICD interrogated consistent with AFL 2:1, EKG with Atrial flutter with variable block.     Did not tolerate metoprolol 5mg IV push.     Discussed with nadia Panchal to start Amiodarone (EP recommended). Will discuss with EP fellow on call (Amiodarone allergy: anaphylaxis)      Rj Luna M.D.  Page # [phone]  Cardiovascular Fellow PGY-IV  [facility]

## 2018-10-18 NOTE — ASSESSMENT & PLAN NOTE
73M with ischemic cardiomyopathy, HFrEF, CAD s/p CABG transferred with ADHF, undergoing VAD workup. EP consulted for frequent runs of nonsustained WCT in setting of baseline sinus tachycardia with biventricular pacing. Recently upgraded 9/2018 to CRT-D device; most recently BiV pacing decreased. Tele and EGMs with nonsustained AT with V sensed. No VT on device or tele. Frequent AT decreasing rate of biventricular pacing. AT was replaced with Atrial flutter starting at 10pm on 10/17.    - Have adjusted device to increase BiV pacing in light of tachycarrhythmias (was down to 30%)  - Continue beta-blocker as tolerated  - start heparin drip, stop tomorrow by 5am  - plan for LAVERNE and ablation of atrial flutter focus tomorrow

## 2018-10-18 NOTE — SUBJECTIVE & OBJECTIVE
Interval History: 10/18/2018: Patient was seen and examined today at bedside. Patient with no major complaints.Improved nasal congestion. Night events noted and telemetry was reviewed. Patient still with evidence of frequent paroxysmal SVT sometimes persistent from telemetry difficult to determine between AT vs. Aflutter from the telemetry tracings. Patient with low dose BB due to low BP. Amiodarone considered but patient with previous adverse reaction to it (dyspnea?? according to the patient). Patient started on digoxin by on duty staff at the [facility] after discussion with EP service, still with frequent paroxysmal arrhythmias. EP service contacted for reevaluation.Electrolyte are ok as well renal function.Good diuresis with Lasix bolus.    Continuous Infusions:  Scheduled Meds:   aspirin  81 mg Oral Daily    atorvastatin  80 mg Oral Daily    cetirizine  5 mg Oral Daily    digoxin  0.125 mg Oral Daily    enoxaparin  40 mg Subcutaneous Daily    furosemide  80 mg Intravenous BID    guaiFENesin  600 mg Oral BID    hydrALAZINE  10 mg Oral Q8H    insulin aspart U-100  3 Units Subcutaneous TIDWM    insulin detemir U-100  15 Units Subcutaneous QHS    metoprolol succinate  50 mg Oral Daily    potassium chloride  40 mEq Oral BID    prasugrel  10 mg Oral Daily    sodium chloride 0.9%  3 mL Intravenous Q8H     PRN Meds:dextrose 50%, dextrose 50%, glucagon (human recombinant), glucose, glucose, insulin aspart U-100    Review of patient's allergies indicates:   Allergen Reactions    Amiodarone analogues Anaphylaxis    Ativan [lorazepam] Anxiety     Objective:     Vital Signs (Most Recent):  Temp: 98.4 °F (36.9 °C) (10/18/18 1150)  Pulse: (!) 138 (10/18/18 1150)  Resp: 16 (10/18/18 0830)  BP: 105/69 (10/18/18 1150)  SpO2: (!) 94 % (10/18/18 1150) Vital Signs (24h Range):  Temp:  [97.2 °F (36.2 °C)-99.6 °F (37.6 °C)] 98.4 °F (36.9 °C)  Pulse:  [] 138  Resp:  [16] 16  SpO2:  [93 %-98 %] 94 %  BP:  ()/(56-88) 105/69     Patient Vitals for the past 72 hrs (Last 3 readings):   Weight   10/18/18 0800 73.1 kg (161 lb 4.3 oz)   10/17/18 0600 72.5 kg (159 lb 14.4 oz)   10/16/18 0700 72 kg (158 lb 11.7 oz)     Body mass index is 26.03 kg/m².      Intake/Output Summary (Last 24 hours) at 10/18/2018 1217  Last data filed at 10/18/2018 0400  Gross per 24 hour   Intake 360 ml   Output 1026 ml   Net -666 ml       Hemodynamic Parameters:       Telemetry:telemetry was reviewed with above findings including frequent SVT (difficculty to determine between Aflutter vs. AT from telemetry tracings).    Physical Exam     Constitutional: He is oriented to person, place, and time. No distress.   HENT:   Head: Atraumatic.   Mouth/Throat: No oropharyngeal exudate.   Eyes: EOM are normal. No scleral icterus.   Neck: Neck supple. No JVD present.   Cardiovascular: Regular rhythm and normal heart sounds. Exam reveals no gallop and no friction rub.   No murmur heard.  Tachycardic   Pulmonary/Chest: Effort normal and breath sounds normal. No respiratory distress. He has no wheezes. He has no rales. He exhibits no tenderness.   Abdominal: Soft. Bowel sounds are normal. He exhibits no distension. There is no tenderness.   Musculoskeletal: He exhibits no edema.   Neurological: He is alert and oriented to person, place, and time. No cranial nerve deficit.   Skin: Skin is warm and dry. No erythema.   Psychiatric: He has a normal mood and affect.     Significant Labs:  CBC:  Recent Labs   Lab 10/16/18  0435 10/17/18  0541 10/18/18  0520   WBC 8.22 8.99 8.54   RBC 4.39* 4.51* 4.27*   HGB 12.7* 12.9* 12.6*   HCT 40.7 43.0 39.7*    172 149*   MCV 93 95 93   MCH 28.9 28.6 29.5   MCHC 31.2* 30.0* 31.7*     BNP:  Recent Labs   Lab 10/16/18  0435 10/18/18  0016   BNP 3,131* 3,552*     CMP:  Recent Labs   Lab 10/16/18  0435 10/17/18  0751 10/18/18  0520   * 226* 201*   CALCIUM 10.0 10.0 9.9   ALBUMIN 3.2* 3.3* 3.1*   PROT 7.5 7.6 7.4     140 136   K 3.8 3.7 4.2   CO2 27 32* 30*   CL 98 97 97   BUN 49* 42* 41*   CREATININE 1.2 1.4 1.3   ALKPHOS 99 107 104   ALT 23 23 23   AST 28 23 23   BILITOT 1.3* 1.3* 1.1*      Coagulation:   No results for input(s): PT, INR, APTT in the last 168 hours.  LDH:  No results for input(s): LDH in the last 72 hours.  Microbiology:  Microbiology Results (last 7 days)     Procedure Component Value Units Date/Time    Blood culture [[id number]]     Order Status:  Canceled Specimen:  Blood     Blood culture [[id number]]     Order Status:  Canceled Specimen:  Blood           I have reviewed all pertinent labs within the past 24 hours.    Estimated Creatinine Clearance: 47 mL/min (based on SCr of 1.3 mg/dL).    Diagnostic Results:  I have reviewed all pertinent imaging results/findings within the past 24 hours.

## 2018-10-18 NOTE — PROGRESS NOTES
[facility]  Cardiac Electrophysiology  Progress Note    Admission Date: 10/10/2018  Code Status: Full Code   Attending Physician: Jony Hendrickson Jr.,*   Expected Discharge Date: 10/22/2018  Principal Problem:HFrEF (heart failure with reduced ejection fraction)    Subjective:     Interval History: HR increased to 140s overnight, responded initial to 5mg metoprolol IV. Had persistently wide complex tachycardia.     Tele: atrial flutter replacing sinus tachy with -140    ROS  Objective:     Vital Signs (Most Recent):  Temp: 98 °F (36.7 °C) (10/18/18 0830)  Pulse: (!) 131 (10/18/18 0830)  Resp: 16 (10/18/18 0830)  BP: (!) 96/59 (10/18/18 0830)  SpO2: (!) 93 % (10/18/18 0830) Vital Signs (24h Range):  Temp:  [97.2 °F (36.2 °C)-99.6 °F (37.6 °C)] 98 °F (36.7 °C)  Pulse:  [] 131  Resp:  [16-18] 16  SpO2:  [93 %-98 %] 93 %  BP: ()/(50-88) 96/59     Weight: 73.1 kg (161 lb 4.3 oz)  Body mass index is 26.03 kg/m².     SpO2: (!) 93 %  O2 Device (Oxygen Therapy): nasal cannula    Physical Exam   Constitutional: He is oriented to person, place, and time. No distress.   HENT:   Head: Atraumatic.   Mouth/Throat: No oropharyngeal exudate.   Eyes: EOM are normal. No scleral icterus.   Neck: Neck supple. No JVD present.   Cardiovascular: Regular rhythm and normal heart sounds. Exam reveals no gallop and no friction rub.   No murmur heard.  Tachycardic   Pulmonary/Chest: Effort normal and breath sounds normal. No respiratory distress. He has no wheezes. He has no rales. He exhibits no tenderness.   Abdominal: Soft. Bowel sounds are normal. He exhibits no distension. There is no tenderness.   Musculoskeletal: He exhibits no edema.   Neurological: He is alert and oriented to person, place, and time. No cranial nerve deficit.   Skin: Skin is warm and dry. No erythema.   Psychiatric: He has a normal mood and affect.       Significant Labs:   EP:   Recent Labs   Lab 10/17/18  0541 10/17/18  0755  10/18/18  0520   NA  --  140 136   K  --  3.7 4.2   CL  --  97 97   CO2  --  32* 30*   GLU  --  226* 201*   BUN  --  42* 41*   CREATININE  --  1.4 1.3   CALCIUM  --  10.0 9.9   PROT  --  7.6 7.4   ALBUMIN  --  3.3* 3.1*   BILITOT  --  1.3* 1.1*   ALKPHOS  --  107 104   AST  --  23 23   ALT  --  23 23   ANIONGAP  --  11 9   ESTGFRAFRICA  --  58.0* >60.0   EGFRNONAA  --  50.2* 54.9*   WBC 8.99  --  8.54   HGB 12.9*  --  12.6*   HCT 43.0  --  39.7*     --  149*       Significant Imaging:   EKG 11pm , with appearance of typical atrial flutter with 1:2 conduction    Assessment and Plan:     Cardiac resynchronization therapy defibrillator (CRT-D) in place    73M with ischemic cardiomyopathy, HFrEF, CAD s/p CABG transferred with ADHF, undergoing VAD workup. EP consulted for frequent runs of nonsustained WCT in setting of baseline sinus tachycardia with biventricular pacing. Recently upgraded 9/2018 to CRT-D device; most recently BiV pacing decreased. Tele and EGMs with nonsustained AT with V sensed. No VT on device or tele. Frequent AT decreasing rate of biventricular pacing. AT was replaced with Atrial flutter starting at 10pm on 10/17.    - Have adjusted device to increase BiV pacing in light of tachycarrhythmias (was down to 30%)  - Continue beta-blocker as tolerated  - start heparin drip, stop tomorrow by 5am  - plan for LAVERNE and ablation of atrial flutter focus tomorrow         Glenroy Dominguez MD  Cardiac Electrophysiology  [facility]

## 2018-10-19 ENCOUNTER — ANESTHESIA EVENT (OUTPATIENT)
Dept: MEDSURG UNIT | Facility: HOSPITAL | Age: 73
DRG: 286 | End: 2018-10-19
Payer: MEDICARE

## 2018-10-19 ENCOUNTER — ANESTHESIA (OUTPATIENT)
Dept: MEDSURG UNIT | Facility: HOSPITAL | Age: 73
DRG: 286 | End: 2018-10-19
Payer: MEDICARE

## 2018-10-19 LAB
ALBUMIN SERPL BCP-MCNC: 3.1 G/DL
ALP SERPL-CCNC: 93 U/L
ALT SERPL W/O P-5'-P-CCNC: 22 U/L
ANION GAP SERPL CALC-SCNC: 11 MMOL/L
AORTIC VALVE STENOSIS: ABNORMAL
APTT BLDCRRT: 30 SEC
APTT BLDCRRT: 30.5 SEC
APTT BLDCRRT: 58.8 SEC
AST SERPL-CCNC: 20 U/L
BASOPHILS # BLD AUTO: 0.06 K/UL
BASOPHILS # BLD AUTO: 0.06 K/UL
BASOPHILS NFR BLD: 0.7 %
BASOPHILS NFR BLD: 0.7 %
BILIRUB SERPL-MCNC: 1 MG/DL
BUN SERPL-MCNC: 37 MG/DL
CALCIUM SERPL-MCNC: 9.5 MG/DL
CHLORIDE SERPL-SCNC: 96 MMOL/L
CO2 SERPL-SCNC: 26 MMOL/L
CREAT SERPL-MCNC: 1.2 MG/DL
DIFFERENTIAL METHOD: ABNORMAL
DIFFERENTIAL METHOD: ABNORMAL
EOSINOPHIL # BLD AUTO: 0.5 K/UL
EOSINOPHIL # BLD AUTO: 0.5 K/UL
EOSINOPHIL NFR BLD: 5 %
EOSINOPHIL NFR BLD: 5 %
ERYTHROCYTE [DISTWIDTH] IN BLOOD BY AUTOMATED COUNT: 14.6 %
ERYTHROCYTE [DISTWIDTH] IN BLOOD BY AUTOMATED COUNT: 14.6 %
EST. GFR  (AFRICAN AMERICAN): >60 ML/MIN/1.73 M^2
EST. GFR  (NON AFRICAN AMERICAN): >60 ML/MIN/1.73 M^2
GLUCOSE SERPL-MCNC: 162 MG/DL
HCT VFR BLD AUTO: 37 %
HCT VFR BLD AUTO: 37 %
HGB BLD-MCNC: 11.4 G/DL
HGB BLD-MCNC: 11.4 G/DL
IMM GRANULOCYTES # BLD AUTO: 0.06 K/UL
IMM GRANULOCYTES # BLD AUTO: 0.06 K/UL
IMM GRANULOCYTES NFR BLD AUTO: 0.7 %
IMM GRANULOCYTES NFR BLD AUTO: 0.7 %
LYMPHOCYTES # BLD AUTO: 1.7 K/UL
LYMPHOCYTES # BLD AUTO: 1.7 K/UL
LYMPHOCYTES NFR BLD: 19.1 %
LYMPHOCYTES NFR BLD: 19.1 %
MAGNESIUM SERPL-MCNC: 1.9 MG/DL
MCH RBC QN AUTO: 29 PG
MCH RBC QN AUTO: 29 PG
MCHC RBC AUTO-ENTMCNC: 30.8 G/DL
MCHC RBC AUTO-ENTMCNC: 30.8 G/DL
MCV RBC AUTO: 94 FL
MCV RBC AUTO: 94 FL
MITRAL VALVE MOBILITY: NORMAL
MITRAL VALVE REGURGITATION: ABNORMAL
MONOCYTES # BLD AUTO: 0.7 K/UL
MONOCYTES # BLD AUTO: 0.7 K/UL
MONOCYTES NFR BLD: 7.9 %
MONOCYTES NFR BLD: 7.9 %
NEUTROPHILS # BLD AUTO: 6.1 K/UL
NEUTROPHILS # BLD AUTO: 6.1 K/UL
NEUTROPHILS NFR BLD: 66.6 %
NEUTROPHILS NFR BLD: 66.6 %
NRBC BLD-RTO: 0 /100 WBC
NRBC BLD-RTO: 0 /100 WBC
PHOSPHATE SERPL-MCNC: 3.7 MG/DL
PLATELET # BLD AUTO: 176 K/UL
PLATELET # BLD AUTO: 176 K/UL
PMV BLD AUTO: 11.5 FL
PMV BLD AUTO: 11.5 FL
POCT GLUCOSE: 177 MG/DL (ref 70–110)
POCT GLUCOSE: 202 MG/DL (ref 70–110)
POCT GLUCOSE: 227 MG/DL (ref 70–110)
POCT GLUCOSE: 259 MG/DL (ref 70–110)
POCT GLUCOSE: 261 MG/DL (ref 70–110)
POTASSIUM SERPL-SCNC: 4 MMOL/L
PROT SERPL-MCNC: 7.2 G/DL
RBC # BLD AUTO: 3.93 M/UL
RBC # BLD AUTO: 3.93 M/UL
RETIRED EF AND QEF - SEE NOTES: 10 (ref 55–65)
SODIUM SERPL-SCNC: 133 MMOL/L
TRICUSPID VALVE REGURGITATION: ABNORMAL
WBC # BLD AUTO: 9.13 K/UL
WBC # BLD AUTO: 9.13 K/UL

## 2018-10-19 PROCEDURE — 25000003 PHARM REV CODE 250: Performed by: INTERNAL MEDICINE

## 2018-10-19 PROCEDURE — 37000009 HC ANESTHESIA EA ADD 15 MINS

## 2018-10-19 PROCEDURE — 63600175 PHARM REV CODE 636 W HCPCS: Mod: NTX | Performed by: ANESTHESIOLOGY

## 2018-10-19 PROCEDURE — 93010 ELECTROCARDIOGRAM REPORT: CPT | Mod: ,,, | Performed by: INTERNAL MEDICINE

## 2018-10-19 PROCEDURE — 20600001 HC STEP DOWN PRIVATE ROOM

## 2018-10-19 PROCEDURE — 99233 SBSQ HOSP IP/OBS HIGH 50: CPT | Mod: ,,, | Performed by: INTERNAL MEDICINE

## 2018-10-19 PROCEDURE — 93325 DOPPLER ECHO COLOR FLOW MAPG: CPT | Mod: 26,,, | Performed by: INTERNAL MEDICINE

## 2018-10-19 PROCEDURE — 37000008 HC ANESTHESIA 1ST 15 MINUTES

## 2018-10-19 PROCEDURE — 27100006 CARDIOVERSION (DCCV)

## 2018-10-19 PROCEDURE — 25000003 PHARM REV CODE 250: Mod: NTX | Performed by: ANESTHESIOLOGY

## 2018-10-19 PROCEDURE — 92960 CARDIOVERSION ELECTRIC EXT: CPT

## 2018-10-19 PROCEDURE — 25000003 PHARM REV CODE 250: Performed by: STUDENT IN AN ORGANIZED HEALTH CARE EDUCATION/TRAINING PROGRAM

## 2018-10-19 PROCEDURE — 85730 THROMBOPLASTIN TIME PARTIAL: CPT | Mod: 91

## 2018-10-19 PROCEDURE — 92960 CARDIOVERSION ELECTRIC EXT: CPT | Mod: ,,, | Performed by: INTERNAL MEDICINE

## 2018-10-19 PROCEDURE — 93005 ELECTROCARDIOGRAM TRACING: CPT

## 2018-10-19 PROCEDURE — 93320 DOPPLER ECHO COMPLETE: CPT

## 2018-10-19 PROCEDURE — 83735 ASSAY OF MAGNESIUM: CPT

## 2018-10-19 PROCEDURE — 82962 GLUCOSE BLOOD TEST: CPT

## 2018-10-19 PROCEDURE — 5A2204Z RESTORATION OF CARDIAC RHYTHM, SINGLE: ICD-10-PCS | Performed by: INTERNAL MEDICINE

## 2018-10-19 PROCEDURE — 85730 THROMBOPLASTIN TIME PARTIAL: CPT

## 2018-10-19 PROCEDURE — 84100 ASSAY OF PHOSPHORUS: CPT

## 2018-10-19 PROCEDURE — 85025 COMPLETE CBC W/AUTO DIFF WBC: CPT

## 2018-10-19 PROCEDURE — 93320 DOPPLER ECHO COMPLETE: CPT | Mod: 26,,, | Performed by: INTERNAL MEDICINE

## 2018-10-19 PROCEDURE — 93312 ECHO TRANSESOPHAGEAL: CPT | Mod: 26,,, | Performed by: INTERNAL MEDICINE

## 2018-10-19 PROCEDURE — 63600175 PHARM REV CODE 636 W HCPCS: Performed by: STUDENT IN AN ORGANIZED HEALTH CARE EDUCATION/TRAINING PROGRAM

## 2018-10-19 PROCEDURE — 80053 COMPREHEN METABOLIC PANEL: CPT

## 2018-10-19 PROCEDURE — D9220A PRA ANESTHESIA: Mod: CRNA,NTX,, | Performed by: NURSE ANESTHETIST, CERTIFIED REGISTERED

## 2018-10-19 PROCEDURE — 25000003 PHARM REV CODE 250

## 2018-10-19 PROCEDURE — 36415 COLL VENOUS BLD VENIPUNCTURE: CPT

## 2018-10-19 PROCEDURE — 93010 ELECTROCARDIOGRAM REPORT: CPT | Mod: 59,,, | Performed by: INTERNAL MEDICINE

## 2018-10-19 PROCEDURE — D9220A PRA ANESTHESIA: Mod: ANES,NTX,, | Performed by: ANESTHESIOLOGY

## 2018-10-19 RX ORDER — SODIUM CHLORIDE 0.9 % (FLUSH) 0.9 %
3 SYRINGE (ML) INJECTION
Status: DISCONTINUED | OUTPATIENT
Start: 2018-10-19 | End: 2018-10-23 | Stop reason: HOSPADM

## 2018-10-19 RX ORDER — SODIUM CHLORIDE 9 MG/ML
INJECTION, SOLUTION INTRAVENOUS CONTINUOUS PRN
Status: DISCONTINUED | OUTPATIENT
Start: 2018-10-19 | End: 2018-10-19

## 2018-10-19 RX ORDER — MIDAZOLAM HYDROCHLORIDE 1 MG/ML
INJECTION, SOLUTION INTRAMUSCULAR; INTRAVENOUS
Status: DISCONTINUED | OUTPATIENT
Start: 2018-10-19 | End: 2018-10-19

## 2018-10-19 RX ORDER — LANOLIN ALCOHOL/MO/W.PET/CERES
400 CREAM (GRAM) TOPICAL 2 TIMES DAILY
Status: COMPLETED | OUTPATIENT
Start: 2018-10-19 | End: 2018-10-22

## 2018-10-19 RX ORDER — DOFETILIDE 0.25 MG/1
250 CAPSULE ORAL EVERY 12 HOURS
Status: DISCONTINUED | OUTPATIENT
Start: 2018-10-19 | End: 2018-10-20

## 2018-10-19 RX ORDER — KETAMINE HCL IN 0.9 % NACL 50 MG/5 ML
SYRINGE (ML) INTRAVENOUS
Status: DISCONTINUED | OUTPATIENT
Start: 2018-10-19 | End: 2018-10-19

## 2018-10-19 RX ORDER — DOFETILIDE 0.25 MG/1
500 CAPSULE ORAL EVERY 12 HOURS
Status: DISCONTINUED | OUTPATIENT
Start: 2018-10-19 | End: 2018-10-19

## 2018-10-19 RX ORDER — LIDOCAINE HYDROCHLORIDE 20 MG/ML
SOLUTION OROPHARYNGEAL
Status: DISCONTINUED | OUTPATIENT
Start: 2018-10-19 | End: 2018-10-19

## 2018-10-19 RX ORDER — ETOMIDATE 2 MG/ML
INJECTION INTRAVENOUS
Status: DISCONTINUED | OUTPATIENT
Start: 2018-10-19 | End: 2018-10-19

## 2018-10-19 RX ORDER — EPINEPHRINE 0.1 MG/ML
INJECTION INTRAVENOUS
Status: DISCONTINUED | OUTPATIENT
Start: 2018-10-19 | End: 2018-10-19

## 2018-10-19 RX ORDER — LIDOCAINE HCL/PF 100 MG/5ML
SYRINGE (ML) INTRAVENOUS
Status: DISCONTINUED | OUTPATIENT
Start: 2018-10-19 | End: 2018-10-19

## 2018-10-19 RX ADMIN — POTASSIUM CHLORIDE 40 MEQ: 1500 TABLET, EXTENDED RELEASE ORAL at 09:10

## 2018-10-19 RX ADMIN — HEPARIN SODIUM AND DEXTROSE 12 UNITS/KG/HR: 10000; 5 INJECTION INTRAVENOUS at 08:10

## 2018-10-19 RX ADMIN — MIDAZOLAM 1 MG: 1 INJECTION INTRAMUSCULAR; INTRAVENOUS at 03:10

## 2018-10-19 RX ADMIN — POTASSIUM CHLORIDE 40 MEQ: 1500 TABLET, EXTENDED RELEASE ORAL at 08:10

## 2018-10-19 RX ADMIN — INSULIN ASPART 3 UNITS: 100 INJECTION, SOLUTION INTRAVENOUS; SUBCUTANEOUS at 09:10

## 2018-10-19 RX ADMIN — LIDOCAINE HYDROCHLORIDE 15 ML: 20 SOLUTION OROPHARYNGEAL at 03:10

## 2018-10-19 RX ADMIN — FUROSEMIDE 80 MG: 10 INJECTION, SOLUTION INTRAMUSCULAR; INTRAVENOUS at 08:10

## 2018-10-19 RX ADMIN — HYDRALAZINE HYDROCHLORIDE 10 MG: 10 TABLET, FILM COATED ORAL at 09:10

## 2018-10-19 RX ADMIN — GUAIFENESIN 600 MG: 600 TABLET, EXTENDED RELEASE ORAL at 08:10

## 2018-10-19 RX ADMIN — INSULIN ASPART 3 UNITS: 100 INJECTION, SOLUTION INTRAVENOUS; SUBCUTANEOUS at 06:10

## 2018-10-19 RX ADMIN — PRASUGREL 10 MG: 10 TABLET, FILM COATED ORAL at 08:10

## 2018-10-19 RX ADMIN — INSULIN DETEMIR 15 UNITS: 100 INJECTION, SOLUTION SUBCUTANEOUS at 09:10

## 2018-10-19 RX ADMIN — EPINEPHRINE 8 MCG: 0.1 INJECTION, SOLUTION ENDOTRACHEAL; INTRACARDIAC; INTRAVENOUS at 03:10

## 2018-10-19 RX ADMIN — GUAIFENESIN 600 MG: 600 TABLET, EXTENDED RELEASE ORAL at 09:10

## 2018-10-19 RX ADMIN — METOPROLOL SUCCINATE 50 MG: 50 TABLET, EXTENDED RELEASE ORAL at 08:10

## 2018-10-19 RX ADMIN — LIDOCAINE HYDROCHLORIDE 100 MG: 20 INJECTION, SOLUTION INTRAVENOUS at 03:10

## 2018-10-19 RX ADMIN — Medication 20 MG: at 03:10

## 2018-10-19 RX ADMIN — MAGNESIUM OXIDE TAB 400 MG (241.3 MG ELEMENTAL MG) 400 MG: 400 (241.3 MG) TAB at 09:10

## 2018-10-19 RX ADMIN — INSULIN ASPART 6 UNITS: 100 INJECTION, SOLUTION INTRAVENOUS; SUBCUTANEOUS at 06:10

## 2018-10-19 RX ADMIN — FUROSEMIDE 80 MG: 10 INJECTION, SOLUTION INTRAMUSCULAR; INTRAVENOUS at 06:10

## 2018-10-19 RX ADMIN — ETOMIDATE 4 MG: 2 INJECTION, SOLUTION INTRAVENOUS at 03:10

## 2018-10-19 RX ADMIN — CETIRIZINE HYDROCHLORIDE 5 MG: 5 TABLET ORAL at 08:10

## 2018-10-19 RX ADMIN — ATORVASTATIN CALCIUM 80 MG: 20 TABLET, FILM COATED ORAL at 08:10

## 2018-10-19 RX ADMIN — DIGOXIN 0.12 MG: 125 TABLET ORAL at 08:10

## 2018-10-19 RX ADMIN — DOFETILIDE 250 MCG: 0.25 CAPSULE ORAL at 09:10

## 2018-10-19 RX ADMIN — ASPIRIN 81 MG: 81 TABLET, COATED ORAL at 08:10

## 2018-10-19 RX ADMIN — SODIUM CHLORIDE: 0.9 INJECTION, SOLUTION INTRAVENOUS at 03:10

## 2018-10-19 NOTE — PROGRESS NOTES
Patient admitted to recovery see [facility] for complete assessment pacu bcg's maintained safety measures verified. Called for ekg patient very sedated at this time.

## 2018-10-19 NOTE — PROGRESS NOTES
[facility] [address]  Heart Transplant  Progress Note    Patient Name: Bharat Coker  MRN: [id number]  Admission Date: 10/10/2018  Hospital Length of Stay: 9 days  Attending Physician: Jony Hendrickson Jr.,*  Primary Care Provider: Ronnie Richardson Jr, MD  Principal Problem:HFrEF (heart failure with reduced ejection fraction)    Subjective:     Interval History: 10/19/2018: Patient was seen and examined at bedside. Patient wit no major complaints. Patient is scheduled for LAVERNE guided  DCCV today (instead of EPS). Cell counts has remained stable as well renal function. No lytes disturbances were noted. Will continue to follow. Possible starting antiarrhythmic after DCCV.    Continuous Infusions:   heparin (porcine) in D5W 12 Units/kg/hr (10/19/18 0859)     Scheduled Meds:   aspirin  81 mg Oral Daily    atorvastatin  80 mg Oral Daily    cetirizine  5 mg Oral Daily    digoxin  0.125 mg Oral Daily    furosemide  80 mg Intravenous BID    guaiFENesin  600 mg Oral BID    hydrALAZINE  10 mg Oral Q8H    insulin aspart U-100  3 Units Subcutaneous TIDWM    insulin detemir U-100  15 Units Subcutaneous QHS    metoprolol succinate  50 mg Oral Daily    potassium chloride  40 mEq Oral BID    prasugrel  10 mg Oral Daily    sodium chloride 0.9%  3 mL Intravenous Q8H     PRN Meds:ceFAZolin (ANCEF) IVPB, dextrose 50%, dextrose 50%, glucagon (human recombinant), glucose, glucose, heparin (PORCINE), heparin (PORCINE), insulin aspart U-100, ramelteon, sodium chloride 0.9%    Review of patient's allergies indicates:   Allergen Reactions    Amiodarone analogues Anaphylaxis    Ativan [lorazepam] Anxiety     Objective:     Vital Signs (Most Recent):  Temp: 98.2 °F (36.8 °C) (10/19/18 0845)  Pulse: (!) 145 (10/19/18 0845)  Resp: 18 (10/19/18 0845)  BP: (!) 81/52 (10/19/18 0845)  SpO2: (!) 93 % (10/19/18 0845) Vital Signs (24h Range):  Temp:  [97 °F (36.1 °C)-98.4 °F (36.9 °C)] 98.2 °F (36.8 °C)  Pulse:  [112-145]  145  Resp:  [18] 18  SpO2:  [93 %-100 %] 93 %  BP: ()/(51-69) 81/52     Patient Vitals for the past 72 hrs (Last 3 readings):   Weight   10/19/18 0714 73 kg (160 lb 15 oz)   10/18/18 0800 73.1 kg (161 lb 4.3 oz)   10/17/18 0600 72.5 kg (159 lb 14.4 oz)     Body mass index is 25.98 kg/m².      Intake/Output Summary (Last 24 hours) at 10/19/2018 1121  Last data filed at 10/19/2018 0700  Gross per 24 hour   Intake 660 ml   Output 2176 ml   Net -1516 ml       Hemodynamic Parameters:       Telemetry: Still with SVT-Aflutter    Physical Exam     HENT:   Head: Atraumatic.   Mouth/Throat: No oropharyngeal exudate.   Eyes: EOM are normal. No scleral icterus.   Neck: Neck supple. No JVD present.   Cardiovascular: Regular rhythm and normal heart sounds. Exam reveals no gallop and no friction rub.   No murmur heard.  Tachycardic   Pulmonary/Chest: Effort normal and breath sounds normal. No respiratory distress. He has no wheezes. He has no rales. He exhibits no tenderness.   Abdominal: Soft. Bowel sounds are normal. He exhibits no distension. There is no tenderness.   Musculoskeletal: He exhibits no edema.   Neurological: He is alert and oriented to person, place, and time. No cranial nerve deficit.   Skin: Skin is warm and dry. No erythema.   Psychiatric: He has a normal mood and affect.     Significant Labs:  CBC:  Recent Labs   Lab 10/18/18  0520 10/18/18  1713 10/19/18  0518   WBC 8.54 10.08 9.13  9.13   RBC 4.27* 4.17* 3.93*  3.93*   HGB 12.6* 12.5* 11.4*  11.4*   HCT 39.7* 39.0* 37.0*  37.0*   * 181 176  176   MCV 93 94 94  94   MCH 29.5 30.0 29.0  29.0   MCHC 31.7* 32.1 30.8*  30.8*     BNP:  Recent Labs   Lab 10/16/18  0435 10/18/18  0016   BNP 3,131* 3,552*     CMP:  Recent Labs   Lab 10/17/18  0751 10/18/18  0520 10/19/18  0518   * 201* 162*   CALCIUM 10.0 9.9 9.5   ALBUMIN 3.3* 3.1* 3.1*   PROT 7.6 7.4 7.2    136 133*   K 3.7 4.2 4.0   CO2 32* 30* 26   CL 97 97 96   BUN 42* 41* 37*  "  CREATININE 1.4 1.3 1.2   ALKPHOS 107 104 93   ALT 23 23 22   AST 23 23 20   BILITOT 1.3* 1.1* 1.0      Coagulation:   Recent Labs   Lab 10/18/18  1713 10/19/18  0006 10/19/18  0518   INR 1.0  --   --    APTT 23.8 30.0 58.8*     LDH:  No results for input(s): LDH in the last 72 hours.  Microbiology:  Microbiology Results (last 7 days)     Procedure Component Value Units Date/Time    Blood culture [[id number]]     Order Status:  Canceled Specimen:  Blood     Blood culture [[id number]]     Order Status:  Canceled Specimen:  Blood           I have reviewed all pertinent labs within the past 24 hours.    Estimated Creatinine Clearance: 51 mL/min (based on SCr of 1.2 mg/dL).    Diagnostic Results:  I have reviewed all pertinent imaging results/findings within the past 24 hours.    Assessment and Plan:     74 y/o male with PMH of CAD s/p CABG, ICM, HFrEF who presents as a transfer from OSH after he presented there for SUMNER, weight gain and palpitations. He presented to OSH after being discharged from the same facility 4 days prior to Oct 8. He was there initially for ADHF and KASEY. The patient recently had a revision of his CRT-D. The patient became SOB on Oct 7 but did not have CP. The patient felt his pulse and noted it to be rapid. He was brought to the ED at OSH by EMS; his HR was ~170. The ECG was c/w WCT (later documentation notes SVT with aberrancy after interrogation was negative for VT), and he was given one dose of adenosine. The HR came down to 110. His CXR was c/w pulmonary edema. A pro-BNP was at one point 20084.The patient's WBC was 17 and sCr 1.6. tBili was 1.5. An ECHO on 10/8: LVIDd 6.5 cm, EF 15%, IVC normal collapsibility and normal IVC size. An undated stress test notes: "mild reversibility noted in the cardiac apex and there lateral wall of the heart from cardiac apex to approximately mid wall suspicion for ischemia..mild reversibility noted in the mid to basilar inferior/inferoseptal wall of the heart " "also suspicious for ischemia". He had a C 6/2018: LIMA-LAD patent, % occluded at ostium, SVG-% occluded, SVG-D 100% occluded, pLCx 30%, 100% occluded OM, 100% LAD occlusion after takeoff of D1, D1 is tiny with 80% disease, SVG-OM 80% proximal stenosis with 50% at site of anastomosis. An SVG-OM OKSANA was placed at that time. The patient's WBC was 9.6 as of 10/9. Aldactone was held at some point 2/2 hyperkalemia. The patient believes he has been diuresed adequately and has no fluid left to remove.     * HFrEF (heart failure with reduced ejection fraction)    Mr. Coker is a 72 y/o male with PMH of CAD s/p CABG, ICM/HFrEF (LVef <10%, LVEDD 7.8) who was a transfer for cardiogenic shock / MODS:    - BNP 4674   - C 6/2018: LIMA-LAD patent, % occluded at ostium, SVG-% occluded, SVG-D 100% occluded, pLCx 30%, 100% occluded OM, 100% LAD occlusion after takeoff of D1, D1 is tiny with 80% disease, SVG-OM 80% proximal stenosis with 50% at site of anastomosis. An SVG-OM OKSANA was placed at that time.    - TTE EF <10%, G2DD, LVH (LVEDD 7.8 / LVESD 7.3 ), reduced RVSF (LV 4.5cm / TAPSE 1.3), PASP 26mmHG, Mod MR    - CT head: no acute changes, old lacunar infract   - CT CAP: Moderate atherosclerosis of the thoracic, abdominal aorta and CAD.  Mild calcification of the aortic valve and aortic and mitral valve annuli.    - Started on Pathway for VAD only, pending CT surgery evaluation by Dr. Khan, prior to Step 2, appreciate assistance    - SW/CM to assist with financial clearance  - Need OSH records from ,  - RHC when euvolemic    - Decreased Lasix 10mg/hr, continue closely monitor UOP,   - Continue PT / OT, ambulate as tolerated, elevated LE   - Fluid restriction at 1500 cc with strict I/Os and daily STANDING weights  - Check Electrolytes, keep Mag >2 & K+ >4  - Continue PTA HF GDMT       SVT (supraventricular tachycardia)    -Arrhythmia suggestive of atypical atrial flutter, will attempt LAVERNE guided " DCCV by EP service on 10/19/2018.  -On heparin gtt.  with plan to transition to NOACs after DCCV.  -Possible to start dofetilide after cardioversion depending on post DCCV ECG.  -Will follow closely.     VT (ventricular tachycardia)    Frequent NSVT with underlying ST:   - Increase Toprol 100mg qday   - Follow up HR  - Consider EP consult if continues to have ST      Skin rash    Posterior neck, likely eczema:  - Continue supportive care with topical      ICD (implantable cardioverter-defibrillator) in place    [employer] CRT-D (09/20/18)  - Interrogated today consistent with NSVT, Afib / AF  - Closely  Monitor for further ectopy   - Will consider EP consultation if continues to persist   - Replete electrolytes   -Will consult EP for evaluation due to episodes that may suggest NSVT and additional episode of SVT with aberrant conduction.     DM (diabetes mellitus)     A1c 6.9,   - SSI with accuchecks  - will consider endocrine consult with VAD work up      CAD (coronary artery disease)    Recent MI 06/2018, ICM / HFrEF / CAD s/p CABG:   - Continue DAPT, statin, and BB  - consider ACEi once renal function panel returns         Timothy Chisholm MD  Heart Transplant  [facility]

## 2018-10-19 NOTE — HPI
TRANSESOPHAGEAL ECHOCARDIOGRAPHY   PRE-PROCEDURE NOTE    10/19/2018    HPI:     Bharat Coker is a 73 y.o. man with PMHx of ICM (EF of <10%), CAD s/p CABG, s/p PCI in 2001 and 2018 here as a transfer for OHtx and VAD workup. Developed WCT here. Has been noted to be in AFL since the 17th. Has been therapeutically anticoagulated since this morning. Currently on heparin, aspirin and prasurgel and platelets are 176.     TTE: 10/10/18    1 - Severely depressed left ventricular systolic function (EF <10%).     2 - Eccentric hypertrophy.     3 - Biatrial enlargement.     4 - Impaired LV relaxation, elevated LAP (grade 2 diastolic dysfunction).     5 - Right ventricular enlargement with moderately depressed systolic function.     6 - The estimated PA systolic pressure is 26 mmHg.     7 - Moderate mitral regurgitation.     8 - Mild tricuspid regurgitation.     Dysphagia or odynophagia:  No  Liver Disease, esophageal disease, or known varices:  No  Upper GI Bleeding: No  Snoring:  Yes  Sleep Apnea:  No  Prior neck surgery or radiation:  No  History of anesthetic difficulties:  No  Family history of anesthetic difficulties:  No  Last oral intake:  ate some peanuts this morning  Able to move neck in all directions:  Yes    Mallampati: 1  ASA: 3

## 2018-10-19 NOTE — PLAN OF CARE
Patient s/p successful LAVERNE/DCCV. Baseline QTc post ablation is 490 msec (measured). Cardiac electrophysiology is recommending starting dofetilide 500 mcg PO q12h.Will follow protocol.

## 2018-10-19 NOTE — ASSESSMENT & PLAN NOTE
Assessment & Plan:     PLAN:  1. LAVERNE for evaluation of LA/SANDY thrombus prior to DCCV.    -The risks, benefits & alternatives of the procedure were explained to the patient.    -The risks of transesophageal echo include but are not limited to:  Dental trauma, esophageal trauma/perforation, bleeding, laryngospasm/brochospasm, aspiration, sore throat/hoarseness, & dislodgement of the endotracheal tube/nasogastric tube (where applicable).    -The risks of moderate sedation include hypotension, respiratory depression, arrhythmias, bronchospasm, & death.    -Informed consent was obtained & the patient is agreeable to proceed with the procedure.    I will discuss with the attending physician. Attending addendum is to follow.     Further recommendations per attending addendum    Eric Lowery MD  PGY-[phone] ([phone])  Cardiology Fellow

## 2018-10-19 NOTE — ASSESSMENT & PLAN NOTE
73M with ischemic cardiomyopathy, HFrEF, CAD s/p CABG transferred with ADHF, undergoing VAD workup. EP consulted for frequent runs of nonsustained WCT in setting of baseline sinus tachycardia with biventricular pacing. Recently upgraded 9/2018 to CRT-D device; most recently BiV pacing decreased. Tele and EGMs with nonsustained AT with V sensed. No VT on device or tele. Frequent AT decreasing rate of biventricular pacing. AT was replaced with Atrial flutter starting at 10pm on 10/17. Has been having higher incidence of BiV pacing since CRT-D parameter adjustments.    - Plan on LAVERNE with DCCV today, then start tikosyn  - Will start tikosyn after reviewing NSR EKG  - please obtain post-procedure EKG.  - Continue beta-blocker as tolerated  - Continue heparin drip, transition to oral anti-coagulant

## 2018-10-19 NOTE — ASSESSMENT & PLAN NOTE
-Arrhythmia suggestive of atypical atrial flutter, will attempt LAVERNE guided DCCV by EP service on 10/19/2018.  -On heparin gtt.  with plan to transition to NOACs after DCCV.  -Possible to start dofetilide after cardioversion depending on post DCCV ECG.  -Will follow closely.

## 2018-10-19 NOTE — PLAN OF CARE
Problem: Patient Care Overview  Goal: Plan of Care Review  Plan of care discussed with patient. Patient is free of fall/trauma/injury. Denies CP, SOB, or pain/discomfort. Heparin drip infusing. Plan for cardioversion today.  All questions addressed. Will continue to monitor

## 2018-10-19 NOTE — SUBJECTIVE & OBJECTIVE
Interval History: 10/19/2018: Patient was seen and examined at bedside. Patient wit no major complaints. Patient is scheduled for LAVERNE guided  DCCV today (instead of EPS). Cell counts has remained stable as well renal function. No lytes disturbances were noted. Will continue to follow. Possible starting antiarrhythmic after DCCV.    Continuous Infusions:   heparin (porcine) in D5W 12 Units/kg/hr (10/19/18 0859)     Scheduled Meds:   aspirin  81 mg Oral Daily    atorvastatin  80 mg Oral Daily    cetirizine  5 mg Oral Daily    digoxin  0.125 mg Oral Daily    furosemide  80 mg Intravenous BID    guaiFENesin  600 mg Oral BID    hydrALAZINE  10 mg Oral Q8H    insulin aspart U-100  3 Units Subcutaneous TIDWM    insulin detemir U-100  15 Units Subcutaneous QHS    metoprolol succinate  50 mg Oral Daily    potassium chloride  40 mEq Oral BID    prasugrel  10 mg Oral Daily    sodium chloride 0.9%  3 mL Intravenous Q8H     PRN Meds:ceFAZolin (ANCEF) IVPB, dextrose 50%, dextrose 50%, glucagon (human recombinant), glucose, glucose, heparin (PORCINE), heparin (PORCINE), insulin aspart U-100, ramelteon, sodium chloride 0.9%    Review of patient's allergies indicates:   Allergen Reactions    Amiodarone analogues Anaphylaxis    Ativan [lorazepam] Anxiety     Objective:     Vital Signs (Most Recent):  Temp: 98.2 °F (36.8 °C) (10/19/18 0845)  Pulse: (!) 145 (10/19/18 0845)  Resp: 18 (10/19/18 0845)  BP: (!) 81/52 (10/19/18 0845)  SpO2: (!) 93 % (10/19/18 0845) Vital Signs (24h Range):  Temp:  [97 °F (36.1 °C)-98.4 °F (36.9 °C)] 98.2 °F (36.8 °C)  Pulse:  [112-145] 145  Resp:  [18] 18  SpO2:  [93 %-100 %] 93 %  BP: ()/(51-69) 81/52     Patient Vitals for the past 72 hrs (Last 3 readings):   Weight   10/19/18 0714 73 kg (160 lb 15 oz)   10/18/18 0800 73.1 kg (161 lb 4.3 oz)   10/17/18 0600 72.5 kg (159 lb 14.4 oz)     Body mass index is 25.98 kg/m².      Intake/Output Summary (Last 24 hours) at 10/19/2018 1121  Last  data filed at 10/19/2018 0700  Gross per 24 hour   Intake 660 ml   Output 2176 ml   Net -1516 ml       Hemodynamic Parameters:       Telemetry: Still with SVT-Aflutter    Physical Exam     HENT:   Head: Atraumatic.   Mouth/Throat: No oropharyngeal exudate.   Eyes: EOM are normal. No scleral icterus.   Neck: Neck supple. No JVD present.   Cardiovascular: Regular rhythm and normal heart sounds. Exam reveals no gallop and no friction rub.   No murmur heard.  Tachycardic   Pulmonary/Chest: Effort normal and breath sounds normal. No respiratory distress. He has no wheezes. He has no rales. He exhibits no tenderness.   Abdominal: Soft. Bowel sounds are normal. He exhibits no distension. There is no tenderness.   Musculoskeletal: He exhibits no edema.   Neurological: He is alert and oriented to person, place, and time. No cranial nerve deficit.   Skin: Skin is warm and dry. No erythema.   Psychiatric: He has a normal mood and affect.     Significant Labs:  CBC:  Recent Labs   Lab 10/18/18  0520 10/18/18  1713 10/19/18  0518   WBC 8.54 10.08 9.13  9.13   RBC 4.27* 4.17* 3.93*  3.93*   HGB 12.6* 12.5* 11.4*  11.4*   HCT 39.7* 39.0* 37.0*  37.0*   * 181 176  176   MCV 93 94 94  94   MCH 29.5 30.0 29.0  29.0   MCHC 31.7* 32.1 30.8*  30.8*     BNP:  Recent Labs   Lab 10/16/18  0435 10/18/18  0016   BNP 3,131* 3,552*     CMP:  Recent Labs   Lab 10/17/18  0751 10/18/18  0520 10/19/18  0518   * 201* 162*   CALCIUM 10.0 9.9 9.5   ALBUMIN 3.3* 3.1* 3.1*   PROT 7.6 7.4 7.2    136 133*   K 3.7 4.2 4.0   CO2 32* 30* 26   CL 97 97 96   BUN 42* 41* 37*   CREATININE 1.4 1.3 1.2   ALKPHOS 107 104 93   ALT 23 23 22   AST 23 23 20   BILITOT 1.3* 1.1* 1.0      Coagulation:   Recent Labs   Lab 10/18/18  1713 10/19/18  0006 10/19/18  0518   INR 1.0  --   --    APTT 23.8 30.0 58.8*     LDH:  No results for input(s): LDH in the last 72 hours.  Microbiology:  Microbiology Results (last 7 days)     Procedure Component  Value Units Date/Time    Blood culture [[id number]]     Order Status:  Canceled Specimen:  Blood     Blood culture [[id number]]     Order Status:  Canceled Specimen:  Blood           I have reviewed all pertinent labs within the past 24 hours.    Estimated Creatinine Clearance: 51 mL/min (based on SCr of 1.2 mg/dL).    Diagnostic Results:  I have reviewed all pertinent imaging results/findings within the past 24 hours.

## 2018-10-19 NOTE — SUBJECTIVE & OBJECTIVE
Past Medical History:   Diagnosis Date    CHF (congestive heart failure)     COPD (chronic obstructive pulmonary disease)     Coronary artery disease     Diabetes mellitus        Past Surgical History:   Procedure Laterality Date    APPENDECTOMY      CARDIAC SURGERY      EYE SURGERY      FRACTURE SURGERY      TONSILLECTOMY         Review of patient's allergies indicates:   Allergen Reactions    Amiodarone analogues Anaphylaxis    Ativan [lorazepam] Anxiety       No current facility-administered medications on file prior to encounter.      Current Outpatient Medications on File Prior to Encounter   Medication Sig    aspirin 81 MG Chew Take 81 mg by mouth once daily.    atorvastatin (LIPITOR) 80 MG tablet Take 80 mg by mouth nightly.    azelastine (ASTELIN) 137 mcg (0.1 %) nasal spray 1 spray by Nasal route 2 (two) times daily.    benzonatate (TESSALON) 200 MG capsule Take 200 mg by mouth 3 (three) times daily as needed for Cough.    docusate sodium (COLACE) 100 MG capsule Take 100 mg by mouth 2 (two) times daily as needed for Constipation.    doxycycline (VIBRA-TABS) 100 MG tablet Take 100 mg by mouth every 12 (twelve) hours.    enoxaparin (LOVENOX) 150 mg/mL Syrg Inject 40 mg into the skin once daily.    fluticasone (FLONASE) 50 mcg/actuation nasal spray 2 sprays by Each Nare route once daily.    furosemide (LASIX) 40 MG tablet Take 40 mg by mouth once daily.    glipiZIDE (GLUCOTROL) 2.5 MG TR24 Take 5 mg by mouth 2 (two) times daily.    insulin aspart U-100 (NOVOLOG) 100 unit/mL injection Inject 2-10 Units into the skin 4 (four) times daily with meals and nightly.    metoprolol succinate (TOPROL-XL) 25 MG 24 hr tablet Take 25 mg by mouth 2 (two) times daily.    prasugrel HCl (EFFIENT ORAL) Take 10 mg by mouth once daily.    ranitidine (ZANTAC) 150 MG tablet Take 150 mg by mouth once daily.    SITagliptin (JANUVIA) 50 MG Tab Take 100 mg by mouth once daily.    acetaminophen (TYLENOL) 650  MG TbSR Take 650 mg by mouth every 4 (four) hours as needed.     Family History     Problem Relation (Age of Onset)    Heart disease Father, Sister, Brother    Stroke Father        Tobacco Use    Smoking status: Former Smoker     Types: Cigarettes    Smokeless tobacco: Never Used   Substance and Sexual Activity    Alcohol use: No     Frequency: Never    Drug use: No    Sexual activity: Not on file     Review of Systems   All other systems reviewed and are negative.    Objective:     Vital Signs (Most Recent):  Temp: 98.2 °F (36.8 °C) (10/19/18 0845)  Pulse: (!) 145 (10/19/18 0845)  Resp: 18 (10/19/18 0845)  BP: (!) 81/52 (10/19/18 0845)  SpO2: (!) 93 % (10/19/18 0845) Vital Signs (24h Range):  Temp:  [97 °F (36.1 °C)-98.4 °F (36.9 °C)] 98.2 °F (36.8 °C)  Pulse:  [112-145] 145  Resp:  [18] 18  SpO2:  [93 %-100 %] 93 %  BP: ()/(51-69) 81/52     Weight: 73 kg (160 lb 15 oz)  Body mass index is 25.98 kg/m².    SpO2: (!) 93 %  O2 Device (Oxygen Therapy): room air      Intake/Output Summary (Last 24 hours) at 10/19/2018 1026  Last data filed at 10/19/2018 0700  Gross per 24 hour   Intake 660 ml   Output 2176 ml   Net -1516 ml       Lines/Drains/Airways     Peripheral Intravenous Line                 Peripheral IV - Single Lumen 10/18/18 0500 Left;Posterior Forearm 1 day                Physical Exam   Constitutional: He is oriented to person, place, and time. He appears well-developed and well-nourished.   HENT:   Head: Normocephalic and atraumatic.   Eyes: EOM are normal. Pupils are equal, round, and reactive to light.   Neck: Normal range of motion. Neck supple. No JVD present.   Cardiovascular: Normal heart sounds and intact distal pulses.   Tachycardic and irregular   Pulmonary/Chest: Effort normal and breath sounds normal.   Abdominal: Soft. Bowel sounds are normal.   Musculoskeletal: Normal range of motion. He exhibits no edema.   Neurological: He is alert and oriented to person, place, and time.        Significant Labs:   CMP   Recent Labs   Lab 10/18/18  0520 10/19/18  0518    133*   K 4.2 4.0   CL 97 96   CO2 30* 26   * 162*   BUN 41* 37*   CREATININE 1.3 1.2   CALCIUM 9.9 9.5   PROT 7.4 7.2   ALBUMIN 3.1* 3.1*   BILITOT 1.1* 1.0   ALKPHOS 104 93   AST 23 20   ALT 23 22   ANIONGAP 9 11   ESTGFRAFRICA >60.0 >60.0   EGFRNONAA 54.9* >60.0   , CBC   Recent Labs   Lab 10/18/18  0520 10/18/18  1713 10/19/18  0518   WBC 8.54 10.08 9.13  9.13   HGB 12.6* 12.5* 11.4*  11.4*   HCT 39.7* 39.0* 37.0*  37.0*   * 181 176  176    and All pertinent lab results from the last 24 hours have been reviewed.    Significant Imaging: Echocardiogram:   2D echo with color flow doppler:   Results for orders placed or performed during the hospital encounter of 10/10/18   2D echo with color flow doppler   Result Value Ref Range    Calculated EF 9 (A) 55 - 65    Mitral Valve Regurgitation MODERATE (A)     Diastolic Dysfunction Yes (A)     Est. PA Systolic Pressure 26.04     Tricuspid Valve Regurgitation MILD     and X-Ray: CXR: X-Ray Chest 1 View (CXR):   Results for orders placed or performed during the hospital encounter of 10/10/18   X-Ray Chest 1 View    Narrative    EXAMINATION:  XR CHEST 1 VIEW    CLINICAL HISTORY:  CHF;    TECHNIQUE:  Single frontal view of the chest was performed.    COMPARISON:  None    FINDINGS:  AICD leads overlie the heart.  Sternotomy wires present.    No consolidation, pleural effusion or pneumothorax.    Cardiomegaly with bilateral edema.      Impression    CHF.    AICD present.      Electronically signed by: Efrain Sadler MD  Date:    10/10/2018  Time:    04:33

## 2018-10-19 NOTE — PROGRESS NOTES
[facility]  Cardiac Electrophysiology  Progress Note    Admission Date: 10/10/2018  Code Status: Full Code   Attending Physician: Jony Hendrickson Jr.,*   Expected Discharge Date: 10/22/2018  Principal Problem:HFrEF (heart failure with reduced ejection fraction)    Subjective:     Interval History: Feeling well, no complaints    Tele: Atrial tachy 120-150 with inttermittent cessation of Bi-V pacing     ROS  Objective:     Vital Signs (Most Recent):  Temp: 98.2 °F (36.8 °C) (10/19/18 0845)  Pulse: (!) 145 (10/19/18 0845)  Resp: 18 (10/19/18 0845)  BP: (!) 81/52 (10/19/18 0845)  SpO2: (!) 93 % (10/19/18 0845) Vital Signs (24h Range):  Temp:  [97 °F (36.1 °C)-98.4 °F (36.9 °C)] 98.2 °F (36.8 °C)  Pulse:  [112-145] 145  Resp:  [18] 18  SpO2:  [93 %-100 %] 93 %  BP: ()/(51-69) 81/52     Weight: 73 kg (160 lb 15 oz)  Body mass index is 25.98 kg/m².     SpO2: (!) 93 %  O2 Device (Oxygen Therapy): room air    Physical Exam   Constitutional: He is oriented to person, place, and time. No distress.   HENT:   Head: Atraumatic.   Mouth/Throat: No oropharyngeal exudate.   Eyes: EOM are normal. No scleral icterus.   Neck: Neck supple. No JVD present.   Cardiovascular: Regular rhythm and normal heart sounds. Exam reveals no gallop and no friction rub.   No murmur heard.  Tachycardic   Pulmonary/Chest: Effort normal and breath sounds normal. No respiratory distress. He has no wheezes. He has no rales. He exhibits no tenderness.   Abdominal: Soft. Bowel sounds are normal. He exhibits no distension. There is no tenderness.   Musculoskeletal: He exhibits no edema.   Neurological: He is alert and oriented to person, place, and time. No cranial nerve deficit.   Skin: Skin is warm and dry. No erythema.   Psychiatric: He has a normal mood and affect.       Significant Labs:   EP:   Recent Labs   Lab 10/18/18  0520 10/18/18  1713 10/19/18  0518     --  133*   K 4.2  --  4.0   CL 97  --  96   CO2 30*  --   26   *  --  162*   BUN 41*  --  37*   CREATININE 1.3  --  1.2   CALCIUM 9.9  --  9.5   PROT 7.4  --  7.2   ALBUMIN 3.1*  --  3.1*   BILITOT 1.1*  --  1.0   ALKPHOS 104  --  93   AST 23  --  20   ALT 23  --  22   ANIONGAP 9  --  11   ESTGFRAFRICA >60.0  --  >60.0   EGFRNONAA 54.9*  --  >60.0   WBC 8.54 10.08 9.13  9.13   HGB 12.6* 12.5* 11.4*  11.4*   HCT 39.7* 39.0* 37.0*  37.0*   * 181 176  176   INR  --  1.0  --          Assessment and Plan:     Cardiac resynchronization therapy defibrillator (CRT-D) in place    73M with ischemic cardiomyopathy, HFrEF, CAD s/p CABG transferred with ADHF, undergoing VAD workup. EP consulted for frequent runs of nonsustained WCT in setting of baseline sinus tachycardia with biventricular pacing. Recently upgraded 9/2018 to CRT-D device; most recently BiV pacing decreased. Tele and EGMs with nonsustained AT with V sensed. No VT on device or tele. Frequent AT decreasing rate of biventricular pacing. AT was replaced with Atrial flutter starting at 10pm on 10/17. Has been having higher incidence of BiV pacing since CRT-D parameter adjustments.    - Plan on LAVERNE with DCCV today, then start tikosyn  - Will start tikosyn after reviewing NSR EKG  - please obtain post-procedure EKG.  - Continue beta-blocker as tolerated  - Continue heparin drip, transition to oral anti-coagulant           Glenroy Dominguez MD  Cardiac Electrophysiology  [facility]

## 2018-10-19 NOTE — PLAN OF CARE
Problem: Patient Care Overview  Goal: Plan of Care Review  Pt remains free from injury/falls for shift. Pt remains NPO since midnight. Heparin drip cont to infuse and titrated according to aPTT. Drip to be stopped at 0500 per MD orders. Educated Pt on meds no questions at this time. Pt tachycardiac  over night MD aware all other VSS.  No complaints of CP, SOB, pain/discomfort  Bed locked in lowest position, call bell within reach. All questions addressed.  Will continue to monitor.

## 2018-10-19 NOTE — SUBJECTIVE & OBJECTIVE
Interval History: Feeling well, no complaints    Tele: Atrial tachy 120-150 with inttermittent cessation of Bi-V pacing     ROS  Objective:     Vital Signs (Most Recent):  Temp: 98.2 °F (36.8 °C) (10/19/18 0845)  Pulse: (!) 145 (10/19/18 0845)  Resp: 18 (10/19/18 0845)  BP: (!) 81/52 (10/19/18 0845)  SpO2: (!) 93 % (10/19/18 0845) Vital Signs (24h Range):  Temp:  [97 °F (36.1 °C)-98.4 °F (36.9 °C)] 98.2 °F (36.8 °C)  Pulse:  [112-145] 145  Resp:  [18] 18  SpO2:  [93 %-100 %] 93 %  BP: ()/(51-69) 81/52     Weight: 73 kg (160 lb 15 oz)  Body mass index is 25.98 kg/m².     SpO2: (!) 93 %  O2 Device (Oxygen Therapy): room air    Physical Exam   Constitutional: He is oriented to person, place, and time. No distress.   HENT:   Head: Atraumatic.   Mouth/Throat: No oropharyngeal exudate.   Eyes: EOM are normal. No scleral icterus.   Neck: Neck supple. No JVD present.   Cardiovascular: Regular rhythm and normal heart sounds. Exam reveals no gallop and no friction rub.   No murmur heard.  Tachycardic   Pulmonary/Chest: Effort normal and breath sounds normal. No respiratory distress. He has no wheezes. He has no rales. He exhibits no tenderness.   Abdominal: Soft. Bowel sounds are normal. He exhibits no distension. There is no tenderness.   Musculoskeletal: He exhibits no edema.   Neurological: He is alert and oriented to person, place, and time. No cranial nerve deficit.   Skin: Skin is warm and dry. No erythema.   Psychiatric: He has a normal mood and affect.       Significant Labs:   EP:   Recent Labs   Lab 10/18/18  0520 10/18/18  1713 10/19/18  0518     --  133*   K 4.2  --  4.0   CL 97  --  96   CO2 30*  --  26   *  --  162*   BUN 41*  --  37*   CREATININE 1.3  --  1.2   CALCIUM 9.9  --  9.5   PROT 7.4  --  7.2   ALBUMIN 3.1*  --  3.1*   BILITOT 1.1*  --  1.0   ALKPHOS 104  --  93   AST 23  --  20   ALT 23  --  22   ANIONGAP 9  --  11   ESTGFRAFRICA >60.0  --  >60.0   EGFRNONAA 54.9*  --  >60.0   WBC  8.54 10.08 9.13  9.13   HGB 12.6* 12.5* 11.4*  11.4*   HCT 39.7* 39.0* 37.0*  37.0*   * 181 176  176   INR  --  1.0  --

## 2018-10-19 NOTE — ANESTHESIA PREPROCEDURE EVALUATION
10/19/2018  Pre-operative evaluation for Procedure(s) (LRB):  ECHOCARDIOGRAM,TRANSESOPHAGEAL (N/A)    Bharat Coker is a 73 y.o. male ICM (EF<10), depressed RV function, CAD s/p CABG currently being worked up for a VAD, DM, CRT with recent revision presenting for above procedure after being in a flutter.  Patient had a light snack when told ablation cancelled (did not realize would be going for a different procedure instead) and that was at 830 AM.  Ok to proceed with anesthesia at 230 PM.    On 2L NC and heparin drip    [facility] 6/2018: LIMA-LAD patent, % occluded at ostium, SVG-% occluded, SVG-D 100% occluded, pLCx 30%, 100% occluded OM, 100% LAD occlusion after takeoff of D1, D1 is tiny with 80% disease, SVG-OM 80% proximal stenosis with 50% at site of anastomosis. An SVG-OM OKSANA was place    CONCLUSIONS     1 - Severely depressed left ventricular systolic function (EF <10%).     2 - Eccentric hypertrophy.     3 - Biatrial enlargement.     4 - Impaired LV relaxation, elevated LAP (grade 2 diastolic dysfunction).     5 - Right ventricular enlargement with moderately depressed systolic function.     6 - The estimated PA systolic pressure is 26 mmHg.     7 - Moderate mitral regurgitation.     8 - Mild tricuspid regurgitation.     Patient Active Problem List   Diagnosis    HFrEF (heart failure with reduced ejection fraction)    CAD (coronary artery disease)    DM (diabetes mellitus)    ICD (implantable cardioverter-defibrillator) in place    Ischemic cardiomyopathy    Pre-transplant evaluation for heart transplant    KASEY (acute kidney injury)    Hepatic congestion    PNA (pneumonia)    Skin rash    VT (ventricular tachycardia)    Palliative care encounter    Atrial tachycardia    Cardiac resynchronization therapy defibrillator (CRT-D) in place    SVT (supraventricular tachycardia)        Review of patient's allergies indicates:   Allergen Reactions    Amiodarone analogues Anaphylaxis    Ativan [lorazepam] Anxiety       No current facility-administered medications on file prior to encounter.      Current Outpatient Medications on File Prior to Encounter   Medication Sig Dispense Refill    aspirin 81 MG Chew Take 81 mg by mouth once daily.      atorvastatin (LIPITOR) 80 MG tablet Take 80 mg by mouth nightly.      azelastine (ASTELIN) 137 mcg (0.1 %) nasal spray 1 spray by Nasal route 2 (two) times daily.      benzonatate (TESSALON) 200 MG capsule Take 200 mg by mouth 3 (three) times daily as needed for Cough.      docusate sodium (COLACE) 100 MG capsule Take 100 mg by mouth 2 (two) times daily as needed for Constipation.      doxycycline (VIBRA-TABS) 100 MG tablet Take 100 mg by mouth every 12 (twelve) hours.      enoxaparin (LOVENOX) 150 mg/mL Syrg Inject 40 mg into the skin once daily.      fluticasone (FLONASE) 50 mcg/actuation nasal spray 2 sprays by Each Nare route once daily.      furosemide (LASIX) 40 MG tablet Take 40 mg by mouth once daily.      glipiZIDE (GLUCOTROL) 2.5 MG TR24 Take 5 mg by mouth 2 (two) times daily.      insulin aspart U-100 (NOVOLOG) 100 unit/mL injection Inject 2-10 Units into the skin 4 (four) times daily with meals and nightly.      metoprolol succinate (TOPROL-XL) 25 MG 24 hr tablet Take 25 mg by mouth 2 (two) times daily.      prasugrel HCl (EFFIENT ORAL) Take 10 mg by mouth once daily.      ranitidine (ZANTAC) 150 MG tablet Take 150 mg by mouth once daily.      SITagliptin (JANUVIA) 50 MG Tab Take 100 mg by mouth once daily.      acetaminophen (TYLENOL) 650 MG TbSR Take 650 mg by mouth every 4 (four) hours as needed.         Past Surgical History:   Procedure Laterality Date    APPENDECTOMY      CARDIAC SURGERY      EYE SURGERY      FRACTURE SURGERY      TONSILLECTOMY         Social History     Socioeconomic History    Marital status:       Spouse name: Not on file    Number of children: Not on file    Years of education: Not on file    Highest education level: Not on file   Social Needs    Financial resource strain: Not on file    Food insecurity - worry: Not on file    Food insecurity - inability: Not on file    Transportation needs - medical: Not on file    Transportation needs - non-medical: Not on file   Occupational History    Not on file   Tobacco Use    Smoking status: Former Smoker     Types: Cigarettes    Smokeless tobacco: Never Used   Substance and Sexual Activity    Alcohol use: No     Frequency: Never    Drug use: No    Sexual activity: Not on file   Other Topics Concern    Not on file   Social History Narrative    Not on file         CBC:   Recent Labs     10/18/18  1713 10/19/18  0518   WBC 10.08 9.13  9.13   RBC 4.17* 3.93*  3.93*   HGB 12.5* 11.4*  11.4*   HCT 39.0* 37.0*  37.0*    176  176   MCV 94 94  94   MCH 30.0 29.0  29.0   MCHC 32.1 30.8*  30.8*       CMP:   Recent Labs     10/18/18  0520 10/19/18  0518    133*   K 4.2 4.0   CL 97 96   CO2 30* 26   BUN 41* 37*   CREATININE 1.3 1.2   * 162*   MG 2.1 1.9   PHOS 3.5 3.7   CALCIUM 9.9 9.5   ALBUMIN 3.1* 3.1*   PROT 7.4 7.2   ALKPHOS 104 93   ALT 23 22   AST 23 20   BILITOT 1.1* 1.0       INR  Recent Labs     10/18/18  1713 10/19/18  0006 10/19/18  0518   INR 1.0  --   --    APTT 23.8 30.0 58.8*           Diagnostic Studies:  CT: Moderate atherosclerosis of the thoracic and abdominal aorta.  Mild calcification of the aortic valve and aortic and mitral valve annuli.    Cardiomegaly.    Dense coronary artery atherosclerosis.    Patchy reticulation and patchy consolidation throughout both lungs which may relate to volume overload.    EKG:  Atrial flutter with variable A-V block with premature ventricular or  aberrantly conducted complexes  Anterolateral infarct ,age undetermined  Abnormal ECG  When compared with ECG of 11-OCT-2018  18:49,  Atrial flutter has replaced Electronic ventricular pacemaker  Confirmed by TAMMY ETIENNE MD ([id number]) on 10/18/2018 3:55:52 PM      2D Echo:  Results for orders placed or performed during the hospital encounter of 10/10/18   2D echo with color flow doppler   Result Value Ref Range    Calculated EF 9 (A) 55 - 65    Mitral Valve Regurgitation MODERATE (A)     Diastolic Dysfunction Yes (A)     Est. PA Systolic Pressure 26.04     Tricuspid Valve Regurgitation MILD          Anesthesia Evaluation    I have reviewed the Patient Summary Reports.    I have reviewed the Nursing Notes.   I have reviewed the Medications.     Review of Systems  Anesthesia Hx:  No problems with previous Anesthesia  History of prior surgery of interest to airway management or planning: Denies Family Hx of Anesthesia complications.   Denies Personal Hx of Anesthesia complications.   Hematology/Oncology:         -- Denies Anemia:   Cardiovascular:   Exercise tolerance: poor Pacemaker Valvular problems/Murmurs, MR CAD  CABG/stent Dysrhythmias  CHF SUMNER ECG has been reviewed.    Pulmonary:   COPD Shortness of breath Denies Sleep Apnea.    Renal/:   Denies Chronic Renal Disease.     Hepatic/GI:   Denies GERD. Denies Liver Disease.    Neurological:   Denies CVA. Denies Seizures.    Endocrine:   Diabetes        Physical Exam  General:  Well nourished    Airway/Jaw/Neck:  Airway Findings: Mouth Opening: Small, but > 3cm Tongue: Normal  General Airway Assessment: Adult  Mallampati: III  Improves to III with phonation.  TM Distance: Normal, at least 6 cm  Jaw/Neck Findings:  Neck ROM: Normal ROM      Dental:  Dental Findings: Periodontal disease, Severe   Chest/Lungs:  Chest/Lungs Findings: Normal Respiratory Rate, Decreased Breath Sounds Bilateral, Rales, Basilar     Heart/Vascular:  Heart Findings: Rate: Normal  Sounds: Normal        Mental Status:  Mental Status Findings:  Cooperative, Alert and Oriented         Anesthesia Plan  Type of  Anesthesia, risks & benefits discussed:  Anesthesia Type:  general, MAC  Patient's Preference:   Intra-op Monitoring Plan: standard ASA monitors  Intra-op Monitoring Plan Comments:   Post Op Pain Control Plan: per primary service following discharge from PACU  Post Op Pain Control Plan Comments:   Induction:   IV  Beta Blocker:  Patient is on a Beta-Blocker and has received one dose within the past 24 hours (No further documentation required).       Informed Consent: Patient understands risks and agrees with Anesthesia plan.  Questions answered. Anesthesia consent signed with patient.  ASA Score: 4     Day of Surgery Review of History & Physical:    H&P update referred to the provider.         Ready For Surgery From Anesthesia Perspective.

## 2018-10-19 NOTE — CONSULTS
[facility]  Cardiology  Consult Note    Patient Name: Bharat Coker  MRN: [id number]  Admission Date: 10/10/2018  Hospital Length of Stay: 9 days  Code Status: Full Code   Attending Provider: Jony Hendrickson Jr.,*   Consulting Provider: Shawna Pepper MD  Primary Care Physician: Ronnie Richardson Jr, MD  Principal Problem:HFrEF (heart failure with reduced ejection fraction)    Patient information was obtained from patient and past medical records.         HPI:       TRANSESOPHAGEAL ECHOCARDIOGRAPHY   PRE-PROCEDURE NOTE    10/19/2018    HPI:     Bharat Coker is a 73 y.o. man with PMHx of ICM (EF of <10%), CAD s/p CABG, s/p PCI in 2001 and 2018 here as a transfer for OHtx and VAD workup. Developed WCT here. Has been noted to be in AFL since the 17th. Has been therapeutically anticoagulated since this morning. Currently on heparin, aspirin and prasurgel and platelets are 176.     TTE: 10/10/18    1 - Severely depressed left ventricular systolic function (EF <10%).     2 - Eccentric hypertrophy.     3 - Biatrial enlargement.     4 - Impaired LV relaxation, elevated LAP (grade 2 diastolic dysfunction).     5 - Right ventricular enlargement with moderately depressed systolic function.     6 - The estimated PA systolic pressure is 26 mmHg.     7 - Moderate mitral regurgitation.     8 - Mild tricuspid regurgitation.     Dysphagia or odynophagia:  No  Liver Disease, esophageal disease, or known varices:  No  Upper GI Bleeding: No  Snoring:  Yes  Sleep Apnea:  No  Prior neck surgery or radiation:  No  History of anesthetic difficulties:  No  Family history of anesthetic difficulties:  No  Last oral intake:  ate some peanuts this morning  Able to move neck in all directions:  Yes    Mallampati: 1  ASA: 3        Past Medical History:   Diagnosis Date    CHF (congestive heart failure)     COPD (chronic obstructive pulmonary disease)     Coronary artery disease     Diabetes mellitus        Past  Surgical History:   Procedure Laterality Date    APPENDECTOMY      CARDIAC SURGERY      EYE SURGERY      FRACTURE SURGERY      TONSILLECTOMY         Review of patient's allergies indicates:   Allergen Reactions    Amiodarone analogues Anaphylaxis    Ativan [lorazepam] Anxiety       No current facility-administered medications on file prior to encounter.      Current Outpatient Medications on File Prior to Encounter   Medication Sig    aspirin 81 MG Chew Take 81 mg by mouth once daily.    atorvastatin (LIPITOR) 80 MG tablet Take 80 mg by mouth nightly.    azelastine (ASTELIN) 137 mcg (0.1 %) nasal spray 1 spray by Nasal route 2 (two) times daily.    benzonatate (TESSALON) 200 MG capsule Take 200 mg by mouth 3 (three) times daily as needed for Cough.    docusate sodium (COLACE) 100 MG capsule Take 100 mg by mouth 2 (two) times daily as needed for Constipation.    doxycycline (VIBRA-TABS) 100 MG tablet Take 100 mg by mouth every 12 (twelve) hours.    enoxaparin (LOVENOX) 150 mg/mL Syrg Inject 40 mg into the skin once daily.    fluticasone (FLONASE) 50 mcg/actuation nasal spray 2 sprays by Each Nare route once daily.    furosemide (LASIX) 40 MG tablet Take 40 mg by mouth once daily.    glipiZIDE (GLUCOTROL) 2.5 MG TR24 Take 5 mg by mouth 2 (two) times daily.    insulin aspart U-100 (NOVOLOG) 100 unit/mL injection Inject 2-10 Units into the skin 4 (four) times daily with meals and nightly.    metoprolol succinate (TOPROL-XL) 25 MG 24 hr tablet Take 25 mg by mouth 2 (two) times daily.    prasugrel HCl (EFFIENT ORAL) Take 10 mg by mouth once daily.    ranitidine (ZANTAC) 150 MG tablet Take 150 mg by mouth once daily.    SITagliptin (JANUVIA) 50 MG Tab Take 100 mg by mouth once daily.    acetaminophen (TYLENOL) 650 MG TbSR Take 650 mg by mouth every 4 (four) hours as needed.     Family History     Problem Relation (Age of Onset)    Heart disease Father, Sister, Brother    Stroke Father        Tobacco  Use    Smoking status: Former Smoker     Types: Cigarettes    Smokeless tobacco: Never Used   Substance and Sexual Activity    Alcohol use: No     Frequency: Never    Drug use: No    Sexual activity: Not on file     Review of Systems   All other systems reviewed and are negative.    Objective:     Vital Signs (Most Recent):  Temp: 98.2 °F (36.8 °C) (10/19/18 0845)  Pulse: (!) 145 (10/19/18 0845)  Resp: 18 (10/19/18 0845)  BP: (!) 81/52 (10/19/18 0845)  SpO2: (!) 93 % (10/19/18 0845) Vital Signs (24h Range):  Temp:  [97 °F (36.1 °C)-98.4 °F (36.9 °C)] 98.2 °F (36.8 °C)  Pulse:  [112-145] 145  Resp:  [18] 18  SpO2:  [93 %-100 %] 93 %  BP: ()/(51-69) 81/52     Weight: 73 kg (160 lb 15 oz)  Body mass index is 25.98 kg/m².    SpO2: (!) 93 %  O2 Device (Oxygen Therapy): room air      Intake/Output Summary (Last 24 hours) at 10/19/2018 1026  Last data filed at 10/19/2018 0700  Gross per 24 hour   Intake 660 ml   Output 2176 ml   Net -1516 ml       Lines/Drains/Airways     Peripheral Intravenous Line                 Peripheral IV - Single Lumen 10/18/18 0500 Left;Posterior Forearm 1 day                Physical Exam   Constitutional: He is oriented to person, place, and time. He appears well-developed and well-nourished.   HENT:   Head: Normocephalic and atraumatic.   Eyes: EOM are normal. Pupils are equal, round, and reactive to light.   Neck: Normal range of motion. Neck supple. No JVD present.   Cardiovascular: Normal heart sounds and intact distal pulses.   Tachycardic and irregular   Pulmonary/Chest: Effort normal and breath sounds normal.   Abdominal: Soft. Bowel sounds are normal.   Musculoskeletal: Normal range of motion. He exhibits no edema.   Neurological: He is alert and oriented to person, place, and time.       Significant Labs:   CMP   Recent Labs   Lab 10/18/18  0520 10/19/18  0518    133*   K 4.2 4.0   CL 97 96   CO2 30* 26   * 162*   BUN 41* 37*   CREATININE 1.3 1.2   CALCIUM 9.9 9.5    PROT 7.4 7.2   ALBUMIN 3.1* 3.1*   BILITOT 1.1* 1.0   ALKPHOS 104 93   AST 23 20   ALT 23 22   ANIONGAP 9 11   ESTGFRAFRICA >60.0 >60.0   EGFRNONAA 54.9* >60.0   , CBC   Recent Labs   Lab 10/18/18  0520 10/18/18  1713 10/19/18  0518   WBC 8.54 10.08 9.13  9.13   HGB 12.6* 12.5* 11.4*  11.4*   HCT 39.7* 39.0* 37.0*  37.0*   * 181 176  176    and All pertinent lab results from the last 24 hours have been reviewed.    Significant Imaging: Echocardiogram:   2D echo with color flow doppler:   Results for orders placed or performed during the hospital encounter of 10/10/18   2D echo with color flow doppler   Result Value Ref Range    Calculated EF 9 (A) 55 - 65    Mitral Valve Regurgitation MODERATE (A)     Diastolic Dysfunction Yes (A)     Est. PA Systolic Pressure 26.04     Tricuspid Valve Regurgitation MILD     and X-Ray: CXR: X-Ray Chest 1 View (CXR):   Results for orders placed or performed during the hospital encounter of 10/10/18   X-Ray Chest 1 View    Narrative    EXAMINATION:  XR CHEST 1 VIEW    CLINICAL HISTORY:  CHF;    TECHNIQUE:  Single frontal view of the chest was performed.    COMPARISON:  None    FINDINGS:  AICD leads overlie the heart.  Sternotomy wires present.    No consolidation, pleural effusion or pneumothorax.    Cardiomegaly with bilateral edema.      Impression    CHF.    AICD present.      Electronically signed by: Efrain Sadler MD  Date:    10/10/2018  Time:    04:33     Assessment and Plan:     Atrial tachycardia      Assessment & Plan:     PLAN:  1. LAVERNE for evaluation of LA/SANDY thrombus prior to DCCV.    -The risks, benefits & alternatives of the procedure were explained to the patient.    -The risks of transesophageal echo include but are not limited to:  Dental trauma, esophageal trauma/perforation, bleeding, laryngospasm/brochospasm, aspiration, sore throat/hoarseness, & dislodgement of the endotracheal tube/nasogastric tube (where applicable).    -The risks of moderate  sedation include hypotension, respiratory depression, arrhythmias, bronchospasm, & death.    -Informed consent was obtained & the patient is agreeable to proceed with the procedure.    I will discuss with the attending physician. Attending addendum is to follow.     Further recommendations per attending addendum    Eric Lowery MD  [phone])  Cardiology Fellow             VTE Risk Mitigation (From admission, onward)        Ordered     heparin 25,000 units in dextrose 5% 250 mL (100 units/mL) infusion LOW INTENSITY nomogram - [facility]  Continuous      10/18/18 1705     heparin 25,000 units in dextrose 5% (100 units/ml) IV bolus from bag - ADDITIONAL PRN BOLUS - 30 units/kg  As needed (PRN)      10/18/18 1705     heparin 25,000 units in dextrose 5% (100 units/ml) IV bolus from bag - ADDITIONAL PRN BOLUS - 60 units/kg  As needed (PRN)      10/18/18 1705     IP VTE HIGH RISK PATIENT  Once      10/10/18 0345          Shawna Pepper MD  Cardiology   [facility]

## 2018-10-19 NOTE — NURSING TRANSFER
Nursing Transfer Note      10/19/2018     Transfer To: [id number]    Transfer via stretcher    Transfer with 2liters  to O2, cardiac monitoring    Transported by escort with ticket to ride and called telemetry    Medicines sent: silvadene cream    Chart send with patient: Yes    Notified: nurse     Patient reassessed at: see epic (date, time)    Upon arrival to floor: notified floor nurse and she told patient's wife about patient going back to room.

## 2018-10-19 NOTE — TRANSFER OF CARE
"Anesthesia Transfer of Care Note    Patient: Bharat Coker    Procedure(s) Performed: Procedure(s) (LRB):  ECHOCARDIOGRAM,TRANSESOPHAGEAL (N/A)    Patient location: PACU    Anesthesia Type: general    Transport from OR: Transported from OR on 2-3 L/min O2 by NC with adequate spontaneous ventilation    Post pain: adequate analgesia    Post assessment: no apparent anesthetic complications and tolerated procedure well    Post vital signs: stable    Level of consciousness: awake and responds to stimulation    Nausea/Vomiting: no nausea/vomiting    Complications: none    Transfer of care protocol was followed      Last vitals:   Visit Vitals  BP (!) 84/61 (BP Location: Right arm, Patient Position: Lying)   Pulse (!) 144   Temp 36.8 °C (98.3 °F) (Oral)   Resp 18   Ht 5' 6" (1.676 m)   Wt 73 kg (160 lb 15 oz)   SpO2 96%   BMI 25.98 kg/m²     "

## 2018-10-20 LAB
ALBUMIN SERPL BCP-MCNC: 3.2 G/DL
ALP SERPL-CCNC: 97 U/L
ALT SERPL W/O P-5'-P-CCNC: 19 U/L
ANION GAP SERPL CALC-SCNC: 12 MMOL/L
APTT BLDCRRT: 29.4 SEC
APTT BLDCRRT: 33.9 SEC
APTT BLDCRRT: 39.5 SEC
APTT BLDCRRT: 43.9 SEC
AST SERPL-CCNC: 19 U/L
BASOPHILS # BLD AUTO: 0.05 K/UL
BASOPHILS NFR BLD: 0.5 %
BILIRUB SERPL-MCNC: 1 MG/DL
BUN SERPL-MCNC: 39 MG/DL
CALCIUM SERPL-MCNC: 10 MG/DL
CHLORIDE SERPL-SCNC: 98 MMOL/L
CO2 SERPL-SCNC: 26 MMOL/L
CREAT SERPL-MCNC: 1.4 MG/DL
DIFFERENTIAL METHOD: ABNORMAL
EOSINOPHIL # BLD AUTO: 0.2 K/UL
EOSINOPHIL NFR BLD: 1.8 %
ERYTHROCYTE [DISTWIDTH] IN BLOOD BY AUTOMATED COUNT: 14.6 %
EST. GFR  (AFRICAN AMERICAN): 58 ML/MIN/1.73 M^2
EST. GFR  (NON AFRICAN AMERICAN): 50.2 ML/MIN/1.73 M^2
GLUCOSE SERPL-MCNC: 175 MG/DL
HCT VFR BLD AUTO: 40 %
HGB BLD-MCNC: 12.4 G/DL
IMM GRANULOCYTES # BLD AUTO: 0.04 K/UL
IMM GRANULOCYTES NFR BLD AUTO: 0.4 %
INR PPP: 1.1
LYMPHOCYTES # BLD AUTO: 1.4 K/UL
LYMPHOCYTES NFR BLD: 13.2 %
MAGNESIUM SERPL-MCNC: 1.9 MG/DL
MCH RBC QN AUTO: 29.3 PG
MCHC RBC AUTO-ENTMCNC: 31 G/DL
MCV RBC AUTO: 95 FL
MONOCYTES # BLD AUTO: 0.7 K/UL
MONOCYTES NFR BLD: 7 %
NEUTROPHILS # BLD AUTO: 8 K/UL
NEUTROPHILS NFR BLD: 77.1 %
NRBC BLD-RTO: 0 /100 WBC
PHOSPHATE SERPL-MCNC: 3.9 MG/DL
PLATELET # BLD AUTO: 188 K/UL
PMV BLD AUTO: 11.8 FL
POCT GLUCOSE: 213 MG/DL (ref 70–110)
POCT GLUCOSE: 266 MG/DL (ref 70–110)
POCT GLUCOSE: 339 MG/DL (ref 70–110)
POCT GLUCOSE: 97 MG/DL (ref 70–110)
POTASSIUM SERPL-SCNC: 5 MMOL/L
PROT SERPL-MCNC: 7.5 G/DL
PROTHROMBIN TIME: 11.3 SEC
RBC # BLD AUTO: 4.23 M/UL
SODIUM SERPL-SCNC: 136 MMOL/L
WBC # BLD AUTO: 10.38 K/UL

## 2018-10-20 PROCEDURE — 36415 COLL VENOUS BLD VENIPUNCTURE: CPT

## 2018-10-20 PROCEDURE — 85025 COMPLETE CBC W/AUTO DIFF WBC: CPT

## 2018-10-20 PROCEDURE — 25000003 PHARM REV CODE 250: Performed by: STUDENT IN AN ORGANIZED HEALTH CARE EDUCATION/TRAINING PROGRAM

## 2018-10-20 PROCEDURE — 99232 SBSQ HOSP IP/OBS MODERATE 35: CPT | Mod: GC,,, | Performed by: INTERNAL MEDICINE

## 2018-10-20 PROCEDURE — 85730 THROMBOPLASTIN TIME PARTIAL: CPT

## 2018-10-20 PROCEDURE — 84100 ASSAY OF PHOSPHORUS: CPT

## 2018-10-20 PROCEDURE — 99232 SBSQ HOSP IP/OBS MODERATE 35: CPT | Mod: ,,, | Performed by: INTERNAL MEDICINE

## 2018-10-20 PROCEDURE — 93005 ELECTROCARDIOGRAM TRACING: CPT

## 2018-10-20 PROCEDURE — 83735 ASSAY OF MAGNESIUM: CPT

## 2018-10-20 PROCEDURE — 25000003 PHARM REV CODE 250

## 2018-10-20 PROCEDURE — 63600175 PHARM REV CODE 636 W HCPCS: Performed by: STUDENT IN AN ORGANIZED HEALTH CARE EDUCATION/TRAINING PROGRAM

## 2018-10-20 PROCEDURE — 85730 THROMBOPLASTIN TIME PARTIAL: CPT | Mod: 91

## 2018-10-20 PROCEDURE — 80053 COMPREHEN METABOLIC PANEL: CPT

## 2018-10-20 PROCEDURE — A4216 STERILE WATER/SALINE, 10 ML: HCPCS | Performed by: INTERNAL MEDICINE

## 2018-10-20 PROCEDURE — 93010 ELECTROCARDIOGRAM REPORT: CPT | Mod: ,,, | Performed by: INTERNAL MEDICINE

## 2018-10-20 PROCEDURE — 85610 PROTHROMBIN TIME: CPT

## 2018-10-20 PROCEDURE — 25000003 PHARM REV CODE 250: Performed by: INTERNAL MEDICINE

## 2018-10-20 PROCEDURE — 20600001 HC STEP DOWN PRIVATE ROOM

## 2018-10-20 RX ORDER — DOFETILIDE 0.12 MG/1
125 CAPSULE ORAL EVERY 12 HOURS
Status: DISCONTINUED | OUTPATIENT
Start: 2018-10-20 | End: 2018-10-20

## 2018-10-20 RX ORDER — LISINOPRIL 2.5 MG/1
2.5 TABLET ORAL DAILY
Status: DISCONTINUED | OUTPATIENT
Start: 2018-10-20 | End: 2018-10-23 | Stop reason: HOSPADM

## 2018-10-20 RX ORDER — WARFARIN 2 MG/1
2 TABLET ORAL DAILY
Status: DISCONTINUED | OUTPATIENT
Start: 2018-10-20 | End: 2018-10-23 | Stop reason: HOSPADM

## 2018-10-20 RX ORDER — DOFETILIDE 0.25 MG/1
250 CAPSULE ORAL EVERY 12 HOURS
Status: DISCONTINUED | OUTPATIENT
Start: 2018-10-20 | End: 2018-10-23 | Stop reason: HOSPADM

## 2018-10-20 RX ORDER — FUROSEMIDE 80 MG/1
80 TABLET ORAL 2 TIMES DAILY
Status: DISCONTINUED | OUTPATIENT
Start: 2018-10-20 | End: 2018-10-23 | Stop reason: HOSPADM

## 2018-10-20 RX ADMIN — INSULIN ASPART 3 UNITS: 100 INJECTION, SOLUTION INTRAVENOUS; SUBCUTANEOUS at 12:10

## 2018-10-20 RX ADMIN — INSULIN ASPART 6 UNITS: 100 INJECTION, SOLUTION INTRAVENOUS; SUBCUTANEOUS at 12:10

## 2018-10-20 RX ADMIN — HEPARIN SODIUM AND DEXTROSE 15 UNITS/KG/HR: 10000; 5 INJECTION INTRAVENOUS at 06:10

## 2018-10-20 RX ADMIN — RAMELTEON 8 MG: 8 TABLET, FILM COATED ORAL at 11:10

## 2018-10-20 RX ADMIN — INSULIN ASPART 3 UNITS: 100 INJECTION, SOLUTION INTRAVENOUS; SUBCUTANEOUS at 08:10

## 2018-10-20 RX ADMIN — LISINOPRIL 2.5 MG: 2.5 TABLET ORAL at 04:10

## 2018-10-20 RX ADMIN — PRASUGREL 10 MG: 10 TABLET, FILM COATED ORAL at 08:10

## 2018-10-20 RX ADMIN — MAGNESIUM OXIDE TAB 400 MG (241.3 MG ELEMENTAL MG) 400 MG: 400 (241.3 MG) TAB at 09:10

## 2018-10-20 RX ADMIN — MAGNESIUM OXIDE TAB 400 MG (241.3 MG ELEMENTAL MG) 400 MG: 400 (241.3 MG) TAB at 08:10

## 2018-10-20 RX ADMIN — Medication 3 ML: at 06:10

## 2018-10-20 RX ADMIN — DOFETILIDE 125 MCG: 0.12 CAPSULE ORAL at 09:10

## 2018-10-20 RX ADMIN — FUROSEMIDE 80 MG: 10 INJECTION, SOLUTION INTRAMUSCULAR; INTRAVENOUS at 08:10

## 2018-10-20 RX ADMIN — CETIRIZINE HYDROCHLORIDE 5 MG: 5 TABLET ORAL at 08:10

## 2018-10-20 RX ADMIN — INSULIN DETEMIR 15 UNITS: 100 INJECTION, SOLUTION SUBCUTANEOUS at 09:10

## 2018-10-20 RX ADMIN — HYDRALAZINE HYDROCHLORIDE 10 MG: 10 TABLET, FILM COATED ORAL at 06:10

## 2018-10-20 RX ADMIN — FUROSEMIDE 80 MG: 80 TABLET ORAL at 05:10

## 2018-10-20 RX ADMIN — ASPIRIN 81 MG: 81 TABLET, COATED ORAL at 08:10

## 2018-10-20 RX ADMIN — HEPARIN SODIUM AND DEXTROSE 15 UNITS/KG/HR: 10000; 5 INJECTION INTRAVENOUS at 03:10

## 2018-10-20 RX ADMIN — WARFARIN SODIUM 2 MG: 2 TABLET ORAL at 05:10

## 2018-10-20 RX ADMIN — INSULIN ASPART 4 UNITS: 100 INJECTION, SOLUTION INTRAVENOUS; SUBCUTANEOUS at 09:10

## 2018-10-20 RX ADMIN — ATORVASTATIN CALCIUM 80 MG: 20 TABLET, FILM COATED ORAL at 08:10

## 2018-10-20 RX ADMIN — METOPROLOL SUCCINATE 50 MG: 50 TABLET, EXTENDED RELEASE ORAL at 08:10

## 2018-10-20 RX ADMIN — HEPARIN SODIUM AND DEXTROSE 17 UNITS/KG/HR: 10000; 5 INJECTION INTRAVENOUS at 05:10

## 2018-10-20 RX ADMIN — DOFETILIDE 250 MCG: 0.25 CAPSULE ORAL at 09:10

## 2018-10-20 RX ADMIN — INSULIN ASPART 4 UNITS: 100 INJECTION, SOLUTION INTRAVENOUS; SUBCUTANEOUS at 08:10

## 2018-10-20 NOTE — ASSESSMENT & PLAN NOTE
Mr. Coker is a 74 y/o male with PMH of CAD s/p CABG, ICM/HFrEF (LVef <10%, LVEDD 7.8) who was a transfer for cardiogenic shock / MODS:    - BNP 4674   - [facility] 6/2018: LIMA-LAD patent, % occluded at ostium, SVG-% occluded, SVG-D 100% occluded, pLCx 30%, 100% occluded OM, 100% LAD occlusion after takeoff of D1, D1 is tiny with 80% disease, SVG-OM 80% proximal stenosis with 50% at site of anastomosis. An SVG-OM OKSANA was placed at that time.    - TTE EF <10%, G2DD, LVH (LVEDD 7.8 / LVESD 7.3 ), reduced RVSF (LV 4.5cm / TAPSE 1.3), PASP 26mmHG, Mod MR    - CT head: no acute changes, old lacunar infract   - CT CAP: Moderate atherosclerosis of the thoracic, abdominal aorta and CAD.  Mild calcification of the aortic valve and aortic and mitral valve annuli.    - Started on Pathway for VAD only, pending CT surgery evaluation by Dr. Khan, prior to Step 2, appreciate assistance    - SW/CM to assist with financial clearance  - Need OSH records from [facility],  - RHC when euvolemic    - Decreased Lasix 10mg/hr, continue closely monitor UOP,   - Continue PT / OT, ambulate as tolerated, elevated LE   - Fluid restriction at 1500 cc with strict I/Os and daily STANDING weights  - Check Electrolytes, keep Mag >2 & K+ >4  - Continue PTA HF GDMT

## 2018-10-20 NOTE — SUBJECTIVE & OBJECTIVE
Interval History:10/20/2018: Patient was seen and examined at bedside. Patient with no major complaints.Adequate UOP and negative fluid balance (-575). Weight has remained stable. Cell counts and renal function has remained stable. Telemetry reviewed and noted few PVCs but has remained on AsVp with sinus rhythm.QTc mildly prolonged from baseline, following EP recommendations for dofetilide. Will follow. Patient with [id number] s/p LAVERNE/DCCV due to flutter, will need OAC currently on heparin drip. 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 Will follow.Hydralazine d/c will try to add low dose lisinopril 2.5 mg PO daily and will watch for tolerance.    Continuous Infusions:   heparin (porcine) in D5W 15 Units/kg/hr (10/20/18 0620)     Scheduled Meds:   aspirin  81 mg Oral Daily    atorvastatin  80 mg Oral Daily    cetirizine  5 mg Oral Daily    dofetilide  125 mcg Oral Q12H    furosemide  80 mg Intravenous BID    hydrALAZINE  10 mg Oral Q8H    insulin aspart U-100  3 Units Subcutaneous TIDWM    insulin detemir U-100  15 Units Subcutaneous QHS    magnesium oxide  400 mg Oral BID    metoprolol succinate  50 mg Oral Daily    prasugrel  10 mg Oral Daily    sodium chloride 0.9%  3 mL Intravenous Q8H     PRN Meds:ceFAZolin (ANCEF) IVPB, dextrose 50%, dextrose 50%, glucagon (human recombinant), glucose, glucose, heparin (PORCINE), heparin (PORCINE), insulin aspart U-100, ramelteon, sodium chloride 0.9%    Review of patient's allergies indicates:   Allergen Reactions    Amiodarone analogues Anaphylaxis    Ativan [lorazepam] Anxiety     Objective:     Vital Signs (Most Recent):  Temp: 97.5 °F (36.4 °C) (10/20/18 0830)  Pulse: 93 (10/20/18 0830)  Resp: 18 (10/20/18 0830)  BP: 112/60 (10/20/18 0830)  SpO2: 96 % (10/20/18 0830) Vital Signs (24h Range):  Temp:  [96.4 °F (35.8 °C)-98.3 °F (36.8 °C)] 97.5 °F (36.4  °C)  Pulse:  [] 93  Resp:  [16-20] 18  SpO2:  [95 %-100 %] 96 %  BP: ()/(57-76) 112/60     Patient Vitals for the past 72 hrs (Last 3 readings):   Weight   10/20/18 0739 73.5 kg (162 lb 0.6 oz)   10/19/18 0714 73 kg (160 lb 15 oz)   10/18/18 0800 73.1 kg (161 lb 4.3 oz)     Body mass index is 26.15 kg/m².      Intake/Output Summary (Last 24 hours) at 10/20/2018 1019  Last data filed at 10/20/2018 0800  Gross per 24 hour   Intake 460 ml   Output 1000 ml   Net -540 ml       Hemodynamic Parameters:       Telemetry:remained on sinus rhythm AsVp    Physical Exam   Head: Atraumatic.   Mouth/Throat: No oropharyngeal exudate.   Eyes: EOM are normal. No scleral icterus.   Neck: Neck supple. No JVD present.   Cardiovascular: Regular rhythm and normal heart sounds. Exam reveals no gallop and no friction rub.   No murmur heard.  Tachycardic   Pulmonary/Chest: Effort normal and breath sounds normal. No respiratory distress. He has no wheezes. He has no rales. He exhibits no tenderness.   Abdominal: Soft. Bowel sounds are normal. He exhibits no distension. There is no tenderness.   Musculoskeletal: He exhibits no edema.   Neurological: He is alert and oriented to person, place, and time. No cranial nerve deficit.   Skin: Skin is warm and dry. No erythema.   Psychiatric: He has a normal mood and affect.       Significant Labs:  CBC:  Recent Labs   Lab 10/18/18  1713 10/19/18  0518 10/20/18  0216   WBC 10.08 9.13  9.13 10.38   RBC 4.17* 3.93*  3.93* 4.23*   HGB 12.5* 11.4*  11.4* 12.4*   HCT 39.0* 37.0*  37.0* 40.0    176  176 188   MCV 94 94  94 95   MCH 30.0 29.0  29.0 29.3   MCHC 32.1 30.8*  30.8* 31.0*     BNP:  Recent Labs   Lab 10/16/18  0435 10/18/18  0016   BNP 3,131* 3,552*     CMP:  Recent Labs   Lab 10/18/18  0520 10/19/18  0518 10/20/18  0216   * 162* 175*   CALCIUM 9.9 9.5 10.0   ALBUMIN 3.1* 3.1* 3.2*   PROT 7.4 7.2 7.5    133* 136   K 4.2 4.0 5.0   CO2 30* 26 26   CL 97 96 98    BUN 41* 37* 39*   CREATININE 1.3 1.2 1.4   ALKPHOS 104 93 97   ALT 23 22 19   AST 23 20 19   BILITOT 1.1* 1.0 1.0      Coagulation:   Recent Labs   Lab 10/18/18  1713  10/19/18  0518 10/19/18  1844 10/20/18  0216   INR 1.0  --   --   --   --    APTT 23.8   < > 58.8* 30.5 29.4    < > = values in this interval not displayed.     LDH:  No results for input(s): LDH in the last 72 hours.  Microbiology:  Microbiology Results (last 7 days)     ** No results found for the last 168 hours. **          I have reviewed all pertinent labs within the past 24 hours.    Estimated Creatinine Clearance: 43.7 mL/min (based on SCr of 1.4 mg/dL).    Diagnostic Results:  I have reviewed and interpreted all pertinent imaging results/findings within the past 24 hours.

## 2018-10-20 NOTE — PLAN OF CARE
Problem: Patient Care Overview  Goal: Plan of Care Review  Outcome: Revised  Tikosyn therapy started overnight. Pt remains free from injury/falls for shift. Educated Pt on meds no questions at this time. VSS.  No complaints of CP, SOB, pain/discomfort  Bed locked in lowest position, call bell within reach. All questions addressed.  Will continue to monitor.

## 2018-10-20 NOTE — ASSESSMENT & PLAN NOTE
73M with ischemic cardiomyopathy, HFrEF, CAD s/p CABG transferred with ADHF, undergoing VAD workup. EP consulted for frequent runs of nonsustained WCT in setting of baseline sinus tachycardia with biventricular pacing. Recently upgraded 9/2018 to CRT-D device; most recently BiV pacing decreased. Tele and EGMs with nonsustained AT with V sensed. No VT on device or tele. Frequent AT decreasing rate of biventricular pacing. AT was replaced with Atrial flutter starting at 10pm on 10/17. Has been having higher incidence of BiV pacing since CRT-D parameter adjustments.    10/19 Initial EKG: A-sensed, V-paced, Qtc 492  10/19 PM EKG: ,  -- EKG overestimated QT, actual is close to 360  10/20 AM EKG: , Qtc 482    - continue tikosyn 250mcg  - Continue beta-blocker as tolerated  - Continue heparin drip, transition to oral anti-coagulant as per HTS

## 2018-10-20 NOTE — PROGRESS NOTES
Pt had a successful LAVERNE/DCCV today. Pt. Was pacing 80-90 post cardioversion.   Was started on Tikosyn, first dose administered at 2120 tonight. Pt now pacing in the 130s. Jeremy ODELL notified. KELLY rivera STAT EKG. Will follow orders and continue to monitor.

## 2018-10-20 NOTE — PLAN OF CARE
Problem: Patient Care Overview  Goal: Plan of Care Review  Plan of care discussed with patient. Patient is free of fall/trauma/injury. Denies CP, SOB, or pain/discomfort. Heparin drip infusing. Second dose of Tykisin given.  All questions addressed. Will continue to monitor

## 2018-10-20 NOTE — ASSESSMENT & PLAN NOTE
-Arrhythmia suggestive of atypical atrial flutter, s/p LAVERNE guided DCCV  on 10/19/2018.  -On heparin gtt.  with plan to transition to Warfarin (NOACs not an option, patient on DAPT with Prasugrel and ASA due to PCI to OKSANA on 06/2018).  -Started on Dofetilide on 10/19/2018 as recommended by EP service, dose reduced to 125 mcg BID on 10/20/2018 due to borderline QTc prolongation above baseline.  -Will follow closely.

## 2018-10-20 NOTE — SUBJECTIVE & OBJECTIVE
Interval History: No events overnight, on tele no signs of tachycardia episode    Tele: SR + PAC HR 85-95. had NSVT x3 with ~8 beats each    ROS  Objective:     Vital Signs (Most Recent):  Temp: 98.3 °F (36.8 °C) (10/20/18 1138)  Pulse: 92 (10/20/18 1138)  Resp: 18 (10/20/18 1138)  BP: (!) 90/58 (10/20/18 1138)  SpO2: 97 % (10/20/18 1138) Vital Signs (24h Range):  Temp:  [96.4 °F (35.8 °C)-98.3 °F (36.8 °C)] 98.3 °F (36.8 °C)  Pulse:  [] 92  Resp:  [16-20] 18  SpO2:  [95 %-100 %] 97 %  BP: ()/(57-76) 90/58     Weight: 73.5 kg (162 lb 0.6 oz)  Body mass index is 26.15 kg/m².     SpO2: 97 %  O2 Device (Oxygen Therapy): nasal cannula    Physical Exam   Constitutional: He is oriented to person, place, and time. No distress.   HENT:   Head: Atraumatic.   Mouth/Throat: No oropharyngeal exudate.   Eyes: EOM are normal. No scleral icterus.   Neck: Neck supple. No JVD present.   Cardiovascular: Regular rhythm and normal heart sounds. Exam reveals no gallop and no friction rub.   No murmur heard.  Tachycardic   Pulmonary/Chest: Effort normal and breath sounds normal. No respiratory distress. He has no wheezes. He has no rales. He exhibits no tenderness.   Abdominal: Soft. Bowel sounds are normal. He exhibits no distension. There is no tenderness.   Musculoskeletal: He exhibits no edema.   Neurological: He is alert and oriented to person, place, and time. No cranial nerve deficit.   Skin: Skin is warm and dry. No erythema.   Psychiatric: He has a normal mood and affect.       Significant Labs:   EP:   Recent Labs   Lab 10/18/18  1713 10/19/18  0518 10/20/18  0216   NA  --  133* 136   K  --  4.0 5.0   CL  --  96 98   CO2  --  26 26   GLU  --  162* 175*   BUN  --  37* 39*   CREATININE  --  1.2 1.4   CALCIUM  --  9.5 10.0   PROT  --  7.2 7.5   ALBUMIN  --  3.1* 3.2*   BILITOT  --  1.0 1.0   ALKPHOS  --  93 97   AST  --  20 19   ALT  --  22 19   ANIONGAP  --  11 12   ESTGFRAFRICA  --  >60.0 58.0*   EGFRNONAA  --  >60.0  50.2*   WBC 10.08 9.13  9.13 10.38   HGB 12.5* 11.4*  11.4* 12.4*   HCT 39.0* 37.0*  37.0* 40.0    176  176 188   INR 1.0  --   --

## 2018-10-20 NOTE — PROGRESS NOTES
[facility]  Heart Transplant  Progress Note    Patient Name: Bharat Coker  MRN: [id number]  Admission Date: 10/10/2018  Hospital Length of Stay: 10 days  Attending Physician: Jony Hendrickson Jr.,*  Primary Care Provider: Ronnie Richardson Jr, MD  Principal Problem:HFrEF (heart failure with reduced ejection fraction)    Subjective:     Interval History:10/20/2018: Patient was seen and examined at bedside. Patient with no major complaints.Adequate UOP and negative fluid balance (-575). Weight has remained stable. Cell counts and renal function has remained stable. Telemetry reviewed and noted few PVCs but has remained on AsVp with sinus rhythm.QTc mildly prolonged from baseline, following EP recommendations for dofetilide. Will follow. Patient with [id number] s/p LAVERNE/DCCV due to flutter, will need OAC currently on heparin drip. 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 Will follow.Hydralazine d/c will try to add low dose lisinopril 2.5 mg PO daily and will watch for tolerance.    Continuous Infusions:   heparin (porcine) in D5W 15 Units/kg/hr (10/20/18 0620)     Scheduled Meds:   aspirin  81 mg Oral Daily    atorvastatin  80 mg Oral Daily    cetirizine  5 mg Oral Daily    dofetilide  125 mcg Oral Q12H    furosemide  80 mg Intravenous BID    hydrALAZINE  10 mg Oral Q8H    insulin aspart U-100  3 Units Subcutaneous TIDWM    insulin detemir U-100  15 Units Subcutaneous QHS    magnesium oxide  400 mg Oral BID    metoprolol succinate  50 mg Oral Daily    prasugrel  10 mg Oral Daily    sodium chloride 0.9%  3 mL Intravenous Q8H     PRN Meds:ceFAZolin (ANCEF) IVPB, dextrose 50%, dextrose 50%, glucagon (human recombinant), glucose, glucose, heparin (PORCINE), heparin (PORCINE), insulin aspart U-100, ramelteon, sodium chloride 0.9%    Review of patient's allergies indicates:   Allergen  Reactions    Amiodarone analogues Anaphylaxis    Ativan [lorazepam] Anxiety     Objective:     Vital Signs (Most Recent):  Temp: 97.5 °F (36.4 °C) (10/20/18 0830)  Pulse: 93 (10/20/18 0830)  Resp: 18 (10/20/18 0830)  BP: 112/60 (10/20/18 0830)  SpO2: 96 % (10/20/18 0830) Vital Signs (24h Range):  Temp:  [96.4 °F (35.8 °C)-98.3 °F (36.8 °C)] 97.5 °F (36.4 °C)  Pulse:  [] 93  Resp:  [16-20] 18  SpO2:  [95 %-100 %] 96 %  BP: ()/(57-76) 112/60     Patient Vitals for the past 72 hrs (Last 3 readings):   Weight   10/20/18 0739 73.5 kg (162 lb 0.6 oz)   10/19/18 0714 73 kg (160 lb 15 oz)   10/18/18 0800 73.1 kg (161 lb 4.3 oz)     Body mass index is 26.15 kg/m².      Intake/Output Summary (Last 24 hours) at 10/20/2018 1019  Last data filed at 10/20/2018 0800  Gross per 24 hour   Intake 460 ml   Output 1000 ml   Net -540 ml       Hemodynamic Parameters:       Telemetry:remained on sinus rhythm AsVp    Physical Exam   Head: Atraumatic.   Mouth/Throat: No oropharyngeal exudate.   Eyes: EOM are normal. No scleral icterus.   Neck: Neck supple. No JVD present.   Cardiovascular: Regular rhythm and normal heart sounds. Exam reveals no gallop and no friction rub.   No murmur heard.  Tachycardic   Pulmonary/Chest: Effort normal and breath sounds normal. No respiratory distress. He has no wheezes. He has no rales. He exhibits no tenderness.   Abdominal: Soft. Bowel sounds are normal. He exhibits no distension. There is no tenderness.   Musculoskeletal: He exhibits no edema.   Neurological: He is alert and oriented to person, place, and time. No cranial nerve deficit.   Skin: Skin is warm and dry. No erythema.   Psychiatric: He has a normal mood and affect.       Significant Labs:  CBC:  Recent Labs   Lab 10/18/18  1713 10/19/18  0518 10/20/18  0216   WBC 10.08 9.13  9.13 10.38   RBC 4.17* 3.93*  3.93* 4.23*   HGB 12.5* 11.4*  11.4* 12.4*   HCT 39.0* 37.0*  37.0* 40.0    176  176 188   MCV 94 94  94 95   MCH  30.0 29.0  29.0 29.3   MCHC 32.1 30.8*  30.8* 31.0*     BNP:  Recent Labs   Lab 10/16/18  0435 10/18/18  0016   BNP 3,131* 3,552*     CMP:  Recent Labs   Lab 10/18/18  0520 10/19/18  0518 10/20/18  0216   * 162* 175*   CALCIUM 9.9 9.5 10.0   ALBUMIN 3.1* 3.1* 3.2*   PROT 7.4 7.2 7.5    133* 136   K 4.2 4.0 5.0   CO2 30* 26 26   CL 97 96 98   BUN 41* 37* 39*   CREATININE 1.3 1.2 1.4   ALKPHOS 104 93 97   ALT 23 22 19   AST 23 20 19   BILITOT 1.1* 1.0 1.0      Coagulation:   Recent Labs   Lab 10/18/18  1713  10/19/18  0518 10/19/18  1844 10/20/18  0216   INR 1.0  --   --   --   --    APTT 23.8   < > 58.8* 30.5 29.4    < > = values in this interval not displayed.     LDH:  No results for input(s): LDH in the last 72 hours.  Microbiology:  Microbiology Results (last 7 days)     ** No results found for the last 168 hours. **          I have reviewed all pertinent labs within the past 24 hours.    Estimated Creatinine Clearance: 43.7 mL/min (based on SCr of 1.4 mg/dL).    Diagnostic Results:  I have reviewed and interpreted all pertinent imaging results/findings within the past 24 hours.    Assessment and Plan:     74 y/o male with PMH of CAD s/p CABG, ICM, HFrEF who presents as a transfer from OSH after he presented there for [facility], weight gain and palpitations. He presented to OSH after being discharged from the same facility 4 days prior to Oct 8. He was there initially for ADHF and KASEY. The patient recently had a revision of his CRT-D. The patient became SOB on Oct 7 but did not have CP. The patient felt his pulse and noted it to be rapid. He was brought to the ED at OSH by EMS; his HR was ~170. The ECG was c/w WCT (later documentation notes SVT with aberrancy after interrogation was negative for VT), and he was given one dose of adenosine. The HR came down to 110. His CXR was c/w pulmonary edema. A pro-BNP was at one point 20084.The patient's WBC was 17 and sCr 1.6. tBili was 1.5. An ECHO on 10/8: LVIDd  "6.5 cm, EF 15%, IVC normal collapsibility and normal IVC size. An undated stress test notes: "mild reversibility noted in the cardiac apex and there lateral wall of the heart from cardiac apex to approximately mid wall suspicion for ischemia..mild reversibility noted in the mid to basilar inferior/inferoseptal wall of the heart also suspicious for ischemia". He had a LHC 6/2018: LIMA-LAD patent, % occluded at ostium, SVG-% occluded, SVG-D 100% occluded, pLCx 30%, 100% occluded OM, 100% LAD occlusion after takeoff of D1, D1 is tiny with 80% disease, SVG-OM 80% proximal stenosis with 50% at site of anastomosis. An SVG-OM OKSANA was placed at that time. The patient's WBC was 9.6 as of 10/9. Aldactone was held at some point 2/2 hyperkalemia. The patient believes he has been diuresed adequately and has no fluid left to remove.     * HFrEF (heart failure with reduced ejection fraction)    Mr. Coker is a 72 y/o male with PMH of CAD s/p CABG, ICM/HFrEF (LVef <10%, LVEDD 7.8) who was a transfer for cardiogenic shock / MODS:    - BNP 4674   - [facility] 6/2018: LIMA-LAD patent, % occluded at ostium, SVG-% occluded, SVG-D 100% occluded, pLCx 30%, 100% occluded OM, 100% LAD occlusion after takeoff of D1, D1 is tiny with 80% disease, SVG-OM 80% proximal stenosis with 50% at site of anastomosis. An SVG-OM OKSANA was placed at that time.    - TTE EF <10%, G2DD, LVH (LVEDD 7.8 / LVESD 7.3 ), reduced RVSF (LV 4.5cm / TAPSE 1.3), PASP 26mmHG, Mod MR    - CT head: no acute changes, old lacunar infract   - CT CAP: Moderate atherosclerosis of the thoracic, abdominal aorta and CAD.  Mild calcification of the aortic valve and aortic and mitral valve annuli.    - Started on Pathway for VAD only, pending CT surgery evaluation by Dr. Khan, prior to Step 2, appreciate assistance    - SW/CM to assist with financial clearance  - Need OSH records from [facility],  - RHC when euvolemic    - Decreased Lasix 10mg/hr, continue closely " monitor UOP,   - Continue PT / OT, ambulate as tolerated, elevated LE   - Fluid restriction at 1500 cc with strict I/Os and daily STANDING weights  - Check Electrolytes, keep Mag >2 & K+ >4  - Continue PTA HF GDMT       SVT (supraventricular tachycardia)    -Arrhythmia suggestive of atypical atrial flutter, s/p LAVERNE guided DCCV  on 10/19/2018.  -On heparin gtt.  with plan to transition to Warfarin (NOACs not an option, patient on DAPT with Prasugrel and ASA due to PCI to OKSANA on 06/2018).  -Started on Dofetilide on 10/19/2018 as recommended by EP service, dose reduced to 125 mcg BID on 10/20/2018 due to borderline QTc prolongation above baseline.  -Will follow closely.     VT (ventricular tachycardia)    Frequent NSVT with underlying ST:   - Increase Toprol 100mg qday   - Follow up HR  - Consider EP consult if continues to have ST      Skin rash    Posterior neck, likely eczema:  - Continue supportive care with topical      ICD (implantable cardioverter-defibrillator) in place    [employer] CRT-D (09/20/18)  - Interrogated today consistent with NSVT, Afib / AF  - Closely  Monitor for further ectopy   - Will consider EP consultation if continues to persist   - Replete electrolytes   -Will consult EP for evaluation due to episodes that may suggest NSVT and additional episode of SVT with aberrant conduction.     DM (diabetes mellitus)     A1c 6.9,   - SSI with accuchecks  - will consider endocrine consult with VAD work up      CAD (coronary artery disease)    Recent MI 06/2018, ICM / HFrEF / CAD s/p CABG:   - Continue DAPT, statin, and BB  - consider ACEi once renal function panel returns         Timothy Chisholm MD  Heart Transplant  [facility]

## 2018-10-21 LAB
ALBUMIN SERPL BCP-MCNC: 2.9 G/DL
ALP SERPL-CCNC: 80 U/L
ALT SERPL W/O P-5'-P-CCNC: 15 U/L
ANION GAP SERPL CALC-SCNC: 12 MMOL/L
APTT BLDCRRT: 45.1 SEC
AST SERPL-CCNC: 16 U/L
BASOPHILS # BLD AUTO: 0.06 K/UL
BASOPHILS NFR BLD: 0.7 %
BILIRUB SERPL-MCNC: 1.1 MG/DL
BUN SERPL-MCNC: 37 MG/DL
CALCIUM SERPL-MCNC: 9.6 MG/DL
CHLORIDE SERPL-SCNC: 96 MMOL/L
CO2 SERPL-SCNC: 27 MMOL/L
CREAT SERPL-MCNC: 1.1 MG/DL
DIFFERENTIAL METHOD: ABNORMAL
EOSINOPHIL # BLD AUTO: 0.3 K/UL
EOSINOPHIL NFR BLD: 3.8 %
ERYTHROCYTE [DISTWIDTH] IN BLOOD BY AUTOMATED COUNT: 14.7 %
EST. GFR  (AFRICAN AMERICAN): >60 ML/MIN/1.73 M^2
EST. GFR  (NON AFRICAN AMERICAN): >60 ML/MIN/1.73 M^2
GLUCOSE SERPL-MCNC: 128 MG/DL
HCT VFR BLD AUTO: 35.1 %
HGB BLD-MCNC: 11 G/DL
IMM GRANULOCYTES # BLD AUTO: 0.03 K/UL
IMM GRANULOCYTES NFR BLD AUTO: 0.4 %
INR PPP: 1
LYMPHOCYTES # BLD AUTO: 1.4 K/UL
LYMPHOCYTES NFR BLD: 17.2 %
MAGNESIUM SERPL-MCNC: 1.7 MG/DL
MCH RBC QN AUTO: 29.5 PG
MCHC RBC AUTO-ENTMCNC: 31.3 G/DL
MCV RBC AUTO: 94 FL
MONOCYTES # BLD AUTO: 0.9 K/UL
MONOCYTES NFR BLD: 11.2 %
NEUTROPHILS # BLD AUTO: 5.5 K/UL
NEUTROPHILS NFR BLD: 66.7 %
NRBC BLD-RTO: 0 /100 WBC
PHOSPHATE SERPL-MCNC: 3.7 MG/DL
PLATELET # BLD AUTO: 171 K/UL
PMV BLD AUTO: 11.5 FL
POCT GLUCOSE: 115 MG/DL (ref 70–110)
POCT GLUCOSE: 135 MG/DL (ref 70–110)
POCT GLUCOSE: 248 MG/DL (ref 70–110)
POCT GLUCOSE: 319 MG/DL (ref 70–110)
POTASSIUM SERPL-SCNC: 3.6 MMOL/L
PROT SERPL-MCNC: 6.7 G/DL
PROTHROMBIN TIME: 10.7 SEC
RBC # BLD AUTO: 3.73 M/UL
SODIUM SERPL-SCNC: 135 MMOL/L
WBC # BLD AUTO: 8.16 K/UL

## 2018-10-21 PROCEDURE — 80053 COMPREHEN METABOLIC PANEL: CPT

## 2018-10-21 PROCEDURE — 63600175 PHARM REV CODE 636 W HCPCS: Performed by: STUDENT IN AN ORGANIZED HEALTH CARE EDUCATION/TRAINING PROGRAM

## 2018-10-21 PROCEDURE — 85610 PROTHROMBIN TIME: CPT

## 2018-10-21 PROCEDURE — 25000003 PHARM REV CODE 250: Performed by: STUDENT IN AN ORGANIZED HEALTH CARE EDUCATION/TRAINING PROGRAM

## 2018-10-21 PROCEDURE — A4216 STERILE WATER/SALINE, 10 ML: HCPCS | Performed by: INTERNAL MEDICINE

## 2018-10-21 PROCEDURE — 99232 SBSQ HOSP IP/OBS MODERATE 35: CPT | Mod: GC,,, | Performed by: INTERNAL MEDICINE

## 2018-10-21 PROCEDURE — 25000003 PHARM REV CODE 250

## 2018-10-21 PROCEDURE — 93005 ELECTROCARDIOGRAM TRACING: CPT

## 2018-10-21 PROCEDURE — 25000003 PHARM REV CODE 250: Performed by: INTERNAL MEDICINE

## 2018-10-21 PROCEDURE — 85730 THROMBOPLASTIN TIME PARTIAL: CPT

## 2018-10-21 PROCEDURE — 93010 ELECTROCARDIOGRAM REPORT: CPT | Mod: ,,, | Performed by: INTERNAL MEDICINE

## 2018-10-21 PROCEDURE — 99232 SBSQ HOSP IP/OBS MODERATE 35: CPT | Mod: ,,, | Performed by: INTERNAL MEDICINE

## 2018-10-21 PROCEDURE — 84100 ASSAY OF PHOSPHORUS: CPT

## 2018-10-21 PROCEDURE — 85025 COMPLETE CBC W/AUTO DIFF WBC: CPT

## 2018-10-21 PROCEDURE — 83735 ASSAY OF MAGNESIUM: CPT

## 2018-10-21 PROCEDURE — 20600001 HC STEP DOWN PRIVATE ROOM

## 2018-10-21 PROCEDURE — 36415 COLL VENOUS BLD VENIPUNCTURE: CPT

## 2018-10-21 RX ORDER — ACETAMINOPHEN 325 MG/1
650 TABLET ORAL EVERY 6 HOURS PRN
Status: DISCONTINUED | OUTPATIENT
Start: 2018-10-21 | End: 2018-10-23 | Stop reason: HOSPADM

## 2018-10-21 RX ORDER — POTASSIUM CHLORIDE 20 MEQ/1
20 TABLET, EXTENDED RELEASE ORAL 2 TIMES DAILY
Status: DISCONTINUED | OUTPATIENT
Start: 2018-10-21 | End: 2018-10-23 | Stop reason: HOSPADM

## 2018-10-21 RX ADMIN — INSULIN ASPART 3 UNITS: 100 INJECTION, SOLUTION INTRAVENOUS; SUBCUTANEOUS at 05:10

## 2018-10-21 RX ADMIN — METOPROLOL SUCCINATE 50 MG: 50 TABLET, EXTENDED RELEASE ORAL at 08:10

## 2018-10-21 RX ADMIN — INSULIN ASPART 4 UNITS: 100 INJECTION, SOLUTION INTRAVENOUS; SUBCUTANEOUS at 12:10

## 2018-10-21 RX ADMIN — POTASSIUM CHLORIDE 20 MEQ: 1500 TABLET, EXTENDED RELEASE ORAL at 08:10

## 2018-10-21 RX ADMIN — WARFARIN SODIUM 2 MG: 2 TABLET ORAL at 05:10

## 2018-10-21 RX ADMIN — DOFETILIDE 250 MCG: 0.25 CAPSULE ORAL at 09:10

## 2018-10-21 RX ADMIN — INSULIN ASPART 3 UNITS: 100 INJECTION, SOLUTION INTRAVENOUS; SUBCUTANEOUS at 08:10

## 2018-10-21 RX ADMIN — INSULIN ASPART 3 UNITS: 100 INJECTION, SOLUTION INTRAVENOUS; SUBCUTANEOUS at 12:10

## 2018-10-21 RX ADMIN — POTASSIUM CHLORIDE 20 MEQ: 1500 TABLET, EXTENDED RELEASE ORAL at 09:10

## 2018-10-21 RX ADMIN — HEPARIN SODIUM AND DEXTROSE 17 UNITS/KG/HR: 10000; 5 INJECTION INTRAVENOUS at 09:10

## 2018-10-21 RX ADMIN — PRASUGREL 10 MG: 10 TABLET, FILM COATED ORAL at 08:10

## 2018-10-21 RX ADMIN — FUROSEMIDE 80 MG: 80 TABLET ORAL at 05:10

## 2018-10-21 RX ADMIN — Medication 3 ML: at 06:10

## 2018-10-21 RX ADMIN — MAGNESIUM OXIDE TAB 400 MG (241.3 MG ELEMENTAL MG) 400 MG: 400 (241.3 MG) TAB at 09:10

## 2018-10-21 RX ADMIN — ASPIRIN 81 MG: 81 TABLET, COATED ORAL at 08:10

## 2018-10-21 RX ADMIN — Medication 3 ML: at 09:10

## 2018-10-21 RX ADMIN — MAGNESIUM OXIDE TAB 400 MG (241.3 MG ELEMENTAL MG) 400 MG: 400 (241.3 MG) TAB at 08:10

## 2018-10-21 RX ADMIN — DOFETILIDE 250 MCG: 0.25 CAPSULE ORAL at 12:10

## 2018-10-21 RX ADMIN — INSULIN ASPART 4 UNITS: 100 INJECTION, SOLUTION INTRAVENOUS; SUBCUTANEOUS at 09:10

## 2018-10-21 RX ADMIN — CETIRIZINE HYDROCHLORIDE 5 MG: 5 TABLET ORAL at 08:10

## 2018-10-21 RX ADMIN — LISINOPRIL 2.5 MG: 2.5 TABLET ORAL at 08:10

## 2018-10-21 RX ADMIN — INSULIN DETEMIR 15 UNITS: 100 INJECTION, SOLUTION SUBCUTANEOUS at 09:10

## 2018-10-21 RX ADMIN — RAMELTEON 8 MG: 8 TABLET, FILM COATED ORAL at 11:10

## 2018-10-21 RX ADMIN — ACETAMINOPHEN 650 MG: 325 TABLET ORAL at 02:10

## 2018-10-21 RX ADMIN — ATORVASTATIN CALCIUM 80 MG: 20 TABLET, FILM COATED ORAL at 08:10

## 2018-10-21 RX ADMIN — FUROSEMIDE 80 MG: 80 TABLET ORAL at 08:10

## 2018-10-21 NOTE — PROGRESS NOTES
[facility]  Heart Transplant  Progress Note    Patient Name: Bharat Coker  MRN: [id number]  Admission Date: 10/10/2018  Hospital Length of Stay: 11 days  Attending Physician: Jony Hendrickson Jr.,*  Primary Care Provider: Ronnie Richardson Jr, MD  Principal Problem:HFrEF (heart failure with reduced ejection fraction)    Subjective:     Interval History: 10/21/2018: Patient was seen and examined at bedside. Patient with no major complaints. Patient on dofetilide, has remained in sinus rhythm AsVp with no evidence of major ventricular arrhythmias. Patient started on low dose lisinopril with adequate tolerance, borderline BP. Renal function has remained stable and there is no evidence of major lytes disturbances.Patient started on VKA, will monitor INR. Patient started also on oral furosemide, good UOP.    Continuous Infusions:   heparin (porcine) in D5W 17 Units/kg/hr (10/20/18 1709)     Scheduled Meds:   aspirin  81 mg Oral Daily    atorvastatin  80 mg Oral Daily    cetirizine  5 mg Oral Daily    dofetilide  250 mcg Oral Q12H    furosemide  80 mg Oral BID    insulin aspart U-100  3 Units Subcutaneous TIDWM    insulin detemir U-100  15 Units Subcutaneous QHS    lisinopril  2.5 mg Oral Daily    magnesium oxide  400 mg Oral BID    metoprolol succinate  50 mg Oral Daily    potassium chloride  20 mEq Oral BID    prasugrel  10 mg Oral Daily    sodium chloride 0.9%  3 mL Intravenous Q8H    warfarin  2 mg Oral Daily     PRN Meds:dextrose 50%, dextrose 50%, glucagon (human recombinant), glucose, glucose, heparin (PORCINE), heparin (PORCINE), insulin aspart U-100, ramelteon, sodium chloride 0.9%    Review of patient's allergies indicates:   Allergen Reactions    Amiodarone analogues Anaphylaxis    Ativan [lorazepam] Anxiety     Objective:     Vital Signs (Most Recent):  Temp: 98 °F (36.7 °C) (10/21/18 0815)  Pulse: 85 (10/21/18 0815)  Resp: 16 (10/21/18 0815)  BP: (!) 109/53 (10/21/18  0815)  SpO2: (!) 92 % (10/21/18 0815) Vital Signs (24h Range):  Temp:  [97.3 °F (36.3 °C)-98.3 °F (36.8 °C)] 98 °F (36.7 °C)  Pulse:  [67-98] 85  Resp:  [16-18] 16  SpO2:  [92 %-97 %] 92 %  BP: ()/(52-60) 109/53     Patient Vitals for the past 72 hrs (Last 3 readings):   Weight   10/21/18 0700 73.6 kg (162 lb 4.1 oz)   10/20/18 0739 73.5 kg (162 lb 0.6 oz)   10/19/18 0714 73 kg (160 lb 15 oz)     Body mass index is 26.19 kg/m².      Intake/Output Summary (Last 24 hours) at 10/21/2018 0956  Last data filed at 10/21/2018 0800  Gross per 24 hour   Intake 1080 ml   Output 2000 ml   Net -920 ml       Hemodynamic Parameters:       Telemetry:no evidence of major arrhythmias.    Physical Exam     Physical Exam   Head: Atraumatic.   Mouth/Throat: No oropharyngeal exudate.   Eyes: EOM are normal. No scleral icterus.   Neck: Neck supple. No JVD present.   Cardiovascular: Regular rhythm and normal heart sounds. Exam reveals no gallop and no friction rub.   No murmur heard.  Tachycardic   Pulmonary/Chest: Effort normal and breath sounds normal. No respiratory distress. He has no wheezes. He has no rales. He exhibits no tenderness.   Abdominal: Soft. Bowel sounds are normal. He exhibits no distension. There is no tenderness.   Musculoskeletal: He exhibits no edema.   Neurological: He is alert and oriented to person, place, and time. No cranial nerve deficit.   Skin: Skin is warm and dry. No erythema.   Psychiatric: He has a normal mood and affect.     Significant Labs:  CBC:  Recent Labs   Lab 10/19/18  0518 10/20/18  0216 10/21/18  0437   WBC 9.13  9.13 10.38 8.16   RBC 3.93*  3.93* 4.23* 3.73*   HGB 11.4*  11.4* 12.4* 11.0*   HCT 37.0*  37.0* 40.0 35.1*     176 188 171   MCV 94  94 95 94   MCH 29.0  29.0 29.3 29.5   MCHC 30.8*  30.8* 31.0* 31.3*     BNP:  Recent Labs   Lab 10/16/18  0435 10/18/18  0016   BNP 3,131* 3,552*     CMP:  Recent Labs   Lab 10/19/18  0518 10/20/18  0216 10/21/18  0437   *  "175* 128*   CALCIUM 9.5 10.0 9.6   ALBUMIN 3.1* 3.2* 2.9*   PROT 7.2 7.5 6.7   * 136 135*   K 4.0 5.0 3.6   CO2 26 26 27   CL 96 98 96   BUN 37* 39* 37*   CREATININE 1.2 1.4 1.1   ALKPHOS 93 97 80   ALT 22 19 15   AST 20 19 16   BILITOT 1.0 1.0 1.1*      Coagulation:   Recent Labs   Lab 10/18/18  1713  10/20/18  1514 10/20/18  2308 10/21/18  0437   INR 1.0  --  1.1  --  1.0   APTT 23.8   < > 33.9* 43.9* 45.1*    < > = values in this interval not displayed.     LDH:  No results for input(s): LDH in the last 72 hours.  Microbiology:  Microbiology Results (last 7 days)     ** No results found for the last 168 hours. **          I have reviewed all pertinent labs within the past 24 hours.    Estimated Creatinine Clearance: 55.6 mL/min (based on SCr of 1.1 mg/dL).    Diagnostic Results:  I have reviewed all pertinent imaging results/findings within the past 24 hours.    Assessment and Plan:     74 y/o male with PMH of CAD s/p CABG, ICM, HFrEF who presents as a transfer from OSH after he presented there for [facility], weight gain and palpitations. He presented to OSH after being discharged from the same facility 4 days prior to Oct 8. He was there initially for ADHF and KASEY. The patient recently had a revision of his CRT-D. The patient became SOB on Oct 7 but did not have CP. The patient felt his pulse and noted it to be rapid. He was brought to the ED at OSH by EMS; his HR was ~170. The ECG was c/w WCT (later documentation notes SVT with aberrancy after interrogation was negative for VT), and he was given one dose of adenosine. The HR came down to 110. His CXR was c/w pulmonary edema. A pro-BNP was at one point 20084.The patient's WBC was 17 and sCr 1.6. tBili was 1.5. An ECHO on 10/8: LVIDd 6.5 cm, EF 15%, IVC normal collapsibility and normal IVC size. An undated stress test notes: "mild reversibility noted in the cardiac apex and there lateral wall of the heart from cardiac apex to approximately mid wall suspicion for " "ischemia..mild reversibility noted in the mid to basilar inferior/inferoseptal wall of the heart also suspicious for ischemia". He had a C 6/2018: LIMA-LAD patent, % occluded at ostium, SVG-% occluded, SVG-D 100% occluded, pLCx 30%, 100% occluded OM, 100% LAD occlusion after takeoff of D1, D1 is tiny with 80% disease, SVG-OM 80% proximal stenosis with 50% at site of anastomosis. An SVG-OM OKSANA was placed at that time. The patient's WBC was 9.6 as of 10/9. Aldactone was held at some point 2/2 hyperkalemia. The patient believes he has been diuresed adequately and has no fluid left to remove.     * HFrEF (heart failure with reduced ejection fraction)    Mr. Coker is a 72 y/o male with PMH of CAD s/p CABG, ICM/HFrEF (LVef <10%, LVEDD 7.8) who was a transfer for cardiogenic shock / MODS:    - BNP 4674   - [facility] 6/2018: LIMA-LAD patent, % occluded at ostium, SVG-% occluded, SVG-D 100% occluded, pLCx 30%, 100% occluded OM, 100% LAD occlusion after takeoff of D1, D1 is tiny with 80% disease, SVG-OM 80% proximal stenosis with 50% at site of anastomosis. An SVG-OM OKSANA was placed at that time.    - TTE EF <10%, G2DD, LVH (LVEDD 7.8 / LVESD 7.3 ), reduced RVSF (LV 4.5cm / TAPSE 1.3), PASP 26mmHG, Mod MR    - CT head: no acute changes, old lacunar infract   - CT CAP: Moderate atherosclerosis of the thoracic, abdominal aorta and CAD.  Mild calcification of the aortic valve and aortic and mitral valve annuli.    - Started on Pathway for VAD only, pending CT surgery evaluation by Dr. Khan, prior to Step 2, appreciate assistance    - MELISSA/CM to assist with financial clearance  - Need OSH records from ,  - RHC when euvolemic    - Decreased Lasix 10mg/hr, continue closely monitor UOP,   - Continue PT / OT, ambulate as tolerated, elevated LE   - Fluid restriction at 1500 cc with strict I/Os and daily STANDING weights  - Check Electrolytes, keep Mag >2 & K+ >4  - Continue PTA HF GDMT  -Pathway stopped, " patient will be followed as an outpatient for evaluation for VAD.       SVT (supraventricular tachycardia)    -Arrhythmia suggestive of atypical atrial flutter, s/p LAVERNE guided DCCV  on 10/19/2018.  -On heparin gtt.  with plan to transition to Warfarin (NOACs not an option, patient on DAPT with Prasugrel and ASA due to PCI to OKSANA on 06/2018).  -Started on Dofetilide on 10/19/2018 as recommended by EP service, dose reduced to 125 mcg BID on 10/20/2018 due to borderline QTc prolongation above baseline.  -Will follow closely.     VT (ventricular tachycardia)    Frequent NSVT with underlying ST:   - Increase Toprol 100mg qday   - Follow up HR  - Consider EP consult if continues to have ST      Skin rash    Posterior neck, likely eczema:  - Continue supportive care with topical      ICD (implantable cardioverter-defibrillator) in place    [employer] CRT-D (09/20/18)  - Interrogated today consistent with NSVT, Afib / AF  - Closely  Monitor for further ectopy   - Will consider EP consultation if continues to persist   - Replete electrolytes   -Will consult EP for evaluation due to episodes that may suggest NSVT and additional episode of SVT with aberrant conduction.     DM (diabetes mellitus)     A1c 6.9,   - SSI with accuchecks  - will consider endocrine consult with VAD work up      CAD (coronary artery disease)    Recent MI 06/2018, ICM / HFrEF / CAD s/p CABG:   - Continue DAPT, statin, and BB  - consider ACEi once renal function panel returns         Timothy Chisholm MD  Heart Transplant  [facility]

## 2018-10-21 NOTE — ASSESSMENT & PLAN NOTE
73M with ischemic cardiomyopathy, HFrEF, CAD s/p CABG transferred with ADHF, undergoing VAD workup. EP consulted for frequent runs of nonsustained WCT in setting of baseline sinus tachycardia with biventricular pacing. Recently upgraded 9/2018 to CRT-D device; most recently BiV pacing decreased. Tele and EGMs with nonsustained AT with V sensed. No VT on device or tele. Frequent AT decreasing rate of biventricular pacing. AT was replaced with Atrial flutter starting at 10pm on 10/17. Has been having higher incidence of BiV pacing since CRT-D parameter adjustments.    10/19 Initial EKG: A-sensed, V-paced, Qtc 492  10/19 PM EKG: ,  -- EKG overestimated QT, actual is close to 360  10/20 AM EKG: , Qtc 482  10/20 PM EKG: ,  -EKG over-estimated QT, closer to 440    - continue tikosyn 250mcg BID, will follow EKG  - Continue beta-blocker as tolerated  - Continue heparin drip, transition to oral anti-coagulant as per HTS

## 2018-10-21 NOTE — SUBJECTIVE & OBJECTIVE
Interval History: 10/21/2018: Patient was seen and examined at bedside. Patient with no major complaints. Patient on dofetilide, has remained in sinus rhythm AsVp with no evidence of major ventricular arrhythmias. Patient started on low dose lisinopril with adequate tolerance, borderline BP. Renal function has remained stable and there is no evidence of major lytes disturbances.Patient started on VKA, will monitor INR. Patient started also on oral furosemide, good UOP.    Continuous Infusions:   heparin (porcine) in D5W 17 Units/kg/hr (10/20/18 1709)     Scheduled Meds:   aspirin  81 mg Oral Daily    atorvastatin  80 mg Oral Daily    cetirizine  5 mg Oral Daily    dofetilide  250 mcg Oral Q12H    furosemide  80 mg Oral BID    insulin aspart U-100  3 Units Subcutaneous TIDWM    insulin detemir U-100  15 Units Subcutaneous QHS    lisinopril  2.5 mg Oral Daily    magnesium oxide  400 mg Oral BID    metoprolol succinate  50 mg Oral Daily    potassium chloride  20 mEq Oral BID    prasugrel  10 mg Oral Daily    sodium chloride 0.9%  3 mL Intravenous Q8H    warfarin  2 mg Oral Daily     PRN Meds:dextrose 50%, dextrose 50%, glucagon (human recombinant), glucose, glucose, heparin (PORCINE), heparin (PORCINE), insulin aspart U-100, ramelteon, sodium chloride 0.9%    Review of patient's allergies indicates:   Allergen Reactions    Amiodarone analogues Anaphylaxis    Ativan [lorazepam] Anxiety     Objective:     Vital Signs (Most Recent):  Temp: 98 °F (36.7 °C) (10/21/18 0815)  Pulse: 85 (10/21/18 0815)  Resp: 16 (10/21/18 0815)  BP: (!) 109/53 (10/21/18 0815)  SpO2: (!) 92 % (10/21/18 0815) Vital Signs (24h Range):  Temp:  [97.3 °F (36.3 °C)-98.3 °F (36.8 °C)] 98 °F (36.7 °C)  Pulse:  [67-98] 85  Resp:  [16-18] 16  SpO2:  [92 %-97 %] 92 %  BP: ()/(52-60) 109/53     Patient Vitals for the past 72 hrs (Last 3 readings):   Weight   10/21/18 0700 73.6 kg (162 lb 4.1 oz)   10/20/18 0739 73.5 kg (162 lb 0.6  oz)   10/19/18 0714 73 kg (160 lb 15 oz)     Body mass index is 26.19 kg/m².      Intake/Output Summary (Last 24 hours) at 10/21/2018 0956  Last data filed at 10/21/2018 0800  Gross per 24 hour   Intake 1080 ml   Output 2000 ml   Net -920 ml       Hemodynamic Parameters:       Telemetry:no evidence of major arrhythmias.    Physical Exam     Physical Exam   Head: Atraumatic.   Mouth/Throat: No oropharyngeal exudate.   Eyes: EOM are normal. No scleral icterus.   Neck: Neck supple. No JVD present.   Cardiovascular: Regular rhythm and normal heart sounds. Exam reveals no gallop and no friction rub.   No murmur heard.  Tachycardic   Pulmonary/Chest: Effort normal and breath sounds normal. No respiratory distress. He has no wheezes. He has no rales. He exhibits no tenderness.   Abdominal: Soft. Bowel sounds are normal. He exhibits no distension. There is no tenderness.   Musculoskeletal: He exhibits no edema.   Neurological: He is alert and oriented to person, place, and time. No cranial nerve deficit.   Skin: Skin is warm and dry. No erythema.   Psychiatric: He has a normal mood and affect.     Significant Labs:  CBC:  Recent Labs   Lab 10/19/18  0518 10/20/18  0216 10/21/18  0437   WBC 9.13  9.13 10.38 8.16   RBC 3.93*  3.93* 4.23* 3.73*   HGB 11.4*  11.4* 12.4* 11.0*   HCT 37.0*  37.0* 40.0 35.1*     176 188 171   MCV 94  94 95 94   MCH 29.0  29.0 29.3 29.5   MCHC 30.8*  30.8* 31.0* 31.3*     BNP:  Recent Labs   Lab 10/16/18  0435 10/18/18  0016   BNP 3,131* 3,552*     CMP:  Recent Labs   Lab 10/19/18  0518 10/20/18  0216 10/21/18  0437   * 175* 128*   CALCIUM 9.5 10.0 9.6   ALBUMIN 3.1* 3.2* 2.9*   PROT 7.2 7.5 6.7   * 136 135*   K 4.0 5.0 3.6   CO2 26 26 27   CL 96 98 96   BUN 37* 39* 37*   CREATININE 1.2 1.4 1.1   ALKPHOS 93 97 80   ALT 22 19 15   AST 20 19 16   BILITOT 1.0 1.0 1.1*      Coagulation:   Recent Labs   Lab 10/18/18  1713  10/20/18  1514 10/20/18  2308 10/21/18  0437   INR  1.0  --  1.1  --  1.0   APTT 23.8   < > 33.9* 43.9* 45.1*    < > = values in this interval not displayed.     LDH:  No results for input(s): LDH in the last 72 hours.  Microbiology:  Microbiology Results (last 7 days)     ** No results found for the last 168 hours. **          I have reviewed all pertinent labs within the past 24 hours.    Estimated Creatinine Clearance: 55.6 mL/min (based on SCr of 1.1 mg/dL).    Diagnostic Results:  I have reviewed all pertinent imaging results/findings within the past 24 hours.

## 2018-10-21 NOTE — SUBJECTIVE & OBJECTIVE
Interval History: No events overnight, feels well    Tele: Sinus, HR 76-90, NSVT x2 episodes 4, 11 beats    ROS  Objective:     Vital Signs (Most Recent):  Temp: 97.6 °F (36.4 °C) (10/21/18 1226)  Pulse: 86 (10/21/18 1226)  Resp: 16 (10/21/18 1226)  BP: (!) 86/52 (10/21/18 1226)  SpO2: (!) 90 % (10/21/18 1226) Vital Signs (24h Range):  Temp:  [97.3 °F (36.3 °C)-98 °F (36.7 °C)] 97.6 °F (36.4 °C)  Pulse:  [67-94] 86  Resp:  [16-18] 16  SpO2:  [90 %-95 %] 90 %  BP: ()/(52-60) 86/52     Weight: 73.6 kg (162 lb 4.1 oz)  Body mass index is 26.19 kg/m².     SpO2: (!) 90 %  O2 Device (Oxygen Therapy): room air    Physical Exam   Constitutional: He is oriented to person, place, and time. No distress.   HENT:   Head: Atraumatic.   Mouth/Throat: No oropharyngeal exudate.   Eyes: EOM are normal. No scleral icterus.   Neck: Neck supple. No JVD present.   Cardiovascular: Regular rhythm and normal heart sounds. Exam reveals no gallop and no friction rub.   No murmur heard.  Tachycardic   Pulmonary/Chest: Effort normal and breath sounds normal. No respiratory distress. He has no wheezes. He has no rales. He exhibits no tenderness.   Abdominal: Soft. Bowel sounds are normal. He exhibits no distension. There is no tenderness.   Musculoskeletal: He exhibits no edema.   Neurological: He is alert and oriented to person, place, and time. No cranial nerve deficit.   Skin: Skin is warm and dry. No erythema.   Psychiatric: He has a normal mood and affect.       Significant Labs:   EP:   Recent Labs   Lab 10/20/18  0216 10/20/18  1514 10/21/18  0437     --  135*   K 5.0  --  3.6   CL 98  --  96   CO2 26  --  27   *  --  128*   BUN 39*  --  37*   CREATININE 1.4  --  1.1   CALCIUM 10.0  --  9.6   PROT 7.5  --  6.7   ALBUMIN 3.2*  --  2.9*   BILITOT 1.0  --  1.1*   ALKPHOS 97  --  80   AST 19  --  16   ALT 19  --  15   ANIONGAP 12  --  12   ESTGFRAFRICA 58.0*  --  >60.0   EGFRNONAA 50.2*  --  >60.0   WBC 10.38  --  8.16    HGB 12.4*  --  11.0*   HCT 40.0  --  35.1*     --  171   INR  --  1.1 1.0

## 2018-10-21 NOTE — PLAN OF CARE
Problem: Patient Care Overview  Goal: Plan of Care Review  Outcome: Revised  Pt remains free from injury/falls for shift. 3rd dose of Tikosyn administered per MD carmona. Heparin continues to be infused. PTT continues to be monitored. Rozerem administered to PT for touble sleeping. Educated Pt on meds no questions at this time. VSS.  No complaints of CP, SOB, pain/discomfort  Bed locked in lowest position, call bell within reach. All questions addressed.  Will continue to monitor.

## 2018-10-21 NOTE — ANESTHESIA POSTPROCEDURE EVALUATION
"Anesthesia Post Evaluation    Patient: Bharat Coker    Procedure(s) Performed: Procedure(s) (LRB):  ECHOCARDIOGRAM,TRANSESOPHAGEAL (N/A)    Final Anesthesia Type: MAC  Patient location during evaluation: PACU  Patient participation: Yes- Able to Participate  Level of consciousness: awake and alert and oriented  Post-procedure vital signs: reviewed and stable  Pain management: adequate  Airway patency: patent  PONV status at discharge: No PONV  Anesthetic complications: no      Cardiovascular status: hypotensive and hemodynamically stable  Respiratory status: unassisted  Hydration status: euvolemic  Follow-up not needed.        Visit Vitals  BP (!) 86/52 (BP Location: Right arm, Patient Position: Lying)   Pulse 82   Temp 36.4 °C (97.6 °F) (Oral)   Resp 16   Ht 5' 6" (1.676 m)   Wt 73.6 kg (162 lb 4.1 oz)   SpO2 (!) 90%   BMI 26.19 kg/m²       Pain/Ewa Score: Pain Assessment Performed: Yes (10/21/2018  2:00 PM)  Presence of Pain: denies (10/21/2018  2:00 PM)  Pain Rating Prior to Med Admin: 3 (10/21/2018  2:53 PM)        "

## 2018-10-21 NOTE — ASSESSMENT & PLAN NOTE
Mr. Coker is a 72 y/o male with PMH of CAD s/p CABG, ICM/HFrEF (LVef <10%, LVEDD 7.8) who was a transfer for cardiogenic shock / MODS:    - BNP 4674   - [facility] 6/2018: LIMA-LAD patent, % occluded at ostium, SVG-% occluded, SVG-D 100% occluded, pLCx 30%, 100% occluded OM, 100% LAD occlusion after takeoff of D1, D1 is tiny with 80% disease, SVG-OM 80% proximal stenosis with 50% at site of anastomosis. An SVG-OM OKSANA was placed at that time.    - TTE EF <10%, G2DD, LVH (LVEDD 7.8 / LVESD 7.3 ), reduced RVSF (LV 4.5cm / TAPSE 1.3), PASP 26mmHG, Mod MR    - CT head: no acute changes, old lacunar infract   - CT CAP: Moderate atherosclerosis of the thoracic, abdominal aorta and CAD.  Mild calcification of the aortic valve and aortic and mitral valve annuli.    - Started on Pathway for VAD only, pending CT surgery evaluation by Dr. Khan, prior to Step 2, appreciate assistance    - SW/CM to assist with financial clearance  - Need OSH records from [facility],  - RHC when euvolemic    - Decreased Lasix 10mg/hr, continue closely monitor UOP,   - Continue PT / OT, ambulate as tolerated, elevated LE   - Fluid restriction at 1500 cc with strict I/Os and daily STANDING weights  - Check Electrolytes, keep Mag >2 & K+ >4  - Continue PTA HF GDMT  -Pathway stopped, patient will be followed as an outpatient for evaluation for VAD.

## 2018-10-21 NOTE — PLAN OF CARE
Problem: Patient Care Overview  Goal: Plan of Care Review  Plan of care discussed with patient. Patient is free of fall/trauma/injury. Denies CP, SOB, or pain/discomfort. Monitoring pt on tykinsen.Heparin drip infusing. Goal INR 2-3. INR today 1. All questions addressed. Will continue to monitor

## 2018-10-21 NOTE — PROGRESS NOTES
[facility]  Cardiac Electrophysiology  Progress Note    Admission Date: 10/10/2018  Code Status: Full Code   Attending Physician: Jony Hendrickson Jr.,*   Expected Discharge Date: 10/23/2018  Principal Problem:HFrEF (heart failure with reduced ejection fraction)    Subjective:     Interval History: No events overnight, feels well    Tele: Sinus, HR 76-90, NSVT x2 episodes 4, 11 beats    ROS  Objective:     Vital Signs (Most Recent):  Temp: 97.6 °F (36.4 °C) (10/21/18 1226)  Pulse: 86 (10/21/18 1226)  Resp: 16 (10/21/18 1226)  BP: (!) 86/52 (10/21/18 1226)  SpO2: (!) 90 % (10/21/18 1226) Vital Signs (24h Range):  Temp:  [97.3 °F (36.3 °C)-98 °F (36.7 °C)] 97.6 °F (36.4 °C)  Pulse:  [67-94] 86  Resp:  [16-18] 16  SpO2:  [90 %-95 %] 90 %  BP: ()/(52-60) 86/52     Weight: 73.6 kg (162 lb 4.1 oz)  Body mass index is 26.19 kg/m².     SpO2: (!) 90 %  O2 Device (Oxygen Therapy): room air    Physical Exam   Constitutional: He is oriented to person, place, and time. No distress.   HENT:   Head: Atraumatic.   Mouth/Throat: No oropharyngeal exudate.   Eyes: EOM are normal. No scleral icterus.   Neck: Neck supple. No JVD present.   Cardiovascular: Regular rhythm and normal heart sounds. Exam reveals no gallop and no friction rub.   No murmur heard.  Tachycardic   Pulmonary/Chest: Effort normal and breath sounds normal. No respiratory distress. He has no wheezes. He has no rales. He exhibits no tenderness.   Abdominal: Soft. Bowel sounds are normal. He exhibits no distension. There is no tenderness.   Musculoskeletal: He exhibits no edema.   Neurological: He is alert and oriented to person, place, and time. No cranial nerve deficit.   Skin: Skin is warm and dry. No erythema.   Psychiatric: He has a normal mood and affect.       Significant Labs:   EP:   Recent Labs   Lab 10/20/18  0216 10/20/18  1514 10/21/18  0437     --  135*   K 5.0  --  3.6   CL 98  --  96   CO2 26  --  27   *  --   128*   BUN 39*  --  37*   CREATININE 1.4  --  1.1   CALCIUM 10.0  --  9.6   PROT 7.5  --  6.7   ALBUMIN 3.2*  --  2.9*   BILITOT 1.0  --  1.1*   ALKPHOS 97  --  80   AST 19  --  16   ALT 19  --  15   ANIONGAP 12  --  12   ESTGFRAFRICA 58.0*  --  >60.0   EGFRNONAA 50.2*  --  >60.0   WBC 10.38  --  8.16   HGB 12.4*  --  11.0*   HCT 40.0  --  35.1*     --  171   INR  --  1.1 1.0         Assessment and Plan:     Cardiac resynchronization therapy defibrillator (CRT-D) in place    73M with ischemic cardiomyopathy, HFrEF, CAD s/p CABG transferred with ADHF, undergoing VAD workup. EP consulted for frequent runs of nonsustained WCT in setting of baseline sinus tachycardia with biventricular pacing. Recently upgraded 9/2018 to CRT-D device; most recently BiV pacing decreased. Tele and EGMs with nonsustained AT with V sensed. No VT on device or tele. Frequent AT decreasing rate of biventricular pacing. AT was replaced with Atrial flutter starting at 10pm on 10/17. Has been having higher incidence of BiV pacing since CRT-D parameter adjustments.    10/19 Initial EKG: A-sensed, V-paced, Qtc 492  10/19 PM EKG: ,  -- EKG overestimated QT, actual is close to 360  10/20 AM EKG: , Qtc 482  10/20 PM EKG: ,  -EKG over-estimated QT, closer to 440    - continue tikosyn 250mcg BID, will follow EKG  - Continue beta-blocker as tolerated  - Continue heparin drip, transition to oral anti-coagulant as per HTS         Glenroy Dominguez MD  Cardiac Electrophysiology  [facility]

## 2018-10-22 LAB
ALBUMIN SERPL BCP-MCNC: 2.8 G/DL
ALP SERPL-CCNC: 80 U/L
ALT SERPL W/O P-5'-P-CCNC: 15 U/L
ANION GAP SERPL CALC-SCNC: 11 MMOL/L
APTT BLDCRRT: 38.9 SEC
AST SERPL-CCNC: 18 U/L
BASOPHILS # BLD AUTO: 0.06 K/UL
BASOPHILS NFR BLD: 0.6 %
BILIRUB SERPL-MCNC: 1.1 MG/DL
BNP SERPL-MCNC: 1354 PG/ML
BUN SERPL-MCNC: 40 MG/DL
CALCIUM SERPL-MCNC: 9.5 MG/DL
CHLORIDE SERPL-SCNC: 95 MMOL/L
CO2 SERPL-SCNC: 28 MMOL/L
CREAT SERPL-MCNC: 1.1 MG/DL
DIFFERENTIAL METHOD: ABNORMAL
EOSINOPHIL # BLD AUTO: 0.4 K/UL
EOSINOPHIL NFR BLD: 3.4 %
ERYTHROCYTE [DISTWIDTH] IN BLOOD BY AUTOMATED COUNT: 14.6 %
EST. GFR  (AFRICAN AMERICAN): >60 ML/MIN/1.73 M^2
EST. GFR  (NON AFRICAN AMERICAN): >60 ML/MIN/1.73 M^2
GLUCOSE SERPL-MCNC: 103 MG/DL
HCT VFR BLD AUTO: 33.8 %
HGB BLD-MCNC: 10.8 G/DL
IMM GRANULOCYTES # BLD AUTO: 0.06 K/UL
IMM GRANULOCYTES NFR BLD AUTO: 0.6 %
INR PPP: 1
LYMPHOCYTES # BLD AUTO: 1.4 K/UL
LYMPHOCYTES NFR BLD: 13.7 %
MAGNESIUM SERPL-MCNC: 1.5 MG/DL
MCH RBC QN AUTO: 29 PG
MCHC RBC AUTO-ENTMCNC: 32 G/DL
MCV RBC AUTO: 91 FL
MONOCYTES # BLD AUTO: 1 K/UL
MONOCYTES NFR BLD: 9.3 %
NEUTROPHILS # BLD AUTO: 7.4 K/UL
NEUTROPHILS NFR BLD: 72.4 %
NRBC BLD-RTO: 0 /100 WBC
PHOSPHATE SERPL-MCNC: 4 MG/DL
PLATELET # BLD AUTO: 136 K/UL
PMV BLD AUTO: 11.6 FL
POCT GLUCOSE: 186 MG/DL (ref 70–110)
POCT GLUCOSE: 215 MG/DL (ref 70–110)
POCT GLUCOSE: 225 MG/DL (ref 70–110)
POCT GLUCOSE: 246 MG/DL (ref 70–110)
POTASSIUM SERPL-SCNC: 3.8 MMOL/L
PROT SERPL-MCNC: 6.6 G/DL
PROTHROMBIN TIME: 10.6 SEC
RBC # BLD AUTO: 3.72 M/UL
SODIUM SERPL-SCNC: 134 MMOL/L
WBC # BLD AUTO: 10.24 K/UL

## 2018-10-22 PROCEDURE — 99233 SBSQ HOSP IP/OBS HIGH 50: CPT | Mod: ,,, | Performed by: INTERNAL MEDICINE

## 2018-10-22 PROCEDURE — 25000003 PHARM REV CODE 250: Performed by: NURSE PRACTITIONER

## 2018-10-22 PROCEDURE — 99233 SBSQ HOSP IP/OBS HIGH 50: CPT | Mod: GC,,, | Performed by: INTERNAL MEDICINE

## 2018-10-22 PROCEDURE — 84100 ASSAY OF PHOSPHORUS: CPT

## 2018-10-22 PROCEDURE — 25000003 PHARM REV CODE 250

## 2018-10-22 PROCEDURE — 25000003 PHARM REV CODE 250: Performed by: STUDENT IN AN ORGANIZED HEALTH CARE EDUCATION/TRAINING PROGRAM

## 2018-10-22 PROCEDURE — 85610 PROTHROMBIN TIME: CPT

## 2018-10-22 PROCEDURE — 83735 ASSAY OF MAGNESIUM: CPT

## 2018-10-22 PROCEDURE — A4216 STERILE WATER/SALINE, 10 ML: HCPCS | Performed by: INTERNAL MEDICINE

## 2018-10-22 PROCEDURE — 85025 COMPLETE CBC W/AUTO DIFF WBC: CPT

## 2018-10-22 PROCEDURE — 63600175 PHARM REV CODE 636 W HCPCS: Performed by: STUDENT IN AN ORGANIZED HEALTH CARE EDUCATION/TRAINING PROGRAM

## 2018-10-22 PROCEDURE — 93010 ELECTROCARDIOGRAM REPORT: CPT | Mod: ,,, | Performed by: INTERNAL MEDICINE

## 2018-10-22 PROCEDURE — 20600001 HC STEP DOWN PRIVATE ROOM

## 2018-10-22 PROCEDURE — 83880 ASSAY OF NATRIURETIC PEPTIDE: CPT

## 2018-10-22 PROCEDURE — 36415 COLL VENOUS BLD VENIPUNCTURE: CPT

## 2018-10-22 PROCEDURE — 93005 ELECTROCARDIOGRAM TRACING: CPT

## 2018-10-22 PROCEDURE — 25000003 PHARM REV CODE 250: Performed by: INTERNAL MEDICINE

## 2018-10-22 PROCEDURE — 85730 THROMBOPLASTIN TIME PARTIAL: CPT

## 2018-10-22 PROCEDURE — 80053 COMPREHEN METABOLIC PANEL: CPT

## 2018-10-22 RX ORDER — HYDROXYZINE HYDROCHLORIDE 25 MG/1
25 TABLET, FILM COATED ORAL ONCE
Status: COMPLETED | OUTPATIENT
Start: 2018-10-22 | End: 2018-10-22

## 2018-10-22 RX ORDER — LANOLIN ALCOHOL/MO/W.PET/CERES
400 CREAM (GRAM) TOPICAL ONCE
Status: COMPLETED | OUTPATIENT
Start: 2018-10-22 | End: 2018-10-22

## 2018-10-22 RX ADMIN — POTASSIUM CHLORIDE 20 MEQ: 1500 TABLET, EXTENDED RELEASE ORAL at 09:10

## 2018-10-22 RX ADMIN — INSULIN ASPART 2 UNITS: 100 INJECTION, SOLUTION INTRAVENOUS; SUBCUTANEOUS at 09:10

## 2018-10-22 RX ADMIN — INSULIN ASPART 3 UNITS: 100 INJECTION, SOLUTION INTRAVENOUS; SUBCUTANEOUS at 05:10

## 2018-10-22 RX ADMIN — Medication 3 ML: at 02:10

## 2018-10-22 RX ADMIN — INSULIN ASPART 3 UNITS: 100 INJECTION, SOLUTION INTRAVENOUS; SUBCUTANEOUS at 12:10

## 2018-10-22 RX ADMIN — CETIRIZINE HYDROCHLORIDE 5 MG: 5 TABLET ORAL at 08:10

## 2018-10-22 RX ADMIN — INSULIN ASPART 4 UNITS: 100 INJECTION, SOLUTION INTRAVENOUS; SUBCUTANEOUS at 08:10

## 2018-10-22 RX ADMIN — FUROSEMIDE 80 MG: 80 TABLET ORAL at 05:10

## 2018-10-22 RX ADMIN — DOFETILIDE 250 MCG: 0.25 CAPSULE ORAL at 09:10

## 2018-10-22 RX ADMIN — MAGNESIUM OXIDE TAB 400 MG (241.3 MG ELEMENTAL MG) 400 MG: 400 (241.3 MG) TAB at 08:10

## 2018-10-22 RX ADMIN — Medication 3 ML: at 06:10

## 2018-10-22 RX ADMIN — WARFARIN SODIUM 2 MG: 2 TABLET ORAL at 05:10

## 2018-10-22 RX ADMIN — HYDROXYZINE HYDROCHLORIDE 25 MG: 25 TABLET, FILM COATED ORAL at 10:10

## 2018-10-22 RX ADMIN — INSULIN ASPART 4 UNITS: 100 INJECTION, SOLUTION INTRAVENOUS; SUBCUTANEOUS at 05:10

## 2018-10-22 RX ADMIN — PRASUGREL 10 MG: 10 TABLET, FILM COATED ORAL at 08:10

## 2018-10-22 RX ADMIN — HEPARIN SODIUM AND DEXTROSE 17 UNITS/KG/HR: 10000; 5 INJECTION INTRAVENOUS at 01:10

## 2018-10-22 RX ADMIN — FUROSEMIDE 80 MG: 80 TABLET ORAL at 08:10

## 2018-10-22 RX ADMIN — ATORVASTATIN CALCIUM 80 MG: 20 TABLET, FILM COATED ORAL at 08:10

## 2018-10-22 RX ADMIN — POTASSIUM CHLORIDE 20 MEQ: 1500 TABLET, EXTENDED RELEASE ORAL at 08:10

## 2018-10-22 RX ADMIN — ASPIRIN 81 MG: 81 TABLET, COATED ORAL at 08:10

## 2018-10-22 RX ADMIN — INSULIN ASPART 3 UNITS: 100 INJECTION, SOLUTION INTRAVENOUS; SUBCUTANEOUS at 08:10

## 2018-10-22 RX ADMIN — METOPROLOL SUCCINATE 50 MG: 50 TABLET, EXTENDED RELEASE ORAL at 08:10

## 2018-10-22 RX ADMIN — DOFETILIDE 250 MCG: 0.25 CAPSULE ORAL at 08:10

## 2018-10-22 RX ADMIN — LISINOPRIL 2.5 MG: 2.5 TABLET ORAL at 08:10

## 2018-10-22 RX ADMIN — INSULIN DETEMIR 15 UNITS: 100 INJECTION, SOLUTION SUBCUTANEOUS at 09:10

## 2018-10-22 NOTE — PROGRESS NOTES
[facility]  Heart Transplant  Progress Note    Patient Name: Bharat Coker  MRN: [id number]  Admission Date: 10/10/2018  Hospital Length of Stay: 12 days  Attending Physician: Jony Hendrickson Jr.,*  Primary Care Provider: Ronnie Richardson Jr, MD  Principal Problem:Acute on chronic combined systolic and diastolic congestive heart failure    Subjective:     Interval History: No issues overnight/complaints this am.     Continuous Infusions:   heparin (porcine) in D5W 17 Units/kg/hr (10/21/18 2115)     Scheduled Meds:   aspirin  81 mg Oral Daily    atorvastatin  80 mg Oral Daily    cetirizine  5 mg Oral Daily    dofetilide  250 mcg Oral Q12H    furosemide  80 mg Oral BID    insulin aspart U-100  3 Units Subcutaneous TIDWM    insulin detemir U-100  15 Units Subcutaneous QHS    lisinopril  2.5 mg Oral Daily    metoprolol succinate  50 mg Oral Daily    potassium chloride  20 mEq Oral BID    prasugrel  10 mg Oral Daily    sodium chloride 0.9%  3 mL Intravenous Q8H    warfarin  2 mg Oral Daily     PRN Meds:acetaminophen, dextrose 50%, dextrose 50%, glucagon (human recombinant), glucose, glucose, heparin (PORCINE), heparin (PORCINE), insulin aspart U-100, ramelteon, sodium chloride 0.9%    Review of patient's allergies indicates:   Allergen Reactions    Amiodarone analogues Anaphylaxis    Ativan [lorazepam] Anxiety     Objective:     Vital Signs (Most Recent):  Temp: 98.4 °F (36.9 °C) (10/22/18 0830)  Pulse: 88 (10/22/18 0830)  Resp: 16 (10/22/18 0830)  BP: (!) 85/51 (10/22/18 0830)  SpO2: 96 % (10/22/18 0830) Vital Signs (24h Range):  Temp:  [97.6 °F (36.4 °C)-98.4 °F (36.9 °C)] 98.4 °F (36.9 °C)  Pulse:  [74-96] 88  Resp:  [16-18] 16  SpO2:  [90 %-98 %] 96 %  BP: ()/(50-57) 85/51     Patient Vitals for the past 72 hrs (Last 3 readings):   Weight   10/22/18 0313 73.9 kg (163 lb 0.5 oz)   10/21/18 0700 73.6 kg (162 lb 4.1 oz)   10/20/18 0739 73.5 kg (162 lb 0.6 oz)     Body mass  index is 26.31 kg/m².      Intake/Output Summary (Last 24 hours) at 10/22/2018 0943  Last data filed at 10/22/2018 0655  Gross per 24 hour   Intake 997 ml   Output 1425 ml   Net -428 ml        Telemetry:no evidence of major arrhythmias.    Physical Exam   Constitutional: He is oriented to person, place, and time. He appears well-developed and well-nourished.   HENT:   Head: Normocephalic.   Eyes: Pupils are equal, round, and reactive to light.   Neck: Normal range of motion. Neck supple.   Cardiovascular: Normal rate and regular rhythm.   Murmur heard.  Pulmonary/Chest: Effort normal and breath sounds normal.   Abdominal: Soft. Bowel sounds are normal.   Musculoskeletal: Normal range of motion.   Neurological: He is alert and oriented to person, place, and time.   Skin: Skin is warm and dry.   Psychiatric: He has a normal mood and affect. His behavior is normal.   Nursing note and vitals reviewed.      Significant Labs:  CBC:  Recent Labs   Lab 10/20/18  0216 10/21/18  0437 10/22/18  0355   WBC 10.38 8.16 10.24   RBC 4.23* 3.73* 3.72*   HGB 12.4* 11.0* 10.8*   HCT 40.0 35.1* 33.8*    171 136*   MCV 95 94 91   MCH 29.3 29.5 29.0   MCHC 31.0* 31.3* 32.0     BNP:  Recent Labs   Lab 10/16/18  0435 10/18/18  0016 10/22/18  0355   BNP 3,131* 3,552* 1,354*     CMP:  Recent Labs   Lab 10/20/18  0216 10/21/18  0437 10/22/18  0355   * 128* 103   CALCIUM 10.0 9.6 9.5   ALBUMIN 3.2* 2.9* 2.8*   PROT 7.5 6.7 6.6    135* 134*   K 5.0 3.6 3.8   CO2 26 27 28   CL 98 96 95   BUN 39* 37* 40*   CREATININE 1.4 1.1 1.1   ALKPHOS 97 80 80   ALT 19 15 15   AST 19 16 18   BILITOT 1.0 1.1* 1.1*      Coagulation:   Recent Labs   Lab 10/20/18  1514 10/20/18  2308 10/21/18  0437 10/22/18  0355   INR 1.1  --  1.0 1.0   APTT 33.9* 43.9* 45.1* 38.9*     LDH:  No results for input(s): LDH in the last 72 hours.  Microbiology:  Microbiology Results (last 7 days)     ** No results found for the last 168 hours. **        I have  "reviewed all pertinent labs within the past 24 hours.    Estimated Creatinine Clearance: 55.6 mL/min (based on SCr of 1.1 mg/dL).    Diagnostic Results:  I have reviewed all pertinent imaging results/findings within the past 24 hours.    Assessment and Plan:     74 y/o male with PMH of CAD s/p CABG, ICM, HFrEF who presents as a transfer from OSH after he presented there for [facility], weight gain and palpitations. He presented to OSH after being discharged from the same facility 4 days prior to Oct 8. He was there initially for ADHF and KASEY. The patient recently had a revision of his CRT-D. The patient became SOB on Oct 7 but did not have CP. The patient felt his pulse and noted it to be rapid. He was brought to the ED at OSH by EMS; his HR was ~170. The ECG was c/w WCT (later documentation notes SVT with aberrancy after interrogation was negative for VT), and he was given one dose of adenosine. The HR came down to 110. His CXR was c/w pulmonary edema. A pro-BNP was at one point 20084.The patient's WBC was 17 and sCr 1.6. tBili was 1.5. An ECHO on 10/8: LVIDd 6.5 cm, EF 15%, IVC normal collapsibility and normal IVC size. An undated stress test notes: "mild reversibility noted in the cardiac apex and there lateral wall of the heart from cardiac apex to approximately mid wall suspicion for ischemia..mild reversibility noted in the mid to basilar inferior/inferoseptal wall of the heart also suspicious for ischemia". He had a C 6/2018: LIMA-LAD patent, % occluded at ostium, SVG-% occluded, SVG-D 100% occluded, pLCx 30%, 100% occluded OM, 100% LAD occlusion after takeoff of D1, D1 is tiny with 80% disease, SVG-OM 80% proximal stenosis with 50% at site of anastomosis. An SVG-OM OKSANA was placed at that time. The patient's WBC was 9.6 as of 10/9. Aldactone was held at some point 2/2 hyperkalemia. The patient believes he has been diuresed adequately and has no fluid left to remove.     * Acute on chronic combined " systolic and diastolic congestive heart failure    Mr. Coker is a 74 y/o male with PMH of CAD s/p CABG, ICM/HFrEF (LVef <10%, LVEDD 7.8) who was a transfer for cardiogenic shock / MODS.  - [facility] 6/2018: LIMA-LAD patent, % occluded at ostium, SVG-% occluded, SVG-D 100% occluded, pLCx 30%, 100% occluded OM, 100% LAD occlusion after takeoff of D1, D1 is tiny with 80% disease, SVG-OM 80% proximal stenosis with 50% at site of anastomosis. An SVG-OM OKSANA was placed at that time.   -GDMT: Lisinopril 2.5mg daily, Metoprolol succinate 50mg daily. BP has been limiting up titration.   -Continue PO Lasix.   -Pathway started but stopped secondary to frailty. Dr Khan would like to see in clinic before proceeding.   -Fluid restriction at 1500 cc with strict I/Os and daily STANDING weights.  -Continue PT / OT, ambulate as tolerated, elevated LE        SVT (supraventricular tachycardia)    -Arrhythmia suggestive of atypical atrial flutter, s/p LAVERNE guided DCCV  on 10/19/2018.  -Started on Dofetilide on 10/19/2018 as recommended by EP service, dose reduced to 125 mcg BID on 10/20/2018 due to borderline QTc prolongation above baseline.  -On heparin gtt/warfarinWarfarin (NOACs not an option, patient on DAPT with Prasugrel and ASA due to PCI to OKSANA on 06/2018). Awaiting therapeutic INR.   -Will follow closely.     VT (ventricular tachycardia)    Frequent NSVT with underlying ST:   - Continue toprol/amio.  -Keep K>4, Mag>2.      ICD (implantable cardioverter-defibrillator) in place    - Medtronic CRT-D (09/20/18)  - Interrogation consistent with NSVT, Afib / AF  - Replete electrolytes      CAD (coronary artery disease)    - Recent MI 06/2018, ICM / HFrEF / CAD s/p CABG:   - Continue DAPT, statin, and BB     Skin rash    Posterior neck, likely eczema:  - Continue supportive care with topical      DM (diabetes mellitus)     A1c 6.9,   - SSI with accuchecks  - will consider endocrine consult with VAD work up      HFrEF (heart  failure with reduced ejection fraction)               Aman Adames NP  Heart Transplant  [facility]

## 2018-10-22 NOTE — ASSESSMENT & PLAN NOTE
-Arrhythmia suggestive of atypical atrial flutter, s/p LAVERNE guided DCCV  on 10/19/2018.  -Started on Dofetilide on 10/19/2018 as recommended by EP service, dose reduced to 125 mcg BID on 10/20/2018 due to borderline QTc prolongation above baseline.  -On heparin gtt/warfarinWarfarin (NOACs not an option, patient on DAPT with Prasugrel and ASA due to PCI to OKSANA on 06/2018). Awaiting therapeutic INR.   -Will follow closely.

## 2018-10-22 NOTE — ASSESSMENT & PLAN NOTE
73M with ischemic cardiomyopathy, HFrEF, CAD s/p CABG transferred with ADHF, undergoing VAD workup. EP consulted for frequent runs of nonsustained WCT in setting of baseline sinus tachycardia with biventricular pacing. Recently upgraded 9/2018 to CRT-D device; most recently BiV pacing decreased. Tele and EGMs with nonsustained AT with V sensed. No VT on device or tele. Frequent AT decreasing rate of biventricular pacing. AT was replaced with Atrial flutter starting at 10pm on 10/17. Has been having higher incidence of BiV pacing since CRT-D parameter adjustments.    10/19 Initial EKG: A-sensed, V-paced, Qtc 492  10/19 PM EKG: ,  -- EKG overestimated QT, actual is closer to 360  10/20 AM EKG: , Qtc 482  10/20 PM EKG: ,  -EKG over-estimated QT, closer to 440  10/21 AM EKG: , Qtc 480  10/21 PM EKG: ,  -EKG over-estimated QT, closer to 400  10/22 AM EKG: , Qtc 474    - continue tikosyn 250mcg BID  - Continue beta-blocker as tolerated  - Continue heparin drip, transition to oral anti-coagulant as per HTS  - will sign off, thank you for the consult, please call back with any questions

## 2018-10-22 NOTE — ASSESSMENT & PLAN NOTE
Mr. Coker is a 74 y/o male with PMH of CAD s/p CABG, ICM/HFrEF (LVef <10%, LVEDD 7.8) who was a transfer for cardiogenic shock / MODS.  - [facility] 6/2018: LIMA-LAD patent, % occluded at ostium, SVG-% occluded, SVG-D 100% occluded, pLCx 30%, 100% occluded OM, 100% LAD occlusion after takeoff of D1, D1 is tiny with 80% disease, SVG-OM 80% proximal stenosis with 50% at site of anastomosis. An SVG-OM OKSANA was placed at that time.   -GDMT: Lisinopril 2.5mg daily, Metoprolol succinate 50mg daily. BP has been limiting up titration.   -Continue PO Lasix.   -Pathway started but stopped secondary to frailty. Dr Khan would like to see in clinic before proceeding.   -Fluid restriction at 1500 cc with strict I/Os and daily STANDING weights.  -Continue PT / OT, ambulate as tolerated, elevated LE

## 2018-10-22 NOTE — SUBJECTIVE & OBJECTIVE
Interval History: No events overnight, no complaints    Tele: A-sense, V-paced and scattered PACs    ROS  Objective:     Vital Signs (Most Recent):  Temp: 97 °F (36.1 °C) (10/22/18 1149)  Pulse: 95 (10/22/18 1149)  Resp: 20 (10/22/18 1149)  BP: (!) 97/55 (10/22/18 1149)  SpO2: (!) 94 % (10/22/18 1149) Vital Signs (24h Range):  Temp:  [97 °F (36.1 °C)-98.4 °F (36.9 °C)] 97 °F (36.1 °C)  Pulse:  [74-96] 95  Resp:  [16-20] 20  SpO2:  [90 %-98 %] 94 %  BP: ()/(50-57) 97/55     Weight: 73.9 kg (163 lb 0.5 oz)  Body mass index is 26.31 kg/m².     SpO2: (!) 94 %  O2 Device (Oxygen Therapy): room air    Physical Exam   Constitutional: He is oriented to person, place, and time. No distress.   HENT:   Head: Atraumatic.   Mouth/Throat: No oropharyngeal exudate.   Eyes: EOM are normal. No scleral icterus.   Neck: Neck supple. No JVD present.   Cardiovascular: Regular rhythm and normal heart sounds. Exam reveals no gallop and no friction rub.   No murmur heard.  Tachycardic   Pulmonary/Chest: Effort normal and breath sounds normal. No respiratory distress. He has no wheezes. He has no rales. He exhibits no tenderness.   Abdominal: Soft. Bowel sounds are normal. He exhibits no distension. There is no tenderness.   Musculoskeletal: He exhibits no edema.   Neurological: He is alert and oriented to person, place, and time. No cranial nerve deficit.   Skin: Skin is warm and dry. No erythema.   Psychiatric: He has a normal mood and affect.       Significant Labs:   EP:   Recent Labs   Lab 10/20/18  1514  10/21/18  0437 10/22/18  0355   NA  --   --  135* 134*   K  --   --  3.6 3.8   CL  --   --  96 95   CO2  --   --  27 28   GLU  --   --  128* 103   BUN  --   --  37* 40*   CREATININE  --   --  1.1 1.1   CALCIUM  --   --  9.6 9.5   PROT  --   --  6.7 6.6   ALBUMIN  --   --  2.9* 2.8*   BILITOT  --   --  1.1* 1.1*   ALKPHOS  --   --  80 80   AST  --   --  16 18   ALT  --   --  15 15   ANIONGAP  --   --  12 11   ESTGFRAFRICA  --    --  >60.0 >60.0   EGFRNONAA  --   --  >60.0 >60.0   WBC  --   --  8.16 10.24   HGB  --   --  11.0* 10.8*   HCT  --    < > 35.1* 33.8*   PLT  --   --  171 136*   INR 1.1  --  1.0 1.0    < > = values in this interval not displayed.

## 2018-10-22 NOTE — SUBJECTIVE & OBJECTIVE
Interval History: No issues overnight/complaints this am.     Continuous Infusions:   heparin (porcine) in D5W 17 Units/kg/hr (10/21/18 2115)     Scheduled Meds:   aspirin  81 mg Oral Daily    atorvastatin  80 mg Oral Daily    cetirizine  5 mg Oral Daily    dofetilide  250 mcg Oral Q12H    furosemide  80 mg Oral BID    insulin aspart U-100  3 Units Subcutaneous TIDWM    insulin detemir U-100  15 Units Subcutaneous QHS    lisinopril  2.5 mg Oral Daily    metoprolol succinate  50 mg Oral Daily    potassium chloride  20 mEq Oral BID    prasugrel  10 mg Oral Daily    sodium chloride 0.9%  3 mL Intravenous Q8H    warfarin  2 mg Oral Daily     PRN Meds:acetaminophen, dextrose 50%, dextrose 50%, glucagon (human recombinant), glucose, glucose, heparin (PORCINE), heparin (PORCINE), insulin aspart U-100, ramelteon, sodium chloride 0.9%    Review of patient's allergies indicates:   Allergen Reactions    Amiodarone analogues Anaphylaxis    Ativan [lorazepam] Anxiety     Objective:     Vital Signs (Most Recent):  Temp: 98.4 °F (36.9 °C) (10/22/18 0830)  Pulse: 88 (10/22/18 0830)  Resp: 16 (10/22/18 0830)  BP: (!) 85/51 (10/22/18 0830)  SpO2: 96 % (10/22/18 0830) Vital Signs (24h Range):  Temp:  [97.6 °F (36.4 °C)-98.4 °F (36.9 °C)] 98.4 °F (36.9 °C)  Pulse:  [74-96] 88  Resp:  [16-18] 16  SpO2:  [90 %-98 %] 96 %  BP: ()/(50-57) 85/51     Patient Vitals for the past 72 hrs (Last 3 readings):   Weight   10/22/18 0313 73.9 kg (163 lb 0.5 oz)   10/21/18 0700 73.6 kg (162 lb 4.1 oz)   10/20/18 0739 73.5 kg (162 lb 0.6 oz)     Body mass index is 26.31 kg/m².      Intake/Output Summary (Last 24 hours) at 10/22/2018 0943  Last data filed at 10/22/2018 0655  Gross per 24 hour   Intake 997 ml   Output 1425 ml   Net -428 ml        Telemetry:no evidence of major arrhythmias.    Physical Exam   Constitutional: He is oriented to person, place, and time. He appears well-developed and well-nourished.   HENT:   Head:  Normocephalic.   Eyes: Pupils are equal, round, and reactive to light.   Neck: Normal range of motion. Neck supple.   Cardiovascular: Normal rate and regular rhythm.   Murmur heard.  Pulmonary/Chest: Effort normal and breath sounds normal.   Abdominal: Soft. Bowel sounds are normal.   Musculoskeletal: Normal range of motion.   Neurological: He is alert and oriented to person, place, and time.   Skin: Skin is warm and dry.   Psychiatric: He has a normal mood and affect. His behavior is normal.   Nursing note and vitals reviewed.      Significant Labs:  CBC:  Recent Labs   Lab 10/20/18  0216 10/21/18  0437 10/22/18  0355   WBC 10.38 8.16 10.24   RBC 4.23* 3.73* 3.72*   HGB 12.4* 11.0* 10.8*   HCT 40.0 35.1* 33.8*    171 136*   MCV 95 94 91   MCH 29.3 29.5 29.0   MCHC 31.0* 31.3* 32.0     BNP:  Recent Labs   Lab 10/16/18  0435 10/18/18  0016 10/22/18  0355   BNP 3,131* 3,552* 1,354*     CMP:  Recent Labs   Lab 10/20/18  0216 10/21/18  0437 10/22/18  0355   * 128* 103   CALCIUM 10.0 9.6 9.5   ALBUMIN 3.2* 2.9* 2.8*   PROT 7.5 6.7 6.6    135* 134*   K 5.0 3.6 3.8   CO2 26 27 28   CL 98 96 95   BUN 39* 37* 40*   CREATININE 1.4 1.1 1.1   ALKPHOS 97 80 80   ALT 19 15 15   AST 19 16 18   BILITOT 1.0 1.1* 1.1*      Coagulation:   Recent Labs   Lab 10/20/18  1514 10/20/18  2308 10/21/18  0437 10/22/18  0355   INR 1.1  --  1.0 1.0   APTT 33.9* 43.9* 45.1* 38.9*     LDH:  No results for input(s): LDH in the last 72 hours.  Microbiology:  Microbiology Results (last 7 days)     ** No results found for the last 168 hours. **        I have reviewed all pertinent labs within the past 24 hours.    Estimated Creatinine Clearance: 55.6 mL/min (based on SCr of 1.1 mg/dL).    Diagnostic Results:  I have reviewed all pertinent imaging results/findings within the past 24 hours.

## 2018-10-22 NOTE — ANESTHESIA POSTPROCEDURE EVALUATION
"Anesthesia Post Evaluation    Patient: Bharat Coker    Procedure(s) Performed: Procedure(s) (LRB):  ECHOCARDIOGRAM,TRANSESOPHAGEAL (N/A)    Final Anesthesia Type: general  Patient location during evaluation: PACU  Patient participation: Yes- Able to Participate  Level of consciousness: awake and alert and oriented  Post-procedure vital signs: reviewed and stable  Pain management: adequate  Airway patency: patent  PONV status at discharge: No PONV  Anesthetic complications: no      Cardiovascular status: hemodynamically stable  Respiratory status: unassisted  Hydration status: euvolemic  Follow-up not needed.        Visit Vitals  BP (!) 97/55 (BP Location: Right arm, Patient Position: Sitting)   Pulse 95   Temp 36.1 °C (97 °F) (Oral)   Resp 20   Ht 5' 6" (1.676 m)   Wt 73.9 kg (163 lb 0.5 oz)   SpO2 (!) 94%   BMI 26.31 kg/m²       Pain/Ewa Score: Pain Assessment Performed: Yes (10/22/2018 10:00 AM)  Presence of Pain: denies (10/22/2018 10:00 AM)  Pain Rating Prior to Med Admin: 3 (10/21/2018  2:53 PM)        "

## 2018-10-22 NOTE — PHYSICIAN QUERY
PT Name: Bharat Coker  MR #: [id number]    Physician Query Form - Atrial Fibrillation Specificity     CDS/: Aidan Pineda Jr, RN              Contact information:[email or web address]     This form is a permanent document in the medical record.     Query Date: October 22, 2018    By submitting this query, we are merely seeking further clarification of documentation. Please utilize your independent clinical judgment when addressing the question(s) below.    The medical record contains the following:   Indicators     Supporting Clinical Findings Location in Medical Record   x Atrial Fibrillation ICD (implantable cardioverter-defibrillator) in place    Medtronic CRT-D (09/20/18)  - Interrogated today consistent with NSVT, Afib / AF  - Closely  Monitor for further ectopy   - Will consider EP consultation if continues to persist   - Replete electrolytes      10/16 Heart Transplant Progress Note   x EKG results Sinus rhythm  Atrial-sensed ventricular-paced rhythm with occasional Premature  ventricular complexes 10/11 EKG Report    Medication      Treatment      Other         Provider, please further specify the Atrial Fibrillation diagnosis.    [ x ] Chronic  [  ] Paroxysmal  [  ] Permanent  [  ] Persistent  [  ] Other (please specify): ____________________________  [  ] Clinically Undetermined    Please document in your progress notes daily for the duration of treatment until resolved, and include in your discharge summary.

## 2018-10-22 NOTE — PROGRESS NOTES
DATE OF PROCEDURE: 9/8/17    REQUESTING PHYSICIAN: Dr Stephanie Mckeon    PROCEDURE: Colonoscopy with EMR    ENDOSCOPIST: Daiana Vasquez M.D. PREOPERATIVE DIAGNOSIS: Hx of colon polyp at hepatic flexure partially removed    POSTOPERATIVE DIAGNOSIS: Hepatic flexure polyp, Diverticulosis, Internal hemorrhoids    SEDATION: MAC    INSTRUMENT: CF H190    DESCRIPTION OF PROCEDURE:  After informed consent was obtained the patient was placed in the left lateral decubitus position. Intravenous sedation was administered as above. After adequate sedation had been achieved, digital rectal examination was performed. The colonoscope was then inserted through the anus and followed the course of the rectum and colon to the cecum, which was confirmed by visualization of the ileocecal valve and appendiceal orifice. The colonoscope was withdrawn from that point, performing a careful survey of the mucosa. Retroflexion was performed in the rectum. FINDINGS:  Normal appearing colonic mucosa from rectum to cecum without inflammation. The large flat hepatic flexure polyp was seen (distal tattoo seen). This was lifted with saline and removed piecemeal with hot barbed snare (difficult as it wouldn't completely lift). The edges were moved with hot bx forceps. Cautery was then applied and 2 hemoclips placed. Left sided diverticulosis noted. Internal hemorrhoids seen.      Estimated Blood Loss:  0 cc-min    IMPRESSION:  Hepatic flexure polyp  Diverticulosis  Internal hemorrhoids    PLAN:  - Post polypectomy instructions  - [place] in [facility] [address], [place] [facility]  Cardiac Electrophysiology  Progress Note    Admission Date: 10/10/2018  Code Status: Full Code   Attending Physician: Jony Hendrickson Jr.,*   Expected Discharge Date: 10/23/2018  Principal Problem:Acute on chronic combined systolic and diastolic congestive heart failure    Subjective:     Interval History: No events overnight, no complaints    Tele: A-sense, V-paced and scattered PACs    ROS  Objective:     Vital Signs (Most Recent):  Temp: 97 °F (36.1 °C) (10/22/18 1149)  Pulse: 95 (10/22/18 1149)  Resp: 20 (10/22/18 1149)  BP: (!) 97/55 (10/22/18 1149)  SpO2: (!) 94 % (10/22/18 1149) Vital Signs (24h Range):  Temp:  [97 °F (36.1 °C)-98.4 °F (36.9 °C)] 97 °F (36.1 °C)  Pulse:  [74-96] 95  Resp:  [16-20] 20  SpO2:  [90 %-98 %] 94 %  BP: ()/(50-57) 97/55     Weight: 73.9 kg (163 lb 0.5 oz)  Body mass index is 26.31 kg/m².     SpO2: (!) 94 %  O2 Device (Oxygen Therapy): room air    Physical Exam   Constitutional: He is oriented to person, place, and time. No distress.   HENT:   Head: Atraumatic.   Mouth/Throat: No oropharyngeal exudate.   Eyes: EOM are normal. No scleral icterus.   Neck: Neck supple. No JVD present.   Cardiovascular: Regular rhythm and normal heart sounds. Exam reveals no gallop and no friction rub.   No murmur heard.  Tachycardic   Pulmonary/Chest: Effort normal and breath sounds normal. No respiratory distress. He has no wheezes. He has no rales. He exhibits no tenderness.   Abdominal: Soft. Bowel sounds are normal. He exhibits no distension. There is no tenderness.   Musculoskeletal: He exhibits no edema.   Neurological: He is alert and oriented to person, place, and time. No cranial nerve deficit.   Skin: Skin is warm and dry. No erythema.   Psychiatric: He has a normal mood and affect.       Significant Labs:   EP:   Recent Labs   Lab 10/20/18  1514  10/21/18  0437 10/22/18  0355   NA  --   --  135* 134*   K  --   --  3.6 3.8   CL  --   --  96 95   CO2   --   --  27 28   GLU  --   --  128* 103   BUN  --   --  37* 40*   CREATININE  --   --  1.1 1.1   CALCIUM  --   --  9.6 9.5   PROT  --   --  6.7 6.6   ALBUMIN  --   --  2.9* 2.8*   BILITOT  --   --  1.1* 1.1*   ALKPHOS  --   --  80 80   AST  --   --  16 18   ALT  --   --  15 15   ANIONGAP  --   --  12 11   ESTGFRAFRICA  --   --  >60.0 >60.0   EGFRNONAA  --   --  >60.0 >60.0   WBC  --   --  8.16 10.24   HGB  --   --  11.0* 10.8*   HCT  --    < > 35.1* 33.8*   PLT  --   --  171 136*   INR 1.1  --  1.0 1.0    < > = values in this interval not displayed.           Assessment and Plan:     Cardiac resynchronization therapy defibrillator (CRT-D) in place    73M with ischemic cardiomyopathy, HFrEF, CAD s/p CABG transferred with ADHF, undergoing VAD workup. EP consulted for frequent runs of nonsustained WCT in setting of baseline sinus tachycardia with biventricular pacing. Recently upgraded 9/2018 to CRT-D device; most recently BiV pacing decreased. Tele and EGMs with nonsustained AT with V sensed. No VT on device or tele. Frequent AT decreasing rate of biventricular pacing. AT was replaced with Atrial flutter starting at 10pm on 10/17. Has been having higher incidence of BiV pacing since CRT-D parameter adjustments.    10/19 Initial EKG: A-sensed, V-paced, Qtc 492  10/19 PM EKG: ,  -- EKG overestimated QT, actual is closer to 360  10/20 AM EKG: , Qtc 482  10/20 PM EKG: ,  -EKG over-estimated QT, closer to 440  10/21 AM EKG: , Qtc 480  10/21 PM EKG: ,  -EKG over-estimated QT, closer to 400  10/22 AM EKG: , Qtc 474    - continue tikosyn 250mcg BID  - Continue beta-blocker as tolerated  - Continue heparin drip, transition to oral anti-coagulant as per HTS  - will sign off, thank you for the consult, please call back with any questions           Glenroy Dominguez MD  Cardiac Electrophysiology  [facility]

## 2018-10-22 NOTE — ASSESSMENT & PLAN NOTE
- Medtronic CRT-D (09/20/18)  - Interrogation consistent with NSVT, Afib / AF  - Replete electrolytes

## 2018-10-22 NOTE — PLAN OF CARE
Problem: Patient Care Overview  Goal: Plan of Care Review  Outcome: Ongoing (interventions implemented as appropriate)  POC reviewed with patient,  Questions addressed and answered.  Heparin gtt therapeutic this morning. Goal for INR is 2-3 . No complaints of CP or SOB.  NAD noted, will continue to monitor.

## 2018-10-22 NOTE — PROGRESS NOTES
Update:    SW to pt's room for update. Pt aaox4, calm, and cooperative. Pt reports wife is on her way to the hospital at this time. Pt reports coping adequately at this time with no needs from SW. SW providing ongoing psychosocial and counseling support, education, assistance, resources, and discharge planning as indicated. SW continuing to follow and remains available.

## 2018-10-22 NOTE — PLAN OF CARE
Problem: Patient Care Overview  Goal: Plan of Care Review  Plan of care discussed with patient. Patient is free of fall/trauma/injury. Denies CP, SOB, or pain/discomfort. INR 1 today. Goal 2 to 3. All questions addressed. Will continue to monitor

## 2018-10-22 NOTE — PROGRESS NOTES
" [facility]  Adult Nutrition  Progress Note    SUMMARY       Recommendations    Recommendation/Intervention:   1. Continue current cardiac diet. Consider discontinuing Boost Breeze as pt with good PO intake.   2. RD following.    Goals: Intake >/=85% EEN/EPN  Nutrition Goal Status: new  Communication of RD Recs: (POC)    Reason for Assessment    Reason for Assessment: length of stay  Diagnosis: cardiac disease(CHF)  Relevant Medical History: CHF, COPD, CAD s/p CABG, DM, ICM, s/p ICD    General Information Comments: Unable to see pt on 2 attempts. Admitted with SOB, volume overload. Good intake documented, 100% of meals. No weight history available per chart review. Has diuresed 5lb since admit.     Nutrition Discharge Planning: Adequate PO intake on cardiac diet    Nutrition Risk Screen    Nutrition Risk Screen: no indicators present    Nutrition/Diet History    Do you have any cultural, spiritual, Druze conflicts, given your current situation?: none reported  Factors Affecting Nutritional Intake: None identified at this time    Anthropometrics    Temp: 97 °F (36.1 °C)  Height Method: Stated  Height: 5' 6" (167.6 cm)  Height (inches): 66 in  Weight Method: Standard Scale  Weight: 73.9 kg (163 lb 0.5 oz)  Weight (lb): 163.03 lb  Ideal Body Weight (IBW), Male: 142 lb  % Ideal Body Weight, Male (lb): 114.81 lb  BMI (Calculated): 26.4  BMI Grade: 25 - 29.9 - overweight       Lab/Procedures/Meds    Pertinent Labs Reviewed: reviewed  Pertinent Labs Comments: Na 134, BUN 40, Mg 1.5, Alb 2.8, T.Bili 1.1  Pertinent Medications Reviewed: reviewed  Pertinent Medications Comments: statin, lasix, insulin, Mg, KCl, warfarin, heparin    Physical Findings/Assessment    Overall Physical Appearance: overweight  Oral/Mouth Cavity: WDL  Skin: intact    Estimated/Assessed Needs    Weight Used For Calorie Calculations: 73.9 kg (162 lb 14.7 oz)  Energy Calorie Requirements (kcal): 1796  Energy Need Method: " [place](x 1.25 (PAL))  Protein Requirements: 74-89 gm (1.0-1.2 gm/kg)  Weight Used For Protein Calculations: 73.9 kg (162 lb 14.7 oz)  Fluid Requirements (mL): per MD     RDA Method (mL): 1796       Nutrition Prescription Ordered    Current Diet Order: Cardiac  Nutrition Order Comments: 1500 ml FR  Oral Nutrition Supplement: Boost Breeze    Evaluation of Received Nutrient/Fluid Intake    I/O: -268ml x 24 hrs (-7.9L since admit)  Comments: LBM 10/18  % Intake of Estimated Energy Needs: 75 - 100 %  % Meal Intake: 75 - 100 %    Nutrition Risk    Level of Risk/Frequency of Follow-up: low(F/u 1x weekly)     Assessment and Plan    Nutrition Problem  Decreased sodium needs    Related to (etiology):   CHF    Signs and Symptoms (as evidenced by):   Admitted with volume overload, diuresis of 7.9L since admit     Interventions/Recommendations (treatment strategy):  See recs above.    Nutrition Diagnosis Status:   New      Monitor and Evaluation    Food and Nutrient Intake: energy intake, food and beverage intake  Food and Nutrient Adminstration: diet order  Knowledge/Beliefs/Attitudes: food and nutrition knowledge/skill  Anthropometric Measurements: weight, weight change, body mass index  Biochemical Data, Medical Tests and Procedures: electrolyte and renal panel, gastrointestinal profile, glucose/endocrine profile, inflammatory profile  Nutrition-Focused Physical Findings: overall appearance     Nutrition Follow-Up    RD Follow-up?: Yes

## 2018-10-23 ENCOUNTER — TELEPHONE (OUTPATIENT)
Dept: PHARMACY | Facility: CLINIC | Age: 73
End: 2018-10-23

## 2018-10-23 VITALS
SYSTOLIC BLOOD PRESSURE: 87 MMHG | BODY MASS INDEX: 26.13 KG/M2 | WEIGHT: 162.56 LBS | RESPIRATION RATE: 16 BRPM | DIASTOLIC BLOOD PRESSURE: 60 MMHG | OXYGEN SATURATION: 91 % | HEART RATE: 86 BPM | HEIGHT: 66 IN | TEMPERATURE: 97 F

## 2018-10-23 PROBLEM — I50.20 HFREF (HEART FAILURE WITH REDUCED EJECTION FRACTION): Status: RESOLVED | Noted: 2018-10-09 | Resolved: 2018-10-23

## 2018-10-23 LAB
ALBUMIN SERPL BCP-MCNC: 3 G/DL
ALP SERPL-CCNC: 81 U/L
ALT SERPL W/O P-5'-P-CCNC: 19 U/L
ANION GAP SERPL CALC-SCNC: 10 MMOL/L
APTT BLDCRRT: 44.1 SEC
AST SERPL-CCNC: 26 U/L
BASOPHILS # BLD AUTO: 0.08 K/UL
BASOPHILS NFR BLD: 0.8 %
BILIRUB SERPL-MCNC: 0.9 MG/DL
BUN SERPL-MCNC: 32 MG/DL
CALCIUM SERPL-MCNC: 10 MG/DL
CHLORIDE SERPL-SCNC: 95 MMOL/L
CO2 SERPL-SCNC: 28 MMOL/L
CREAT SERPL-MCNC: 1.1 MG/DL
DIFFERENTIAL METHOD: ABNORMAL
EOSINOPHIL # BLD AUTO: 0.4 K/UL
EOSINOPHIL NFR BLD: 4.2 %
ERYTHROCYTE [DISTWIDTH] IN BLOOD BY AUTOMATED COUNT: 15 %
EST. GFR  (AFRICAN AMERICAN): >60 ML/MIN/1.73 M^2
EST. GFR  (NON AFRICAN AMERICAN): >60 ML/MIN/1.73 M^2
GLUCOSE SERPL-MCNC: 137 MG/DL
HCT VFR BLD AUTO: 35.7 %
HGB BLD-MCNC: 11.2 G/DL
IMM GRANULOCYTES # BLD AUTO: 0.09 K/UL
IMM GRANULOCYTES NFR BLD AUTO: 0.9 %
INR PPP: 1
LYMPHOCYTES # BLD AUTO: 1.8 K/UL
LYMPHOCYTES NFR BLD: 16.8 %
MAGNESIUM SERPL-MCNC: 1.9 MG/DL
MCH RBC QN AUTO: 28.8 PG
MCHC RBC AUTO-ENTMCNC: 31.4 G/DL
MCV RBC AUTO: 92 FL
MONOCYTES # BLD AUTO: 1.2 K/UL
MONOCYTES NFR BLD: 11.1 %
NEUTROPHILS # BLD AUTO: 7 K/UL
NEUTROPHILS NFR BLD: 66.2 %
NRBC BLD-RTO: 0 /100 WBC
PHOSPHATE SERPL-MCNC: 3.7 MG/DL
PLATELET # BLD AUTO: 140 K/UL
PMV BLD AUTO: 11.9 FL
POCT GLUCOSE: 175 MG/DL (ref 70–110)
POCT GLUCOSE: 257 MG/DL (ref 70–110)
POTASSIUM SERPL-SCNC: 4.3 MMOL/L
PROT SERPL-MCNC: 7.2 G/DL
PROTHROMBIN TIME: 10.4 SEC
RBC # BLD AUTO: 3.89 M/UL
SODIUM SERPL-SCNC: 133 MMOL/L
WBC # BLD AUTO: 10.51 K/UL

## 2018-10-23 PROCEDURE — 63600175 PHARM REV CODE 636 W HCPCS: Performed by: NURSE PRACTITIONER

## 2018-10-23 PROCEDURE — 36415 COLL VENOUS BLD VENIPUNCTURE: CPT

## 2018-10-23 PROCEDURE — 97116 GAIT TRAINING THERAPY: CPT

## 2018-10-23 PROCEDURE — 63600175 PHARM REV CODE 636 W HCPCS: Performed by: STUDENT IN AN ORGANIZED HEALTH CARE EDUCATION/TRAINING PROGRAM

## 2018-10-23 PROCEDURE — 85610 PROTHROMBIN TIME: CPT

## 2018-10-23 PROCEDURE — 83735 ASSAY OF MAGNESIUM: CPT

## 2018-10-23 PROCEDURE — 25000242 PHARM REV CODE 250 ALT 637 W/ HCPCS: Performed by: NURSE PRACTITIONER

## 2018-10-23 PROCEDURE — 93005 ELECTROCARDIOGRAM TRACING: CPT

## 2018-10-23 PROCEDURE — 84100 ASSAY OF PHOSPHORUS: CPT

## 2018-10-23 PROCEDURE — 25000003 PHARM REV CODE 250: Performed by: INTERNAL MEDICINE

## 2018-10-23 PROCEDURE — G8980 MOBILITY D/C STATUS: HCPCS | Mod: CH

## 2018-10-23 PROCEDURE — A4216 STERILE WATER/SALINE, 10 ML: HCPCS | Performed by: INTERNAL MEDICINE

## 2018-10-23 PROCEDURE — 25000003 PHARM REV CODE 250: Performed by: STUDENT IN AN ORGANIZED HEALTH CARE EDUCATION/TRAINING PROGRAM

## 2018-10-23 PROCEDURE — 25000003 PHARM REV CODE 250

## 2018-10-23 PROCEDURE — 99233 SBSQ HOSP IP/OBS HIGH 50: CPT | Mod: ,,, | Performed by: INTERNAL MEDICINE

## 2018-10-23 PROCEDURE — 80053 COMPREHEN METABOLIC PANEL: CPT

## 2018-10-23 PROCEDURE — 85730 THROMBOPLASTIN TIME PARTIAL: CPT

## 2018-10-23 PROCEDURE — 85025 COMPLETE CBC W/AUTO DIFF WBC: CPT

## 2018-10-23 RX ORDER — PRASUGREL 10 MG/1
10 TABLET, FILM COATED ORAL DAILY
Qty: 90 TABLET | Refills: 3 | Status: ON HOLD | OUTPATIENT
Start: 2018-10-23 | End: 2019-01-14 | Stop reason: HOSPADM

## 2018-10-23 RX ORDER — DOFETILIDE 0.25 MG/1
250 CAPSULE ORAL EVERY 12 HOURS
Qty: 180 CAPSULE | Refills: 3 | Status: SHIPPED | OUTPATIENT
Start: 2018-10-23 | End: 2018-10-23 | Stop reason: SDUPTHER

## 2018-10-23 RX ORDER — ENOXAPARIN SODIUM 100 MG/ML
70 INJECTION SUBCUTANEOUS
Status: DISCONTINUED | OUTPATIENT
Start: 2018-10-23 | End: 2018-10-23 | Stop reason: HOSPADM

## 2018-10-23 RX ORDER — WARFARIN 2 MG/1
2 TABLET ORAL DAILY
Qty: 30 TABLET | Refills: 11 | Status: ON HOLD | OUTPATIENT
Start: 2018-10-23 | End: 2018-11-13 | Stop reason: HOSPADM

## 2018-10-23 RX ORDER — FUROSEMIDE 80 MG/1
80 TABLET ORAL 2 TIMES DAILY
Qty: 180 TABLET | Refills: 3 | Status: SHIPPED | OUTPATIENT
Start: 2018-10-23 | End: 2018-10-23 | Stop reason: SDUPTHER

## 2018-10-23 RX ORDER — CETIRIZINE HYDROCHLORIDE 5 MG/1
5 TABLET ORAL DAILY
Refills: 0 | COMMUNITY
Start: 2018-10-24 | End: 2019-10-24

## 2018-10-23 RX ORDER — POTASSIUM CHLORIDE 20 MEQ/1
20 TABLET, EXTENDED RELEASE ORAL 2 TIMES DAILY
Qty: 90 TABLET | Refills: 3 | Status: SHIPPED | OUTPATIENT
Start: 2018-10-23 | End: 2018-10-23

## 2018-10-23 RX ORDER — LISINOPRIL 2.5 MG/1
2.5 TABLET ORAL DAILY
Qty: 90 TABLET | Refills: 3 | Status: SHIPPED | OUTPATIENT
Start: 2018-10-24 | End: 2018-10-29

## 2018-10-23 RX ORDER — POTASSIUM CHLORIDE 20 MEQ/1
20 TABLET, EXTENDED RELEASE ORAL 2 TIMES DAILY
Qty: 90 TABLET | Refills: 3 | Status: ON HOLD | OUTPATIENT
Start: 2018-10-23 | End: 2018-11-13 | Stop reason: HOSPADM

## 2018-10-23 RX ORDER — LISINOPRIL 2.5 MG/1
2.5 TABLET ORAL DAILY
Qty: 90 TABLET | Refills: 3 | Status: SHIPPED | OUTPATIENT
Start: 2018-10-24 | End: 2018-10-23

## 2018-10-23 RX ORDER — DOFETILIDE 0.25 MG/1
250 CAPSULE ORAL EVERY 12 HOURS
Qty: 90 CAPSULE | Refills: 3 | Status: SHIPPED | OUTPATIENT
Start: 2018-10-23 | End: 2018-10-23 | Stop reason: SDUPTHER

## 2018-10-23 RX ORDER — FUROSEMIDE 80 MG/1
80 TABLET ORAL DAILY
Qty: 180 TABLET | Refills: 3 | Status: SHIPPED | OUTPATIENT
Start: 2018-10-23 | End: 2018-10-23 | Stop reason: SDUPTHER

## 2018-10-23 RX ORDER — FLUTICASONE PROPIONATE 50 MCG
2 SPRAY, SUSPENSION (ML) NASAL DAILY
Status: DISCONTINUED | OUTPATIENT
Start: 2018-10-23 | End: 2018-10-23 | Stop reason: HOSPADM

## 2018-10-23 RX ORDER — DOFETILIDE 0.25 MG/1
250 CAPSULE ORAL EVERY 12 HOURS
Qty: 14 CAPSULE | Refills: 3 | Status: ON HOLD | OUTPATIENT
Start: 2018-10-23 | End: 2018-11-13 | Stop reason: HOSPADM

## 2018-10-23 RX ORDER — FUROSEMIDE 80 MG/1
80 TABLET ORAL 2 TIMES DAILY
Qty: 180 TABLET | Refills: 3 | Status: SHIPPED | OUTPATIENT
Start: 2018-10-23 | End: 2018-10-29

## 2018-10-23 RX ORDER — METOPROLOL SUCCINATE 25 MG/1
25 TABLET, EXTENDED RELEASE ORAL 2 TIMES DAILY
Qty: 180 TABLET | Refills: 3 | Status: ON HOLD | OUTPATIENT
Start: 2018-10-23 | End: 2018-11-13 | Stop reason: HOSPADM

## 2018-10-23 RX ORDER — ENOXAPARIN SODIUM 100 MG/ML
60 INJECTION SUBCUTANEOUS EVERY 12 HOURS
Qty: 6 ML | Refills: 0 | Status: ON HOLD | OUTPATIENT
Start: 2018-10-23 | End: 2018-11-13 | Stop reason: HOSPADM

## 2018-10-23 RX ADMIN — WARFARIN SODIUM 2 MG: 2 TABLET ORAL at 05:10

## 2018-10-23 RX ADMIN — INSULIN ASPART 3 UNITS: 100 INJECTION, SOLUTION INTRAVENOUS; SUBCUTANEOUS at 09:10

## 2018-10-23 RX ADMIN — METOPROLOL SUCCINATE 50 MG: 50 TABLET, EXTENDED RELEASE ORAL at 09:10

## 2018-10-23 RX ADMIN — HEPARIN SODIUM AND DEXTROSE 17 UNITS/KG/HR: 10000; 5 INJECTION INTRAVENOUS at 07:10

## 2018-10-23 RX ADMIN — PRASUGREL 10 MG: 10 TABLET, FILM COATED ORAL at 09:10

## 2018-10-23 RX ADMIN — FLUTICASONE PROPIONATE 100 MCG: 50 SPRAY, METERED NASAL at 12:10

## 2018-10-23 RX ADMIN — ATORVASTATIN CALCIUM 80 MG: 20 TABLET, FILM COATED ORAL at 09:10

## 2018-10-23 RX ADMIN — CETIRIZINE HYDROCHLORIDE 5 MG: 5 TABLET ORAL at 09:10

## 2018-10-23 RX ADMIN — ENOXAPARIN SODIUM 70 MG: 100 INJECTION SUBCUTANEOUS at 12:10

## 2018-10-23 RX ADMIN — POTASSIUM CHLORIDE 20 MEQ: 1500 TABLET, EXTENDED RELEASE ORAL at 09:10

## 2018-10-23 RX ADMIN — INSULIN ASPART 3 UNITS: 100 INJECTION, SOLUTION INTRAVENOUS; SUBCUTANEOUS at 01:10

## 2018-10-23 RX ADMIN — ASPIRIN 81 MG: 81 TABLET, COATED ORAL at 09:10

## 2018-10-23 RX ADMIN — FUROSEMIDE 80 MG: 80 TABLET ORAL at 09:10

## 2018-10-23 RX ADMIN — DOFETILIDE 250 MCG: 0.25 CAPSULE ORAL at 09:10

## 2018-10-23 RX ADMIN — Medication 3 ML: at 02:10

## 2018-10-23 NOTE — NURSING
Pt discharged with wife present. Questions asked and answered. Prescriptions given to his wife. Vss. Iv and telemetry are removed.

## 2018-10-23 NOTE — SUBJECTIVE & OBJECTIVE
Interval History: No issues overnight/complaints this am.     Continuous Infusions:   heparin (porcine) in D5W 17 Units/kg/hr (10/23/18 0712)     Scheduled Meds:   aspirin  81 mg Oral Daily    atorvastatin  80 mg Oral Daily    cetirizine  5 mg Oral Daily    dofetilide  250 mcg Oral Q12H    furosemide  80 mg Oral BID    insulin aspart U-100  3 Units Subcutaneous TIDWM    insulin detemir U-100  15 Units Subcutaneous QHS    lisinopril  2.5 mg Oral Daily    metoprolol succinate  50 mg Oral Daily    potassium chloride  20 mEq Oral BID    prasugrel  10 mg Oral Daily    sodium chloride 0.9%  3 mL Intravenous Q8H    warfarin  2 mg Oral Daily     PRN Meds:acetaminophen, dextrose 50%, dextrose 50%, glucagon (human recombinant), glucose, glucose, heparin (PORCINE), heparin (PORCINE), insulin aspart U-100, ramelteon, sodium chloride 0.9%    Review of patient's allergies indicates:   Allergen Reactions    Amiodarone analogues Anaphylaxis    Ativan [lorazepam] Anxiety     Objective:     Vital Signs (Most Recent):  Temp: 97.5 °F (36.4 °C) (10/23/18 0749)  Pulse: 88 (10/23/18 0749)  Resp: 18 (10/23/18 0749)  BP: (!) 77/53 (10/23/18 0510)  SpO2: 95 % (10/23/18 0749) Vital Signs (24h Range):  Temp:  [96.7 °F (35.9 °C)-98.4 °F (36.9 °C)] 97.5 °F (36.4 °C)  Pulse:  [] 88  Resp:  [16-20] 18  SpO2:  [92 %-98 %] 95 %  BP: (77-97)/(50-59) 77/53     Patient Vitals for the past 72 hrs (Last 3 readings):   Weight   10/23/18 0700 73.8 kg (162 lb 9.4 oz)   10/22/18 0313 73.9 kg (163 lb 0.5 oz)   10/21/18 0700 73.6 kg (162 lb 4.1 oz)     Body mass index is 26.24 kg/m².      Intake/Output Summary (Last 24 hours) at 10/23/2018 0806  Last data filed at 10/23/2018 0500  Gross per 24 hour   Intake 897 ml   Output 1575 ml   Net -678 ml        Telemetry:no evidence of major arrhythmias.    Physical Exam   Constitutional: He is oriented to person, place, and time. He appears well-developed and well-nourished.   HENT:   Head:  Normocephalic.   Eyes: Pupils are equal, round, and reactive to light.   Neck: Normal range of motion. Neck supple.   Cardiovascular: Normal rate and regular rhythm.   Murmur heard.  Pulmonary/Chest: Effort normal and breath sounds normal.   Abdominal: Soft. Bowel sounds are normal.   Musculoskeletal: Normal range of motion.   Neurological: He is alert and oriented to person, place, and time.   Skin: Skin is warm and dry.   Psychiatric: He has a normal mood and affect. His behavior is normal.   Nursing note and vitals reviewed.      Significant Labs:  CBC:  Recent Labs   Lab 10/21/18  0437 10/22/18  0355 10/23/18  0528   WBC 8.16 10.24 10.51   RBC 3.73* 3.72* 3.89*   HGB 11.0* 10.8* 11.2*   HCT 35.1* 33.8* 35.7*    136* 140*   MCV 94 91 92   MCH 29.5 29.0 28.8   MCHC 31.3* 32.0 31.4*     BNP:  Recent Labs   Lab 10/18/18  0016 10/22/18  0355   BNP 3,552* 1,354*     CMP:  Recent Labs   Lab 10/21/18  0437 10/22/18  0355 10/23/18  0528   * 103 137*   CALCIUM 9.6 9.5 10.0   ALBUMIN 2.9* 2.8* 3.0*   PROT 6.7 6.6 7.2   * 134* 133*   K 3.6 3.8 4.3   CO2 27 28 28   CL 96 95 95   BUN 37* 40* 32*   CREATININE 1.1 1.1 1.1   ALKPHOS 80 80 81   ALT 15 15 19   AST 16 18 26   BILITOT 1.1* 1.1* 0.9      Coagulation:   Recent Labs   Lab 10/20/18  1514 10/20/18  2308 10/21/18  0437 10/22/18  0355   INR 1.1  --  1.0 1.0   APTT 33.9* 43.9* 45.1* 38.9*     LDH:  No results for input(s): LDH in the last 72 hours.  Microbiology:  Microbiology Results (last 7 days)     ** No results found for the last 168 hours. **        I have reviewed all pertinent labs within the past 24 hours.    Estimated Creatinine Clearance: 55.6 mL/min (based on SCr of 1.1 mg/dL).    Diagnostic Results:  I have reviewed all pertinent imaging results/findings within the past 24 hours.

## 2018-10-23 NOTE — NURSING
Pt b/p manually is 88/56. Asymptomatic without any complaints. Informed Aman Adames NP. Further orders given and carried out. Will continue to monitor.

## 2018-10-23 NOTE — PROGRESS NOTES
[facility]  Heart Transplant  Progress Note    Patient Name: Bharat Coker  MRN: [id number]  Admission Date: 10/10/2018  Hospital Length of Stay: 13 days  Attending Physician: Jony Hendrickson Jr.,*  Primary Care Provider: Ronnie Richardson Jr, MD  Principal Problem:Acute on chronic combined systolic and diastolic congestive heart failure    Subjective:     Interval History: No issues overnight/complaints this am.     Continuous Infusions:   heparin (porcine) in D5W 17 Units/kg/hr (10/23/18 0712)     Scheduled Meds:   aspirin  81 mg Oral Daily    atorvastatin  80 mg Oral Daily    cetirizine  5 mg Oral Daily    dofetilide  250 mcg Oral Q12H    furosemide  80 mg Oral BID    insulin aspart U-100  3 Units Subcutaneous TIDWM    insulin detemir U-100  15 Units Subcutaneous QHS    lisinopril  2.5 mg Oral Daily    metoprolol succinate  50 mg Oral Daily    potassium chloride  20 mEq Oral BID    prasugrel  10 mg Oral Daily    sodium chloride 0.9%  3 mL Intravenous Q8H    warfarin  2 mg Oral Daily     PRN Meds:acetaminophen, dextrose 50%, dextrose 50%, glucagon (human recombinant), glucose, glucose, heparin (PORCINE), heparin (PORCINE), insulin aspart U-100, ramelteon, sodium chloride 0.9%    Review of patient's allergies indicates:   Allergen Reactions    Amiodarone analogues Anaphylaxis    Ativan [lorazepam] Anxiety     Objective:     Vital Signs (Most Recent):  Temp: 97.5 °F (36.4 °C) (10/23/18 0749)  Pulse: 88 (10/23/18 0749)  Resp: 18 (10/23/18 0749)  BP: (!) 77/53 (10/23/18 0510)  SpO2: 95 % (10/23/18 0749) Vital Signs (24h Range):  Temp:  [96.7 °F (35.9 °C)-98.4 °F (36.9 °C)] 97.5 °F (36.4 °C)  Pulse:  [] 88  Resp:  [16-20] 18  SpO2:  [92 %-98 %] 95 %  BP: (77-97)/(50-59) 77/53     Patient Vitals for the past 72 hrs (Last 3 readings):   Weight   10/23/18 0700 73.8 kg (162 lb 9.4 oz)   10/22/18 0313 73.9 kg (163 lb 0.5 oz)   10/21/18 0700 73.6 kg (162 lb 4.1 oz)     Body mass  index is 26.24 kg/m².      Intake/Output Summary (Last 24 hours) at 10/23/2018 0806  Last data filed at 10/23/2018 0500  Gross per 24 hour   Intake 897 ml   Output 1575 ml   Net -678 ml        Telemetry:no evidence of major arrhythmias.    Physical Exam   Constitutional: He is oriented to person, place, and time. He appears well-developed and well-nourished.   HENT:   Head: Normocephalic.   Eyes: Pupils are equal, round, and reactive to light.   Neck: Normal range of motion. Neck supple.   Cardiovascular: Normal rate and regular rhythm.   Murmur heard.  Pulmonary/Chest: Effort normal and breath sounds normal.   Abdominal: Soft. Bowel sounds are normal.   Musculoskeletal: Normal range of motion.   Neurological: He is alert and oriented to person, place, and time.   Skin: Skin is warm and dry.   Psychiatric: He has a normal mood and affect. His behavior is normal.   Nursing note and vitals reviewed.      Significant Labs:  CBC:  Recent Labs   Lab 10/21/18  0437 10/22/18  0355 10/23/18  0528   WBC 8.16 10.24 10.51   RBC 3.73* 3.72* 3.89*   HGB 11.0* 10.8* 11.2*   HCT 35.1* 33.8* 35.7*    136* 140*   MCV 94 91 92   MCH 29.5 29.0 28.8   MCHC 31.3* 32.0 31.4*     BNP:  Recent Labs   Lab 10/18/18  0016 10/22/18  0355   BNP 3,552* 1,354*     CMP:  Recent Labs   Lab 10/21/18  0437 10/22/18  0355 10/23/18  0528   * 103 137*   CALCIUM 9.6 9.5 10.0   ALBUMIN 2.9* 2.8* 3.0*   PROT 6.7 6.6 7.2   * 134* 133*   K 3.6 3.8 4.3   CO2 27 28 28   CL 96 95 95   BUN 37* 40* 32*   CREATININE 1.1 1.1 1.1   ALKPHOS 80 80 81   ALT 15 15 19   AST 16 18 26   BILITOT 1.1* 1.1* 0.9      Coagulation:   Recent Labs   Lab 10/20/18  1514 10/20/18  2308 10/21/18  0437 10/22/18  0355   INR 1.1  --  1.0 1.0   APTT 33.9* 43.9* 45.1* 38.9*     LDH:  No results for input(s): LDH in the last 72 hours.  Microbiology:  Microbiology Results (last 7 days)     ** No results found for the last 168 hours. **        I have reviewed all pertinent  "labs within the past 24 hours.    Estimated Creatinine Clearance: 55.6 mL/min (based on SCr of 1.1 mg/dL).    Diagnostic Results:  I have reviewed all pertinent imaging results/findings within the past 24 hours.    Assessment and Plan:     72 y/o male with PMH of CAD s/p CABG, ICM, HFrEF who presents as a transfer from OSH after he presented there for [facility], weight gain and palpitations. He presented to OSH after being discharged from the same facility 4 days prior to Oct 8. He was there initially for ADHF and KASEY. The patient recently had a revision of his CRT-D. The patient became SOB on Oct 7 but did not have CP. The patient felt his pulse and noted it to be rapid. He was brought to the ED at OSH by EMS; his HR was ~170. The ECG was c/w WCT (later documentation notes SVT with aberrancy after interrogation was negative for VT), and he was given one dose of adenosine. The HR came down to 110. His CXR was c/w pulmonary edema. A pro-BNP was at one point 20084.The patient's WBC was 17 and sCr 1.6. tBili was 1.5. An ECHO on 10/8: LVIDd 6.5 cm, EF 15%, IVC normal collapsibility and normal IVC size. An undated stress test notes: "mild reversibility noted in the cardiac apex and there lateral wall of the heart from cardiac apex to approximately mid wall suspicion for ischemia..mild reversibility noted in the mid to basilar inferior/inferoseptal wall of the heart also suspicious for ischemia". He had a LHC 6/2018: LIMA-LAD patent, % occluded at ostium, SVG-% occluded, SVG-D 100% occluded, pLCx 30%, 100% occluded OM, 100% LAD occlusion after takeoff of D1, D1 is tiny with 80% disease, SVG-OM 80% proximal stenosis with 50% at site of anastomosis. An SVG-OM OKSANA was placed at that time. The patient's WBC was 9.6 as of 10/9. Aldactone was held at some point 2/2 hyperkalemia. The patient believes he has been diuresed adequately and has no fluid left to remove.     * Acute on chronic combined systolic and diastolic " congestive heart failure    Mr. Coker is a 74 y/o male with PMH of CAD s/p CABG, ICM/HFrEF (LVef <10%, LVEDD 7.8) who was a transfer for cardiogenic shock / MODS.  - [place] 6/2018: LIMA-LAD patent, % occluded at ostium, SVG-% occluded, SVG-D 100% occluded, pLCx 30%, 100% occluded OM, 100% LAD occlusion after takeoff of D1, D1 is tiny with 80% disease, SVG-OM 80% proximal stenosis with 50% at site of anastomosis. An SVG-OM OKSANA was placed at that time.   -GDMT: Lisinopril 2.5mg daily, Metoprolol succinate 50mg daily. BP has been limiting up titration.   -Continue PO Lasix.   -Pathway started but stopped secondary to frailty. Dr Khan would like to see in clinic before proceeding.   -Fluid restriction at 1500 cc with strict I/Os and daily STANDING weights.  -Continue PT / OT, ambulate as tolerated, elevated LE        SVT (supraventricular tachycardia)    -Arrhythmia suggestive of atypical atrial flutter, s/p LAVERNE/DCCV on 10/19/2018.  -Started on Dofetilide on 10/19/2018 as recommended by EP service.  -On heparin gtt/warfarin(NOACs not an option, patient on DAPT with Prasugrel and ASA due to PCI to OKSANA on 06/2018). Awaiting therapeutic INR. Will discuss LMWH option.  -Will follow closely.     VT (ventricular tachycardia)    Frequent NSVT with underlying ST:   - Continue toprol/amio.  -Keep K>4, Mag>2.      ICD (implantable cardioverter-defibrillator) in place    - Medtronic CRT-D (09/20/18)  - Interrogation consistent with NSVT, Afib / AF  - Replete electrolytes      CAD (coronary artery disease)    - Recent MI 06/2018, ICM / HFrEF / CAD s/p CABG:   - Continue DAPT, statin, and BB     Skin rash    Posterior neck, likely eczema:  - Continue supportive care with topical      DM (diabetes mellitus)     A1c 6.9,   - SSI with suleman Adames NP  Heart Transplant  [facility]

## 2018-10-23 NOTE — PLAN OF CARE
Problem: Physical Therapy Goal  Goal: Physical Therapy Goal  Goals to be met by: 10/26/2018     Patient will increase functional independence with mobility by performin. Supine to sit with [place]  2. Sit to stand transfer with [place]  3. Gait  x 400 feet with [place] using no AD with no LOB.   4. Ascend/descend 3 stair with left Handrails Supervision using no AD.      Outcome: Outcome(s) achieved Date Met: 10/23/18  Pt is independent with all mobility and does not require further acute skilled therapy intervention. Discharge from PT services and re-consult if pt experiences a change in status.

## 2018-10-23 NOTE — PROGRESS NOTES
D/C note:    SW to pt's room for update. Pt sitting up in chair aaox4, calm, and pleasant. Pt reports wife is on her way to [facility] at this time. Pt reports in agreement with plan to D/C home with no services arranged by SW. Pt's wife to provide transport. SW providing ongoing psychosocial and counseling support, education, assistance, resources, and discharge planning as indicated. SW continuing to follow and remains available.

## 2018-10-23 NOTE — ASSESSMENT & PLAN NOTE
Mr. Coker is a 74 y/o male with PMH of CAD s/p CABG, ICM/HFrEF (LVef <10%, LVEDD 7.8) who was a transfer for cardiogenic shock / MODS.  - [facility] 6/2018: LIMA-LAD patent, % occluded at ostium, SVG-% occluded, SVG-D 100% occluded, pLCx 30%, 100% occluded OM, 100% LAD occlusion after takeoff of D1, D1 is tiny with 80% disease, SVG-OM 80% proximal stenosis with 50% at site of anastomosis. An SVG-OM OKSANA was placed at that time.   -GDMT: Lisinopril 2.5mg daily, Metoprolol succinate 50mg daily. BP has been limiting up titration.   -Continue PO Lasix.   -Pathway started but stopped secondary to frailty. Dr Khan would like to see in clinic before proceeding.   -Fluid restriction at 1500 cc with strict I/Os and daily STANDING weights.  -Continue PT / OT, ambulate as tolerated, elevated LE

## 2018-10-23 NOTE — DISCHARGE SUMMARY
"[facility]  Heart Transplant  Discharge Summary      Patient Name: Bharat Coker  MRN: [id number]  Admission Date: 10/10/2018  Hospital Length of Stay: 13 days  Discharge Date and Time: 10/23/2018 2:06 PM  Attending Physician: Jony Hendrickson Jr.,*   Discharging Provider: Aman Adames NP  Primary Care Provider: Ronnie Richardson Jr, MD     HPI: 74 y/o male with PMH of CAD s/p CABG, ICM, HFrEF who presents as a transfer from OSH after he presented there for SUMNER, weight gain and palpitations. He presented to OSH after being discharged from the same facility 4 days prior to Oct 8. He was there initially for ADHF and KASEY. The patient recently had a revision of his CRT-D. The patient became SOB on Oct 7 but did not have CP. The patient felt his pulse and noted it to be rapid. He was brought to the ED at OSH by EMS; his HR was ~170. The ECG was c/w WCT (later documentation notes SVT with aberrancy after interrogation was negative for VT), and he was given one dose of adenosine. The HR came down to 110. His CXR was c/w pulmonary edema. A pro-BNP was at one point 20084.The patient's WBC was 17 and sCr 1.6. tBili was 1.5. An ECHO on 10/8: LVIDd 6.5 cm, EF 15%, IVC normal collapsibility and normal IVC size. An undated stress test notes: "mild reversibility noted in the cardiac apex and there lateral wall of the heart from cardiac apex to approximately mid wall suspicion for ischemia..mild reversibility noted in the mid to basilar inferior/inferoseptal wall of the heart also suspicious for ischemia". He had a LHC 6/2018: LIMA-LAD patent, % occluded at ostium, SVG-% occluded, SVG-D 100% occluded, pLCx 30%, 100% occluded OM, 100% LAD occlusion after takeoff of D1, D1 is tiny with 80% disease, SVG-OM 80% proximal stenosis with 50% at site of anastomosis. An SVG-OM OKSANA was placed at that time. The patient's WBC was 9.6 as of 10/9. Aldactone was held at some point 2/2 hyperkalemia. The " patient believes he has been diuresed adequately and has no fluid left to remove.    Procedure(s) (LRB):  ECHOCARDIOGRAM,TRANSESOPHAGEAL (N/A)     Hospital Course: He was admitted and aggressively diuresed. Workup for advanced options was started but stopped secondary to his Frailty/calcifications on CT. Dr Khan wishes to see him as outpt to further assess. EP was consulted secondary to SVT which they felt was AT. LAVERNE/DCCV was performed and pt was started on Tikosyn which he tolerated while admitted. His GDMT was up titrated but limited secondary to low BPs. Once medically ready, he was discharged home in good condition with plans to f/u with CTS/HTS and EP within the next few weeks.     Consults (From admission, onward)        Status Ordering Provider     Inpatient consult to Cardiothoracic Surgery  Once     Provider:  (Not yet assigned)    Completed VENTURA SWANN     Inpatient consult to Electrophysiology  Once     Provider:  (Not yet assigned)    Completed DIAZ DEWEY     Inpatient consult to Palliative Care  Once     Provider:  (Not yet assigned)    Completed NITO HALL JR        Pending Diagnostic Studies:     None        Final Active Diagnoses:    Diagnosis Date Noted POA    PRINCIPAL PROBLEM:  Acute on chronic combined systolic and diastolic congestive heart failure [I50.43]  Unknown    SVT (supraventricular tachycardia) [I47.1] 10/18/2018 Unknown    VT (ventricular tachycardia) [I47.2] 10/14/2018 Unknown    ICD (implantable cardioverter-defibrillator) in place [Z95.810] 10/11/2018 Yes    CAD (coronary artery disease) [I25.10] 10/10/2018 Yes    Atrial fibrillation status post cardioversion [I48.91]  Unknown    Palliative care encounter [Z51.5] 10/17/2018 Not Applicable    Atrial tachycardia [I47.1] 10/17/2018 Unknown    Cardiac resynchronization therapy defibrillator (CRT-D) in place [Z95.810] 10/17/2018 Unknown    Skin rash [R21] 10/14/2018 Unknown    PNA  (pneumonia) [J18.9] 10/12/2018 Unknown    Ischemic cardiomyopathy [I25.5]  Unknown    Pre-transplant evaluation for heart transplant [Z01.818]  Not Applicable    KASEY (acute kidney injury) [N17.9]  Unknown    Hepatic congestion [K76.1]  Unknown    DM (diabetes mellitus) [E11.9] 10/10/2018 Yes      Problems Resolved During this Admission:    Diagnosis Date Noted Date Resolved POA    HFrEF (heart failure with reduced ejection fraction) [I50.20] 10/09/2018 10/23/2018 Yes      Discharged Condition: good    Patient Instructions:   No discharge procedures on file.  Medications:  Reconciled Home Medications:      Medication List      START taking these medications    cetirizine 5 MG tablet  Commonly known as:  ZYRTEC  Take 1 tablet (5 mg total) by mouth once daily.  Start taking on:  10/24/2018     dofetilide 250 MCG Cap  Commonly known as:  TIKOSYN  Take 1 capsule (250 mcg total) by mouth every 12 (twelve) hours.     enoxaparin 60 mg/0.6 mL Syrg  Commonly known as:  LOVENOX  Inject 0.6 mLs (60 mg total) into the skin every 12 (twelve) hours.  Replaces:  enoxaparin 150 mg/mL Syrg     furosemide 80 MG tablet  Commonly known as:  LASIX  Take 1 tablet (80 mg total) by mouth 2 (two) times daily.     lisinopril 2.5 MG tablet  Commonly known as:  PRINIVIL,ZESTRIL  Take 1 tablet (2.5 mg total) by mouth once daily.  Start taking on:  10/24/2018     potassium chloride SA 20 MEQ tablet  Commonly known as:  K-DUR,KLOR-CON  Take 1 tablet (20 mEq total) by mouth 2 (two) times daily.        CONTINUE taking these medications    aspirin 81 MG Chew  Take 81 mg by mouth once daily.     atorvastatin 80 MG tablet  Commonly known as:  LIPITOR  Take 80 mg by mouth nightly.     azelastine 137 mcg (0.1 %) nasal spray  Commonly known as:  ASTELIN  1 spray by Nasal route 2 (two) times daily.     benzonatate 200 MG capsule  Commonly known as:  TESSALON  Take 200 mg by mouth 3 (three) times daily as needed for Cough.     docusate sodium 100 MG  capsule  Commonly known as:  COLACE  Take 100 mg by mouth 2 (two) times daily as needed for Constipation.     EFFIENT ORAL  Take 10 mg by mouth once daily.     fluticasone 50 mcg/actuation nasal spray  Commonly known as:  FLONASE  2 sprays by Each Nare route once daily.     glipiZIDE 2.5 MG Tr24  Commonly known as:  GLUCOTROL  Take 5 mg by mouth 2 (two) times daily.     insulin aspart U-100 100 unit/mL injection  Commonly known as:  NOVOLOG  Inject 2-10 Units into the skin 4 (four) times daily with meals and nightly.     metoprolol succinate 25 MG 24 hr tablet  Commonly known as:  TOPROL-XL  Take 25 mg by mouth 2 (two) times daily.     ranitidine 150 MG tablet  Commonly known as:  ZANTAC  Take 150 mg by mouth once daily.     SITagliptin 50 MG Tab  Commonly known as:  JANUVIA  Take 100 mg by mouth once daily.        STOP taking these medications    acetaminophen 650 MG Tbsr  Commonly known as:  TYLENOL     doxycycline 100 MG tablet  Commonly known as:  VIBRA-TABS     enoxaparin 150 mg/mL Syrg  Commonly known as:  LOVENOX  Replaced by:  enoxaparin 60 mg/0.6 mL Syrg            Aman Adames NP  Heart Transplant  [facility]

## 2018-10-23 NOTE — TELEPHONE ENCOUNTER
Patient gave consent to speak to wife - Nava Simon. Confirmed two patient identifiers - name + . Patient has been given the medication for the last few days and closely monitored while inpatient, so wife declines further Tikosyn (dofetilide) consultation. Dose confirmed at 250mcg PO Q12H. Patient requests only a 7d/s of medication - $64.84 copay ([facility]), to be delivered bedside, Room 328.     Orders were discontinued to print a [facility]. [facility] reached Aman Adames NP which sent a new order for dofetilide 250mcg - 7d/s. [facility] will deliver to patient today/ASAP.     Patient requested that [facility] call [facility] to make sure they have the medication - called and spoke to Amanda, which states that she is not allowed to disclose that information over the phone, patient must bring Rx in to find out - wife notified. [facility] will f/u in a few days to check status, Patient Care Plan added. TTN

## 2018-10-23 NOTE — ASSESSMENT & PLAN NOTE
-Arrhythmia suggestive of atypical atrial flutter, s/p LAVERNE/DCCV on 10/19/2018.  -Started on Dofetilide on 10/19/2018 as recommended by EP service.  -On heparin gtt/warfarin(NOACs not an option, patient on DAPT with Prasugrel and ASA due to PCI to OKSANA on 06/2018). Awaiting therapeutic INR. Will discuss LMWH option.  -Will follow closely.

## 2018-10-23 NOTE — PT/OT/SLP PROGRESS
Physical Therapy Treatment/Discharge    Patient Name:  Bharat Coker   MRN:  [id number]    Recommendations:     Discharge Recommendations:  home   Discharge Equipment Recommendations: none   Barriers to discharge: None    Assessment:     Bharat Coker is a 73 y.o. male admitted with a medical diagnosis of Acute on chronic combined systolic and diastolic congestive heart failure.  He presents with the following impairments/functional limitations:  impaired endurance, impaired cardiopulmonary response to activity. Pt is independent and safe with all mobility and ADLs. Pt is at baseline mobility and does not require further acute skilled therapy intervention. Discharge from PT services and re-consult if pt experiences a change in status.       Rehab Prognosis:  good;     Recent Surgery: Procedure(s) (LRB):  ECHOCARDIOGRAM,TRANSESOPHAGEAL (N/A) 4 Days Post-Op    Plan:     · Plan of Care Expires:  11/12/18   Plan of Care Reviewed with: patient    Subjective     Communicated with RN prior to session.  Patient found supine upon PT entry to room, agreeable to treatment.      Chief Complaint: no complaints at this time   Patient comments/goals: to get better and return home   Pain/Comfort:  · Pain Rating 1: 0/10  · Pain Rating Post-Intervention 1: 0/10    Patients cultural, spiritual, Jewish conflicts given the current situation: none reported    Objective:     Patient found with: telemetry, peripheral IV     General Precautions: Standard, fall   Orthopedic Precautions:N/A   Braces: N/A     Functional Mobility:  · Bed Mobility:     · Supine to Sit: independence  · Transfers:     · Sit to Stand:  independence with no AD  · Toilet Transfer: independence with  no AD  using  Step Transfer  · Gait: Pt ambulated 200 feet with full turn with independence. First 100 feet with no AD, second 100 feet pushing IV pole to ensure safety with independent ambulation in hallway following discharge from PT. No rest breaks, no LOB, no  SOB.       AM-PAC 6 CLICK MOBILITY  Turning over in bed (including adjusting bedclothes, sheets and blankets)?: 4  Sitting down on and standing up from a chair with arms (e.g., wheelchair, bedside commode, etc.): 4  Moving from lying on back to sitting on the side of the bed?: 4  Moving to and from a bed to a chair (including a wheelchair)?: 4  Need to walk in hospital room?: 4  Climbing 3-5 steps with a railing?: 4  Basic Mobility Total Score: 24       Therapeutic Activities and Exercises:   Pt educated on role of PT/POC. Discussed safety with mobility at home and community level. Discussed plan to discharge pt from PT services. Pt verbalized understanding and agreed that he feels he is at baseline and does not require further PT services. Pt encouraged to ambulate in hallway daily to maintain strength and endurance. Pt verbalized understanding.     Patient left sitting EOB  with all lines intact, call button in reach and RN  notified..    GOALS:   Multidisciplinary Problems     Physical Therapy Goals     Not on file          Multidisciplinary Problems (Resolved)        Problem: Physical Therapy Goal    Goal Priority Disciplines Outcome Goal Variances Interventions   Physical Therapy Goal   (Resolved)     PT, PT/OT Outcome(s) achieved     Description:  Goals to be met by: 10/26/2018     Patient will increase functional independence with mobility by performin. Supine to sit with [place]  2. Sit to stand transfer with [place]  3. Gait  x 400 feet with [place] using no AD with no LOB.   4. Ascend/descend 3 stair with left Handrails Supervision using no AD.                       Time Tracking:     PT Received On: 10/23/18  PT Start Time: 933     PT Stop Time: 942  PT Total Time (min): 9 min     Billable Minutes: Gait Training 9 mins     Treatment Type: Treatment  PT/PTA: PT           Liliane Mulligan, PT  10/23/2018

## 2018-10-23 NOTE — PLAN OF CARE
Problem: Patient Care Overview  Goal: Plan of Care Review  Outcome: Ongoing (interventions implemented as appropriate)  Pt free of falls and injury during shift. POC reviewed with pt. VS stable. Paced on telemetry. POC ,admin 2 units of SS insulin. No acute events noted at this time. Pt complained of itching over back, applied moisturizer and obtained orders for hydroxyzine. Heparin infusing at 17 units per kg. Educated pt why they are at risk for falls and to use call light for assistance ambulating. Yellow non-slip socks on pt. Bed low and locked, call light with in reach. Will continue to monitor.

## 2018-10-24 NOTE — PHYSICIAN QUERY
PT Name: Bharat Coker  MR #: [id number]     Physician Query Form - Documentation Clarification      CDS/: Aidan Pineda Jr, RN              Contact information:[email or web address]    This form is a permanent document in the medical record.     Query Date: October 24, 2018    By submitting this query, we are merely seeking further clarification of documentation. Please utilize your independent clinical judgment when addressing the question(s) below.    The Medical record reflects the following:    Supporting Clinical Findings Location in Medical Record   FINDINGS:  AICD leads overlie the heart.  Sternotomy wires present.  No consolidation, pleural effusion or pneumothorax.  Cardiomegaly with bilateral edema.    Patchy reticulation and patchy consolidation throughout both lungs which may relate to volume overload.    WBC=10.92-->9.82-->9.15    Temp=97.4-->98.7-->97.7     Pt complain of nasal congestion, decongestant ordered. Will continue to monitor.     guaiFENesin 12 hr tablet 600 mg 2 Times Daily    PNA (pneumonia)      Principal Problem:Acute on chronic combined systolic and diastolic congestive heart failure    Final Active Diagnoses:   Diagnosis Date Noted   PNA (pneumonia) 10/12/2018   10/10 Chest X Ray report              10/11 CT Chest        10/10-10/12 Labs    10/10 VS Flowsheet    10/17 Nursing Progress Note        10/17-10/19 MAR      10/19 Anesthesia Pre Procedure Note   (Patient Active Problem List)    10/23 Heart Transplant Progress Note      10/23 Discharge Summary                                                                                    Doctor, Please specify diagnosis or diagnoses associated with above clinical findings.  Specify the Status of the Pneumonia Noted on the Discharge Summary    Provider Use Only    (    )Pneumonia,Present on Admit    (    )Pneumonia,Not Present on Admit    (    )Pneumonia was not present this admit    (  x   )Other_________________________________________________                                                                                                           [  ] Clinically undetermined

## 2018-10-25 ENCOUNTER — NURSE TRIAGE (OUTPATIENT)
Dept: ADMINISTRATIVE | Facility: CLINIC | Age: 73
End: 2018-10-25

## 2018-10-25 ENCOUNTER — TELEPHONE (OUTPATIENT)
Dept: TRANSPLANT | Facility: CLINIC | Age: 73
End: 2018-10-25

## 2018-10-25 ENCOUNTER — PATIENT OUTREACH (OUTPATIENT)
Dept: ADMINISTRATIVE | Facility: CLINIC | Age: 73
End: 2018-10-25

## 2018-10-25 DIAGNOSIS — I25.5 ISCHEMIC CARDIOMYOPATHY: Primary | ICD-10-CM

## 2018-10-25 DIAGNOSIS — I48.91 ATRIAL FIBRILLATION STATUS POST CARDIOVERSION: ICD-10-CM

## 2018-10-25 NOTE — PROGRESS NOTES
Wife states talked to Katarzyna, coordinator, and pulse came down and decided to go to pcp at 1330 today.

## 2018-10-25 NOTE — TELEPHONE ENCOUNTER
[facility] call [facility] with [facility] 1.07.  She siad pt has a few more days of lovenox at home and she refilled his lovenox for him.  Advised Dr Villalobos, that I will speak with KATHLEEN Adames NP in the AM to enroll pt in coumadin clinic.     Discussed INR result with DR Rodriges.  Order received for pt to take 4mg coumadin today and tomorrow then receive further instruction from coumadin clinic.       Call placed to pt's wife. Advised of coumadin 4mg po today and tomorrow and to expect a call from NP or coumadin clinic with further instruction.

## 2018-10-25 NOTE — PATIENT INSTRUCTIONS
Discharge Instructions for Heart Failure  The heart is a muscle that pumps oxygen-rich blood to all parts of the body. When you have heart failure, the heart is not able to pump as well as it should. Blood and fluid may back up into the lungs (congestive heart failure), and some parts of the body dont get enough oxygen-rich blood to work normally. These problems lead to the symptoms of heart failure. Heart failure can occur due to an injury to the heart or from natural processes.  You can control symptoms of heart failure with some lifestyle changes and by following your doctor's advice.  Home care  Activity  Ask your healthcare provider about an exercise program. You can benefit from simple activities such as walking or gardening. Exercising most days of the week can make you feel better. Don't be discouraged if your progress is slow at first. Rest as needed. Stop activity if you develop symptoms such as chest pain, lightheadedness, or significant shortness of breath. Find activities that you enjoy, such as brisk walking, dancing, swimming, or gardening. These will help you stay active and strengthen your heart.  Diet  Follow a heart healthy diet. And make sure to limit the salt (sodium) in your diet. Salt causes your body to hold water. This makes your heart work harder as there is more fluid for the heart to pump. Limit your salt by doing the following:  · Limit canned, dried, packaged, and fast foods.  · Don't add salt to your food.  · Season foods with herbs instead of salt.  · Watch how much liquids you drink. Drinking too much can make heart failure worse. Talk with your health care provider about how much you should drink each day.  · Limit the amount of alcohol you drink. It may harm your heart. Women should have no more than 1 drink a day and men should have no more than 2.  · When you eat out, request that your meals have no added salt.  Tobacco  If you smoke, it's very important to quit. Smoking  increases your chances of having a heart attack by harming the blood vessels that provide oxygen to your heart. This makes heart failure worse. Quitting smoking is the number one thing you can do to improve your health. Enroll in a stop-smoking program to improve your chances of success. Talk with your healthcare provider about medicines or nicotine replacement therapy to help you quit smoking. Ask your healthcare provider about smoking cessation support groups.  Medicine  Take your medicines exactly as prescribed. Learn the names and purpose of each of your medicines. Keep an accurate medicine list and current dosages with you at all times. Don't skip doses. If you miss a dose of your medicine, take it as soon as you remember. If you miss a dose and it's almost time for your next dose, just wait and take your next dose at the normal time. Don't take a double dose. If you are unsure, call your doctor's office. Make sure not to mix up your medicines or forget what you've taken the same day.  Weight monitoring  Weigh yourself every day. A sudden weight gain can mean your heart failure is getting worse. Weigh yourself at the same time of day and in the same kind of clothes. Ideally, weigh yourself first thing in the morning after you empty your bladder, but before you eat breakfast. Your healthcare provider will show you how to track your weight. He or she will also discuss with you when you should call if you have a sudden, unexpected increase in your weight.  In general, your healthcare provider may ask you to report if your weight goes up by more than 2 pounds in 1 day,  5 pounds in 1 week, or whatever weight gain you were told by your doctor. This is a sign that you are retaining more fluid than you should be. Clues to weight gain include checking your ankles for swelling, or noticing you are short of breath when you lie down.  Follow-up care  Make a follow-up appointment as directed. Depending on the type and  severity of heart failure you have, you may need follow-up as early as 7 days from hospital discharge. Keep appointments for checkups and lab tests that are needed to check your medicines and condition.  Recognize that your health and even survival depend on your following medical recommendations.  Symptoms  Heart failure can cause a variety of symptoms, including:  · Shortness of breath  · Trouble breathing at night, especially when you lie down  · Swelling in the legs and feet or in the belly (abdomen)  · Becoming easily fatigued  · Irregular or rapid heartbeat  · Weakness or lightheadedness  · Swelling of the neck veins  It is important to know what to do if symptoms get worse or if you develop signs of worsening heart failure.     When to see your healthcare provider  Call your doctor right away if you have any of these signs of worsening heart failure:  · Sudden weight gain (more than 2 pounds in 1 day or 5 pounds in 1 week, or whatever weight gain you were told to report by your doctor)  · Trouble breathing not related to being active  · New or increased swelling of your legs or ankles  · Swelling or pain in your abdomen  · Breathing trouble at night (waking up short of breath, needing more pillows to breathe)  · Frequent coughing that doesn't go away  · Feeling much more tired than usual  Call 911  Call 911 right away if you have:  · Severe shortness of breath, such that you can't catch your breath even while resting  · Severe chest pain that does not resolve with rest or nitroglycerin  · Pink, foamy mucus with cough and shortness of breath  · A continuous rapid or irregular heartbeat  · Passing out or fainting  · Stroke symptoms such as sudden numbness or weakness on one side of your face, arm, or leg or sudden confusion, trouble speaking or vision changes   Date Last Reviewed: 3/21/2016  © 6900-6359 [employer]. [address] [place] [address], [place] [address]. All rights reserved. This information  is not intended as a substitute for professional medical care. Always follow your healthcare professional's instructions.        Taking a Diuretic  Your healthcare provider has prescribed a diuretic, or water pill, to help your body get rid of excess water and salt and maintain appropriate fluid balance. Taking your diuretic can help you feel better, breathe better, move more easily, and have more energy.     Take your medication early in the day at the same time each day.      The name of my diuretic is:     ________________________________________   Medicine tips  · Read the fact sheet that comes with your medicine. It tells you when and how to take it. Ask for a medicine sheet if you dont get one.  · If you take 2 or more doses each day, take the last one before dinner if you can. That way youll get up fewer times during the night to go to the bathroom. However, make sure you have enough time between doses during the day.   · If you miss a dose, take it as soon as you remember. If it is almost time for the next dose, skip the missed dose. Do not take a double dose.  · If you miss 2 or more consecutive doses, call your healthcare provider. You may be at risk for fluid buildup.  For your safety  · Follow your doctors guidelines for potassium intake. You may need to take a potassium supplement. Or, you may need to avoid potassium supplements, salt substitutes with potassium, or large amounts of high-potassium foods (such as bananas, potatoes, broccoli, and milk). Recommendations for potassium will depend on the type of diuretic you are prescribed along with your kidney function and other factors. Your healthcare provider will likely want to check your potassium level regularly while you are taking this medicine.  · Talk to your healthcare provider about whether it is safe for you to drink alcohol while taking this medicine.  · Get up slowly when you are sitting or lying down. This helps prevent dizziness  and falls due to dizziness.  · Ask your healthcare provider or pharmacist before you take any other prescription or nonprescription medicine or herbal supplements. Some of them may interact with your diuretic and keep it from working correctly.  · Limit exposure to sunlight. A diuretic may increase your sensitivity to the sun. Even brief sun exposure may cause skin rash, itching, redness, or other discoloration. It may also lead to severe sunburn. To protect your skin do the following:  ¨ Avoid direct sunlight, especially between 10 a.m. and 2 p.m., whenever possible.  ¨ Apply a daily sunblock of at least SPF 15 (or higher) to any exposed skin, including your lips.  ¨ Wear protective clothing, such as a hat and sunglasses, when you are outdoors.  ¨ Avoid sunlamps and tanning booths.  ¨ Long-sleeve shirts and pants in the summer can help protect your skin.        When to seek medical advice  Call your healthcare provider right away if any of these occur:  · Have diarrhea, constipation, nausea or vomiting.  · Lose your appetite or notice a rapid or excessive weight gain.  · Feel extremely tired or weak.  · Have shortness of breath or difficulty breathing or swallowing.  · Have numbness or tingling in your hands, feet, or lips or a ringing in your ears.  · Feel lightheaded when getting up after sitting or lying down.  · Have headaches, blurred vision, or feel a sense of confusion.  · Have muscle cramps or joint pain.  · Have chest pains or changes in your heartbeat.  · Have an excessive thirst or a dry mouth.  · Notice a skin rash.  · Gain more than 2 pounds in 1 day or 4 pounds in 1 week (ask your healthcare provider for his or her specific direction).  · Have any other unusual symptoms.   Date Last Reviewed: 6/1/2016  © 7301-9356 [employer]. [address] [place] [address], [place] [address]. All rights reserved. This information is not intended as a substitute for professional medical care. Always follow your  healthcare professional's instructions.

## 2018-10-25 NOTE — PROGRESS NOTES
Called progressed to triage with Call 911 now disposition. Will call back later to see if pt decided to go to ER.

## 2018-10-25 NOTE — TELEPHONE ENCOUNTER
Wife stated that during [facility] call that pt Blood pressure this am 69\48 around 0830. Had her recheck blood pressure 83\44, pulse 131, and c\o mild lightheadedness. Weight at d\c was 162 and today 157.2. BS this am 92. Wife stated that she was told by d\c provider if low blood pressure with symptoms to call cardiologist, but unable to tell me who cardiologist is. She stated that pt has appt 1330 today and asked if she should just wait. I instructed her to call 911 Now. She states that she will talk to pt and see what he wants to do.   Reason for Disposition   Systolic BP < 90 and feeling dizzy, lightheaded, or weak    Protocols used: ST LOW BLOOD PRESSURE-A-OH

## 2018-10-25 NOTE — TELEPHONE ENCOUNTER
"Call placed to pt/wife at the request of KATHLEEN WHITING. Wife had called the [facility]U asking for advise on meds because she did not know the clinic number.     Spoke with pt's wife who reports the "discharge on call nurse told us to call 911 based on the answers to questions we gave. But he feels fine".  Wife reports pt's SBP has been 60-80. His HR sometimes goes up to 130 when she checks his BP but other times it's much lower 80-90's.   Wife reports she has been measuring pt's fluid intake and he was less than 1500ml yesterday. She was told to NOT give pt Metoprolol if SBP was <80 so pt has not had this since discharge.  She also held his lisinopril due to SBP as well although she was not told specific parameters for holding this, she knows it affects blood pressure. She asked for clarification on holding lisinopril as well.  Per wife, [facility].   Advised wife that as long as pt is asymptomatic at this time, he does not need to call 911.   She reports he is currently in the kitchen making lunch. They will see pt's PCP today and report back if there are any changes made to his medications.     Pt is currently scheduled to see Dr Khan on 10/29/18 but does not have f/u with HTS. Per wife, they were told to see Dr Khan first and then it will be decided when he needs to see HTS again.   Requested that she call after seeing Dr Khan in case that plan is not relayed to the coordinators.   Wife voiced understanding.     Reviewed above with Dr Rodriges.  Pt to hold lisinopril for SBP < 80 as well.   Informed wife and she voiced understanding.     Per MD request will try to see if a midlevel can see pt in HTS clinic on 10/29 when pt sees Dr Khan.     Wife asked if pt needed blood drawn for his coumadin level.   He was not sent home with  nor set up with coumadin clinic.  Per Dr Rodriges, [facility] ([facility]).  Wife will ask PCP to order and fax results. Coordinator contact " info provided.

## 2018-10-26 ENCOUNTER — ANTI-COAG VISIT (OUTPATIENT)
Dept: CARDIOLOGY | Facility: CLINIC | Age: 73
End: 2018-10-26

## 2018-10-26 DIAGNOSIS — I48.20 CHRONIC ATRIAL FIBRILLATION: Primary | ICD-10-CM

## 2018-10-26 DIAGNOSIS — Z79.01 LONG TERM (CURRENT) USE OF ANTICOAGULANTS: ICD-10-CM

## 2018-10-26 DIAGNOSIS — I48.20 CHRONIC ATRIAL FIBRILLATION: ICD-10-CM

## 2018-10-26 NOTE — PROGRESS NOTES
Mr. Coker is a 72 y/o male with PMH of CAD s/p CABG, ICM, HFrEF     He was recently transferred to [facility] 10/10-10/23. He was brought to the ED at OSH by EMS - HR ~170 w/abnormal ECG. His CXR was c/w pulmonary edema. A pro-BNP was at one point [address]84.The patient's WBC was 17 and sCr 1.6. tBili was 1.5. He was admitted and aggressively diuresed.    LAVERNE/DCCV was performed (10/19) and pt was started on dofetilide which he tolerated while admitted.     Workup for advanced options was started but stopped secondary to his frailty/calcifications on CT. Dr Khan wishes to see him as outpt to further assess. He was discharged home in good condition with plans to f/u with CTS/HTS and EP within the next few weeks.       We received a message from Aman Adames NP referring patient for enrollment on behalf of Dr. Elliott. Patient was started on warfarin 2mg daily on 10/20 with an enoxaparin bridge 60mg BID.    Spoke to patient's wife by phone 10/26. She informed me that they do have the 2mg tablets and have been giving those along with the enoxaparin injections. She was instructed by Aman Adames NP to starting taking 4mg warfarin 10/25 and continue through the weekend. This was reiterated, along with continuing enoxaparin. Wife verbalized understand and was given our phone number in case she has any questions. She understands she is to have a lab on 10/29 and we will call her with results/instructions. They are scheduled for new patient education on 11/2.

## 2018-10-26 NOTE — TELEPHONE ENCOUNTER
Discussed case with KATHLEEN Adames NP.  He will see pt in clinic 10/29 at 1130 with labs prior.  He will enroll pt in coumadin clinic.     Lab orders entered for clinic visit.     Pt's wife informed

## 2018-10-29 ENCOUNTER — OFFICE VISIT (OUTPATIENT)
Dept: TRANSPLANT | Facility: CLINIC | Age: 73
End: 2018-10-29
Payer: MEDICARE

## 2018-10-29 ENCOUNTER — OFFICE VISIT (OUTPATIENT)
Dept: CARDIOTHORACIC SURGERY | Facility: CLINIC | Age: 73
End: 2018-10-29
Payer: MEDICARE

## 2018-10-29 ENCOUNTER — HOSPITAL ENCOUNTER (OUTPATIENT)
Dept: CARDIOLOGY | Facility: CLINIC | Age: 73
Discharge: HOME | End: 2018-10-29
Payer: MEDICARE

## 2018-10-29 ENCOUNTER — ANTI-COAG VISIT (OUTPATIENT)
Dept: CARDIOLOGY | Facility: CLINIC | Age: 73
End: 2018-10-29

## 2018-10-29 ENCOUNTER — TELEPHONE (OUTPATIENT)
Dept: ELECTROPHYSIOLOGY | Facility: CLINIC | Age: 73
End: 2018-10-29

## 2018-10-29 VITALS
DIASTOLIC BLOOD PRESSURE: 55 MMHG | BODY MASS INDEX: 26.19 KG/M2 | HEIGHT: 66 IN | HEART RATE: 103 BPM | SYSTOLIC BLOOD PRESSURE: 82 MMHG | WEIGHT: 162.94 LBS

## 2018-10-29 VITALS
DIASTOLIC BLOOD PRESSURE: 60 MMHG | WEIGHT: 163 LBS | BODY MASS INDEX: 26.2 KG/M2 | HEIGHT: 66 IN | HEART RATE: 94 BPM | OXYGEN SATURATION: 97 % | TEMPERATURE: 98 F | SYSTOLIC BLOOD PRESSURE: 89 MMHG

## 2018-10-29 DIAGNOSIS — I48.20 CHRONIC ATRIAL FIBRILLATION: ICD-10-CM

## 2018-10-29 DIAGNOSIS — I50.9 CONGESTIVE HEART FAILURE, UNSPECIFIED HF CHRONICITY, UNSPECIFIED HEART FAILURE TYPE: ICD-10-CM

## 2018-10-29 DIAGNOSIS — I48.91 ATRIAL FIBRILLATION STATUS POST CARDIOVERSION: ICD-10-CM

## 2018-10-29 DIAGNOSIS — I47.10 SVT (SUPRAVENTRICULAR TACHYCARDIA): ICD-10-CM

## 2018-10-29 DIAGNOSIS — I50.43 ACUTE ON CHRONIC COMBINED SYSTOLIC AND DIASTOLIC CONGESTIVE HEART FAILURE: Primary | ICD-10-CM

## 2018-10-29 DIAGNOSIS — Z79.01 LONG TERM (CURRENT) USE OF ANTICOAGULANTS: ICD-10-CM

## 2018-10-29 DIAGNOSIS — Z95.810 ICD (IMPLANTABLE CARDIOVERTER-DEFIBRILLATOR) IN PLACE: ICD-10-CM

## 2018-10-29 DIAGNOSIS — I47.20 VT (VENTRICULAR TACHYCARDIA): ICD-10-CM

## 2018-10-29 DIAGNOSIS — I50.9 HEART FAILURE, UNSPECIFIED HF CHRONICITY, UNSPECIFIED HEART FAILURE TYPE: Primary | ICD-10-CM

## 2018-10-29 DIAGNOSIS — I25.10 CORONARY ARTERY DISEASE INVOLVING NATIVE CORONARY ARTERY OF NATIVE HEART WITHOUT ANGINA PECTORIS: ICD-10-CM

## 2018-10-29 DIAGNOSIS — N17.9 AKI (ACUTE KIDNEY INJURY): ICD-10-CM

## 2018-10-29 DIAGNOSIS — I50.9 CONGESTIVE HEART FAILURE, UNSPECIFIED HF CHRONICITY, UNSPECIFIED HEART FAILURE TYPE: Primary | ICD-10-CM

## 2018-10-29 PROCEDURE — 99999 PR PBB SHADOW E&M-EST. PATIENT-LVL IV: CPT | Mod: PBBFAC,TXP,, | Performed by: NURSE PRACTITIONER

## 2018-10-29 PROCEDURE — 99214 OFFICE O/P EST MOD 30 MIN: CPT | Mod: PBBFAC,TXP | Performed by: NURSE PRACTITIONER

## 2018-10-29 PROCEDURE — 93000 ELECTROCARDIOGRAM COMPLETE: CPT | Mod: S$GLB,,, | Performed by: INTERNAL MEDICINE

## 2018-10-29 PROCEDURE — 99215 OFFICE O/P EST HI 40 MIN: CPT | Mod: NTX,S$GLB,, | Performed by: THORACIC SURGERY (CARDIOTHORACIC VASCULAR SURGERY)

## 2018-10-29 PROCEDURE — 1101F PT FALLS ASSESS-DOCD LE1/YR: CPT | Mod: CPTII,TXP,, | Performed by: NURSE PRACTITIONER

## 2018-10-29 PROCEDURE — 99213 OFFICE O/P EST LOW 20 MIN: CPT | Mod: S$PBB,TXP,, | Performed by: NURSE PRACTITIONER

## 2018-10-29 PROCEDURE — 99999 PR PBB SHADOW E&M-EST. PATIENT-LVL III: CPT | Mod: PBBFAC,TXP,, | Performed by: THORACIC SURGERY (CARDIOTHORACIC VASCULAR SURGERY)

## 2018-10-29 PROCEDURE — 1101F PT FALLS ASSESS-DOCD LE1/YR: CPT | Mod: CPTII,NTX,S$GLB, | Performed by: THORACIC SURGERY (CARDIOTHORACIC VASCULAR SURGERY)

## 2018-10-29 RX ORDER — FUROSEMIDE 80 MG/1
TABLET ORAL
Qty: 180 TABLET | Refills: 3 | Status: ON HOLD
Start: 2018-10-29 | End: 2018-11-13 | Stop reason: HOSPADM

## 2018-10-29 NOTE — LETTER
October 29, 2018        Ronnie Richardson Jr.  [address]  [address]  [place] [address]  Phone: [phone]  Fax: [phone]             [facility] [address]  [facility] [address]  Phone: [phone]   Patient: Bharat Coker   MR Number: [id number]   YOB: 1945   Date of Visit: 10/29/2018       Dear Dr. Ronnie Richardson Jr.    Thank you for referring Bharat Coker to me for evaluation. Attached you will find relevant portions of my assessment and plan of care.    If you have questions, please do not hesitate to call me. I look forward to following Bharat Coker along with you.    Sincerely,    Aman Adames, SARAH    Enclosure    If you would like to receive this communication electronically, please contact [email or web address] or [phone] to request [employer] Link access.    [employer] Link is a tool which provides read-only access to select patient information with whom you have a relationship. Its easy to use and provides real time access to review your patients record including encounter summaries, notes, results, and demographic information.    If you feel you have received this communication in error or would no longer like to receive these types of communications, please e-mail [email or web address]

## 2018-10-29 NOTE — LETTER
November 6, 2018      Jony Hendrickson Jr., MD  [address]  [facility] [address]           [facility]  [address]  [facility] [address]  Phone: [phone]          Patient: Bharat Coker   MR Number: [id number]   YOB: 1945   Date of Visit: 10/29/2018       Dear Dr. Jony Hendrickson Jr.:    Thank you for referring Bharat Coker to me for evaluation. Attached you will find relevant portions of my assessment and plan of care.    If you have questions, please do not hesitate to call me. I look forward to following Bharat Coker along with you.    Sincerely,    Joe Khan MD    Enclosure  CC:  No Recipients    If you would like to receive this communication electronically, please contact [email or web address] or [phone] to request more information on [employer] Link access.    For providers and/or their staff who would like to refer a patient to [facility], please contact us through our one-stop-shop provider referral line, [facility], at [phone].    If you feel you have received this communication in error or would no longer like to receive these types of communications, please e-mail [email or web address]

## 2018-10-29 NOTE — PROGRESS NOTES
Subjective:    Patient ID:  Bharat Coker is a 73 y.o. male who presents for follow-up of Heart Transplant Pre-evaluation (New Clinic visit s/p Hospital d/c)      HPI 74 y/o male with PMH of s/p CABG, ICM, HFrEF, CRT-D, CAD([facility] 6/2018: LIMA-LAD patent, % occluded at ostium, SVG-% occluded, SVG-D 100% occluded, pLCx 30%, 100% occluded OM, 100% LAD occlusion after takeoff of D1, D1 is tiny with 80% disease, SVG-OM 80% proximal stenosis with 50% at site of anastomosis. An SVG-OM OKSANA was placed at that time) who comes in for post hospital DC f/u. He was recently admitted for ADHF. He was admitted and aggressively diuresed. Workup for advanced options was started but stopped secondary to his Frailty/calcifications on CT. Dr Khan wishes to see him as outpt to further assess. EP was consulted secondary to SVT which they felt was AT. LAVERNE/DCCV was performed and pt was started on Tikosyn which he tolerated while admitted. His GDMT was up titrated but limited secondary to low BPs. Once medically ready, he was discharged home in good condition with plans to f/u with CTS/HTS and EP within the next few weeks.      Since discharge, he states that he is doing pretty well. No problems with dyspnea. His main limiting factors are foot drop and debility. His appetite has been great. He is accompanied by his wife who has kept meticulous records of his home vital signs. His BP has been running low and she has been holding his lisinopril for the most part. Weights are stable. Labs so far with elevated Cr. EKG pending. He saw Dr Khan this morning regarding LVAD and he wants him to get stronger and he is considering lvad with axillary outflow position as an option secondary to extensive calcifications.     Review of Systems   Constitution: Negative.   HENT: Negative.    Eyes: Negative.    Cardiovascular: Negative.    Respiratory: Negative.    Endocrine: Negative.    Hematologic/Lymphatic: Negative.    Skin: Negative.     Musculoskeletal: Negative.         Objective:    Physical Exam   Constitutional: He is oriented to person, place, and time. He appears well-developed and well-nourished.   HENT:   Head: Normocephalic.   Eyes: Conjunctivae are normal. Pupils are equal, round, and reactive to light.   Neck: Normal range of motion. Neck supple. No JVD present.   Cardiovascular: Normal rate.   Murmur heard.  Pulmonary/Chest: Effort normal and breath sounds normal.   Abdominal: Soft. Bowel sounds are normal.   Musculoskeletal: Normal range of motion.   Neurological: He is alert and oriented to person, place, and time.   Skin: Skin is warm and dry.   Psychiatric: He has a normal mood and affect.   Nursing note and vitals reviewed.        Assessment:       1. Acute on chronic combined systolic and diastolic congestive heart failure    2. SVT (supraventricular tachycardia)    3. VT (ventricular tachycardia)    4. ICD (implantable cardioverter-defibrillator) in place    5. Coronary artery disease involving native coronary artery of native heart without angina pectoris    6. Atrial fibrillation status post cardioversion    7. KASEY (acute kidney injury)         Plan:       Stop Lisinopril.  Decrease Lasix to 40mg qam and 80mg Qpm(per wife's request as she thinks he urinates more at night).  Continue Toprol at current dose.  Recommend 2 gram sodium restriction and 1500cc fluid restriction.   Encourage physical activity with graded exercise program.   Requested patient to weigh themselves daily, and to notify us if their weight increases by more than 3 lbs in 1 day or 5 lbs in 1 week.   RTC to clinic in 1 month with labs prior or prn

## 2018-10-30 NOTE — PROGRESS NOTES
INR unchanged after dose increase. Patient's wife reports taking correct dose and continuing enoxaparin. Of note, patient is also doing Boost drinks daily per MD orders. Will boost today and increase dose so we can get patient off injections as soon as possible. He will likely need new script for different tablet size soon.

## 2018-10-30 NOTE — PROGRESS NOTES
Patient wife advised PT to continue lovenox injections and to take coumadin (8mg) Tuesday, (8mg) Wednesday, (6mg) Thursday, (INR) -Friday. Also to re test INR in CC on 11/2. Patients wife verbalized understanding.

## 2018-11-01 ENCOUNTER — NURSE TRIAGE (OUTPATIENT)
Dept: ADMINISTRATIVE | Facility: CLINIC | Age: 73
End: 2018-11-01

## 2018-11-01 NOTE — PHYSICIAN QUERY
PT Name: Bharat Coker  MR #: [id number]     Physician Query Form - Documentation Clarification      CDS/: Aidan Pindea Jr, RN              Contact information:[email or web address]    This form is a permanent document in the medical record.     Query Date: November 1, 2018    By submitting this query, we are merely seeking further clarification of documentation. Please utilize your independent clinical judgment when addressing the question(s) below.    The Medical record reflects the following:    Supporting Clinical Findings Location in Medical Record   FINDINGS:  AICD leads overlie the heart.  Sternotomy wires present.  No consolidation, pleural effusion or pneumothorax.  Cardiomegaly with bilateral edema.    Patchy reticulation and patchy consolidation throughout both lungs which may relate to volume overload.    WBC=10.92-->9.82-->9.15    Temp=97.4-->98.7-->97.7     Pt complain of nasal congestion, decongestant ordered. Will continue to monitor.     guaiFENesin 12 hr tablet 600 mg 2 Times Daily    PNA (pneumonia)      Principal Problem:Acute on chronic combined systolic and diastolic congestive heart failure    Final Active Diagnoses:   Diagnosis Date Noted   PNA (pneumonia) 10/12/2018   10/10 Chest X Ray report              10/11 CT Chest        10/10-10/12 Labs    10/10 VS Flowsheet    10/17 Nursing Progress Note        10/17-10/19 MAR      10/19 Anesthesia Pre Procedure Note   (Patient Active Problem List)    10/23 Heart Transplant Progress Note      10/23 Discharge Summary                                                                                    Doctor, Please specify diagnosis or diagnoses associated with above clinical findings.  Specify the Status of the Pneumonia Noted on the Discharge Summary    Provider Use Only    (    )Pneumonia,Present on Admit    ( x   )Pneumonia,Not Present on Admit    (    )Pneumonia was not present this admit    (    )Other_________________________________________________                                                                                                           [  ] Clinically undetermined

## 2018-11-02 ENCOUNTER — ANTI-COAG VISIT (OUTPATIENT)
Dept: CARDIOLOGY | Facility: CLINIC | Age: 73
End: 2018-11-02
Payer: MEDICARE

## 2018-11-02 ENCOUNTER — NURSE TRIAGE (OUTPATIENT)
Dept: ADMINISTRATIVE | Facility: CLINIC | Age: 73
End: 2018-11-02

## 2018-11-02 DIAGNOSIS — Z79.01 LONG TERM (CURRENT) USE OF ANTICOAGULANTS: Primary | ICD-10-CM

## 2018-11-02 DIAGNOSIS — I48.20 CHRONIC ATRIAL FIBRILLATION: ICD-10-CM

## 2018-11-02 LAB — INR PPP: 1.4 (ref 2–3)

## 2018-11-02 PROCEDURE — 85610 PROTHROMBIN TIME: CPT | Mod: QW,S$GLB,, | Performed by: INTERNAL MEDICINE

## 2018-11-02 PROCEDURE — 99211 OFF/OP EST MAY X REQ PHY/QHP: CPT | Mod: 25,S$GLB,,

## 2018-11-02 NOTE — PROGRESS NOTES
This patient was educated on the proper use of warfarin. Various aspects of warfarin therapy were discussed with the patient, including the indication for anticoagulation, the expected duration of therapy and the importance of being compliant. The counseling session also included directions for use, the importance of monitoring lab work and proper follow-up, how to handle missed doses, and side effects. Medication and dietary interactions were also discussed. Patient was instructed to avoid aspirin or NSAIDs while taking warfarin, unless approved by physician. Patient was also instructed to report any unusual bleeding issues. Patient verbalized understanding and had the opportunity to ask questions.    INR low today. Patient reports last lovenox injection was 11/2 am. Reports patient has been receiving 60mg and 80mg of lovenox. Confirmed with pharmacist, patient is to receive lovenox 80mg every 12 hours. Daughter reports they have HH evaluation next week. Advised patient and daughter if they are confirmed for HH they will need to call with fax/phone number. Reports bruising and slight bleeding to injection sights. Advised to rotate injection areas. Will do high vitamin k foods 2x weekly. Continue boost/ensure daily.  Will increase weekly dose until follow up in 3 days for close monitoring. Advised patient to call with any changes or concerns. Care Plan made with Barbie Thompson, Pharm D.

## 2018-11-02 NOTE — TELEPHONE ENCOUNTER
Caregiver called to report the following:     -patient got flu shot today   -temp 99.1   -on Lovenox and coumadin   -denies difficulty breathing, pain redness at injection site   -wife advised on home care and when to call back     Reason for Disposition   H1N1 Influenza (inactivated) injected vaccine reactions  (all triage questions negative)    Protocols used: ST IMMUNIZATION [id number]

## 2018-11-03 ENCOUNTER — HOSPITAL ENCOUNTER (INPATIENT)
Facility: HOSPITAL | Age: 73
LOS: 11 days | Discharge: HOME HEALTH | DRG: 273 | End: 2018-11-14
Attending: EMERGENCY MEDICINE | Admitting: INTERNAL MEDICINE
Payer: MEDICARE

## 2018-11-03 DIAGNOSIS — R07.9 CHEST PAIN: ICD-10-CM

## 2018-11-03 DIAGNOSIS — I95.9 HYPOTENSION, UNSPECIFIED HYPOTENSION TYPE: ICD-10-CM

## 2018-11-03 DIAGNOSIS — N17.9 AKI (ACUTE KIDNEY INJURY): ICD-10-CM

## 2018-11-03 DIAGNOSIS — Z95.828 STATUS POST PERIPHERALLY INSERTED CENTRAL CATHETER (PICC) CENTRAL LINE PLACEMENT: ICD-10-CM

## 2018-11-03 DIAGNOSIS — I50.43 ACUTE ON CHRONIC COMBINED SYSTOLIC AND DIASTOLIC CONGESTIVE HEART FAILURE: ICD-10-CM

## 2018-11-03 DIAGNOSIS — I47.20 VT (VENTRICULAR TACHYCARDIA): ICD-10-CM

## 2018-11-03 DIAGNOSIS — I95.0 IDIOPATHIC HYPOTENSION: ICD-10-CM

## 2018-11-03 DIAGNOSIS — Z79.01 LONG TERM (CURRENT) USE OF ANTICOAGULANTS: ICD-10-CM

## 2018-11-03 DIAGNOSIS — I47.10 SVT (SUPRAVENTRICULAR TACHYCARDIA): Primary | ICD-10-CM

## 2018-11-03 DIAGNOSIS — E11.59 TYPE 2 DIABETES MELLITUS WITH OTHER CIRCULATORY COMPLICATION, WITHOUT LONG-TERM CURRENT USE OF INSULIN: ICD-10-CM

## 2018-11-03 DIAGNOSIS — I25.10 CORONARY ARTERY DISEASE INVOLVING NATIVE CORONARY ARTERY OF NATIVE HEART WITHOUT ANGINA PECTORIS: ICD-10-CM

## 2018-11-03 DIAGNOSIS — R57.0 CARDIOGENIC SHOCK: ICD-10-CM

## 2018-11-03 DIAGNOSIS — K76.1 HEPATIC CONGESTION: ICD-10-CM

## 2018-11-03 DIAGNOSIS — Z95.0 PACEMAKER: ICD-10-CM

## 2018-11-03 DIAGNOSIS — I48.20 CHRONIC ATRIAL FIBRILLATION: ICD-10-CM

## 2018-11-03 DIAGNOSIS — R06.02 SOB (SHORTNESS OF BREATH): ICD-10-CM

## 2018-11-03 DIAGNOSIS — I47.10 SUPRAVENTRICULAR TACHYCARDIA: ICD-10-CM

## 2018-11-03 DIAGNOSIS — Z95.810 ICD (IMPLANTABLE CARDIOVERTER-DEFIBRILLATOR) IN PLACE: ICD-10-CM

## 2018-11-03 DIAGNOSIS — I48.91 ATRIAL FIBRILLATION STATUS POST CARDIOVERSION: ICD-10-CM

## 2018-11-03 LAB
ALBUMIN SERPL BCP-MCNC: 3.5 G/DL
ALP SERPL-CCNC: 77 U/L
ALT SERPL W/O P-5'-P-CCNC: 28 U/L
ANION GAP SERPL CALC-SCNC: 18 MMOL/L
AST SERPL-CCNC: 32 U/L
BASOPHILS # BLD AUTO: 0.1 K/UL
BASOPHILS NFR BLD: 0.6 %
BILIRUB SERPL-MCNC: 1.3 MG/DL
BNP SERPL-MCNC: 3222 PG/ML
BUN SERPL-MCNC: 58 MG/DL
CALCIUM SERPL-MCNC: 10.5 MG/DL
CHLORIDE SERPL-SCNC: 95 MMOL/L
CO2 SERPL-SCNC: 20 MMOL/L
CREAT SERPL-MCNC: 1.7 MG/DL
CTP QC/QA: YES
DIFFERENTIAL METHOD: ABNORMAL
EOSINOPHIL # BLD AUTO: 0.2 K/UL
EOSINOPHIL NFR BLD: 1.4 %
ERYTHROCYTE [DISTWIDTH] IN BLOOD BY AUTOMATED COUNT: 15.1 %
EST. GFR  (AFRICAN AMERICAN): 45.9 ML/MIN/1.73 M^2
EST. GFR  (NON AFRICAN AMERICAN): 39.7 ML/MIN/1.73 M^2
GLUCOSE SERPL-MCNC: 324 MG/DL
HCT VFR BLD AUTO: 39.2 %
HGB BLD-MCNC: 12.5 G/DL
IMM GRANULOCYTES # BLD AUTO: 0.27 K/UL
IMM GRANULOCYTES NFR BLD AUTO: 1.5 %
INR PPP: 2.4
LYMPHOCYTES # BLD AUTO: 1.8 K/UL
LYMPHOCYTES NFR BLD: 10.1 %
MAGNESIUM SERPL-MCNC: 2 MG/DL
MCH RBC QN AUTO: 28.9 PG
MCHC RBC AUTO-ENTMCNC: 31.9 G/DL
MCV RBC AUTO: 91 FL
MONOCYTES # BLD AUTO: 1.3 K/UL
MONOCYTES NFR BLD: 7.5 %
NEUTROPHILS # BLD AUTO: 13.8 K/UL
NEUTROPHILS NFR BLD: 78.9 %
NRBC BLD-RTO: 0 /100 WBC
PLATELET # BLD AUTO: 288 K/UL
PMV BLD AUTO: 10.9 FL
POC MOLECULAR INFLUENZA A AGN: NEGATIVE
POC MOLECULAR INFLUENZA B AGN: NEGATIVE
POTASSIUM SERPL-SCNC: 4.6 MMOL/L
PROT SERPL-MCNC: 8.4 G/DL
PROTHROMBIN TIME: 23 SEC
RBC # BLD AUTO: 4.33 M/UL
SODIUM SERPL-SCNC: 133 MMOL/L
TROPONIN I SERPL DL<=0.01 NG/ML-MCNC: 0.06 NG/ML
WBC # BLD AUTO: 17.49 K/UL

## 2018-11-03 PROCEDURE — 96375 TX/PRO/DX INJ NEW DRUG ADDON: CPT | Mod: 59,NTX

## 2018-11-03 PROCEDURE — 84100 ASSAY OF PHOSPHORUS: CPT | Mod: NTX

## 2018-11-03 PROCEDURE — 84484 ASSAY OF TROPONIN QUANT: CPT | Mod: NTX

## 2018-11-03 PROCEDURE — 82803 BLOOD GASES ANY COMBINATION: CPT | Mod: NTX

## 2018-11-03 PROCEDURE — 99291 CRITICAL CARE FIRST HOUR: CPT | Mod: 25,NTX

## 2018-11-03 PROCEDURE — 83735 ASSAY OF MAGNESIUM: CPT | Mod: 91,NTX

## 2018-11-03 PROCEDURE — 99900035 HC TECH TIME PER 15 MIN (STAT): Mod: NTX

## 2018-11-03 PROCEDURE — 87632 RESP VIRUS 6-11 TARGETS: CPT | Mod: NTX

## 2018-11-03 PROCEDURE — 96365 THER/PROPH/DIAG IV INF INIT: CPT | Mod: 59,NTX

## 2018-11-03 PROCEDURE — 93005 ELECTROCARDIOGRAM TRACING: CPT

## 2018-11-03 PROCEDURE — 99291 CRITICAL CARE FIRST HOUR: CPT | Mod: NTX,,, | Performed by: EMERGENCY MEDICINE

## 2018-11-03 PROCEDURE — 93010 ELECTROCARDIOGRAM REPORT: CPT | Mod: 76,NTX,, | Performed by: INTERNAL MEDICINE

## 2018-11-03 PROCEDURE — 80053 COMPREHEN METABOLIC PANEL: CPT | Mod: NTX

## 2018-11-03 PROCEDURE — 36556 INSERT NON-TUNNEL CV CATH: CPT | Mod: NTX

## 2018-11-03 PROCEDURE — 87040 BLOOD CULTURE FOR BACTERIA: CPT | Mod: NTX

## 2018-11-03 PROCEDURE — 96376 TX/PRO/DX INJ SAME DRUG ADON: CPT | Mod: NTX

## 2018-11-03 PROCEDURE — 85610 PROTHROMBIN TIME: CPT | Mod: NTX

## 2018-11-03 PROCEDURE — C1751 CATH, INF, PER/CENT/MIDLINE: HCPCS | Mod: NTX

## 2018-11-03 PROCEDURE — 25000003 PHARM REV CODE 250: Mod: NTX | Performed by: EMERGENCY MEDICINE

## 2018-11-03 PROCEDURE — 20000000 HC ICU ROOM: Mod: NTX

## 2018-11-03 PROCEDURE — 85025 COMPLETE CBC W/AUTO DIFF WBC: CPT | Mod: 91,NTX

## 2018-11-03 PROCEDURE — 80053 COMPREHEN METABOLIC PANEL: CPT | Mod: 91,NTX

## 2018-11-03 PROCEDURE — 51702 INSERT TEMP BLADDER CATH: CPT | Mod: NTX

## 2018-11-03 PROCEDURE — 83880 ASSAY OF NATRIURETIC PEPTIDE: CPT | Mod: NTX

## 2018-11-03 PROCEDURE — 96361 HYDRATE IV INFUSION ADD-ON: CPT | Mod: NTX

## 2018-11-03 PROCEDURE — 63600175 PHARM REV CODE 636 W HCPCS: Mod: NTX | Performed by: INTERNAL MEDICINE

## 2018-11-03 PROCEDURE — 83735 ASSAY OF MAGNESIUM: CPT | Mod: NTX

## 2018-11-03 PROCEDURE — 25000003 PHARM REV CODE 250: Mod: NTX | Performed by: INTERNAL MEDICINE

## 2018-11-03 PROCEDURE — 27200188 HC TRANSDUCER, ART ADULT/PEDS: Mod: NTX

## 2018-11-03 PROCEDURE — 84484 ASSAY OF TROPONIN QUANT: CPT | Mod: 91,NTX

## 2018-11-03 PROCEDURE — 93010 ELECTROCARDIOGRAM REPORT: CPT | Mod: NTX,,, | Performed by: INTERNAL MEDICINE

## 2018-11-03 RX ORDER — METOPROLOL TARTRATE 1 MG/ML
2.5 INJECTION, SOLUTION INTRAVENOUS
Status: COMPLETED | OUTPATIENT
Start: 2018-11-03 | End: 2018-11-03

## 2018-11-03 RX ORDER — FLUTICASONE PROPIONATE 50 MCG
2 SPRAY, SUSPENSION (ML) NASAL DAILY
Status: DISCONTINUED | OUTPATIENT
Start: 2018-11-04 | End: 2018-11-14 | Stop reason: HOSPADM

## 2018-11-03 RX ORDER — DOBUTAMINE HYDROCHLORIDE 400 MG/100ML
5 INJECTION, SOLUTION INTRAVENOUS CONTINUOUS
Status: DISCONTINUED | OUTPATIENT
Start: 2018-11-03 | End: 2018-11-14 | Stop reason: HOSPADM

## 2018-11-03 RX ORDER — AZELASTINE 1 MG/ML
1 SPRAY, METERED NASAL 2 TIMES DAILY
Status: DISCONTINUED | OUTPATIENT
Start: 2018-11-03 | End: 2018-11-14 | Stop reason: HOSPADM

## 2018-11-03 RX ORDER — ENOXAPARIN SODIUM 100 MG/ML
60 INJECTION SUBCUTANEOUS EVERY 12 HOURS
Status: DISCONTINUED | OUTPATIENT
Start: 2018-11-04 | End: 2018-11-03

## 2018-11-03 RX ORDER — FAMOTIDINE 20 MG/1
20 TABLET, FILM COATED ORAL DAILY
Status: DISCONTINUED | OUTPATIENT
Start: 2018-11-04 | End: 2018-11-08

## 2018-11-03 RX ORDER — AMOXICILLIN 250 MG
1 CAPSULE ORAL 2 TIMES DAILY
Status: DISCONTINUED | OUTPATIENT
Start: 2018-11-04 | End: 2018-11-14 | Stop reason: HOSPADM

## 2018-11-03 RX ORDER — FUROSEMIDE 10 MG/ML
80 INJECTION INTRAMUSCULAR; INTRAVENOUS ONCE
Status: COMPLETED | OUTPATIENT
Start: 2018-11-03 | End: 2018-11-03

## 2018-11-03 RX ORDER — DOCUSATE SODIUM 100 MG/1
100 CAPSULE, LIQUID FILLED ORAL 2 TIMES DAILY PRN
Status: DISCONTINUED | OUTPATIENT
Start: 2018-11-03 | End: 2018-11-14 | Stop reason: HOSPADM

## 2018-11-03 RX ORDER — NOREPINEPHRINE BITARTRATE/D5W 4MG/250ML
0.05 PLASTIC BAG, INJECTION (ML) INTRAVENOUS CONTINUOUS
Status: DISCONTINUED | OUTPATIENT
Start: 2018-11-03 | End: 2018-11-03

## 2018-11-03 RX ORDER — ATORVASTATIN CALCIUM 20 MG/1
80 TABLET, FILM COATED ORAL NIGHTLY
Status: DISCONTINUED | OUTPATIENT
Start: 2018-11-03 | End: 2018-11-14 | Stop reason: HOSPADM

## 2018-11-03 RX ORDER — POLYETHYLENE GLYCOL 3350 17 G/17G
17 POWDER, FOR SOLUTION ORAL DAILY
Status: DISCONTINUED | OUTPATIENT
Start: 2018-11-04 | End: 2018-11-14 | Stop reason: HOSPADM

## 2018-11-03 RX ORDER — ADENOSINE 3 MG/ML
6 INJECTION, SOLUTION INTRAVENOUS
Status: DISCONTINUED | OUTPATIENT
Start: 2018-11-03 | End: 2018-11-03

## 2018-11-03 RX ORDER — ACETAMINOPHEN 325 MG/1
650 TABLET ORAL EVERY 4 HOURS PRN
Status: DISCONTINUED | OUTPATIENT
Start: 2018-11-03 | End: 2018-11-14 | Stop reason: HOSPADM

## 2018-11-03 RX ORDER — PRASUGREL 10 MG/1
10 TABLET, FILM COATED ORAL DAILY
Status: DISCONTINUED | OUTPATIENT
Start: 2018-11-04 | End: 2018-11-14 | Stop reason: HOSPADM

## 2018-11-03 RX ORDER — DOFETILIDE 0.12 MG/1
125 CAPSULE ORAL EVERY 12 HOURS
Status: DISCONTINUED | OUTPATIENT
Start: 2018-11-03 | End: 2018-11-03 | Stop reason: SDUPTHER

## 2018-11-03 RX ORDER — RAMELTEON 8 MG/1
8 TABLET ORAL NIGHTLY PRN
Status: DISCONTINUED | OUTPATIENT
Start: 2018-11-03 | End: 2018-11-14 | Stop reason: HOSPADM

## 2018-11-03 RX ORDER — DOFETILIDE 0.25 MG/1
250 CAPSULE ORAL EVERY 12 HOURS
Status: DISCONTINUED | OUTPATIENT
Start: 2018-11-03 | End: 2018-11-03

## 2018-11-03 RX ORDER — SODIUM CHLORIDE 0.9 % (FLUSH) 0.9 %
3 SYRINGE (ML) INJECTION EVERY 8 HOURS
Status: DISCONTINUED | OUTPATIENT
Start: 2018-11-04 | End: 2018-11-07

## 2018-11-03 RX ORDER — CETIRIZINE HYDROCHLORIDE 5 MG/1
5 TABLET ORAL DAILY
Status: DISCONTINUED | OUTPATIENT
Start: 2018-11-04 | End: 2018-11-12

## 2018-11-03 RX ORDER — ONDANSETRON 2 MG/ML
4 INJECTION INTRAMUSCULAR; INTRAVENOUS EVERY 8 HOURS PRN
Status: DISCONTINUED | OUTPATIENT
Start: 2018-11-03 | End: 2018-11-14 | Stop reason: HOSPADM

## 2018-11-03 RX ORDER — NAPROXEN SODIUM 220 MG/1
81 TABLET, FILM COATED ORAL DAILY
Status: DISCONTINUED | OUTPATIENT
Start: 2018-11-04 | End: 2018-11-14 | Stop reason: HOSPADM

## 2018-11-03 RX ORDER — DOFETILIDE 0.25 MG/1
250 CAPSULE ORAL EVERY 12 HOURS
Status: DISCONTINUED | OUTPATIENT
Start: 2018-11-03 | End: 2018-11-09

## 2018-11-03 RX ADMIN — FUROSEMIDE 80 MG: 10 INJECTION, SOLUTION INTRAMUSCULAR; INTRAVENOUS at 09:11

## 2018-11-03 RX ADMIN — SODIUM CHLORIDE 250 ML: 0.9 INJECTION, SOLUTION INTRAVENOUS at 09:11

## 2018-11-03 RX ADMIN — FUROSEMIDE 10 MG/HR: 10 INJECTION, SOLUTION INTRAMUSCULAR; INTRAVENOUS at 10:11

## 2018-11-03 RX ADMIN — METOPROLOL TARTRATE 2.5 MG: 1 INJECTION, SOLUTION INTRAVENOUS at 08:11

## 2018-11-03 RX ADMIN — Medication 0.05 MCG/KG/MIN: at 09:11

## 2018-11-03 RX ADMIN — DOBUTAMINE HYDROCHLORIDE 5 MCG/KG/MIN: 200 INJECTION INTRAVENOUS at 09:11

## 2018-11-03 NOTE — TELEPHONE ENCOUNTER
Reason for Disposition   Fever onset within 24 hours of receiving vaccine   Influenza (TIV; Injection) injected vaccine reactions  (all triage questions negative)    Protocols used:  FEVER-A-AH,  IMMUNIZATION [id number]    Patient's wife called to report that he had the flu shot yesterday and his temp is 100.1. Patient has well controlled diabetes and hypertension. He is on fluid restrictions of 1500 ml per day. He took tylenol and will monitor the temp. Wife asked to call back in 1 hour.

## 2018-11-03 NOTE — Clinical Note
0945 Initial assessment completed.   Prepped: groin. Prepped with: ChloraPrep. The site was clipped. The patient was draped.

## 2018-11-03 NOTE — TELEPHONE ENCOUNTER
2155 ---called patient's spouse back who reports temp is not 98.6 oral and he is resting. She will call back if the temp spikes again.

## 2018-11-04 PROBLEM — I95.9 HYPOTENSION: Status: ACTIVE | Noted: 2018-11-04

## 2018-11-04 LAB
ALBUMIN SERPL BCP-MCNC: 3.1 G/DL
ALLENS TEST: ABNORMAL
ALP SERPL-CCNC: 71 U/L
ALT SERPL W/O P-5'-P-CCNC: 25 U/L
ANION GAP SERPL CALC-SCNC: 10 MMOL/L
ANION GAP SERPL CALC-SCNC: 11 MMOL/L
ANION GAP SERPL CALC-SCNC: 13 MMOL/L
AST SERPL-CCNC: 26 U/L
BASOPHILS # BLD AUTO: 0.09 K/UL
BASOPHILS NFR BLD: 0.5 %
BILIRUB SERPL-MCNC: 1.1 MG/DL
BUN SERPL-MCNC: 52 MG/DL
BUN SERPL-MCNC: 57 MG/DL
BUN SERPL-MCNC: 60 MG/DL
CALCIUM SERPL-MCNC: 9.3 MG/DL
CALCIUM SERPL-MCNC: 9.5 MG/DL
CALCIUM SERPL-MCNC: 9.6 MG/DL
CHLORIDE SERPL-SCNC: 100 MMOL/L
CHLORIDE SERPL-SCNC: 95 MMOL/L
CHLORIDE SERPL-SCNC: 97 MMOL/L
CO2 SERPL-SCNC: 25 MMOL/L
CO2 SERPL-SCNC: 25 MMOL/L
CO2 SERPL-SCNC: 27 MMOL/L
CREAT SERPL-MCNC: 1.4 MG/DL
CREAT SERPL-MCNC: 1.4 MG/DL
CREAT SERPL-MCNC: 1.5 MG/DL
DELSYS: ABNORMAL
DIFFERENTIAL METHOD: ABNORMAL
EOSINOPHIL # BLD AUTO: 0.1 K/UL
EOSINOPHIL NFR BLD: 0.4 %
ERYTHROCYTE [DISTWIDTH] IN BLOOD BY AUTOMATED COUNT: 15 %
EST. GFR  (AFRICAN AMERICAN): 53.4 ML/MIN/1.73 M^2
EST. GFR  (AFRICAN AMERICAN): 58 ML/MIN/1.73 M^2
EST. GFR  (AFRICAN AMERICAN): 58 ML/MIN/1.73 M^2
EST. GFR  (NON AFRICAN AMERICAN): 46.2 ML/MIN/1.73 M^2
EST. GFR  (NON AFRICAN AMERICAN): 50.2 ML/MIN/1.73 M^2
EST. GFR  (NON AFRICAN AMERICAN): 50.2 ML/MIN/1.73 M^2
FLOW: 3
GLUCOSE SERPL-MCNC: 196 MG/DL
GLUCOSE SERPL-MCNC: 265 MG/DL
GLUCOSE SERPL-MCNC: 367 MG/DL
HCO3 UR-SCNC: 29.1 MMOL/L (ref 24–28)
HCT VFR BLD AUTO: 34.6 %
HGB BLD-MCNC: 10.8 G/DL
IMM GRANULOCYTES # BLD AUTO: 0.16 K/UL
IMM GRANULOCYTES NFR BLD AUTO: 1 %
LYMPHOCYTES # BLD AUTO: 0.9 K/UL
LYMPHOCYTES NFR BLD: 5.7 %
MAGNESIUM SERPL-MCNC: 1.7 MG/DL
MAGNESIUM SERPL-MCNC: 1.9 MG/DL
MCH RBC QN AUTO: 29.3 PG
MCHC RBC AUTO-ENTMCNC: 31.2 G/DL
MCV RBC AUTO: 94 FL
MODE: ABNORMAL
MONOCYTES # BLD AUTO: 1.1 K/UL
MONOCYTES NFR BLD: 6.5 %
NEUTROPHILS # BLD AUTO: 14.1 K/UL
NEUTROPHILS NFR BLD: 85.9 %
NRBC BLD-RTO: 0 /100 WBC
PCO2 BLDA: 49.8 MMHG (ref 35–45)
PH SMN: 7.38 [PH] (ref 7.35–7.45)
PHOSPHATE SERPL-MCNC: 4.4 MG/DL
PLATELET # BLD AUTO: 234 K/UL
PMV BLD AUTO: 10.8 FL
PO2 BLDA: 33 MMHG (ref 40–60)
POC BE: 4 MMOL/L
POC SATURATED O2: 62 % (ref 95–100)
POC TCO2: 31 MMOL/L (ref 24–29)
POCT GLUCOSE: 244 MG/DL (ref 70–110)
POCT GLUCOSE: 318 MG/DL (ref 70–110)
POTASSIUM SERPL-SCNC: 3.7 MMOL/L
POTASSIUM SERPL-SCNC: 3.9 MMOL/L
POTASSIUM SERPL-SCNC: 4.3 MMOL/L
PROT SERPL-MCNC: 7.3 G/DL
RBC # BLD AUTO: 3.69 M/UL
SAMPLE: ABNORMAL
SITE: ABNORMAL
SODIUM SERPL-SCNC: 133 MMOL/L
SODIUM SERPL-SCNC: 134 MMOL/L
SODIUM SERPL-SCNC: 136 MMOL/L
SP02: 91
TROPONIN I SERPL DL<=0.01 NG/ML-MCNC: 0.58 NG/ML
WBC # BLD AUTO: 16.37 K/UL

## 2018-11-04 PROCEDURE — 20000000 HC ICU ROOM: Mod: NTX

## 2018-11-04 PROCEDURE — 25000003 PHARM REV CODE 250: Mod: NTX | Performed by: STUDENT IN AN ORGANIZED HEALTH CARE EDUCATION/TRAINING PROGRAM

## 2018-11-04 PROCEDURE — 25000003 PHARM REV CODE 250: Mod: NTX | Performed by: INTERNAL MEDICINE

## 2018-11-04 PROCEDURE — 63600175 PHARM REV CODE 636 W HCPCS: Mod: NTX | Performed by: STUDENT IN AN ORGANIZED HEALTH CARE EDUCATION/TRAINING PROGRAM

## 2018-11-04 PROCEDURE — 94761 N-INVAS EAR/PLS OXIMETRY MLT: CPT | Mod: NTX

## 2018-11-04 PROCEDURE — 63600175 PHARM REV CODE 636 W HCPCS: Mod: NTX | Performed by: INTERNAL MEDICINE

## 2018-11-04 PROCEDURE — 80048 BASIC METABOLIC PNL TOTAL CA: CPT | Mod: NTX

## 2018-11-04 PROCEDURE — 83735 ASSAY OF MAGNESIUM: CPT | Mod: NTX

## 2018-11-04 PROCEDURE — 51701 INSERT BLADDER CATHETER: CPT | Mod: NTX

## 2018-11-04 PROCEDURE — 99291 CRITICAL CARE FIRST HOUR: CPT | Mod: NTX,,, | Performed by: INTERNAL MEDICINE

## 2018-11-04 PROCEDURE — 80048 BASIC METABOLIC PNL TOTAL CA: CPT | Mod: 91,NTX

## 2018-11-04 RX ORDER — POTASSIUM CHLORIDE 20 MEQ/1
60 TABLET, EXTENDED RELEASE ORAL ONCE
Status: COMPLETED | OUTPATIENT
Start: 2018-11-04 | End: 2018-11-04

## 2018-11-04 RX ORDER — IBUPROFEN 200 MG
24 TABLET ORAL
Status: DISCONTINUED | OUTPATIENT
Start: 2018-11-04 | End: 2018-11-05

## 2018-11-04 RX ORDER — GLUCAGON 1 MG
1 KIT INJECTION
Status: DISCONTINUED | OUTPATIENT
Start: 2018-11-04 | End: 2018-11-05

## 2018-11-04 RX ORDER — INSULIN ASPART 100 [IU]/ML
0-5 INJECTION, SOLUTION INTRAVENOUS; SUBCUTANEOUS
Status: DISCONTINUED | OUTPATIENT
Start: 2018-11-04 | End: 2018-11-05

## 2018-11-04 RX ORDER — IBUPROFEN 200 MG
16 TABLET ORAL
Status: DISCONTINUED | OUTPATIENT
Start: 2018-11-04 | End: 2018-11-05

## 2018-11-04 RX ORDER — POTASSIUM CHLORIDE 20 MEQ/1
40 TABLET, EXTENDED RELEASE ORAL ONCE
Status: COMPLETED | OUTPATIENT
Start: 2018-11-04 | End: 2018-11-04

## 2018-11-04 RX ORDER — POTASSIUM CHLORIDE 20 MEQ/1
20 TABLET, EXTENDED RELEASE ORAL ONCE
Status: COMPLETED | OUTPATIENT
Start: 2018-11-04 | End: 2018-11-04

## 2018-11-04 RX ORDER — MAGNESIUM SULFATE HEPTAHYDRATE 40 MG/ML
2 INJECTION, SOLUTION INTRAVENOUS ONCE
Status: COMPLETED | OUTPATIENT
Start: 2018-11-04 | End: 2018-11-04

## 2018-11-04 RX ORDER — MAGNESIUM SULFATE HEPTAHYDRATE 40 MG/ML
2 INJECTION, SOLUTION INTRAVENOUS ONCE
Status: COMPLETED | OUTPATIENT
Start: 2018-11-04 | End: 2018-11-05

## 2018-11-04 RX ADMIN — SENNOSIDES AND DOCUSATE SODIUM 1 TABLET: 8.6; 5 TABLET ORAL at 09:11

## 2018-11-04 RX ADMIN — DOFETILIDE 250 MCG: 0.25 CAPSULE ORAL at 09:11

## 2018-11-04 RX ADMIN — ASPIRIN 81 MG CHEWABLE TABLET 81 MG: 81 TABLET CHEWABLE at 09:11

## 2018-11-04 RX ADMIN — ATORVASTATIN CALCIUM 80 MG: 20 TABLET, FILM COATED ORAL at 09:11

## 2018-11-04 RX ADMIN — WARFARIN SODIUM 3 MG: 2 TABLET ORAL at 04:11

## 2018-11-04 RX ADMIN — CETIRIZINE HYDROCHLORIDE 5 MG: 5 TABLET ORAL at 09:11

## 2018-11-04 RX ADMIN — INSULIN ASPART 2 UNITS: 100 INJECTION, SOLUTION INTRAVENOUS; SUBCUTANEOUS at 09:11

## 2018-11-04 RX ADMIN — DOBUTAMINE HYDROCHLORIDE 5 MCG/KG/MIN: 200 INJECTION INTRAVENOUS at 09:11

## 2018-11-04 RX ADMIN — POTASSIUM CHLORIDE 40 MEQ: 1500 TABLET, EXTENDED RELEASE ORAL at 10:11

## 2018-11-04 RX ADMIN — DOFETILIDE 250 MCG: 0.25 CAPSULE ORAL at 12:11

## 2018-11-04 RX ADMIN — INSULIN ASPART 2 UNITS: 100 INJECTION, SOLUTION INTRAVENOUS; SUBCUTANEOUS at 04:11

## 2018-11-04 RX ADMIN — MAGNESIUM SULFATE IN WATER 2 G: 40 INJECTION, SOLUTION INTRAVENOUS at 10:11

## 2018-11-04 RX ADMIN — FAMOTIDINE 20 MG: 20 TABLET ORAL at 09:11

## 2018-11-04 RX ADMIN — FUROSEMIDE 10 MG/HR: 10 INJECTION, SOLUTION INTRAMUSCULAR; INTRAVENOUS at 11:11

## 2018-11-04 RX ADMIN — POTASSIUM CHLORIDE 20 MEQ: 1500 TABLET, EXTENDED RELEASE ORAL at 08:11

## 2018-11-04 RX ADMIN — POTASSIUM CHLORIDE 40 MEQ: 1500 TABLET, EXTENDED RELEASE ORAL at 06:11

## 2018-11-04 RX ADMIN — MAGNESIUM SULFATE IN WATER 2 G: 40 INJECTION, SOLUTION INTRAVENOUS at 02:11

## 2018-11-04 RX ADMIN — PRASUGREL 10 MG: 10 TABLET, FILM COATED ORAL at 09:11

## 2018-11-04 RX ADMIN — ATORVASTATIN CALCIUM 80 MG: 20 TABLET, FILM COATED ORAL at 12:11

## 2018-11-04 NOTE — ASSESSMENT & PLAN NOTE
--admit to ICU with HTS  --start  at 5/hr  --CVC with CVP and SVO2 monitoring  --lasix 80 with 10 gtt  --strict I/O's

## 2018-11-04 NOTE — HPI
Mr. Coker is a 74 y/o male admitted to [facility] [place] for ADHF in the setting of recent Lasix adjustment and recent initiation of BB/CCB. PMHx of CAD s/p CABG, ICM/HFrEF (LVef10%) s/p Medtronic CRT-D, CAD([facility] 6/2018: LIMA-LAD patent, % occluded at ostium, SVG-% occluded, SVG-D 100% occluded, pLCx 30%, 100% occluded OM, 100% LAD occlusion after takeoff of D1, D1 is tiny with 80% disease, SVG-OM 80% proximal stenosis with 50% at site of anastomosis.An SVG-OM OKSANA was placed at that time). Who presents with c/o acute sob which occurred around 7PM this evening. Patient was at usual health, states developed acute SOB, had been having worsening SUMNER. EMS called, he was found to be in SVT, given Adenosis and Cardizem and brought to ED. He was noted to be in SVT -300u hypotensive SBP 70s, given metoprolol IV, and 250cc NS bolus. Cardiology consulted. Medtronic device interrogated, A-sensed Bi-V paced.     At length discussion with spouse / patient, she has been logging his BP / HR / Weights on daily basis, patient has been hypotensive not given his Lisinopril / Metoprolol. Seen in clinic with Aman Adames, recently decreased lasix 80/80, 40/80.  Recently discharged 10/10-10/23 for ADHF, patient is a poor VAD candidate dt frailty and calcifications on CT, also found to be in AT s/p LAVERNE/DCCV started on Tikosyn initiate on 10/19/18

## 2018-11-04 NOTE — PROCEDURES
Medtronic ICD interrogated:   Zipmentsia MRI Quad CRT-D DTMA 1Q1  Serial [id number]    Mode: DDD  HR lower 50 / upper 130  V. Pacing LV -> RV    AT/AF HR >171BPM, Burst+, Ramp, CV off  VT: zone on, -200BPM, Burst(2), Ramp(2), 20J, 35J x3  VF zone on, >200BPM, ATP during charging, 35J x6    SVT episodes: all of them self terminated  Duration 0:54 on 11/3/18  Duration 0:48 11/3/18   Duration 0:47 11/3/18  Duration 2:46 at 19:59 on 11/3/18     Rj Luna M.D.  Page # [phone]  Cardiovascular Fellow PGY-IV  [facility]

## 2018-11-04 NOTE — SUBJECTIVE & OBJECTIVE
Past Medical History:   Diagnosis Date    CHF (congestive heart failure)     COPD (chronic obstructive pulmonary disease)     Coronary artery disease     Diabetes mellitus        Past Surgical History:   Procedure Laterality Date    APPENDECTOMY      CARDIAC SURGERY      ECHOCARDIOGRAM,TRANSESOPHAGEAL N/A 10/19/2018    Performed by [facility] Diagnostic Provider at [facility] CATH LAB    EYE SURGERY      FRACTURE SURGERY      TONSILLECTOMY         Review of patient's allergies indicates:   Allergen Reactions    Amiodarone analogues Anaphylaxis    Ativan [lorazepam] Anxiety       Current Facility-Administered Medications   Medication    acetaminophen tablet 650 mg    aspirin chewable tablet 81 mg    atorvastatin tablet 80 mg    azelastine 137 mcg (0.1 %) nasal spray 137 mcg    cetirizine tablet 5 mg    DOBUTamine 500mg in D5W 250mL infusion (premix) (NON-TITRATING)    docusate sodium capsule 100 mg    dofetilide capsule 250 mcg    famotidine tablet 20 mg    fluticasone 50 mcg/actuation nasal spray 100 mcg    furosemide (LASIX) 2 mg/mL in sodium chloride 0.9% 100 mL infusion (conc: 2 mg/mL)    ondansetron injection 4 mg    polyethylene glycol packet 17 g    prasugrel tablet 10 mg    ramelteon tablet 8 mg    senna-docusate 8.6-50 mg per tablet 1 tablet    sodium chloride 0.9% flush 3 mL    warfarin tablet 3 mg     Family History     Problem Relation (Age of Onset)    Heart disease Father, Sister, Brother    Stroke Father        Tobacco Use    Smoking status: Former Smoker     Types: Cigarettes    Smokeless tobacco: Never Used   Substance and Sexual Activity    Alcohol use: No     Frequency: Never    Drug use: No    Sexual activity: Not on file     Review of Systems   Constitutional: Positive for fatigue. Negative for chills, diaphoresis and fever.   HENT: Negative for congestion.    Respiratory: Positive for shortness of breath. Negative for apnea, cough, choking, chest tightness, wheezing and  stridor.    Cardiovascular: Negative for chest pain, palpitations and leg swelling.   Gastrointestinal: Negative for abdominal distention and abdominal pain.   Genitourinary: Negative for difficulty urinating and dysuria.   Musculoskeletal: Negative for arthralgias.     Objective:     Vital Signs (Most Recent):  Temp: 99.1 °F (37.3 °C) (11/03/18 2333)  Pulse: 105 (11/03/18 2345)  Resp: (!) 30 (11/03/18 2345)  BP: (!) 83/51 (11/03/18 2345)  SpO2: 95 % (11/03/18 2345) Vital Signs (24h Range):  Temp:  [98.4 °F (36.9 °C)-99.1 °F (37.3 °C)] 99.1 °F (37.3 °C)  Pulse:  [105-130] 105  Resp:  [12-41] 30  SpO2:  [74 %-95 %] 95 %  BP: ()/(50-68) 83/51     Patient Vitals for the past 72 hrs (Last 3 readings):   Weight   11/03/18 2010 63.5 kg (140 lb)     Body mass index is 22.6 kg/m².      Intake/Output Summary (Last 24 hours) at 11/4/2018 0014  Last data filed at 11/3/2018 2315  Gross per 24 hour   Intake 277.49 ml   Output 210 ml   Net 67.49 ml       Physical Exam   Constitutional: He appears well-developed and well-nourished. No distress.   HENT:   Head: Normocephalic and atraumatic.   Neck: Normal range of motion. JVD present.       Cardiovascular: Regular rhythm, normal heart sounds and intact distal pulses. Tachycardia present. Exam reveals no gallop and no friction rub.   No murmur heard.  Pulmonary/Chest: Effort normal. No stridor. No respiratory distress. He has no wheezes. He has rhonchi in the right middle field, the right lower field, the left middle field and the left lower field. He has rales in the right lower field and the left lower field. He exhibits no tenderness.       Abdominal: Soft. Bowel sounds are normal. He exhibits no distension. There is no tenderness.   Musculoskeletal: Normal range of motion. He exhibits no edema.   Skin: Skin is warm and dry. Capillary refill takes less than 2 seconds. He is not diaphoretic.   Psychiatric: He has a normal mood and affect.   Nursing note and vitals  reviewed.      Significant Labs:  CBC:  Recent Labs   Lab 11/03/18 2025   WBC 17.49*   RBC 4.33*   HGB 12.5*   HCT 39.2*      MCV 91   MCH 28.9   MCHC 31.9*     BNP:  Recent Labs   Lab 10/29/18  0950 11/03/18 2025   * 3,222*     CMP:  Recent Labs   Lab 10/29/18  0950 11/03/18 2025   * 324*   CALCIUM 10.5 10.5   ALBUMIN 3.4* 3.5   PROT 8.0 8.4   * 133*   K 4.4 4.6   CO2 27 20*   CL 94* 95   BUN 68* 58*   CREATININE 1.7* 1.7*   ALKPHOS 78 77   ALT 45* 28   AST 37 32   BILITOT 0.7 1.3*      Coagulation:   Recent Labs   Lab 10/29/18  0950 11/02/18  1013 11/03/18 2025   INR 1.0 1.4 2.4*     LDH:  No results for input(s): LDH in the last 72 hours.  Microbiology:  Microbiology Results (last 7 days)     Procedure Component Value Units Date/Time    Blood culture #2 **CANNOT BE ORDERED STAT** [[id number]] Collected:  11/03/18 2134    Order Status:  Sent Specimen:  Blood from Peripheral, Antecubital, Right Updated:  11/03/18 2146    Respiratory Viral Panel by PCR [facility]; Nasal Swab [[id number]] Collected:  11/03/18 2133    Order Status:  Sent Specimen:  Respiratory Updated:  11/03/18 2145    Blood culture #1 **CANNOT BE ORDERED STAT** [[id number]] Collected:  11/03/18 2059    Order Status:  Sent Specimen:  Blood from Peripheral, Antecubital, Right Updated:  11/03/18 2103          BMP:   Recent Labs   Lab 11/03/18 2025   *   *   K 4.6   CL 95   CO2 20*   BUN 58*   CREATININE 1.7*   CALCIUM 10.5   MG 2.0     Cardiac Markers: No results for input(s): CKMB, TROPONINT, MYOGLOBIN in the last 72 hours.  Coagulation:   Recent Labs   Lab 11/03/18 2025   INR 2.4*     Prealbumin: No results for input(s): PREALBUMIN in the last 72 hours.  I have reviewed all pertinent labs within the past 24 hours.    Diagnostic Results:  I have reviewed all pertinent imaging results/findings within the past 24 hours.

## 2018-11-04 NOTE — ASSESSMENT & PLAN NOTE
Mr. Coker is a 74 y/o male admitted to [facility] for ADHF in the setting of recent Lasix adjustment and recent initiation of BB/CCB. PMHx of CAD s/p CABG, ICM/HFrEF (LVef10%) s/p Medtronic CRT-D.   - Likely d/t ongoing SVT   - Elevated BNP ~3000   - CXR with diffuse pulmonary edema   - PTA lasix changed 80/80 -> 40/80   - TTE <10% EF    - Admit to [facility], Dr. Hendrickson aware   - Not a candidate for advanced options, if decompensates / does not improve may not be a candidate for MCS.  - S/p RIJ MML placed 11/4/18   - CVP 7 / SVO2 62  - Started on  5mcg / Lasix 10mg   - Closely monitor UOP, BMP, mag,   - Fluid restriction at 1500 cc with strict I/Os and daily STANDING weights  - Maintain on telemetry and daily EKGs   - Up to date risk stratification : TSH, Lipids, HbA1c with optimization of risk factors is necessary  - Check Electrolytes, keep Mag >2 & K+ >4  - SCDs, TEDs, Nursing communication to elevated LE

## 2018-11-04 NOTE — CONSULTS
[facility]  Cardiology  Consult Note    Patient Name: Bharat Coker  MRN: [id number]  Admission Date: 11/3/2018  Hospital Length of Stay: 0 days  Code Status: Full Code   Attending Provider: Jony Hendrickson Jr.,*   Consulting Provider: Lindsey Hilario MD  Primary Care Physician: Ronnie Richardson Jr, MD  Principal Problem:<principal problem not specified>    Patient information was obtained from patient, spouse/SO, past medical records and ER records.     Inpatient consult to Cardiology  Consult performed by: Lindsey Hilario MD  Consult ordered by: Rj Luna MD  Reason for consult: SVT        Subjective:     Chief Complaint:  SOB, SVT     HPI:   Mr. Coker is a 73 year old gentleman with a past medical history of CAD s/p CABG ([facility] 6/2018: HDZ-LAD patent, % occluded at ostium, SVG-% occluded, SVG-D 100% occluded, pLCx 30%, 100% occluded OM, 100% LAD occlusion after takeoff of D1, D1 is tiny with 80% disease, SVG-OM 80% proximal stenosis with 50% at site of anastomosis. An SVG-OM OKSANA was placed at that time), ICM, HFrEF (EF <10%), CRT-D who presented to the ED with sudden onset SOB. He stated that at about 7pm,  He had sudden onset of SOB and called the EMS, which diagnosed him with SVT, which they gave adenosine and and unknown dose of cardizem for. He was then brought to the ED. He was given metoprolol IV for the tachycardia and cardiology was asked to interrogate his device and for further management of his SVT. On arrival, he stated that he felt a bit better.     His wife stated that he had been doing well, and his weight had actually been decreasing since the hospital stay. He was doing well with therapy as well.     He was recently admitted from 10/10-10/23 for ADHF for which he was aggressively diuresed. He was also worked up for LVAD, but was felt to be a poor candidate due to his frailty and calcifications on his CT. He was also found to be in AT and had a LAVERNE/DCCV  with tikosyn initiation on 10/19. He was anticoagulated with coumadin.       Past Medical History:   Diagnosis Date    CHF (congestive heart failure)     COPD (chronic obstructive pulmonary disease)     Coronary artery disease     Diabetes mellitus        Past Surgical History:   Procedure Laterality Date    APPENDECTOMY      CARDIAC SURGERY      ECHOCARDIOGRAM,TRANSESOPHAGEAL N/A 10/19/2018    Performed by [facility] Diagnostic Provider at [facility] CATH LAB    EYE SURGERY      FRACTURE SURGERY      TONSILLECTOMY         Review of patient's allergies indicates:   Allergen Reactions    Amiodarone analogues Anaphylaxis    Ativan [lorazepam] Anxiety       No current facility-administered medications on file prior to encounter.      Current Outpatient Medications on File Prior to Encounter   Medication Sig    aspirin 81 MG Chew Take 81 mg by mouth once daily.    atorvastatin (LIPITOR) 80 MG tablet Take 80 mg by mouth nightly.    azelastine (ASTELIN) 137 mcg (0.1 %) nasal spray 1 spray by Nasal route 2 (two) times daily.    benzonatate (TESSALON) 200 MG capsule Take 200 mg by mouth 3 (three) times daily as needed for Cough.    cetirizine (ZYRTEC) 5 MG tablet Take 1 tablet (5 mg total) by mouth once daily.    docusate sodium (COLACE) 100 MG capsule Take 100 mg by mouth 2 (two) times daily as needed for Constipation.    dofetilide (TIKOSYN) 250 MCG Cap Take 1 capsule (250 mcg total) by mouth every 12 (twelve) hours.    enoxaparin (LOVENOX) 60 mg/0.6 mL Syrg Inject 0.6 mLs (60 mg total) into the skin every 12 (twelve) hours.    fluticasone (FLONASE) 50 mcg/actuation nasal spray 2 sprays by Each Nare route once daily.    furosemide (LASIX) 80 MG tablet 40mg qam and 80mg qpm.    glipiZIDE (GLUCOTROL) 2.5 MG TR24 Take 5 mg by mouth 2 (two) times daily.    metoprolol succinate (TOPROL-XL) 25 MG 24 hr tablet Take 1 tablet (25 mg total) by mouth 2 (two) times daily.    potassium chloride SA (K-DUR,KLOR-CON) 20 MEQ  tablet Take 1 tablet (20 mEq total) by mouth 2 (two) times daily.    prasugrel (EFFIENT) 10 mg Tab Take 1 tablet (10 mg total) by mouth once daily.    ranitidine (ZANTAC) 150 MG tablet Take 150 mg by mouth once daily.    warfarin (COUMADIN) 2 MG tablet Take 1 tablet (2 mg total) by mouth Daily. (Patient taking differently: Take 4 mg by mouth Daily. )     Family History     Problem Relation (Age of Onset)    Heart disease Father, Sister, Brother    Stroke Father        Tobacco Use    Smoking status: Former Smoker     Types: Cigarettes    Smokeless tobacco: Never Used   Substance and Sexual Activity    Alcohol use: No     Frequency: Never    Drug use: No    Sexual activity: Not on file     Review of Systems   Constitution: Negative for chills, fever and malaise/fatigue.   HENT: Negative.    Cardiovascular: Positive for dyspnea on exertion. Negative for chest pain, irregular heartbeat, leg swelling, orthopnea, palpitations and paroxysmal nocturnal dyspnea.   Respiratory: Positive for shortness of breath. Negative for wheezing.    Skin: Negative for color change and rash.   Musculoskeletal: Negative for arthritis, back pain and myalgias.   Gastrointestinal: Negative for abdominal pain, constipation, diarrhea and nausea.   Neurological: Negative for dizziness, numbness and paresthesias.   Psychiatric/Behavioral: Negative.      Objective:     Vital Signs (Most Recent):  Temp: 98.4 °F (36.9 °C) (11/03/18 2018)  Pulse: 108 (11/03/18 2246)  Resp: 13 (11/03/18 2246)  BP: (!) 84/50 (11/03/18 2246)  SpO2: (!) 94 % (11/03/18 2240) Vital Signs (24h Range):  Temp:  [98.4 °F (36.9 °C)] 98.4 °F (36.9 °C)  Pulse:  [108-130] 108  Resp:  [12-41] 13  SpO2:  [87 %-94 %] 94 %  BP: ()/(50-68) 84/50     Weight: 63.5 kg (140 lb)  Body mass index is 22.6 kg/m².    SpO2: (!) 94 %         Intake/Output Summary (Last 24 hours) at 11/3/2018 8495  Last data filed at 11/3/2018 2210  Gross per 24 hour   Intake 254.17 ml   Output --    Net 254.17 ml       Lines/Drains/Airways     Central Venous Catheter Line                 Percutaneous Central Line Insertion/Assessment - triple lumen  11/03/18 2212 right internal jugular less than 1 day          Drain                 Urethral Catheter 11/03/18 2154 Straight-tip 16 Fr. less than 1 day          Peripheral Intravenous Line                 Peripheral IV - Single Lumen 10/18/18 0500 Left;Posterior Forearm 16 days         Peripheral IV - Single Lumen 11/03/18 2020 Right Antecubital less than 1 day         Peripheral IV - Single Lumen 11/03/18 Left Antecubital less than 1 day                Physical Exam   Constitutional: Vital signs are normal. He appears well-developed and well-nourished. He is cooperative.  Non-toxic appearance. No distress.   HENT:   Head: Normocephalic and atraumatic.   Neck: JVD (12cm) present. No hepatojugular reflux present. Carotid bruit is not present.   Cardiovascular: Regular rhythm, S1 normal and S2 normal. Tachycardia present. Exam reveals no gallop.   Murmur heard.  High-pitched blowing holosystolic murmur is present with a grade of 2/6 at the apex.  Pulses:       Radial pulses are 2+ on the right side, and 2+ on the left side.        Dorsalis pedis pulses are 2+ on the right side, and 2+ on the left side.        Posterior tibial pulses are 2+ on the right side, and 2+ on the left side.   No lower extremity edema. Cold distal extremities   Pulmonary/Chest: Effort normal and breath sounds normal. No respiratory distress. He has no decreased breath sounds. He has no wheezes. He has no rhonchi. He has no rales.   Abdominal: Soft. Normal appearance and bowel sounds are normal. He exhibits no distension and no mass. There is no tenderness.   Neurological: He is alert.   Skin: Skin is warm, dry and intact.       Significant Labs:   CMP   Recent Labs   Lab 11/03/18 2025   *   K 4.6   CL 95   CO2 20*   *   BUN 58*   CREATININE 1.7*   CALCIUM 10.5   PROT 8.4    ALBUMIN 3.5   BILITOT 1.3*   ALKPHOS 77   AST 32   ALT 28   ANIONGAP 18*   ESTGFRAFRICA 45.9*   EGFRNONAA 39.7*   , CBC   Recent Labs   Lab 11/03/18 2025   WBC 17.49*   HGB 12.5*   HCT 39.2*       and INR   Recent Labs   Lab 11/02/18  1013 11/03/18 2025   INR 1.4 2.4*      11/3/2018 20:25   Troponin I 0.055 (H)   BNP 3,222 (H)      Significant Imaging:    EKG: AT with biV pacing.    Assessment and Plan:     Cardiogenic shock    --admit to ICU with HTS  --start  at 5/hr  --CVC with CVP and SVO2 monitoring  --lasix 80 with 10 gtt  --strict I/O's     Atrial tachycardia    --heart rate elevated likely secondary to to shock.  --continue to monitor for now.         VTE Risk Mitigation (From admission, onward)        Ordered     enoxaparin injection 60 mg  Every 12 hours      11/03/18 5463          Thank you for your consult. I will follow-up with patient. Please contact us if you have any additional questions.    Lindsey Hilario MD  Cardiology   [facility]

## 2018-11-04 NOTE — PLAN OF CARE
Problem: Patient Care Overview  Goal: Plan of Care Review  Outcome: Ongoing (interventions implemented as appropriate)  POC reviewed with patient. Pt arrived to room at 11:15. AAOx4. Follows commands and moves all extremities. Systolic BP 80-85. 3L nasal canula. No acute events overnight. Calabrese d'cd. External catheter applied. Wife left during shift to go home, will return this morning.

## 2018-11-04 NOTE — ASSESSMENT & PLAN NOTE
Bilirubin elevated 1.3 compared to baseline.   - CMP in AM   - Hopefully will clear with  / Lasix

## 2018-11-04 NOTE — ED TRIAGE NOTES
PT REPORTS SUDDEN ONSET OF SHORTNESS OF BREATH AT 1830.  DENIES CHEST PAIN.  HX OF PACER/DEFIB.  STATES THAT IT HAS NOT FIRED.  REPORTS INTERMITTENT NAUSEA THAT HAS SINCE RESOLVED. EMS REPORTED SVT ON ARRIVAL.  GAVE 6 AND 12 OF ADENOSINE WITH NO CONVERSION.  ALSO GAVE ZOFRAN 4 AND CARDIZEM.  REMAINS TACYCARDIC.

## 2018-11-04 NOTE — ASSESSMENT & PLAN NOTE
S/p LAVERNE / DCCV on Tikosyn:   Medtronic ICD interrogated, multiple SVT events noted.   - Continue Tikosyn BID,   - EP fellow aware, current EKG with NSR  - Hold Betablocker with ADHR and hypotension

## 2018-11-04 NOTE — ED PROVIDER NOTES
Encounter Date: 11/3/2018       History     Chief Complaint   Patient presents with    SVT     Mr Coker is a 72 yo  male patient with a PMHx of CAD s/p CABG, ICM, HFrEF, COPD, DM that presents to the ED tonight via EMS with SVT, shortness of breath, and light-headedness. Pt was recently discharged from [facility] on 10/23/18 for similar complaint.  Pt was given 6 and 12 of Adenosine and unknown amount of Cardizem en route to ED. On arrival to ED, patient states that he is no longer short of breath and is asymptomatic. Pt's HR in the 120's. Pt hypotensive at 92/55. Pt denies any chest pain or shortness of breath, but pt's wife states that he has been having cough, fever, and sinus congestion for the last few days.           Review of patient's allergies indicates:   Allergen Reactions    Amiodarone analogues Anaphylaxis    Ativan [lorazepam] Anxiety     Past Medical History:   Diagnosis Date    CHF (congestive heart failure)     COPD (chronic obstructive pulmonary disease)     Coronary artery disease     Diabetes mellitus      Past Surgical History:   Procedure Laterality Date    APPENDECTOMY      CARDIAC SURGERY      ECHOCARDIOGRAM,TRANSESOPHAGEAL N/A 10/19/2018    Performed by [facility] Diagnostic Provider at [facility] CATH LAB    EYE SURGERY      FRACTURE SURGERY      TONSILLECTOMY       Family History   Problem Relation Age of Onset    Stroke Father     Heart disease Father     Heart disease Sister     Heart disease Brother      Social History     Tobacco Use    Smoking status: Former Smoker     Types: Cigarettes    Smokeless tobacco: Never Used   Substance Use Topics    Alcohol use: No     Frequency: Never    Drug use: No     Review of Systems   Constitutional: Positive for fever.   HENT: Positive for congestion, rhinorrhea and sinus pressure.    Respiratory: Positive for cough and shortness of breath. Negative for chest tightness.    Cardiovascular: Negative for chest pain, palpitations and leg  swelling.   Gastrointestinal: Positive for nausea. Negative for abdominal pain, diarrhea and vomiting.   Genitourinary: Positive for frequency. Negative for decreased urine volume, difficulty urinating, dysuria and flank pain.   Musculoskeletal: Negative for arthralgias, back pain, neck pain and neck stiffness.   Skin: Positive for pallor.   Neurological: Positive for light-headedness. Negative for dizziness, syncope, speech difficulty, numbness and headaches.   Hematological: Bruises/bleeds easily.        Pt on coumadin and lovenox       Physical Exam     Initial Vitals   BP Pulse Resp Temp SpO2   11/03/18 2018 11/03/18 2010 11/03/18 2010 11/03/18 2018 11/03/18 2018   (!) 112/58 (!) 130 (!) 22 98.4 °F (36.9 °C) (!) 93 %      MAP       --                Physical Exam    Constitutional: He appears well-developed and well-nourished. He is not diaphoretic. He appears cachectic. He is easily aroused. He does not appear ill. No distress.   HENT:   Head: Normocephalic and atraumatic.   Eyes: Conjunctivae, EOM and lids are normal. Pupils are equal, round, and reactive to light.   Neck: Normal range of motion. Neck supple.   Cardiovascular: Intact distal pulses and normal pulses. Tachycardia present.  Exam reveals no gallop and no friction rub.    No murmur heard.  Pulmonary/Chest: Effort normal. No respiratory distress. He has no wheezes. He has no rhonchi. He has rales in the right lower field and the left lower field.   Abdominal: Soft. Bowel sounds are normal. He exhibits no distension. There is no tenderness. There is no rigidity, no guarding and no CVA tenderness.   Neurological: He is alert, oriented to person, place, and time and easily aroused. No cranial nerve deficit or sensory deficit.   Skin: Skin is warm, dry and intact. No pallor.   Psychiatric: He has a normal mood and affect. His speech is normal and behavior is normal.         ED Course   Critical Care  Date/Time: 11/3/2018 10:00 PM  Performed by: Xavier  JACKIE Loomis PA-C  Authorized by: Maya Flannery MD   Direct patient critical care time: 15 minutes  Additional history critical care time: 10 minutes  Ordering / reviewing critical care time: 10 minutes  Documentation critical care time: 15 minutes  Consulting other physicians critical care time: 15 minutes  Consult with family critical care time: 5 minutes  Total critical care time (exclusive of procedural time) : 70 minutes  Critical care was necessary to treat or prevent imminent or life-threatening deterioration of the following conditions: cardiac failure, respiratory failure and circulatory failure.  Critical care was time spent personally by me on the following activities: examination of patient, ordering and performing treatments and interventions, review of old charts, obtaining history from patient or surrogate, ordering and review of laboratory studies and pulse oximetry.        Labs Reviewed   CBC W/ AUTO DIFFERENTIAL - Abnormal; Notable for the following components:       Result Value    WBC 17.49 (*)     RBC 4.33 (*)     Hemoglobin 12.5 (*)     Hematocrit 39.2 (*)     MCHC 31.9 (*)     RDW 15.1 (*)     Immature Granulocytes 1.5 (*)     Gran # (ANC) 13.8 (*)     Immature Grans (Abs) 0.27 (*)     Mono # 1.3 (*)     Gran% 78.9 (*)     Lymph% 10.1 (*)     All other components within normal limits   COMPREHENSIVE METABOLIC PANEL - Abnormal; Notable for the following components:    Sodium 133 (*)     CO2 20 (*)     Glucose 324 (*)     BUN, Bld 58 (*)     Creatinine 1.7 (*)     Total Bilirubin 1.3 (*)     Anion Gap 18 (*)     eGFR if  45.9 (*)     eGFR if non  39.7 (*)     All other components within normal limits   TROPONIN I - Abnormal; Notable for the following components:    Troponin I 0.055 (*)     All other components within normal limits   B-TYPE NATRIURETIC PEPTIDE - Abnormal; Notable for the following components:    BNP 3,222 (*)     All other components within  "normal limits   POCT INFLUENZA A/B MOLECULAR - Normal   RESPIRATORY VIRAL PANEL BY PCR   CULTURE, BLOOD   CULTURE, BLOOD   MAGNESIUM   PROTIME-INR   TROPONIN I   CBC W/ AUTO DIFFERENTIAL   COMPREHENSIVE METABOLIC PANEL   MAGNESIUM   PHOSPHORUS          Imaging Results          X-Ray Chest 1 View (Final result)  Result time 11/03/18 22:58:37    Final result by Eric Conti MD (11/03/18 22:58:37)                 Impression:      Right-sided central venous catheter is partially obscured by AICD wires, but likely terminates over the mid SVC.      Electronically signed by: Eric Conti MD  Date:    11/03/2018  Time:    22:58             Narrative:    EXAMINATION:  XR CHEST 1 VIEW    CLINICAL HISTORY:  Provided history is "  Presence of other vascular implants and grafts".    TECHNIQUE:  One view of the chest.    COMPARISON:  11/03/2018 at 21:34 hours.    FINDINGS:  Cardiac silhouette is stable.  Left chest wall AICD and postoperative changes of prior median sternotomy.  No detrimental change in lung aeration.  Interval placement of a right IJ central venous catheter with the tip partially obscured by the patient's AICD wires, but appears to terminate overlying the mid SVC.                               X-Ray Chest AP Portable (Final result)  Result time 11/03/18 21:53:43    Final result by Eric Conti MD (11/03/18 21:53:43)                 Impression:      Cardiomegaly and pulmonary edema, overall similar to the prior exam.      Electronically signed by: Eric Conti MD  Date:    11/03/2018  Time:    21:53             Narrative:    EXAMINATION:  XR CHEST AP PORTABLE    CLINICAL HISTORY:  Provided history is "  Shortness of breath".    TECHNIQUE:  One view of the chest.    COMPARISON:  10/10/2018.    FINDINGS:  Cardiac silhouette is enlarged but stable.  Cardiac wires, cardiac pacing device, defibrillator pads, and an additional electronic device overlies the chest and limits evaluation of the lung " fields.  There are postoperative changes of prior median sternotomy.  Left chest wall AICD is noted with transvenous leads overlying the heart.  There are coarsened interstitial lung markings and mild diffuse bilateral airspace disease suggestive of edema.  Findings are overall similar to the prior exam.                                 Medical Decision Making:   History:   Old Medical Records: I decided to obtain old medical records.  Differential Diagnosis:   SVT  ACS  CHF-fluid overload  Pneumonia  Pacemaker malfunction  Electrolyte Abnormality  Clinical Tests:   Lab Tests: Ordered and Reviewed  Radiological Study: Ordered and Reviewed  Medical Tests: Ordered and Reviewed  ED Management:  Mr Coker is a 74 yo  male patient with a PMHx of CAD s/p CABG, ICM, HFrEF that presents to the ED tonight via EMS with SVT, shortness of breath, and light-headedness.  12-lead EKG, CBC, CMP, BNP, Mag, Trop, Flu swab, BCX2, CXR ordered in ED. Metoprolol given IV. 12 lead ekg shows sinus tach with rate of 118. Cardiology consulted and will come evaluate the patient in ED and interrogate his pacer/icd. CBC shows leukocytosis of 17,000. CMP reveals Creatinine of 1.7, BUN 58, GFR 39.7. Troponin elevated at 0.055. BNP 3,222. CXR reveals cardiomegaly and edema. Cardiology evaluated patient in ED and started patient on Dobutamine. Cardiology will admit to [facility].                      Clinical Impression:   Diagnoses of Chest pain, SOB (shortness of breath), and Status post peripherally inserted central catheter (PICC) central line placement were pertinent to this visit.      Disposition:   Disposition: Admitted  Condition: Serious                        Xavier Loomis PA-C  11/03/18 6221       Xavier Loomis PA-C  11/06/18 1668

## 2018-11-04 NOTE — ASSESSMENT & PLAN NOTE
Cardiorenal in nature, BL Cr 1.1, on admission 1.7  -Monitor BMP   -Closely watch UOP  -If continues to rise consider renal u/s and urine lytes  -Avoid nephrotoxic medications

## 2018-11-04 NOTE — SUBJECTIVE & OBJECTIVE
Past Medical History:   Diagnosis Date    CHF (congestive heart failure)     COPD (chronic obstructive pulmonary disease)     Coronary artery disease     Diabetes mellitus        Past Surgical History:   Procedure Laterality Date    APPENDECTOMY      CARDIAC SURGERY      ECHOCARDIOGRAM,TRANSESOPHAGEAL N/A 10/19/2018    Performed by [facility] Provider at [facility] CATH LAB    EYE SURGERY      FRACTURE SURGERY      TONSILLECTOMY         Review of patient's allergies indicates:   Allergen Reactions    Amiodarone analogues Anaphylaxis    Ativan [lorazepam] Anxiety       No current facility-administered medications on file prior to encounter.      Current Outpatient Medications on File Prior to Encounter   Medication Sig    aspirin 81 MG Chew Take 81 mg by mouth once daily.    atorvastatin (LIPITOR) 80 MG tablet Take 80 mg by mouth nightly.    azelastine (ASTELIN) 137 mcg (0.1 %) nasal spray 1 spray by Nasal route 2 (two) times daily.    benzonatate (TESSALON) 200 MG capsule Take 200 mg by mouth 3 (three) times daily as needed for Cough.    cetirizine (ZYRTEC) 5 MG tablet Take 1 tablet (5 mg total) by mouth once daily.    docusate sodium (COLACE) 100 MG capsule Take 100 mg by mouth 2 (two) times daily as needed for Constipation.    dofetilide (TIKOSYN) 250 MCG Cap Take 1 capsule (250 mcg total) by mouth every 12 (twelve) hours.    enoxaparin (LOVENOX) 60 mg/0.6 mL Syrg Inject 0.6 mLs (60 mg total) into the skin every 12 (twelve) hours.    fluticasone (FLONASE) 50 mcg/actuation nasal spray 2 sprays by Each Nare route once daily.    furosemide (LASIX) 80 MG tablet 40mg qam and 80mg qpm.    glipiZIDE (GLUCOTROL) 2.5 MG TR24 Take 5 mg by mouth 2 (two) times daily.    metoprolol succinate (TOPROL-XL) 25 MG 24 hr tablet Take 1 tablet (25 mg total) by mouth 2 (two) times daily.    potassium chloride SA (K-DUR,KLOR-CON) 20 MEQ tablet Take 1 tablet (20 mEq total) by mouth 2 (two) times daily.     prasugrel (EFFIENT) 10 mg Tab Take 1 tablet (10 mg total) by mouth once daily.    ranitidine (ZANTAC) 150 MG tablet Take 150 mg by mouth once daily.    warfarin (COUMADIN) 2 MG tablet Take 1 tablet (2 mg total) by mouth Daily. (Patient taking differently: Take 4 mg by mouth Daily. )     Family History     Problem Relation (Age of Onset)    Heart disease Father, Sister, Brother    Stroke Father        Tobacco Use    Smoking status: Former Smoker     Types: Cigarettes    Smokeless tobacco: Never Used   Substance and Sexual Activity    Alcohol use: No     Frequency: Never    Drug use: No    Sexual activity: Not on file     Review of Systems   Constitution: Negative for chills, fever and malaise/fatigue.   HENT: Negative.    Cardiovascular: Positive for dyspnea on exertion. Negative for chest pain, irregular heartbeat, leg swelling, orthopnea, palpitations and paroxysmal nocturnal dyspnea.   Respiratory: Positive for shortness of breath. Negative for wheezing.    Skin: Negative for color change and rash.   Musculoskeletal: Negative for arthritis, back pain and myalgias.   Gastrointestinal: Negative for abdominal pain, constipation, diarrhea and nausea.   Neurological: Negative for dizziness, numbness and paresthesias.   Psychiatric/Behavioral: Negative.      Objective:     Vital Signs (Most Recent):  Temp: 98.4 °F (36.9 °C) (11/03/18 2018)  Pulse: 108 (11/03/18 2246)  Resp: 13 (11/03/18 2246)  BP: (!) 84/50 (11/03/18 2246)  SpO2: (!) 94 % (11/03/18 2240) Vital Signs (24h Range):  Temp:  [98.4 °F (36.9 °C)] 98.4 °F (36.9 °C)  Pulse:  [108-130] 108  Resp:  [12-41] 13  SpO2:  [87 %-94 %] 94 %  BP: ()/(50-68) 84/50     Weight: 63.5 kg (140 lb)  Body mass index is 22.6 kg/m².    SpO2: (!) 94 %         Intake/Output Summary (Last 24 hours) at 11/3/2018 2111  Last data filed at 11/3/2018 2210  Gross per 24 hour   Intake 254.17 ml   Output --   Net 254.17 ml       Lines/Drains/Airways     Central Venous Catheter  Line                 Percutaneous Central Line Insertion/Assessment - triple lumen  11/03/18 2212 right internal jugular less than 1 day          Drain                 Urethral Catheter 11/03/18 2154 Straight-tip 16 Fr. less than 1 day          Peripheral Intravenous Line                 Peripheral IV - Single Lumen 10/18/18 0500 Left;Posterior Forearm 16 days         Peripheral IV - Single Lumen 11/03/18 2020 Right Antecubital less than 1 day         Peripheral IV - Single Lumen 11/03/18 Left Antecubital less than 1 day                Physical Exam   Constitutional: Vital signs are normal. He appears well-developed and well-nourished. He is cooperative.  Non-toxic appearance. No distress.   HENT:   Head: Normocephalic and atraumatic.   Neck: JVD (12cm) present. No hepatojugular reflux present. Carotid bruit is not present.   Cardiovascular: Regular rhythm, S1 normal and S2 normal. Tachycardia present. Exam reveals no gallop.   Murmur heard.  High-pitched blowing holosystolic murmur is present with a grade of 2/6 at the apex.  Pulses:       Radial pulses are 2+ on the right side, and 2+ on the left side.        Dorsalis pedis pulses are 2+ on the right side, and 2+ on the left side.        Posterior tibial pulses are 2+ on the right side, and 2+ on the left side.   No lower extremity edema. Cold distal extremities   Pulmonary/Chest: Effort normal and breath sounds normal. No respiratory distress. He has no decreased breath sounds. He has no wheezes. He has no rhonchi. He has no rales.   Abdominal: Soft. Normal appearance and bowel sounds are normal. He exhibits no distension and no mass. There is no tenderness.   Neurological: He is alert.   Skin: Skin is warm, dry and intact.       Significant Labs:   CMP   Recent Labs   Lab 11/03/18 2025   *   K 4.6   CL 95   CO2 20*   *   BUN 58*   CREATININE 1.7*   CALCIUM 10.5   PROT 8.4   ALBUMIN 3.5   BILITOT 1.3*   ALKPHOS 77   AST 32   ALT 28   ANIONGAP 18*    ESTGFRAFRICA 45.9*   EGFRNONAA 39.7*   , CBC   Recent Labs   Lab 11/03/18 2025   WBC 17.49*   HGB 12.5*   HCT 39.2*       and INR   Recent Labs   Lab 11/02/18  1013 11/03/18 2025   INR 1.4 2.4*      11/3/2018 20:25   Troponin I 0.055 (H)   BNP 3,222 (H)      Significant Imaging:    EKG: AT with biV pacing.

## 2018-11-04 NOTE — HPI
Mr. Coker is a 73 year old gentleman with a past medical history of CAD s/p CABG ([facility] 6/2018: HDZ-LAD patent, % occluded at ostium, SVG-% occluded, SVG-D 100% occluded, pLCx 30%, 100% occluded OM, 100% LAD occlusion after takeoff of D1, D1 is tiny with 80% disease, SVG-OM 80% proximal stenosis with 50% at site of anastomosis. An SVG-OM OKSANA was placed at that time), ICM, HFrEF (EF <10%), CRT-D who presented to the ED with sudden onset SOB. He stated that at about 7pm,  He had sudden onset of SOB and called the EMS, which diagnosed him with SVT, which they gave adenosine and and unknown dose of cardizem for. He was then brought to the ED. He was given metoprolol IV for the tachycardia and cardiology was asked to interrogate his device and for further management of his SVT. On arrival, he stated that he felt a bit better.     His wife stated that he had been doing well, and his weight had actually been decreasing since the hospital stay. He was doing well with therapy as well.     He was recently admitted from 10/10-10/23 for ADHF for which he was aggressively diuresed. He was also worked up for LVAD, but was felt to be a poor candidate due to his frailty and calcifications on his CT. He was also found to be in AT and had a LAVERNE/DCCV with tikosyn initiation on 10/19. He was anticoagulated with coumadin.

## 2018-11-04 NOTE — PROCEDURES
11/04/2018  12:50 AM    Procedure:  Central line  Indication:  Hemodynamic monitoring    Risks, benefits, and indications for the procedure were reviewed with patient and family. Consent was signed and placed in chart. Pt prepped and draped in sterile fashion. The following sterile precautions were followed: cap and mask, hand hygiene, sterile gown and sterile gloves, large sterile sheet and sterile field and 2% Chlorhexidine for cutaneous antisepsis. Time out was performed with nursing staff. Lidocaine 1% injected at site. Ultrasound guidance utilized to visualize anatomy RIJ. Access obtained without difficulty and blood flow was non-pulsatile. Manometry showed <5 estimated CVP. Wire threaded smoothly and US confirmed appropriate venous placement of wire. . Catheter advanced without resistance. Pt tolerated procedure well. No evidence of hematoma at vascular access site. CXR has been ordered to confirm appropriate placement.     Rj Luna M.D.  Page # [phone]  Cardiovascular Fellow PGY-IV  [facility]

## 2018-11-04 NOTE — ASSESSMENT & PLAN NOTE
CAD([facility] 6/2018: LIMA-LAD patent, % occluded at ostium, SVG-% occluded, SVG-D 100% occluded, pLCx 30%, 100% occluded OM, 100% LAD occlusion after takeoff of D1, D1 is tiny with 80% disease, SVG-OM 80% proximal stenosis with 50% at site of anastomosis.An SVG-OM OKSANA was placed at that time).     - Continue ASA / Prasugrel

## 2018-11-04 NOTE — PLAN OF CARE
Problem: Diabetes, Type 2 (Adult)  Intervention: Optimize Glycemic Control  Pt's BG monitored starting @ dinner. PRN SSI ordered and insulin administered per order. DM education completed to wife and pt @ bedside.       Problem: Patient Care Overview  Goal: Plan of Care Review  Outcome: Ongoing (interventions implemented as appropriate)  Pt remains in CMICU. No acute events today. Pt AAOx4. On 3 L NC, when attempting to wean pt off O2 pt desats to 88%. Pt with wide QRS complexes. Paced on monitor. SBP >80, maps > 60. Pt swallows pills with no difficulty. Per pt and pt's wife, pt is on 1500 ml fluid restriction @ home, not ordered, but following that restriction today. Lasix Dobutamine remain infusing. CVP 4-5. Pt with condom cath in place. Appetite good. Boost ordered one time daily. BG monitored. Vitals and assessments per chart. POC reviewed with pt and pt's family @ bedside, all questions and concerns addressed.

## 2018-11-04 NOTE — ED NOTES
PT REMAINS AAOX4.  CONTINUES TO DENY PAIN AND SHORTNESS OF BREATH.  HR REMAINS 110-120.  O2 SAT 92% ON 3 L VIA N.C.  EATING ICE AT THIS TIME.

## 2018-11-04 NOTE — H&P
[facility]  Heart Transplant  H&P    Patient Name: Bharat Coker  MRN: [id number]  Admission Date: 11/3/2018  Attending Physician: Jony Hendrickson Jr.,*  Primary Care Provider: Ronnie Richardson Jr, MD  Principal Problem:<principal problem not specified>    Subjective:     History of Present Illness:  Mr. Coker is a 72 y/o male admitted to [facility] for ADHF in the setting of recent Lasix adjustment and recent initiation of BB/CCB. PMHx of CAD s/p CABG, ICM/HFrEF (LVef10%) s/p Medtronic CRT-D, CAD([facility] 6/2018: LIMA-LAD patent, % occluded at ostium, SVG-% occluded, SVG-D 100% occluded, pLCx 30%, 100% occluded OM, 100% LAD occlusion after takeoff of D1, D1 is tiny with 80% disease, SVG-OM 80% proximal stenosis with 50% at site of anastomosis.An SVG-OM OKSANA was placed at that time). Who presents with c/o acute sob which occurred around 7PM this evening. Patient was at usual health, states developed acute SOB, had been having worsening SUMNER. EMS called, he was found to be in SVT, given Adenosis and Cardizem and brought to ED. He was noted to be in SVT -388b hypotensive SBP 70s, given metoprolol IV, and 250cc NS bolus. Cardiology consulted. Medtronic device interrogated, A-sensed Bi-V paced.     At length discussion with spouse / patient, she has been logging his BP / HR / Weights on daily basis, patient has been hypotensive not given his Lisinopril / Metoprolol. Seen in clinic with Aman Adames, recently decreased lasix 80/80, 40/80.  Recently discharged 10/10-10/23 for ADHF, patient is a poor VAD candidate dt frailty and calcifications on CT, also found to be in AT s/p LAVERNE/DCCV started on Tikosyn initiate on 10/19/18          Past Medical History:   Diagnosis Date    CHF (congestive heart failure)     COPD (chronic obstructive pulmonary disease)     Coronary artery disease     Diabetes mellitus        Past Surgical History:   Procedure Laterality Date    APPENDECTOMY       CARDIAC SURGERY      ECHOCARDIOGRAM,TRANSESOPHAGEAL N/A 10/19/2018    Performed by [facility] Diagnostic Provider at [facility] CATH LAB    EYE SURGERY      FRACTURE SURGERY      TONSILLECTOMY         Review of patient's allergies indicates:   Allergen Reactions    Amiodarone analogues Anaphylaxis    Ativan [lorazepam] Anxiety       Current Facility-Administered Medications   Medication    acetaminophen tablet 650 mg    aspirin chewable tablet 81 mg    atorvastatin tablet 80 mg    azelastine 137 mcg (0.1 %) nasal spray 137 mcg    cetirizine tablet 5 mg    DOBUTamine 500mg in D5W 250mL infusion (premix) (NON-TITRATING)    docusate sodium capsule 100 mg    dofetilide capsule 250 mcg    famotidine tablet 20 mg    fluticasone 50 mcg/actuation nasal spray 100 mcg    furosemide (LASIX) 2 mg/mL in sodium chloride 0.9% 100 mL infusion (conc: 2 mg/mL)    ondansetron injection 4 mg    polyethylene glycol packet 17 g    prasugrel tablet 10 mg    ramelteon tablet 8 mg    senna-docusate 8.6-50 mg per tablet 1 tablet    sodium chloride 0.9% flush 3 mL    warfarin tablet 3 mg     Family History     Problem Relation (Age of Onset)    Heart disease Father, Sister, Brother    Stroke Father        Tobacco Use    Smoking status: Former Smoker     Types: Cigarettes    Smokeless tobacco: Never Used   Substance and Sexual Activity    Alcohol use: No     Frequency: Never    Drug use: No    Sexual activity: Not on file     Review of Systems   Constitutional: Positive for fatigue. Negative for chills, diaphoresis and fever.   HENT: Negative for congestion.    Respiratory: Positive for shortness of breath. Negative for apnea, cough, choking, chest tightness, wheezing and stridor.    Cardiovascular: Negative for chest pain, palpitations and leg swelling.   Gastrointestinal: Negative for abdominal distention and abdominal pain.   Genitourinary: Negative for difficulty urinating and dysuria.   Musculoskeletal: Negative for  arthralgias.     Objective:     Vital Signs (Most Recent):  Temp: 99.1 °F (37.3 °C) (11/03/18 2333)  Pulse: 105 (11/03/18 2345)  Resp: (!) 30 (11/03/18 2345)  BP: (!) 83/51 (11/03/18 2345)  SpO2: 95 % (11/03/18 2345) Vital Signs (24h Range):  Temp:  [98.4 °F (36.9 °C)-99.1 °F (37.3 °C)] 99.1 °F (37.3 °C)  Pulse:  [105-130] 105  Resp:  [12-41] 30  SpO2:  [74 %-95 %] 95 %  BP: ()/(50-68) 83/51     Patient Vitals for the past 72 hrs (Last 3 readings):   Weight   11/03/18 2010 63.5 kg (140 lb)     Body mass index is 22.6 kg/m².      Intake/Output Summary (Last 24 hours) at 11/4/2018 0014  Last data filed at 11/3/2018 2315  Gross per 24 hour   Intake 277.49 ml   Output 210 ml   Net 67.49 ml       Physical Exam   Constitutional: He appears well-developed and well-nourished. No distress.   HENT:   Head: Normocephalic and atraumatic.   Neck: Normal range of motion. JVD present.       Cardiovascular: Regular rhythm, normal heart sounds and intact distal pulses. Tachycardia present. Exam reveals no gallop and no friction rub.   No murmur heard.  Pulmonary/Chest: Effort normal. No stridor. No respiratory distress. He has no wheezes. He has rhonchi in the right middle field, the right lower field, the left middle field and the left lower field. He has rales in the right lower field and the left lower field. He exhibits no tenderness.       Abdominal: Soft. Bowel sounds are normal. He exhibits no distension. There is no tenderness.   Musculoskeletal: Normal range of motion. He exhibits no edema.   Skin: Skin is warm and dry. Capillary refill takes less than 2 seconds. He is not diaphoretic.   Psychiatric: He has a normal mood and affect.   Nursing note and vitals reviewed.      Significant Labs:  CBC:  Recent Labs   Lab 11/03/18 2025   WBC 17.49*   RBC 4.33*   HGB 12.5*   HCT 39.2*      MCV 91   MCH 28.9   MCHC 31.9*     BNP:  Recent Labs   Lab 10/29/18  0950 11/03/18 2025   * 3,222*     CMP:  Recent Labs    Lab 10/29/18  0950 11/03/18 2025   * 324*   CALCIUM 10.5 10.5   ALBUMIN 3.4* 3.5   PROT 8.0 8.4   * 133*   K 4.4 4.6   CO2 27 20*   CL 94* 95   BUN 68* 58*   CREATININE 1.7* 1.7*   ALKPHOS 78 77   ALT 45* 28   AST 37 32   BILITOT 0.7 1.3*      Coagulation:   Recent Labs   Lab 10/29/18  0950 11/02/18  1013 11/03/18 2025   INR 1.0 1.4 2.4*     LDH:  No results for input(s): LDH in the last 72 hours.  Microbiology:  Microbiology Results (last 7 days)     Procedure Component Value Units Date/Time    Blood culture #2 **CANNOT BE ORDERED STAT** [[id number]] Collected:  11/03/18 2134    Order Status:  Sent Specimen:  Blood from Peripheral, Antecubital, Right Updated:  11/03/18 2146    Respiratory Viral Panel by PCR [facility]; Nasal Swab [[id number]] Collected:  11/03/18 2133    Order Status:  Sent Specimen:  Respiratory Updated:  11/03/18 2145    Blood culture #1 **CANNOT BE ORDERED STAT** [[id number]] Collected:  11/03/18 2059    Order Status:  Sent Specimen:  Blood from Peripheral, Antecubital, Right Updated:  11/03/18 2103          BMP:   Recent Labs   Lab 11/03/18 2025   *   *   K 4.6   CL 95   CO2 20*   BUN 58*   CREATININE 1.7*   CALCIUM 10.5   MG 2.0     Cardiac Markers: No results for input(s): CKMB, TROPONINT, MYOGLOBIN in the last 72 hours.  Coagulation:   Recent Labs   Lab 11/03/18 2025   INR 2.4*     Prealbumin: No results for input(s): PREALBUMIN in the last 72 hours.  I have reviewed all pertinent labs within the past 24 hours.    Diagnostic Results:  I have reviewed all pertinent imaging results/findings within the past 24 hours.    Assessment/Plan:     Cardiogenic shock    Mr. Coker is a 74 y/o male admitted to [facility] for ADHF in the setting of recent Lasix adjustment and recent initiation of BB/CCB. PMHx of CAD s/p CABG, ICM/HFrEF (LVef10%) s/p Medtronic CRT-D.   - Likely d/t ongoing SVT   - Elevated BNP ~3000   - CXR with diffuse pulmonary edema   - PTA lasix changed 80/80 ->  40/80   - TTE <10% EF    - Admit to [facility], Dr. Hendrickson aware   - Not a candidate for advanced options, if decompensates / does not improve may not be a candidate for MCS.  - S/p RIJ MML placed 11/4/18   - CVP 7 / SVO2 62  - Started on  5mcg / Lasix 10mg   - Leukocytosis shock induced, will not start on ABX,   - Closely monitor UOP, BMP, mag,   - Fluid restriction at 1500 cc with strict I/Os and daily STANDING weights  - Maintain on telemetry and daily EKGs   - Up to date risk stratification : TSH, Lipids, HbA1c with optimization of risk factors is necessary  - Check Electrolytes, keep Mag >2 & K+ >4  - SCDs, TEDs, Nursing communication to elevated LE          Hypotension    D/t ADHF / Cardiogenic shock:   - Hold PTA anti-HTNs  - may need Mellissa for close HD monitoring      Atrial tachycardia    S/p LAVERNE / DCCV on Tikosyn:   Medtronic ICD interrogated, multiple SVT events noted.   - Continue Tikosyn BID,   - EP fellow aware, current EKG with NSR  - Hold Betablocker with ADHR and hypotension      Hepatic congestion    Bilirubin elevated 1.3 compared to baseline.   - CMP in AM   - Hopefully will clear with  / Lasix      KASEY (acute kidney injury)    Cardiorenal in nature, BL Cr 1.1, on admission 1.7  -Monitor BMP   -Closely watch UOP  -If continues to rise consider renal u/s and urine lytes  -Avoid nephrotoxic medications      DM (diabetes mellitus)    Hold PTA Glipizide   - ISS with accuchecks      CAD (coronary artery disease)    CAD([facility] 6/2018: LIMA-LAD patent, % occluded at ostium, SVG-% occluded, SVG-D 100% occluded, pLCx 30%, 100% occluded OM, 100% LAD occlusion after takeoff of D1, D1 is tiny with 80% disease, SVG-OM 80% proximal stenosis with 50% at site of anastomosis.An SVG-OM OKSANA was placed at that time).     - Continue ASA / Prasugrel                  Rj Pacheco MD  Heart Transplant  [facility]

## 2018-11-05 PROBLEM — E11.59 TYPE 2 DIABETES MELLITUS WITH CIRCULATORY DISORDER, WITHOUT LONG-TERM CURRENT USE OF INSULIN: Status: ACTIVE | Noted: 2018-10-10

## 2018-11-05 LAB
ANION GAP SERPL CALC-SCNC: 12 MMOL/L
BASOPHILS # BLD AUTO: 0.05 K/UL
BASOPHILS NFR BLD: 0.4 %
BNP SERPL-MCNC: 2209 PG/ML
BUN SERPL-MCNC: 43 MG/DL
CALCIUM SERPL-MCNC: 9.5 MG/DL
CHLORIDE SERPL-SCNC: 96 MMOL/L
CO2 SERPL-SCNC: 26 MMOL/L
CREAT SERPL-MCNC: 1.2 MG/DL
DIFFERENTIAL METHOD: ABNORMAL
EOSINOPHIL # BLD AUTO: 0.5 K/UL
EOSINOPHIL NFR BLD: 4.3 %
ERYTHROCYTE [DISTWIDTH] IN BLOOD BY AUTOMATED COUNT: 14.9 %
EST. GFR  (AFRICAN AMERICAN): >60 ML/MIN/1.73 M^2
EST. GFR  (NON AFRICAN AMERICAN): >60 ML/MIN/1.73 M^2
GLUCOSE SERPL-MCNC: 249 MG/DL
HCT VFR BLD AUTO: 31 %
HGB BLD-MCNC: 9.7 G/DL
IMM GRANULOCYTES # BLD AUTO: 0.08 K/UL
IMM GRANULOCYTES NFR BLD AUTO: 0.7 %
INR PPP: 1.8
LYMPHOCYTES # BLD AUTO: 0.6 K/UL
LYMPHOCYTES NFR BLD: 5 %
MCH RBC QN AUTO: 28.6 PG
MCHC RBC AUTO-ENTMCNC: 31.3 G/DL
MCV RBC AUTO: 91 FL
MONOCYTES # BLD AUTO: 0.7 K/UL
MONOCYTES NFR BLD: 5.9 %
NEUTROPHILS # BLD AUTO: 10 K/UL
NEUTROPHILS NFR BLD: 83.7 %
NRBC BLD-RTO: 0 /100 WBC
PLATELET # BLD AUTO: 198 K/UL
PMV BLD AUTO: 11 FL
POCT GLUCOSE: 120 MG/DL (ref 70–110)
POCT GLUCOSE: 218 MG/DL (ref 70–110)
POCT GLUCOSE: 227 MG/DL (ref 70–110)
POCT GLUCOSE: 99 MG/DL (ref 70–110)
POTASSIUM SERPL-SCNC: 3.6 MMOL/L
PROTHROMBIN TIME: 17.6 SEC
RBC # BLD AUTO: 3.39 M/UL
SODIUM SERPL-SCNC: 134 MMOL/L
WBC # BLD AUTO: 11.89 K/UL

## 2018-11-05 PROCEDURE — 85025 COMPLETE CBC W/AUTO DIFF WBC: CPT | Mod: NTX

## 2018-11-05 PROCEDURE — 85610 PROTHROMBIN TIME: CPT | Mod: NTX

## 2018-11-05 PROCEDURE — 25000003 PHARM REV CODE 250: Mod: NTX | Performed by: INTERNAL MEDICINE

## 2018-11-05 PROCEDURE — 97165 OT EVAL LOW COMPLEX 30 MIN: CPT | Mod: NTX

## 2018-11-05 PROCEDURE — 80048 BASIC METABOLIC PNL TOTAL CA: CPT | Mod: NTX

## 2018-11-05 PROCEDURE — 94761 N-INVAS EAR/PLS OXIMETRY MLT: CPT | Mod: NTX

## 2018-11-05 PROCEDURE — 99232 SBSQ HOSP IP/OBS MODERATE 35: CPT | Mod: GC,NTX,, | Performed by: INTERNAL MEDICINE

## 2018-11-05 PROCEDURE — 20000000 HC ICU ROOM: Mod: NTX

## 2018-11-05 PROCEDURE — 99291 CRITICAL CARE FIRST HOUR: CPT | Mod: NTX,,, | Performed by: INTERNAL MEDICINE

## 2018-11-05 PROCEDURE — 27000221 HC OXYGEN, UP TO 24 HOURS: Mod: NTX

## 2018-11-05 PROCEDURE — A4216 STERILE WATER/SALINE, 10 ML: HCPCS | Mod: NTX | Performed by: STUDENT IN AN ORGANIZED HEALTH CARE EDUCATION/TRAINING PROGRAM

## 2018-11-05 PROCEDURE — G8979 MOBILITY GOAL STATUS: HCPCS | Mod: CJ,NTX

## 2018-11-05 PROCEDURE — 25000003 PHARM REV CODE 250: Mod: NTX | Performed by: STUDENT IN AN ORGANIZED HEALTH CARE EDUCATION/TRAINING PROGRAM

## 2018-11-05 PROCEDURE — 63600175 PHARM REV CODE 636 W HCPCS: Mod: NTX | Performed by: INTERNAL MEDICINE

## 2018-11-05 PROCEDURE — S5571 INSULIN DISPOS PEN 3 ML: HCPCS | Mod: NTX | Performed by: INTERNAL MEDICINE

## 2018-11-05 PROCEDURE — 83880 ASSAY OF NATRIURETIC PEPTIDE: CPT | Mod: NTX

## 2018-11-05 PROCEDURE — G8978 MOBILITY CURRENT STATUS: HCPCS | Mod: CN,NTX

## 2018-11-05 PROCEDURE — 97161 PT EVAL LOW COMPLEX 20 MIN: CPT | Mod: NTX

## 2018-11-05 PROCEDURE — 99223 1ST HOSP IP/OBS HIGH 75: CPT | Mod: GC,NTX,, | Performed by: INTERNAL MEDICINE

## 2018-11-05 RX ORDER — GLUCAGON 1 MG
1 KIT INJECTION
Status: DISCONTINUED | OUTPATIENT
Start: 2018-11-05 | End: 2018-11-05

## 2018-11-05 RX ORDER — CEFAZOLIN SODIUM 1 G/3ML
2 INJECTION, POWDER, FOR SOLUTION INTRAMUSCULAR; INTRAVENOUS ONCE
Status: COMPLETED | OUTPATIENT
Start: 2018-11-06 | End: 2018-11-06

## 2018-11-05 RX ORDER — IBUPROFEN 200 MG
16 TABLET ORAL
Status: DISCONTINUED | OUTPATIENT
Start: 2018-11-05 | End: 2018-11-05

## 2018-11-05 RX ORDER — VALSARTAN 40 MG/1
40 TABLET ORAL 2 TIMES DAILY
Status: DISCONTINUED | OUTPATIENT
Start: 2018-11-05 | End: 2018-11-05

## 2018-11-05 RX ORDER — DIGOXIN 125 MCG
0.12 TABLET ORAL DAILY
Status: DISCONTINUED | OUTPATIENT
Start: 2018-11-06 | End: 2018-11-14 | Stop reason: HOSPADM

## 2018-11-05 RX ORDER — IBUPROFEN 200 MG
24 TABLET ORAL
Status: DISCONTINUED | OUTPATIENT
Start: 2018-11-05 | End: 2018-11-14 | Stop reason: HOSPADM

## 2018-11-05 RX ORDER — IBUPROFEN 200 MG
16 TABLET ORAL
Status: DISCONTINUED | OUTPATIENT
Start: 2018-11-05 | End: 2018-11-14 | Stop reason: HOSPADM

## 2018-11-05 RX ORDER — DIGOXIN 125 MCG
0.12 TABLET ORAL DAILY
Status: DISCONTINUED | OUTPATIENT
Start: 2018-11-05 | End: 2018-11-05

## 2018-11-05 RX ORDER — GLUCAGON 1 MG
1 KIT INJECTION
Status: DISCONTINUED | OUTPATIENT
Start: 2018-11-05 | End: 2018-11-14 | Stop reason: HOSPADM

## 2018-11-05 RX ORDER — INSULIN ASPART 100 [IU]/ML
3 INJECTION, SOLUTION INTRAVENOUS; SUBCUTANEOUS
Status: DISCONTINUED | OUTPATIENT
Start: 2018-11-05 | End: 2018-11-11

## 2018-11-05 RX ORDER — INSULIN ASPART 100 [IU]/ML
0-5 INJECTION, SOLUTION INTRAVENOUS; SUBCUTANEOUS
Status: DISCONTINUED | OUTPATIENT
Start: 2018-11-05 | End: 2018-11-14 | Stop reason: HOSPADM

## 2018-11-05 RX ORDER — IBUPROFEN 200 MG
24 TABLET ORAL
Status: DISCONTINUED | OUTPATIENT
Start: 2018-11-05 | End: 2018-11-05

## 2018-11-05 RX ORDER — POTASSIUM CHLORIDE 20 MEQ/1
40 TABLET, EXTENDED RELEASE ORAL ONCE
Status: COMPLETED | OUTPATIENT
Start: 2018-11-05 | End: 2018-11-05

## 2018-11-05 RX ADMIN — FUROSEMIDE 10 MG/HR: 10 INJECTION, SOLUTION INTRAMUSCULAR; INTRAVENOUS at 08:11

## 2018-11-05 RX ADMIN — DOFETILIDE 250 MCG: 0.25 CAPSULE ORAL at 10:11

## 2018-11-05 RX ADMIN — SENNOSIDES AND DOCUSATE SODIUM 1 TABLET: 8.6; 5 TABLET ORAL at 08:11

## 2018-11-05 RX ADMIN — POLYETHYLENE GLYCOL 3350 17 G: 17 POWDER, FOR SOLUTION ORAL at 08:11

## 2018-11-05 RX ADMIN — SODIUM CHLORIDE 250 ML: 0.9 INJECTION, SOLUTION INTRAVENOUS at 02:11

## 2018-11-05 RX ADMIN — POTASSIUM CHLORIDE 40 MEQ: 1500 TABLET, EXTENDED RELEASE ORAL at 06:11

## 2018-11-05 RX ADMIN — INSULIN ASPART 3 UNITS: 100 INJECTION, SOLUTION INTRAVENOUS; SUBCUTANEOUS at 05:11

## 2018-11-05 RX ADMIN — INSULIN ASPART 2 UNITS: 100 INJECTION, SOLUTION INTRAVENOUS; SUBCUTANEOUS at 11:11

## 2018-11-05 RX ADMIN — DIGOXIN 0.12 MG: 125 TABLET ORAL at 10:11

## 2018-11-05 RX ADMIN — Medication 3 ML: at 02:11

## 2018-11-05 RX ADMIN — INSULIN ASPART 2 UNITS: 100 INJECTION, SOLUTION INTRAVENOUS; SUBCUTANEOUS at 07:11

## 2018-11-05 RX ADMIN — VALSARTAN 40 MG: 40 TABLET ORAL at 10:11

## 2018-11-05 RX ADMIN — DOFETILIDE 250 MCG: 0.25 CAPSULE ORAL at 09:11

## 2018-11-05 RX ADMIN — ATORVASTATIN CALCIUM 80 MG: 20 TABLET, FILM COATED ORAL at 09:11

## 2018-11-05 RX ADMIN — ACETAMINOPHEN 650 MG: 325 TABLET, FILM COATED ORAL at 12:11

## 2018-11-05 RX ADMIN — CETIRIZINE HYDROCHLORIDE 5 MG: 5 TABLET ORAL at 08:11

## 2018-11-05 RX ADMIN — SODIUM CHLORIDE 500 ML: 0.9 INJECTION, SOLUTION INTRAVENOUS at 06:11

## 2018-11-05 RX ADMIN — FAMOTIDINE 20 MG: 20 TABLET ORAL at 08:11

## 2018-11-05 RX ADMIN — PRASUGREL 10 MG: 10 TABLET, FILM COATED ORAL at 08:11

## 2018-11-05 RX ADMIN — INSULIN DETEMIR 9 UNITS: 100 INJECTION, SOLUTION SUBCUTANEOUS at 09:11

## 2018-11-05 RX ADMIN — ASPIRIN 81 MG CHEWABLE TABLET 81 MG: 81 TABLET CHEWABLE at 08:11

## 2018-11-05 NOTE — SUBJECTIVE & OBJECTIVE
PMH, PSH, FH, SH updated and reviewed     Review of Systems   Constitutional: Negative for unexpected weight change.   Eyes: Negative for visual disturbance.   Respiratory: Positive for shortness of breath.    Cardiovascular: Negative for chest pain.   Gastrointestinal: Positive for abdominal distention. Negative for abdominal pain.   Genitourinary: Negative for urgency.   Musculoskeletal: Negative for arthralgias.   Skin: Negative for wound.   Neurological: Negative for headaches.   Hematological: Does not bruise/bleed easily.   Psychiatric/Behavioral: Negative for sleep disturbance.     PHYSICAL EXAMINATION:  Vitals:    11/05/18 1434   BP: (!) 84/48   Pulse: 97   Resp: (!) 33   Temp:      Body mass index is 22.6 kg/m².    Physical Exam   Constitutional: He is oriented to person, place, and time. He appears well-developed.   Lying flat in bed - no distress   HENT:   Right Ear: External ear normal.   Left Ear: External ear normal.   Nose: Nose normal.   Hearing grossly normal  Dentition grossly normal   Cardiovascular: Normal rate.   No murmur heard.  Pulmonary/Chest: Effort normal and breath sounds normal. No respiratory distress.   Abdominal: Soft. He exhibits no distension and no mass. There is no tenderness.   Musculoskeletal: He exhibits no edema.   No digital clubbing or extremity cyanosis   Neurological: He is alert and oriented to person, place, and time. Coordination normal.   Skin: No rash noted.   No subcutaneous nodules noted.   Psychiatric: He has a normal mood and affect. His behavior is normal.   Alert and oriented to person, place, and situation.   Nursing note and vitals reviewed.

## 2018-11-05 NOTE — PLAN OF CARE
Problem: Occupational Therapy Goal  Goal: Occupational Therapy Goal  Goals to be met by: 7 days 11/12/18     Patient will increase functional independence with ADLs by performing:    UE Dressing with Supervision.  LE Dressing with Minimal Assistance.  Grooming while standing with Supervision.  Toileting from toilet with Supervision for hygiene and clothing management.   Toilet transfer to toilet with Supervision.    Goals and POC established today

## 2018-11-05 NOTE — HPI
Reason for Consult: Management of Type 2 diabetes mellitus, controlled (last A1c 6.9), not on long-term insulin therapy    Home Diabetes Medications:   Glipizide 5 mg BID.    How often checking glucose at home? AM and Bedtime   BG readings on regimen: 110-190s  Hypoglycemia on the regimen? Not recently. Has had it before if he takes the glipizide but doesn't eat.  Missed doses on regimen?  No    Diabetes Complications include:     Coronary artery disease s/p CABG    Complicating diabetes co morbidities:   Cardiogenic shock  Ischemic cardiomyopathy    HPI:   Patient is a 73 y.o. male with a diagnosis of ischemic cardiomyopathy with advanced heart failure, type 2 diabetes mellitus,

## 2018-11-05 NOTE — PROGRESS NOTES
Admit Note     Met with patient and spouse to assess needs. Patient is a 73 y.o.  male, admitted for cardiogenic shock, hypotension, atrial tachycardia, KASEY,DM and CAD.  Pt last discharged on 10/23.  Pt's spouse reports pt was in somewhat good health until heart attack in June 2018.  Pt has spent much of the last 4.5 months in the hospital.    Pt's spouse reports she was told by team today that the pt is not a eligible for an  LVAD and will go home on IV .  The pt and spouse asked worker the difference between HH and hospice, worker explained same.    The pt and spouse report they are not yet willing to give up and continue to be hopeful.   Pt's spouse all [facility].    Patient admitted from emergency on 11/3/2018 .  At this time, patient presents as alert and oriented x 4, good eye contact and calm.  At this time, patients caregiver presents as alert and oriented x 4, good eye contact and communicative.    Household/Family Systems     Patient resides with patient's spouse, at     [address] [place] [address].        Support system includes spouse, daughter and 10yr old granddaughter.    Patient does not have dependents that are need of being cared for.     Patients primary caregiver is Yanira Simon, patients spouse, phone number [phone].    No land line.  Pt has a cell phone, but does not usually answer the phone.   Additional emergency contact  Jania Simon (daughter) [phone]    During admission, patient's caregiver plans to stay in patient's room.  Confirmed patient and patients caregivers do have access to reliable transportation.    Cognitive Status/Learning     Patient's spouse reports pt's reading ability as college and states patient does have difficulty with hearing and memory. The pt does not have any hearing aids.  The pt's spouse reports the pt has experienced difficulty with short term memory since first heart attack in 1995.  The pt's spouse  reports the pt will stay he remembers when in fact he does not.  No difficulty with eyesight, reading, writing or learning.    Patient reports patient learns best by visual and support from spouse.     Needed: No.   Highest education level: Attended College/Technical School    Vocation/Disability   .  Working for Income: No  If no, reason not working: Patient Choice - Retired  Patient used to work as a teacher in the workplace.  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    The pt's spouse is not currently working and cares for spouse.  Pt's spouse was laid off earlier this year.     Adherence     Patient's spouse  reports a high level of adherence to patients health care regimen.  The pt's spouse reports the pt needs to increase his muscle weight. Adherence counseling and education provided. Patient verbalizes understanding.    Substance Use    Patient reports the following substance usage.    Tobacco: none.  Pt quit smoking in 1995. Pt used to smoke 1ppd  Alcohol: none since June  Illicit Drugs/Non-prescribed Medications: none, patient denies any use.  Patient states clear understanding of the potential impact of substance use.  Substance abstinence/cessation counseling, education and resources provided and reviewed.     Services Utilizing/ADLS    Infusion Service: Prior to admission, patient utilizing? no, However pt may be discharged to home on IV   Home Health: Prior to admission, patient utilizing? yes Pt's HH [facility] and will need to be arranged through [facility].  The pt's spouse can't remember the name of the HH co, however pt was being seen by a nurse, physical therapy and OT. Pt's spouse reports the pt was doing really well with the physical therapist.   DME: Prior to admission, no  Pulmonary/Cardiac Rehab: Prior to admission, no  Dialysis:  Prior to admission, no  Transplant Specialty Pharmacy:  Prior to admission, no.    Prior to admission, patient's spouse reports patient was  somewhat  independent with ADLS, however support from spouse was needed.  Pt's spouse reports with therapy the pt was getting a little stronger. Pt was not driving. The pt's spouse drives.    Patient reports patient is not able to care for self at this time due to compromised medical condition (as documented in medical record) and physical weakness..  Patient indicates a willingness to care for self once medically cleared to do so.    Insurance/Medications    Insured by   Payor/Plan Subscr  Sex Relation Sub. Ins. ID Effective Group Num   1. [employer] MANAGE* EUGENIO JADE 1945 Male  [id number] 1/1/18 [id number]                                   [address] [address]   2. VET[employer] ADMI* EUGENIO JADE 1945 Male Self [id number] 1998                                    [address]      Primary Insurance (for UNOS reporting): veterans  Secondary Insurance (for UNOS reporting): Public Insurance - Medicare FFS (Fee For Service)   The pt's spouse reports when the pt is in the hospital the pt's primary insurance is Veterans and secondary is [employer], however when the pt is out pt the primary insurance is [employer] and secondary is Veterans.  All home medications are being provided by [employer].     Patient's spouse reports patient is able to obtain and afford medications at this time and at time of discharge.    Living Will/Healthcare Power of     Patient's spouse states patient has a LW and/or HCPA. Pt's spouse is the HCPA.   provided education regarding LW and HCPA and the completion of forms.    Coping/Mental Health    Patient is coping adequately with the aid of  family members.  Patient denies mental health difficulties, however both the pt and spouse were very tearful after this mornings conversation with Dr. Hendrickson, but have decided to remain positive/hopeful and hope the home IV medication will make the pt stronger.  Worker provided emotional support.     Discharge Planning    At time of  discharge, patient plans to return to patient's home under the care of spouse.  Patients spouse will transport patient.  Per rounds today, expected discharge date has not been medically determined at this time. Patient and patients caregiver  verbalize understanding and are involved in treatment planning and discharge process.    Additional Concerns    Patient is being followed for needs, education, resources, information, emotional support, supportive counseling, and for supportive and skilled discharge plan of care.  providing ongoing psychosocial support, education, resources and d/c planning as needed.  SW remains available. Patient's caregiver verbalizes understanding and agreement with information reviewed,  availability and how to access available resources as needed. Patient verbalizes understanding and agreement with information reviewed, social work availability, and how to access available resources as needed.

## 2018-11-05 NOTE — CONSULTS
[facility]  Endocrinology  Diabetes Consult Note    Consult Requested by: Jony Hendrickson Jr.,*   Reason for admit:     HISTORY OF PRESENT ILLNESS:  Reason for Consult: Management of Type 2 diabetes mellitus, controlled (last A1c 6.9), not on long-term insulin therapy    Home Diabetes Medications:   Glipizide 5 mg BID.    How often checking glucose at home? AM and Bedtime   BG readings on regimen: 110-190s  Hypoglycemia on the regimen? Not recently. Has had it before if he takes the glipizide but doesn't eat.  Missed doses on regimen?  No    Diabetes Complications include:     Coronary artery disease s/p CABG    Complicating diabetes co morbidities:   Cardiogenic shock  Ischemic cardiomyopathy    HPI:   Patient is a 73 y.o. male with a diagnosis of ischemic cardiomyopathy with advanced heart failure, type 2 diabetes mellitus,     PMH, PSH, FH, SH updated and reviewed     Review of Systems   Constitutional: Negative for unexpected weight change.   Eyes: Negative for visual disturbance.   Respiratory: Positive for shortness of breath.    Cardiovascular: Negative for chest pain.   Gastrointestinal: Positive for abdominal distention. Negative for abdominal pain.   Genitourinary: Negative for urgency.   Musculoskeletal: Negative for arthralgias.   Skin: Negative for wound.   Neurological: Negative for headaches.   Hematological: Does not bruise/bleed easily.   Psychiatric/Behavioral: Negative for sleep disturbance.     PHYSICAL EXAMINATION:  Vitals:    11/05/18 1434   BP: (!) 84/48   Pulse: 97   Resp: (!) 33   Temp:      Body mass index is 22.6 kg/m².    Physical Exam   Constitutional: He is oriented to person, place, and time. He appears well-developed.   Lying flat in bed - no distress   HENT:   Right Ear: External ear normal.   Left Ear: External ear normal.   Nose: Nose normal.   Hearing grossly normal  Dentition grossly normal   Cardiovascular: Normal rate.   No murmur  heard.  Pulmonary/Chest: Effort normal and breath sounds normal. No respiratory distress.   Abdominal: Soft. He exhibits no distension and no mass. There is no tenderness.   Musculoskeletal: He exhibits no edema.   No digital clubbing or extremity cyanosis   Neurological: He is alert and oriented to person, place, and time. Coordination normal.   Skin: No rash noted.   No subcutaneous nodules noted.   Psychiatric: He has a normal mood and affect. His behavior is normal.   Alert and oriented to person, place, and situation.   Nursing note and vitals reviewed.        Labs Reviewed and Include   Recent Labs   Lab 11/05/18  0400   *   CALCIUM 9.5   *   K 3.6   CO2 26   CL 96   BUN 43*   CREATININE 1.2     Lab Results   Component Value Date    WBC 11.89 11/05/2018    HGB 9.7 (L) 11/05/2018    HCT 31.0 (L) 11/05/2018    MCV 91 11/05/2018     11/05/2018     No results for input(s): TSH, FREET4 in the last 168 hours.  Lab Results   Component Value Date    HGBA1C 6.9 (H) 10/11/2018       Nutritional status:   Body mass index is 22.6 kg/m².  Lab Results   Component Value Date    ALBUMIN 3.1 (L) 11/03/2018    ALBUMIN 3.5 11/03/2018    ALBUMIN 3.4 (L) 10/29/2018     No results found for: PREALBUMIN    Estimated Creatinine Clearance: 50.7 mL/min (based on SCr of 1.2 mg/dL).    Accu-Checks  Recent Labs     11/04/18  1627 11/04/18  2115 11/05/18  0749 11/05/18  1114   POCTGLUCOSE 244* 318* 227* 218*        ASSESSMENT and PLAN    Cardiogenic shock    Per primary  Inotropes/pressors may increase insulin requirements.       KASEY (acute kidney injury)    Estimated Creatinine Clearance: 50.7 mL/min (based on SCr of 1.2 mg/dL).  Avoid insulin stacking.       Type 2 diabetes mellitus with circulatory disorder, without long-term current use of insulin    BG goal 140-180 while inpatient.    Given HF and CKD, start ~0.3 units/kg/day    Start Levemir 9 units nightly  Start Novolog 3 units qAC  POC AC/HS  Low dose  correction    Discharge plans:  TBD. Glipizide carries the risk for hypoglycemia, but this doesn't seem to have been an issue recently.     CAD (coronary artery disease)    Per cardiology  Avoid hypoglycemia       Plan discussed with patient, family, and RN at bedside.     Adair Mccarthy MD  Endocrinology  [facility]

## 2018-11-05 NOTE — ASSESSMENT & PLAN NOTE
Estimated Creatinine Clearance: 50.7 mL/min (based on SCr of 1.2 mg/dL).  Avoid insulin stacking.

## 2018-11-05 NOTE — PT/OT/SLP EVAL
Occupational Therapy   Evaluation    Name: Bharat Coker  MRN: [id number]  Admitting Diagnosis: cardiogenic shock.  Pt admitted to ED with SOB and lightheadedness     Recommendations:     Discharge Recommendations:  HH; home with spouse       History:     Occupational Profile:  Living Environment: Pt lives with spouse in one story home with 5 SILVIANO with R HR  Previous level of function: Pt was independent. [facility], but he did not use at home before he arrived to [facility] ED   Equipment Used at Home:  walker, rolling, wheelchair  Assistance upon Discharge: spouse     Past Medical History:   Diagnosis Date    CHF (congestive heart failure)     COPD (chronic obstructive pulmonary disease)     Coronary artery disease     Diabetes mellitus        Past Surgical History:   Procedure Laterality Date    APPENDECTOMY      CARDIAC SURGERY      ECHOCARDIOGRAM,TRANSESOPHAGEAL N/A 10/19/2018    Performed by [facility] Diagnostic Provider at [facility] CATH LAB    EYE SURGERY      FRACTURE SURGERY      TONSILLECTOMY         Subjective     Pt agreeable to therapy.     Pain/Comfort:  · Pain Rating 1: 0/10    Patients cultural, spiritual, Taoist conflicts given the current situation:    None stated   Objective:     Communicated with: nsg  prior to session.    Pt found supine in bed with 3 LPM oxygen in place.     General Precautions: Standard, fall     Occupational Performance:    Bed Mobility:    · Patient completed Supine to Sit with stand by assistance    Functional Mobility/Transfers:  · Patient completed Sit <> Stand Transfer with stand by assistance  with  no assistive device   · Patient completed Bed <> Chair Transfer using Stand Pivot technique with stand by assistance with no assistive device      Activities of Daily Living:  · Grooming: stand by assistance seated  · Lower Body Dressing: minimum assistance chemo footwear    Cognitive/Visual Perceptual:  Pt awake, alert and following commands. Pt with appropriate  "mood/affect.       Physical Exam:  Pt is right hand dominant and demo WFL UE strength/ROM, coordination and sensation.     AMPAC 6 Click ADL:  AMPAC Total Score: 19    Treatment & Education:  Pt demo Fair to Fair+ sitting balance. Pt able to take few steps to chair with SBA and t/f to chair with SBA. Pt moves quickly with cues to slow speed to increased safety/decrease fall risk.   Education provided re: safety with functional mobility/ADL skill and OT POC   Education:    Patient left up in chair with all lines intact, call button in reach and nsg notified and spouse present.     Assessment:     Bharat Coker is a 73 y.o. male with a medical diagnosis of <principal problem not specified>.  He presents with the following performance deficits affecting function: weakness, impaired functional mobilty, gait instability, impaired self care skills, impaired endurance, impaired balance.      Rehab Prognosis: Good; patient would benefit from acute skilled OT services to address these deficits and reach maximum level of function.         Clinical Decision Makin.  OT Low:  "Pt evaluation falls under low complexity for evaluation coding due to performance deficits noted in 1-3 areas as stated above and 0 co-morbities affecting current functional status. Data obtained from problem focused assessments. No modifications or assistance was required for completion of evaluation. Only brief occupational profile and history review completed."     Plan:     Patient to be seen 3 x/week to address the above listed problems via self-care/home management, therapeutic activities, therapeutic exercises  · Plan of Care Expires:    · Plan of Care Reviewed with: patient, spouse    This Plan of care has been discussed with the patient who was involved in its development and understands and is in agreement with the identified goals and treatment plan    GOALS:   Multidisciplinary Problems     Occupational Therapy Goals        Problem: " Occupational Therapy Goal    Goal Priority Disciplines Outcome Interventions   Occupational Therapy Goal     OT, PT/OT     Description:  Goals to be met by: 7 days 11/12/18     Patient will increase functional independence with ADLs by performing:    UE Dressing with Supervision.  LE Dressing with Minimal Assistance.  Grooming while standing with Supervision.  Toileting from toilet with Supervision for hygiene and clothing management.   Toilet transfer to toilet with Supervision.                      Time Tracking:     OT Date of Treatment: 11/05/18  OT Start Time: 0850  OT Stop Time: 0915  OT Total Time (min): 25 min    Billable Minutes:Evaluation 25    ERINN Horvath  11/5/2018

## 2018-11-05 NOTE — HPI
74 y/o male with PMH of CAD s/p CABG, ICM, HFrEF (10%) s/p CRT-D with frequent hospitalizations, AT/AFL (on dofetilide 250 Q12h, metoprolol 25 BID) s/p DCCV (Oct 2018). He was admitted again for ADHF and was found to have four new episodes of SVT (see interrogation report from 11/03), although EGMs did not show begining or end of the tachycardia. No tachy-therapy was delivered. Telemetry showed a sustained paced tachycardia on the 100s-120s.     Today, he received digoxin 0.125 and valsartan 40 and developed hypotension.

## 2018-11-05 NOTE — ASSESSMENT & PLAN NOTE
S/p AT/AFL DCCV in October 2018 w Dr Ashby  Difficult to place on BB due to hypotension     Plan:   -[facility]  -Patient consented (consent on the chart)  -EP pre-ablation order set in place including ppx ABx-Should you have any doubts please call EP consults    Case discussed w Dr Ashby

## 2018-11-05 NOTE — PLAN OF CARE
Problem: Patient Care Overview  Goal: Plan of Care Review  POC reviewed with patient. Pt remains in CMICU. No acute events overnight. AAOx4, follows commands. Pt on 3L nasal canula Sats 90-45. Pt remains on lasix and doputamine gtts. Voids per external urinary catheter. No BM. Pt given tylenol of incision pain from the RIJ.  POC discussed with wife over the phone. No acute events during shift.Discussion on POC goal will take place today at bedside.

## 2018-11-05 NOTE — PLAN OF CARE
"Problem: Patient Care Overview  Goal: Plan of Care Review  Outcome: Ongoing (interventions implemented as appropriate)  Hypotensive episode today, initially symptomatic with "lightheadedness" but resolved quickly when back to bed.  NS fluid bolus given. Trendelenburg positioning seemed to help.  Medication as well as pt condition education given per RN, Fellow, Attending MD, pt and family verbalizes understanding.  Appetite good.  Pt agrees to ablation on tomorrow, consent is signed.        "

## 2018-11-05 NOTE — CONSULTS
[facility]  Cardiac Electrophysiology  Consult Note    Admission Date: 11/3/2018  Code Status: Full Code   Attending Provider: Jony Hendrickson Jr.,*  Consulting Provider: Yordan Spicer MD  Principal Problem:<principal problem not specified>    Inpatient consult to Electrophysiology  Consult performed by: Yordan Spicer MD  Consult ordered by: Rachel Son MD        Subjective:     Chief Complaint:  ADHF     HPI:   72 y/o male with PMH of CAD s/p CABG, ICM, HFrEF (10%) s/p CRT-D with frequent hospitalizations, AT/AFL (on dofetilide 250 Q12h, metoprolol 25 BID) s/p DCCV (Oct 2018). He was admitted again for ADHF and was found to have four new episodes of SVT (see interrogation report from 11/03), although EGMs did not show begining or end of the tachycardia. No tachy-therapy was delivered. Telemetry showed a sustained paced tachycardia on the 100s-120s.     Today, he received digoxin 0.125 and valsartan 40 and developed hypotension.     Past Medical History:   Diagnosis Date    CHF (congestive heart failure)     COPD (chronic obstructive pulmonary disease)     Coronary artery disease     Diabetes mellitus        Past Surgical History:   Procedure Laterality Date    APPENDECTOMY      CARDIAC SURGERY      ECHOCARDIOGRAM,TRANSESOPHAGEAL N/A 10/19/2018    Performed by [facility] Diagnostic Provider at [facility] CATH LAB    EYE SURGERY      FRACTURE SURGERY      TONSILLECTOMY         Review of patient's allergies indicates:   Allergen Reactions    Amiodarone analogues Anaphylaxis    Ativan [lorazepam] Anxiety       No current facility-administered medications on file prior to encounter.      Current Outpatient Medications on File Prior to Encounter   Medication Sig    aspirin 81 MG Chew Take 81 mg by mouth once daily.    atorvastatin (LIPITOR) 80 MG tablet Take 80 mg by mouth nightly.    azelastine (ASTELIN) 137 mcg (0.1 %) nasal spray 1 spray by Nasal route 2  (two) times daily.    benzonatate (TESSALON) 200 MG capsule Take 200 mg by mouth 3 (three) times daily as needed for Cough.    cetirizine (ZYRTEC) 5 MG tablet Take 1 tablet (5 mg total) by mouth once daily.    docusate sodium (COLACE) 100 MG capsule Take 100 mg by mouth 2 (two) times daily as needed for Constipation.    dofetilide (TIKOSYN) 250 MCG Cap Take 1 capsule (250 mcg total) by mouth every 12 (twelve) hours.    enoxaparin (LOVENOX) 60 mg/0.6 mL Syrg Inject 0.6 mLs (60 mg total) into the skin every 12 (twelve) hours.    fluticasone (FLONASE) 50 mcg/actuation nasal spray 2 sprays by Each Nare route once daily.    furosemide (LASIX) 80 MG tablet 40mg qam and 80mg qpm.    glipiZIDE (GLUCOTROL) 2.5 MG TR24 Take 5 mg by mouth 2 (two) times daily.    metoprolol succinate (TOPROL-XL) 25 MG 24 hr tablet Take 1 tablet (25 mg total) by mouth 2 (two) times daily.    potassium chloride SA (K-DUR,KLOR-CON) 20 MEQ tablet Take 1 tablet (20 mEq total) by mouth 2 (two) times daily.    prasugrel (EFFIENT) 10 mg Tab Take 1 tablet (10 mg total) by mouth once daily.    ranitidine (ZANTAC) 150 MG tablet Take 150 mg by mouth once daily.    warfarin (COUMADIN) 2 MG tablet Take 1 tablet (2 mg total) by mouth Daily. (Patient taking differently: Take 4 mg by mouth Daily. )     Family History     Problem Relation (Age of Onset)    Heart disease Father, Sister, Brother    Stroke Father        Tobacco Use    Smoking status: Former Smoker     Types: Cigarettes    Smokeless tobacco: Never Used   Substance and Sexual Activity    Alcohol use: No     Frequency: Never    Drug use: No    Sexual activity: Not on file     Review of Systems   Constitution: Positive for weakness and weight loss. Negative for chills and decreased appetite.   HENT: Negative for congestion, ear discharge and ear pain.    Eyes: Negative for blurred vision and discharge.   Cardiovascular: Positive for dyspnea on exertion and leg swelling. Negative for  chest pain, claudication and cyanosis.   Respiratory: Positive for shortness of breath. Negative for cough and hemoptysis.    Endocrine: Negative for cold intolerance and heat intolerance.   Skin: Negative for color change and nail changes.   Musculoskeletal: Negative for arthritis and back pain.   Gastrointestinal: Negative for bloating and abdominal pain.   Genitourinary: Negative for bladder incontinence and decreased libido.   Neurological: Negative for aphonia and brief paralysis.     Objective:     Vital Signs (Most Recent):  Temp: 98.8 °F (37.1 °C) (11/05/18 1530)  Pulse: 98 (11/05/18 1611)  Resp: (!) 37 (11/05/18 1611)  BP: (!) 91/54 (11/05/18 1611)  SpO2: (!) 94 % (11/05/18 1611) Vital Signs (24h Range):  Temp:  [97.6 °F (36.4 °C)-98.8 °F (37.1 °C)] 98.8 °F (37.1 °C)  Pulse:  [] 98  Resp:  [15-52] 37  SpO2:  [86 %-100 %] 94 %  BP: ()/(32-94) 91/54       Weight: 63.5 kg (140 lb)  Body mass index is 22.6 kg/m².    SpO2: (!) 94 %  O2 Device (Oxygen Therapy): nasal cannula    Physical Exam   Constitutional: He is oriented to person, place, and time. He appears well-developed and well-nourished.   HENT:   Head: Normocephalic and atraumatic.   Nose: Nose normal.   Mouth/Throat: No oropharyngeal exudate.   Eyes: Right eye exhibits no discharge. Left eye exhibits no discharge. No scleral icterus.   Neck: Normal range of motion. Neck supple. JVD present.   Cardiovascular: Normal rate, regular rhythm, S1 normal and S2 normal. Exam reveals no gallop, no S3, no S4, no distant heart sounds, no friction rub, no midsystolic click and no opening snap.   No murmur heard.  Pulses:       Radial pulses are 2+ on the right side, and 2+ on the left side.        Femoral pulses are 2+ on the right side, and 2+ on the left side.  Pulmonary/Chest: Effort normal. No respiratory distress. He has no wheezes. He has rales. He exhibits no tenderness.   Abdominal: Soft. Bowel sounds are normal. He exhibits no distension. There  is no tenderness. There is no rebound.   Musculoskeletal: Normal range of motion. He exhibits edema. He exhibits no tenderness or deformity.   Lymphadenopathy:     He has no cervical adenopathy.   Neurological: He is alert and oriented to person, place, and time. No cranial nerve deficit.   Skin: Skin is warm and dry.   Psychiatric: He has a normal mood and affect. His behavior is normal.       Significant Labs:   BMP:   Recent Labs   Lab 11/03/18 2025 11/03/18 2350 11/04/18 0431 11/04/18 2000 11/05/18  0400   * 265* 196* 367* 249*   * 136 134* 133* 134*   K 4.6 4.3 3.9 3.7 3.6   CL 95 100 97 95 96   CO2 20* 25 27 25 26   BUN 58* 60* 57* 52* 43*   CREATININE 1.7* 1.5* 1.4 1.4 1.2   CALCIUM 10.5 9.5 9.6 9.3 9.5   MG 2.0 1.9  --  1.7  --    , CMP:   Recent Labs   Lab 11/03/18 2025 11/03/18 2350 11/04/18 0431 11/04/18 2000 11/05/18  0400   * 136 134* 133* 134*   K 4.6 4.3 3.9 3.7 3.6   CL 95 100 97 95 96   CO2 20* 25 27 25 26   * 265* 196* 367* 249*   BUN 58* 60* 57* 52* 43*   CREATININE 1.7* 1.5* 1.4 1.4 1.2   CALCIUM 10.5 9.5 9.6 9.3 9.5   PROT 8.4 7.3  --   --   --    ALBUMIN 3.5 3.1*  --   --   --    BILITOT 1.3* 1.1*  --   --   --    ALKPHOS 77 71  --   --   --    AST 32 26  --   --   --    ALT 28 25  --   --   --    ANIONGAP 18* 11 10 13 12   ESTGFRAFRICA 45.9* 53.4* 58.0* 58.0* >60.0   EGFRNONAA 39.7* 46.2* 50.2* 50.2* >60.0    and CBC:   Recent Labs   Lab 11/03/18 2025 11/03/18  2350 11/05/18  0836   WBC 17.49* 16.37* 11.89   HGB 12.5* 10.8* 9.7*   HCT 39.2* 34.6* 31.0*    234 198       Significant Imaging: EKGs, Telemetry, TTE              Assessment and Plan:     SVT (supraventricular tachycardia)    S/p AT/AFL DCCV in October 2018 w Dr Ashby  Difficult to place on BB due to hypotension     Plan:   -Proceed w AVN ablation tomorrow w Dr Willams  -Patient consented (consent on the chart)  -EP pre-ablation order set in place including ppx ABx-Should you have any doubts  please call EP consults    Case discussed w Dr Ashby           Thank you for your consult. I will follow-up with patient. Please contact us if you have any additional questions.    Yordan Spicer MD  Cardiac Electrophysiology  [facility]

## 2018-11-05 NOTE — PLAN OF CARE
Problem: Physical Therapy Goal  Goal: Physical Therapy Goal  Goals to be met by: 18    Patient will increase functional independence with mobility by performin. Supine to sit with Modified [place] -not met  2. Sit to stand transfer with Modified [place] -not met  3. Gait  x 250 feet with Supervision -not met  4. Ascend/descend 5 stair with right Handrails Supervision -not met    Evaluation completed and goals appropriate. Marina Ugalde, PT 2018

## 2018-11-05 NOTE — ASSESSMENT & PLAN NOTE
BG goal 140-180 while inpatient.    Given HF and CKD, start ~0.3 units/kg/day    Start Levemir 9 units nightly  Start Novolog 3 units qAC  POC AC/HS  Low dose correction    Discharge plans:  TBD. Glipizide carries the risk for hypoglycemia, but this doesn't seem to have been an issue recently.

## 2018-11-05 NOTE — PT/OT/SLP EVAL
Physical Therapy Evaluation    Patient Name:  Bharat Coker   MRN:  [id number]    Recommendations:     Discharge Recommendations:  (home no needs)   Discharge Equipment Recommendations: none   Barriers to discharge: None    Assessment:     Bharat Coker is a 73 y.o. male admitted with a medical diagnosis of SVT.  He presents with the following impairments/functional limitations:  impaired functional mobilty, gait instability, impaired endurance pt diana treatment well and will benefit from skilled PT 3x/wk to progress physically.  pt will be able to discharge home when medically stable and will not need further PT or DME.     Rehab Prognosis:  good; patient would benefit from acute skilled PT services to address these deficits and reach maximum level of function.      Recent Surgery: * No surgery found *      Plan:     During this hospitalization, patient to be seen 3 x/week to address the above listed problems via gait training, therapeutic activities  · Plan of Care Expires:  12/02/18   Plan of Care Reviewed with: patient, spouse    Subjective     Communicated with nurse prior to session.  Patient found supine upon PT entry to room, agreeable to evaluation.      Chief Complaint: pt had no complaints.   Patient comments/goals:  To get better and go home.   Pain/Comfort:  · Pain Rating 1: 0/10  · Pain Rating Post-Intervention 1: 0/10    Patients cultural, spiritual, Samaritan conflicts given the current situation: none    Living Environment:  Pt is retired and lives with his wife who is a homemaker. They live in 1 story with 5 steps and R handrail.   Prior to admission, patients level of function was Independent .  Patient has the following equipment: walker, rolling, wheelchair.  DME owned (not currently used): none.  Upon discharge, patient will have assistance from wife.    Objective:     Patient found with: telemetry, blood pressure cuff, pulse ox (continuous), oxygen, central line, samaniego catheter(CVP)      General Precautions: Standard, fall   Orthopedic Precautions:    Braces:       Exams:  · Cognitive Exam:  Patient is oriented to Person, Place, Time and Situation  · RLE ROM: WFL  · RLE Strength: WFL  · LLE ROM: WFL  · LLE Strength: WFL    Functional Mobility:  · Bed Mobility:     · Rolling Right: stand by assistance  · Supine to Sit: stand by assistance  ·   · Transfers:     · Sit to Stand:  stand by assistance with no AD.  ·   · Gait: pt received gait training ~ 3 ft with CGA. Distance pt ambulated limited by medical lines    AM-PAC 6 CLICK MOBILITY  Total Score:18         Patient left up in chair with all lines intact, call button in reach and wife present.    GOALS:   Multidisciplinary Problems     Physical Therapy Goals        Problem: Physical Therapy Goal    Goal Priority Disciplines Outcome Goal Variances Interventions   Physical Therapy Goal     PT, PT/OT      Description:  Goals to be met by: 18    Patient will increase functional independence with mobility by performin. Supine to sit with Modified [place] -not met  2. Sit to stand transfer with Modified [place] -not met  3. Gait  x 250 feet with Supervision -not met  4. Ascend/descend 5 stair with right Handrails Supervision -not met                      History:     Past Medical History:   Diagnosis Date    CHF (congestive heart failure)     COPD (chronic obstructive pulmonary disease)     Coronary artery disease     Diabetes mellitus        Past Surgical History:   Procedure Laterality Date    APPENDECTOMY      CARDIAC SURGERY      ECHOCARDIOGRAM,TRANSESOPHAGEAL N/A 10/19/2018    Performed by [facility] Diagnostic Provider at [facility] CATH LAB    EYE SURGERY      FRACTURE SURGERY      TONSILLECTOMY         Clinical Decision Making:     History  Co-morbidities and personal factors that may impact the plan of care Examination  Body Structures and Functions, activity limitations and participation restrictions that may impact the  plan of care Clinical Presentation   Decision Making/ Complexity Score   Co-morbidities:   [] Time since onset of injury / illness / exacerbation  [] Status of current condition  []Patient's cognitive status and safety concerns    [] Multiple Medical Problems (see med hx)  Personal Factors:   [] Patient's age  [] Prior Level of function   [] Patient's home situation (environment and family support)  [] Patient's level of motivation  [] Expected progression of patient      HISTORY:(criteria)    [] 68329 - no personal factors/history    [] 57865 - has 1-2 personal factor/comorbidity     [] 47265 - has >3 personal factor/comorbidity     Body Regions:  [] Objective examination findings  [] Head     []  Neck  [] Trunk   [] Upper Extremity  [] Lower Extremity    Body Systems:  [] For communication ability, affect, cognition, language, and learning style: the assessment of the ability to make needs known, consciousness, orientation (person, place, and time), expected emotional /behavioral responses, and learning preferences (eg, learning barriers, education  needs)  [] For the neuromuscular system: a general assessment of gross coordinated movement (eg, balance, gait, locomotion, transfers, and transitions) and motor function  (motor control and motor learning)  [] For the musculoskeletal system: the assessment of gross symmetry, gross range of motion, gross strength, height, and weight  [] For the integumentary system: the assessment of pliability(texture), presence of scar formation, skin color, and skin integrity  [] For cardiovascular/pulmonary system: the assessment of heart rate, respiratory rate, blood pressure, and edema     Activity limitations:    [] Patient's cognitive status and saf ety concerns          [] Status of current condition      [] Weight bearing restriction  [] Cardiopulmunary Restriction    Participation Restrictions:   [] Goals and goal agreement with the patient     [] Rehab potential (prognosis)  and probable outcome      Examination of Body System: (criteria)    [] 32370 - addressing 1-2 elements    [] 33422 - addressing a total of 3 or more elements     [] 64441 -  Addressing a total of 4 or more elements         Clinical Presentation: (criteria)  Choose one     On examination of body system using standardized tests and measures patient presents with (CHOOSE ONE) elements from any of the following: body structures and functions, activity limitations, and/or participation restrictions.  Leading to a clinical presentation that is considered (CHOOSE ONE)                              Clinical Decision Making  (Eval Complexity):  Choose One     Time Tracking:     PT Received On: 11/05/18  PT Start Time: 0913     PT Stop Time: 0927  PT Total Time (min): 14 min     Billable Minutes: Evaluation 14 min      Marina Ugalde, PT  11/05/2018

## 2018-11-05 NOTE — PLAN OF CARE
[facility]   Heart Transplant Clinic  [address] [place] [address]   [phone] after hours        HOME  HEALTH ORDERS      Admit to Home Health    Diagnosis:   Patient Active Problem List   Diagnosis    CAD (coronary artery disease)    DM (diabetes mellitus)    ICD (implantable cardioverter-defibrillator) in place    Ischemic cardiomyopathy    Pre-transplant evaluation for heart transplant    KASEY (acute kidney injury)    Hepatic congestion    PNA (pneumonia)    Skin rash    VT (ventricular tachycardia)    Palliative care encounter    Atrial tachycardia    Cardiac resynchronization therapy defibrillator (CRT-D) in place    SVT (supraventricular tachycardia)    Atrial fibrillation status post cardioversion    Acute on chronic combined systolic and diastolic congestive heart failure    Chronic atrial fibrillation    Long term (current) use of anticoagulants [Z79.01]    Chest pain    Cardiogenic shock    Hypotension       Patient is homebound due to:   Diet: Low Sodium  Acitivities: As Tolerated    Nursing:   SN to complete comprehensive assessment including routine vital signs. Instruct on disease process and s/s of complications to report to MD. Review/verify medication list sent home with the patient at time of discharge  and instruct patient/caregiver as needed. Frequency may be adjusted depending on start of care date.    Notify MD if SBP > 160 or < 90; DBP > 90 or < 50; HR > 120 or < 50; Temp > 101; Weight gain >3lbs in 1 day or 5lbs in 1 week.    LABS:  SN to perform labs: CBC, CMP, BNP    HOME INFUSION THERAPY: Dobutamine at 5 mcg/kg/min ( 63.5 Kgs)  SN to perform Infusion Therapy/Central Line Care.  Review Central Line Care & Central Line Flush with patient.    Administer (drug and dose):Dobutamine at 5 mcg/kg/min ( 63.5 Kgs)    **For questions or concerns, please call [phone] and ask for Pre-Heart transplant clinic, M-F 8-5.  After hours, weekends, call [phone] and ask for the Heart Transplant Cardiologist on call.**    Central line care:  Scrub the Hub: Prior to accessing the line, always perform a 30 second alcohol scrub  Each lumen of the central line is to be flushed at least daily with 10 mL Normal Saline and 3 mL Heparin flush (100 units/mL)  Skilled Nurse (SN) may draw blood from IV access  Blood Draw Procedure:   - Aspirate at least 5 mL of blood   - Discard   - Obtain specimen   - Change posiflow cap   - Flush with 20 mL Normal Saline followed by a                 3-5 mL Heparin flush (100 units/mL)  Central :   - Sterile dressing changes are done weekly and as needed.   - Use chlor-hexadine scrub to cleanse site, apply Biopatch to insertion site,       apply securement device dressing   - Posi-flow caps are changed weekly and after EVERY lab draw.   - If sterile gauze is under dressing to control oozing,                 dressing change must be performed every 24 hours until gauze is not needed.      Physical Therapy to evaluate and treat. Evaluate for home safety and equipment needs; Establish/upgrade home exercise program. Perform / instruct on therapeutic exercises, gait training, transfer training, and Range of Motion.    Occupational Therapy to evaluate and treat. Evaluate home environment for safety and equipment needs. Perform/Instruct on transfers, ADL training, ROM, and therapeutic exercises.    Speech Therapy  to evaluate and treat for:  Language  Swallowing  Cognition                                              to evaluate for community resources/long-range planning.     Aide to provide assistance with personal care, ADLs, and vital signs    Send initial Home Health orders to HTS attending physician on call.  Send follow up questions to [phone] or fax:                        Heart Failure:      [phone]

## 2018-11-05 NOTE — SUBJECTIVE & OBJECTIVE
Past Medical History:   Diagnosis Date    CHF (congestive heart failure)     COPD (chronic obstructive pulmonary disease)     Coronary artery disease     Diabetes mellitus        Past Surgical History:   Procedure Laterality Date    APPENDECTOMY      CARDIAC SURGERY      ECHOCARDIOGRAM,TRANSESOPHAGEAL N/A 10/19/2018    Performed by [facility] Diagnostic Provider at [facility] CATH LAB    EYE SURGERY      FRACTURE SURGERY      TONSILLECTOMY         Review of patient's allergies indicates:   Allergen Reactions    Amiodarone analogues Anaphylaxis    Ativan [lorazepam] Anxiety       No current facility-administered medications on file prior to encounter.      Current Outpatient Medications on File Prior to Encounter   Medication Sig    aspirin 81 MG Chew Take 81 mg by mouth once daily.    atorvastatin (LIPITOR) 80 MG tablet Take 80 mg by mouth nightly.    azelastine (ASTELIN) 137 mcg (0.1 %) nasal spray 1 spray by Nasal route 2 (two) times daily.    benzonatate (TESSALON) 200 MG capsule Take 200 mg by mouth 3 (three) times daily as needed for Cough.    cetirizine (ZYRTEC) 5 MG tablet Take 1 tablet (5 mg total) by mouth once daily.    docusate sodium (COLACE) 100 MG capsule Take 100 mg by mouth 2 (two) times daily as needed for Constipation.    dofetilide (TIKOSYN) 250 MCG Cap Take 1 capsule (250 mcg total) by mouth every 12 (twelve) hours.    enoxaparin (LOVENOX) 60 mg/0.6 mL Syrg Inject 0.6 mLs (60 mg total) into the skin every 12 (twelve) hours.    fluticasone (FLONASE) 50 mcg/actuation nasal spray 2 sprays by Each Nare route once daily.    furosemide (LASIX) 80 MG tablet 40mg qam and 80mg qpm.    glipiZIDE (GLUCOTROL) 2.5 MG TR24 Take 5 mg by mouth 2 (two) times daily.    metoprolol succinate (TOPROL-XL) 25 MG 24 hr tablet Take 1 tablet (25 mg total) by mouth 2 (two) times daily.    potassium chloride SA (K-DUR,KLOR-CON) 20 MEQ tablet Take 1 tablet (20 mEq total) by mouth 2 (two) times daily.     prasugrel (EFFIENT) 10 mg Tab Take 1 tablet (10 mg total) by mouth once daily.    ranitidine (ZANTAC) 150 MG tablet Take 150 mg by mouth once daily.    warfarin (COUMADIN) 2 MG tablet Take 1 tablet (2 mg total) by mouth Daily. (Patient taking differently: Take 4 mg by mouth Daily. )     Family History     Problem Relation (Age of Onset)    Heart disease Father, Sister, Brother    Stroke Father        Tobacco Use    Smoking status: Former Smoker     Types: Cigarettes    Smokeless tobacco: Never Used   Substance and Sexual Activity    Alcohol use: No     Frequency: Never    Drug use: No    Sexual activity: Not on file     Review of Systems   Constitution: Positive for weakness and weight loss. Negative for chills and decreased appetite.   HENT: Negative for congestion, ear discharge and ear pain.    Eyes: Negative for blurred vision and discharge.   Cardiovascular: Positive for dyspnea on exertion and leg swelling. Negative for chest pain, claudication and cyanosis.   Respiratory: Positive for shortness of breath. Negative for cough and hemoptysis.    Endocrine: Negative for cold intolerance and heat intolerance.   Skin: Negative for color change and nail changes.   Musculoskeletal: Negative for arthritis and back pain.   Gastrointestinal: Negative for bloating and abdominal pain.   Genitourinary: Negative for bladder incontinence and decreased libido.   Neurological: Negative for aphonia and brief paralysis.     Objective:     Vital Signs (Most Recent):  Temp: 98.8 °F (37.1 °C) (11/05/18 1530)  Pulse: 98 (11/05/18 1611)  Resp: (!) 37 (11/05/18 1611)  BP: (!) 91/54 (11/05/18 1611)  SpO2: (!) 94 % (11/05/18 1611) Vital Signs (24h Range):  Temp:  [97.6 °F (36.4 °C)-98.8 °F (37.1 °C)] 98.8 °F (37.1 °C)  Pulse:  [] 98  Resp:  [15-52] 37  SpO2:  [86 %-100 %] 94 %  BP: ()/(32-94) 91/54       Weight: 63.5 kg (140 lb)  Body mass index is 22.6 kg/m².    SpO2: (!) 94 %  O2 Device (Oxygen Therapy): nasal  cannula    Physical Exam   Constitutional: He is oriented to person, place, and time. He appears well-developed and well-nourished.   HENT:   Head: Normocephalic and atraumatic.   Nose: Nose normal.   Mouth/Throat: No oropharyngeal exudate.   Eyes: Right eye exhibits no discharge. Left eye exhibits no discharge. No scleral icterus.   Neck: Normal range of motion. Neck supple. JVD present.   Cardiovascular: Normal rate, regular rhythm, S1 normal and S2 normal. Exam reveals no gallop, no S3, no S4, no distant heart sounds, no friction rub, no midsystolic click and no opening snap.   No murmur heard.  Pulses:       Radial pulses are 2+ on the right side, and 2+ on the left side.        Femoral pulses are 2+ on the right side, and 2+ on the left side.  Pulmonary/Chest: Effort normal. No respiratory distress. He has no wheezes. He has rales. He exhibits no tenderness.   Abdominal: Soft. Bowel sounds are normal. He exhibits no distension. There is no tenderness. There is no rebound.   Musculoskeletal: Normal range of motion. He exhibits edema. He exhibits no tenderness or deformity.   Lymphadenopathy:     He has no cervical adenopathy.   Neurological: He is alert and oriented to person, place, and time. No cranial nerve deficit.   Skin: Skin is warm and dry.   Psychiatric: He has a normal mood and affect. His behavior is normal.       Significant Labs:   BMP:   Recent Labs   Lab 11/03/18 2025 11/03/18 2350 11/04/18 0431 11/04/18 2000 11/05/18  0400   * 265* 196* 367* 249*   * 136 134* 133* 134*   K 4.6 4.3 3.9 3.7 3.6   CL 95 100 97 95 96   CO2 20* 25 27 25 26   BUN 58* 60* 57* 52* 43*   CREATININE 1.7* 1.5* 1.4 1.4 1.2   CALCIUM 10.5 9.5 9.6 9.3 9.5   MG 2.0 1.9  --  1.7  --    , CMP:   Recent Labs   Lab 11/03/18 2025 11/03/18 2350 11/04/18 0431 11/04/18 2000 11/05/18  0400   * 136 134* 133* 134*   K 4.6 4.3 3.9 3.7 3.6   CL 95 100 97 95 96   CO2 20* 25 27 25 26   * 265* 196* 367* 249*    BUN 58* 60* 57* 52* 43*   CREATININE 1.7* 1.5* 1.4 1.4 1.2   CALCIUM 10.5 9.5 9.6 9.3 9.5   PROT 8.4 7.3  --   --   --    ALBUMIN 3.5 3.1*  --   --   --    BILITOT 1.3* 1.1*  --   --   --    ALKPHOS 77 71  --   --   --    AST 32 26  --   --   --    ALT 28 25  --   --   --    ANIONGAP 18* 11 10 13 12   ESTGFRAFRICA 45.9* 53.4* 58.0* 58.0* >60.0   EGFRNONAA 39.7* 46.2* 50.2* 50.2* >60.0    and CBC:   Recent Labs   Lab 11/03/18 2025 11/03/18  2350 11/05/18  0836   WBC 17.49* 16.37* 11.89   HGB 12.5* 10.8* 9.7*   HCT 39.2* 34.6* 31.0*    234 198       Significant Imaging: EKGs, Telemetry, TTE

## 2018-11-06 ENCOUNTER — ANESTHESIA (OUTPATIENT)
Dept: MEDSURG UNIT | Facility: HOSPITAL | Age: 73
DRG: 273 | End: 2018-11-06
Payer: MEDICARE

## 2018-11-06 ENCOUNTER — ANESTHESIA EVENT (OUTPATIENT)
Dept: MEDSURG UNIT | Facility: HOSPITAL | Age: 73
DRG: 273 | End: 2018-11-06
Payer: MEDICARE

## 2018-11-06 PROBLEM — D64.9 ANEMIA: Status: ACTIVE | Noted: 2018-11-06

## 2018-11-06 LAB
ALLENS TEST: ABNORMAL
ANION GAP SERPL CALC-SCNC: 8 MMOL/L
BASOPHILS # BLD AUTO: 0.07 K/UL
BASOPHILS NFR BLD: 0.5 %
BUN SERPL-MCNC: 44 MG/DL
CALCIUM SERPL-MCNC: 9 MG/DL
CHLORIDE SERPL-SCNC: 98 MMOL/L
CO2 SERPL-SCNC: 27 MMOL/L
CREAT SERPL-MCNC: 1.2 MG/DL
DIFFERENTIAL METHOD: ABNORMAL
ENTEROVIRUS: NOT DETECTED
EOSINOPHIL # BLD AUTO: 0.7 K/UL
EOSINOPHIL NFR BLD: 5.6 %
ERYTHROCYTE [DISTWIDTH] IN BLOOD BY AUTOMATED COUNT: 14.9 %
EST. GFR  (AFRICAN AMERICAN): >60 ML/MIN/1.73 M^2
EST. GFR  (NON AFRICAN AMERICAN): >60 ML/MIN/1.73 M^2
GLUCOSE SERPL-MCNC: 127 MG/DL
HCO3 UR-SCNC: 31.4 MMOL/L (ref 24–28)
HCT VFR BLD AUTO: 27.8 %
HGB BLD-MCNC: 9 G/DL
HUMAN BOCAVIRUS: NOT DETECTED
HUMAN CORONAVIRUS, COMMON COLD VIRUS: NOT DETECTED
IMM GRANULOCYTES # BLD AUTO: 0.11 K/UL
IMM GRANULOCYTES NFR BLD AUTO: 0.8 %
INFLUENZA A - H1N1-09: NOT DETECTED
INR PPP: 1.4
LYMPHOCYTES # BLD AUTO: 0.9 K/UL
LYMPHOCYTES NFR BLD: 7 %
MCH RBC QN AUTO: 29 PG
MCHC RBC AUTO-ENTMCNC: 32.4 G/DL
MCV RBC AUTO: 90 FL
MONOCYTES # BLD AUTO: 1.1 K/UL
MONOCYTES NFR BLD: 8.6 %
NEUTROPHILS # BLD AUTO: 10 K/UL
NEUTROPHILS NFR BLD: 77.5 %
NRBC BLD-RTO: 0 /100 WBC
PARAINFLUENZA: NOT DETECTED
PCO2 BLDA: 48.8 MMHG (ref 35–45)
PH SMN: 7.42 [PH] (ref 7.35–7.45)
PLATELET # BLD AUTO: 210 K/UL
PMV BLD AUTO: 10.7 FL
PO2 BLDA: 35 MMHG (ref 40–60)
POC BE: 7 MMOL/L
POC SATURATED O2: 67 % (ref 95–100)
POC TCO2: 33 MMOL/L (ref 24–29)
POCT GLUCOSE: 136 MG/DL (ref 70–110)
POCT GLUCOSE: 139 MG/DL (ref 70–110)
POCT GLUCOSE: 271 MG/DL (ref 70–110)
POTASSIUM SERPL-SCNC: 4 MMOL/L
PROTHROMBIN TIME: 14.1 SEC
RBC # BLD AUTO: 3.1 M/UL
RVP - ADENOVIRUS: NOT DETECTED
RVP - HUMAN METAPNEUMOVIRUS (HMPV): NOT DETECTED
RVP - INFLUENZA A: NOT DETECTED
RVP - INFLUENZA B: NOT DETECTED
RVP - RESPIRATORY SYNCTIAL VIRUS (RSV) A: NOT DETECTED
RVP - RESPIRATORY VIRAL PANEL, SOURCE: NORMAL
RVP - RHINOVIRUS: NOT DETECTED
SAMPLE: ABNORMAL
SITE: ABNORMAL
SODIUM SERPL-SCNC: 133 MMOL/L
WBC # BLD AUTO: 12.96 K/UL

## 2018-11-06 PROCEDURE — 80048 BASIC METABOLIC PNL TOTAL CA: CPT | Mod: NTX

## 2018-11-06 PROCEDURE — 37000008 HC ANESTHESIA 1ST 15 MINUTES: Mod: NTX | Performed by: INTERNAL MEDICINE

## 2018-11-06 PROCEDURE — 93650 ICAR CATH ABLTJ AV NODE FUNC: CPT | Mod: NTX | Performed by: INTERNAL MEDICINE

## 2018-11-06 PROCEDURE — 93600 BUNDLE OF HIS RECORDING: CPT | Mod: 26,58,NTX, | Performed by: INTERNAL MEDICINE

## 2018-11-06 PROCEDURE — 99900035 HC TECH TIME PER 15 MIN (STAT): Mod: NTX

## 2018-11-06 PROCEDURE — 20000000 HC ICU ROOM: Mod: NTX

## 2018-11-06 PROCEDURE — D9220A PRA ANESTHESIA: Mod: ANES,NTX,, | Performed by: ANESTHESIOLOGY

## 2018-11-06 PROCEDURE — 93650 ICAR CATH ABLTJ AV NODE FUNC: CPT | Mod: 58,NTX,, | Performed by: INTERNAL MEDICINE

## 2018-11-06 PROCEDURE — 85610 PROTHROMBIN TIME: CPT | Mod: NTX

## 2018-11-06 PROCEDURE — A4216 STERILE WATER/SALINE, 10 ML: HCPCS | Mod: NTX | Performed by: STUDENT IN AN ORGANIZED HEALTH CARE EDUCATION/TRAINING PROGRAM

## 2018-11-06 PROCEDURE — 02HV33Z INSERTION OF INFUSION DEVICE INTO SUPERIOR VENA CAVA, PERCUTANEOUS APPROACH: ICD-10-PCS | Performed by: INTERNAL MEDICINE

## 2018-11-06 PROCEDURE — D9220A PRA ANESTHESIA: Mod: CRNA,NTX,, | Performed by: NURSE ANESTHETIST, CERTIFIED REGISTERED

## 2018-11-06 PROCEDURE — 25000003 PHARM REV CODE 250: Mod: NTX | Performed by: NURSE ANESTHETIST, CERTIFIED REGISTERED

## 2018-11-06 PROCEDURE — 37000009 HC ANESTHESIA EA ADD 15 MINS: Mod: NTX | Performed by: INTERNAL MEDICINE

## 2018-11-06 PROCEDURE — C1894 INTRO/SHEATH, NON-LASER: HCPCS | Mod: NTX | Performed by: INTERNAL MEDICINE

## 2018-11-06 PROCEDURE — 27000221 HC OXYGEN, UP TO 24 HOURS: Mod: NTX

## 2018-11-06 PROCEDURE — 02583ZZ DESTRUCTION OF CONDUCTION MECHANISM, PERCUTANEOUS APPROACH: ICD-10-PCS | Performed by: INTERNAL MEDICINE

## 2018-11-06 PROCEDURE — C1730 CATH, EP, 19 OR FEW ELECT: HCPCS | Mod: NTX | Performed by: INTERNAL MEDICINE

## 2018-11-06 PROCEDURE — 93600 BUNDLE OF HIS RECORDING: CPT | Mod: NTX | Performed by: INTERNAL MEDICINE

## 2018-11-06 PROCEDURE — 63600175 PHARM REV CODE 636 W HCPCS: Mod: NTX | Performed by: STUDENT IN AN ORGANIZED HEALTH CARE EDUCATION/TRAINING PROGRAM

## 2018-11-06 PROCEDURE — 25000003 PHARM REV CODE 250: Mod: NTX | Performed by: INTERNAL MEDICINE

## 2018-11-06 PROCEDURE — 25000003 PHARM REV CODE 250: Mod: NTX | Performed by: STUDENT IN AN ORGANIZED HEALTH CARE EDUCATION/TRAINING PROGRAM

## 2018-11-06 PROCEDURE — 63600175 PHARM REV CODE 636 W HCPCS: Mod: NTX | Performed by: NURSE ANESTHETIST, CERTIFIED REGISTERED

## 2018-11-06 PROCEDURE — 99233 SBSQ HOSP IP/OBS HIGH 50: CPT | Mod: NTX,,, | Performed by: INTERNAL MEDICINE

## 2018-11-06 PROCEDURE — 82803 BLOOD GASES ANY COMBINATION: CPT | Mod: NTX

## 2018-11-06 PROCEDURE — 85025 COMPLETE CBC W/AUTO DIFF WBC: CPT | Mod: NTX

## 2018-11-06 PROCEDURE — 63600175 PHARM REV CODE 636 W HCPCS: Mod: NTX | Performed by: INTERNAL MEDICINE

## 2018-11-06 PROCEDURE — 99233 SBSQ HOSP IP/OBS HIGH 50: CPT | Mod: GC,NTX,, | Performed by: INTERNAL MEDICINE

## 2018-11-06 PROCEDURE — C1733 CATH, EP, OTHR THAN COOL-TIP: HCPCS | Mod: NTX | Performed by: INTERNAL MEDICINE

## 2018-11-06 RX ORDER — EPINEPHRINE 0.1 MG/ML
INJECTION INTRAVENOUS
Status: DISCONTINUED | OUTPATIENT
Start: 2018-11-06 | End: 2018-11-06

## 2018-11-06 RX ORDER — MIDAZOLAM HYDROCHLORIDE 1 MG/ML
INJECTION, SOLUTION INTRAMUSCULAR; INTRAVENOUS
Status: DISCONTINUED | OUTPATIENT
Start: 2018-11-06 | End: 2018-11-06

## 2018-11-06 RX ORDER — SODIUM CHLORIDE 0.9 % (FLUSH) 0.9 %
3 SYRINGE (ML) INJECTION
Status: CANCELLED | OUTPATIENT
Start: 2018-11-06

## 2018-11-06 RX ORDER — CEFAZOLIN SODIUM 1 G/3ML
INJECTION, POWDER, FOR SOLUTION INTRAMUSCULAR; INTRAVENOUS
Status: DISCONTINUED | OUTPATIENT
Start: 2018-11-06 | End: 2018-11-06

## 2018-11-06 RX ORDER — LIDOCAINE HYDROCHLORIDE 20 MG/ML
INJECTION, SOLUTION EPIDURAL; INFILTRATION; INTRACAUDAL; PERINEURAL
Status: DISCONTINUED | OUTPATIENT
Start: 2018-11-06 | End: 2018-11-06 | Stop reason: HOSPADM

## 2018-11-06 RX ORDER — BUPIVACAINE HYDROCHLORIDE 2.5 MG/ML
INJECTION, SOLUTION EPIDURAL; INFILTRATION; INTRACAUDAL
Status: DISCONTINUED | OUTPATIENT
Start: 2018-11-06 | End: 2018-11-06 | Stop reason: HOSPADM

## 2018-11-06 RX ORDER — SODIUM CHLORIDE 9 MG/ML
INJECTION, SOLUTION INTRAVENOUS CONTINUOUS PRN
Status: DISCONTINUED | OUTPATIENT
Start: 2018-11-06 | End: 2018-11-06

## 2018-11-06 RX ORDER — WARFARIN 2 MG/1
4 TABLET ORAL DAILY
Status: DISCONTINUED | OUTPATIENT
Start: 2018-11-06 | End: 2018-11-08

## 2018-11-06 RX ADMIN — WARFARIN SODIUM 4 MG: 2 TABLET ORAL at 07:11

## 2018-11-06 RX ADMIN — Medication 3 ML: at 09:11

## 2018-11-06 RX ADMIN — MIDAZOLAM 0.5 MG: 1 INJECTION INTRAMUSCULAR; INTRAVENOUS at 02:11

## 2018-11-06 RX ADMIN — SODIUM CHLORIDE: 0.9 INJECTION, SOLUTION INTRAVENOUS at 01:11

## 2018-11-06 RX ADMIN — ATORVASTATIN CALCIUM 80 MG: 20 TABLET, FILM COATED ORAL at 08:11

## 2018-11-06 RX ADMIN — INSULIN ASPART 3 UNITS: 100 INJECTION, SOLUTION INTRAVENOUS; SUBCUTANEOUS at 08:11

## 2018-11-06 RX ADMIN — CEFAZOLIN 2 G: 330 INJECTION, POWDER, FOR SOLUTION INTRAMUSCULAR; INTRAVENOUS at 01:11

## 2018-11-06 RX ADMIN — CEFAZOLIN 2 G: 1 INJECTION, POWDER, FOR SOLUTION INTRAMUSCULAR; INTRAVENOUS at 07:11

## 2018-11-06 RX ADMIN — DOFETILIDE 250 MCG: 0.25 CAPSULE ORAL at 08:11

## 2018-11-06 RX ADMIN — DOBUTAMINE HYDROCHLORIDE 5 MCG/KG/MIN: 200 INJECTION INTRAVENOUS at 12:11

## 2018-11-06 RX ADMIN — EPINEPHRINE 5 MCG: 0.1 INJECTION, SOLUTION ENDOTRACHEAL; INTRACARDIAC; INTRAVENOUS at 02:11

## 2018-11-06 RX ADMIN — INSULIN DETEMIR 9 UNITS: 100 INJECTION, SOLUTION SUBCUTANEOUS at 09:11

## 2018-11-06 RX ADMIN — MIDAZOLAM 1 MG: 1 INJECTION INTRAMUSCULAR; INTRAVENOUS at 01:11

## 2018-11-06 RX ADMIN — SENNOSIDES AND DOCUSATE SODIUM 1 TABLET: 8.6; 5 TABLET ORAL at 08:11

## 2018-11-06 NOTE — TRANSFER OF CARE
"Anesthesia Transfer of Care Note    Patient: Bharat Coker    Procedure(s) Performed: Procedure(s) (LRB):  ABLATION, AVN (N/A)    Patient location: ICU    Anesthesia Type: MAC    Transport from OR: Transported from OR on 2-3 L/min O2 by NC with adequate spontaneous ventilation. Continuous ECG monitoring in transport. Continuous SpO2 monitoring in transport    Post pain: adequate analgesia    Post assessment: no apparent anesthetic complications and tolerated procedure well    Post vital signs: stable    Level of consciousness: awake, alert and oriented    Nausea/Vomiting: no nausea/vomiting    Complications: none    Transfer of care protocol was followed      Last vitals:   Visit Vitals  BP (!) 91/55   Pulse (!) 116   Temp 36.6 °C (97.9 °F) (Oral)   Resp (!) 30   Ht 5' 6" (1.676 m)   Wt 63.5 kg (140 lb)   SpO2 96%   BMI 22.60 kg/m²     "

## 2018-11-06 NOTE — PROGRESS NOTES
[facility]  Heart Transplant  Progress Note    Patient Name: Bharat Coker  MRN: [id number]  Admission Date: 11/3/2018  Hospital Length of Stay: 3 days  Attending Physician: Jony Hendrickson Jr.,*  Primary Care Provider: Ronnie Richardson Jr, MD  Principal Problem:SVT (supraventricular tachycardia)    Subjective:     Interval History: Patient seen and examined today at bedside.Lying in bed.  No dizziness overnight though had episodes of hypotension, though nurse overnight documents readings affected with position change and improved with monitoring. Morning SBP -98. Good UO. Awating ablation today.    Continuous Infusions:   DOBUTamine 5 mcg/kg/min (11/06/18 1317)     Scheduled Meds:   aspirin  81 mg Oral Daily    atorvastatin  80 mg Oral Nightly    azelastine  1 spray Nasal BID    cetirizine  5 mg Oral Daily    digoxin  0.125 mg Oral Daily    dofetilide  250 mcg Oral Q12H    famotidine  20 mg Oral Daily    fluticasone  2 spray Each Nare Daily    insulin aspart U-100  3 Units Subcutaneous TIDWM    insulin detemir U-100  9 Units Subcutaneous QHS    polyethylene glycol  17 g Oral Daily    prasugrel  10 mg Oral Daily    senna-docusate 8.6-50 mg  1 tablet Oral BID    sodium chloride 0.9%  3 mL Intravenous Q8H    warfarin  4 mg Oral Daily     PRN Meds:acetaminophen, bupivacaine (PF) 0.25% (2.5 mg/ml), dextrose 50%, dextrose 50%, docusate sodium, glucagon (human recombinant), glucose, glucose, insulin aspart U-100, lidocaine (PF) 20 mg/ml (2%), ondansetron, ramelteon    Review of patient's allergies indicates:   Allergen Reactions    Amiodarone analogues Anaphylaxis    Ativan [lorazepam] Anxiety     Objective:     Vital Signs (Most Recent):  Temp: 97.9 °F (36.6 °C) (11/06/18 1130)  Pulse: (!) 116 (11/06/18 1130)  Resp: (!) 30 (11/06/18 1130)  BP: (!) 91/55 (11/06/18 1130)  SpO2: 96 % (11/06/18 1130) Vital Signs (24h Range):  Temp:  [97.6 °F (36.4 °C)-98.2 °F (36.8 °C)] 97.9 °F  (36.6 °C)  Pulse:  [] 116  Resp:  [16-37] 30  SpO2:  [83 %-99 %] 96 %  BP: ()/(33-94) 91/55     Patient Vitals for the past 72 hrs (Last 3 readings):   Weight   11/03/18 2010 63.5 kg (140 lb)     Body mass index is 22.6 kg/m².      Intake/Output Summary (Last 24 hours) at 11/6/2018 1535  Last data filed at 11/6/2018 1445  Gross per 24 hour   Intake 420 ml   Output 425 ml   Net -5 ml       Hemodynamic Parameters:  CVP:  [5 mmHg-6 mmHg] 5 mmHg    Telemetry: Paced    Physical Exam   Constitutional: He is oriented to person, place, and time. He appears well-developed and well-nourished.   HENT:   Mouth/Throat: Oropharynx is clear and moist.   Neck:   Central line on right   Cardiovascular: Normal rate, regular rhythm, normal heart sounds and intact distal pulses. Exam reveals no gallop.   Pulmonary/Chest: Effort normal and breath sounds normal. No respiratory distress. He has no wheezes.   Rhonchi upto mid lung   Abdominal: Soft. Bowel sounds are normal. He exhibits no distension. There is no tenderness. There is no guarding.   Musculoskeletal: He exhibits no edema.   Neurological: He is alert and oriented to person, place, and time.   Skin: Skin is warm.       Significant Labs:  CBC:  Recent Labs   Lab 11/03/18 2350 11/05/18 0836 11/06/18  0342   WBC 16.37* 11.89 12.96*   RBC 3.69* 3.39* 3.10*   HGB 10.8* 9.7* 9.0*   HCT 34.6* 31.0* 27.8*    198 210   MCV 94 91 90   MCH 29.3 28.6 29.0   MCHC 31.2* 31.3* 32.4     BNP:  Recent Labs   Lab 11/03/18 2025 11/05/18  0836   BNP 3,222* 2,209*     CMP:  Recent Labs   Lab 11/03/18 2025 11/03/18 2350  11/04/18  2000 11/05/18  0400 11/06/18  0342   * 265*   < > 367* 249* 127*   CALCIUM 10.5 9.5   < > 9.3 9.5 9.0   ALBUMIN 3.5 3.1*  --   --   --   --    PROT 8.4 7.3  --   --   --   --    * 136   < > 133* 134* 133*   K 4.6 4.3   < > 3.7 3.6 4.0   CO2 20* 25   < > 25 26 27   CL 95 100   < > 95 96 98   BUN 58* 60*   < > 52* 43* 44*   CREATININE 1.7*  1.5*   < > 1.4 1.2 1.2   ALKPHOS 77 71  --   --   --   --    ALT 28 25  --   --   --   --    AST 32 26  --   --   --   --    BILITOT 1.3* 1.1*  --   --   --   --     < > = values in this interval not displayed.      Coagulation:   Recent Labs   Lab 11/03/18 2025 11/05/18  0400 11/06/18  0342   INR 2.4* 1.8* 1.4*     LDH:  No results for input(s): LDH in the last 72 hours.  Microbiology:  Microbiology Results (last 7 days)     Procedure Component Value Units Date/Time    Respiratory Viral Panel by PCR [facility]; Nasal Swab [[id number]] Collected:  11/03/18 2133    Order Status:  Completed Specimen:  Respiratory Updated:  11/06/18 1322     Respiratory Virus Panel, source Nasal Swab     RVP - Adenovirus Not Detected     Comment: Detects Serotypes B and E. Detection of Serotype C may   be limited. If Adenovirus infection is suspected and a   Not Detected result is returned the sample should be   re-tested for Adenovirus using an independent method  (e.g. [employer] Adenovirus Quantitative Real-Time  PCR test.          Enterovirus Not Detected     Comment: Cross-reactivity has been observed between certain Rhinovirus  strains and the Enterovirus assay.          Human Bocavirus Not Detected     Human Coronavirus Not Detected     Comment: The Human Coronavirus assay detects Human coronavirus types  229E, OC43,NL63 and HKU1.          RVP - Human Metapneumovirus (hMPV) Not Detected     RVP - Influenza A Not Detected     Influenza A - G5T5-30 Not Detected     RVP - Influenza B Not Detected     Parainfluenza Not Detected     Respiratory Syncytial VirusVirus (RSV) A Not Detected     Comment: The Respiratory Syncytial Viral assay detects types A and B,  however it does not distinguish between the two.          RVP - Rhinovirus Not Detected     Comment: Cross-Reactivity has been observed between certain   Rhinovirus strains and the Enterovirus assay.  Target Enriched Mulitplex Polymerase Chain Reaction (TEM-PCR)  allows for  the detection of multiple pathogens out of a single  reaction.  This test was developed and its performance   characteristics determined by [employer].  It has not   been cleared or approved by the U.S.Food and Drug Administration.  Results should be used in conjunction with clinical findings,   and should not form the sole basis for a diagnosis or treatment  decision.  TEM-PCR is a licensed technology of [employer].         Narrative:       Receiving Lab:->[facility]    Blood culture #1 **CANNOT BE ORDERED STAT** [[id number]] Collected:  11/03/18 2059    Order Status:  Completed Specimen:  Blood from Peripheral, Antecubital, Right Updated:  11/05/18 2312     Blood Culture, Routine No Growth to date     Blood Culture, Routine No Growth to date     Blood Culture, Routine No Growth to date    Blood culture #2 **CANNOT BE ORDERED STAT** [[id number]] Collected:  11/03/18 2134    Order Status:  Completed Specimen:  Blood from Peripheral, Antecubital, Right Updated:  11/05/18 2312     Blood Culture, Routine No Growth to date     Blood Culture, Routine No Growth to date     Blood Culture, Routine No Growth to date          BMP:   Recent Labs   Lab 11/04/18 2000 11/06/18  0342   *   < > 127*   *   < > 133*   K 3.7   < > 4.0   CL 95   < > 98   CO2 25   < > 27   BUN 52*   < > 44*   CREATININE 1.4   < > 1.2   CALCIUM 9.3   < > 9.0   MG 1.7  --   --     < > = values in this interval not displayed.     Cardiac Markers: No results for input(s): CKMB, TROPONINT, MYOGLOBIN in the last 72 hours.  Coagulation:   Recent Labs   Lab 11/06/18  0342   INR 1.4*     I have reviewed all pertinent labs within the past 24 hours.    Estimated Creatinine Clearance: 50.7 mL/min (based on SCr of 1.2 mg/dL).    Diagnostic Results:  I have reviewed all pertinent imaging results/findings within the past 24 hours.    Assessment and Plan:     Mr. Coker is a 74 y/o male admitted to [facility] for ADHF in the setting of recent  Lasix adjustment and recent initiation of BB/CCB. PMHx of CAD s/p CABG, ICM/HFrEF (LVef10%) s/p Medtronic CRT-D, CAD([facility] 6/2018: LIMA-LAD patent, % occluded at ostium, SVG-% occluded, SVG-D 100% occluded, pLCx 30%, 100% occluded OM, 100% LAD occlusion after takeoff of D1, D1 is tiny with 80% disease, SVG-OM 80% proximal stenosis with 50% at site of anastomosis.An SVG-OM OKSANA was placed at that time). Who presents with c/o acute sob which occurred around 7PM this evening. Patient was at usual health, states developed acute SOB, had been having worsening SUMNER. EMS called, he was found to be in SVT, given Adenosis and Cardizem and brought to ED. He was noted to be in SVT -295g hypotensive SBP 70s, given metoprolol IV, and 250cc NS bolus. Cardiology consulted. Medtronic device interrogated, A-sensed Bi-V paced.     At length discussion with spouse / patient, she has been logging his BP / HR / Weights on daily basis, patient has been hypotensive not given his Lisinopril / Metoprolol. Seen in clinic with Aman Adames, recently decreased lasix 80/80, 40/80.  Recently discharged 10/10-10/23 for ADHF, patient is a poor VAD candidate dt frailty and calcifications on CT, also found to be in AT s/p LAVERNE/DCCV started on Tikosyn initiate on 10/19/18          * SVT (supraventricular tachycardia)    - Appreciate EP consult. Had ICD interrogation yesterday with multiple self terminating SVT episodes  - S/W digoxin 0.125 mg. ( Avoid hypokalemia)  - Allergic to amiodarone. Will discuss with EP reg dofetilide.  - Ablation on 11/6  - Will restart coumadin after ablation.  -Will replace electrolytes.      Anemia    - H/H dropped from 12.5 on 10/18 to 9 on this admission.  - Will do FOBT, Iron studies, B12, folate.  - Patient on triple therapy as recent OKSANA to SVG to OM on 6/2018 and AFl (S/P LAVERNE/DCCV)     Hypotension    - Overnight few more episodes of SBP 70-80, improved with no active intervention. AM SBP 98, not on  medications.      - On 11/5Patient got hypotensive after receiving Valsartan 40 mg on 11/5. See plan of care . At home, patient had episodes of hypotension with SBP to 70-80 with metoprolol. Had similar events with medications in the past.  - Sensitive to medications  - Received 250 ml - NS - 2 bolus , placed in Trendelenberg position.  -Manual SBP 88-92, higher than cuff pressure. Monitor systolic blood pressure.  - Received 500 ml NS over 6 hours slowly as patient with EF 10%  - Likely from medication side effect.  -Will continue with digoxin.      Cardiogenic shock    Mr. Coker is a 72 y/o male [facility] in the setting of recent Lasix adjustment and recent initiation of BB/CCB. PMHx of CAD s/p CABG, ICM/HFrEF (LVef10%) s/p Medtronic CRT-D.   - Resolved  - Likely d/t  SVT   - Elevated BNP ~3000, CXR with diffuse pulmonary edema on admission.At home lasix changed 80/80 -> 40/80 at home  - Held lasix on 11/4. CVP 5. C/W dobutamine. EP for ablation to see if function improves.    - TTE <10% EF, Severe RV dysfunction, moderate MR,  Moderate to severe AS, Mild TR    - Not a candidate for advanced options, if decompensates / does not improve may not be a candidate for MCS.  - Palliative consult to be ordered  - To discuss with Dr. Khan regarding US of the vessels  - S/p RIJ MML placed 11/4/18   - Started on  5mcg this admission    -Ordered PICC line   - Closely monitor UOP, BMP, mag,   - Fluid restriction at 1500 cc with strict I/Os and daily STANDING weights  - Maintain on telemetry and daily EKGs   - Check Electrolytes, keep Mag >2 & K+ >4  - SCDs, TEDs, Nursing communication to elevated LE          Atrial tachycardia    H/O Aflutter - S/p LAVERNE / DCCV on Tikosyn.    Medtronic ICD interrogated, multiple SVT events noted.   - Continue Tikosyn BID,   - EP on board  - Hold Betablocker with ADHR and hypotension      Hepatic congestion    Bilirubin elevated 1.3 compared to baseline.   - CMP in AM   -  Hopefully will clear with  / Lasix      KASEY (acute kidney injury)    Cardiorenal in nature, BL Cr 1.1, on admission 1.7. Improved to 1.2  -Monitor BMP   -Closely watch UOP  -If continues to rise consider renal u/s and urine lytes  -Avoid nephrotoxic medications      Type 2 diabetes mellitus with circulatory disorder, without long-term current use of insulin    Hold PTA Glipizide   - ISS with accuchecks      CAD (coronary artery disease)    CAD([facility] 6/2018: LIMA-LAD patent, % occluded at ostium, SVG-% occluded, SVG-D 100% occluded, pLCx 30%, 100% occluded OM, 100% LAD occlusion after takeoff of D1, D1 is tiny with 80% disease, SVG-OM 80% proximal stenosis with 50% at site of anastomosis.An SVG-OM OKSANA was placed at that time).     - No records here, none seen in care everywhere.    -  Given OKSANA in 6/2018 - Continue DAPT for now - ASA / Prasugrel. Patient on coumadin as well for AFl.                Case seen and d/w Dr. Emeka Son MD  Heart Transplant  [facility]

## 2018-11-06 NOTE — PLAN OF CARE
Reviewed insulin regimen and CBG.     Glucose at goal on current regimen. No changes today.      Will continue to follow along; please call with any questions.

## 2018-11-06 NOTE — HOSPITAL COURSE
Admitted for SVT and hypotension. BP improved with IV fluid boluses and holding BP medications. EP performed an AV rodrigo ablation for SVT on 11/6/18. Discontinued Tikosyn, started on Amiodarone. Restarted on low dose Enalapril in addition to Digoxin and spironolactone for GDMIT. Discontinued beta blocker and started on Dobutamine for decompensated heart failure. PICC line placed 11/7. He was evaluated for advanced options but was determine to not be a candidate for to calcium on CT scans and debility. Continue on DAPT for OKSANA 06/2018, additionally on coumadin. Qualified for home oxygen. On day of discharge was discharged with home dobutamine and oxygen. Patient does not need follow up with advanced heart failure as he is not a candidate for advanced options. To be followed by general cardiology. He is a  and [facility] some of [facility] and [facility].

## 2018-11-06 NOTE — ASSESSMENT & PLAN NOTE
- H/H dropped from 12.5 on 10/18 to 9 on this admission.  - Will do FOBT, Iron studies, B12, folate.  - Patient on triple therapy as recent OKSANA to SVG to OM on 6/2018 and AFl (S/P LAVERNE/DCCV)

## 2018-11-06 NOTE — ASSESSMENT & PLAN NOTE
H/O Aflutter - S/p LAVERNE / DCCV on Tikosyn.    Medtronic ICD interrogated, multiple SVT events noted.   - Continue Tikosyn BID,   - EP on board  - Hold Betablocker with ADHR and hypotension

## 2018-11-06 NOTE — PROGRESS NOTES
[facility]  Heart Transplant  Progress Note    Patient Name: Bharat Coker  MRN: [id number]  Admission Date: 11/3/2018  Hospital Length of Stay: 2 days  Attending Physician: Jony Hendrickson Jr.,*  Primary Care Provider: Ronnie Richardson Jr, MD  Principal Problem:SVT (supraventricular tachycardia)    Subjective:     Interval History: Patient seen and examined today at bedside. Had felt SOB during the episode of SVT but none in the hospital. No lightheadedness. UO well last night. He and his wife inquired about Dr. Khan recommendation of US for the vessels.     He received Valsartan 40 mg and digoxin in am. His SBP dropped to 70-80, resolved slowly with fluids. Initially lightheaded, improved with  trendelenburg position.     Continuous Infusions:   DOBUTamine 5 mcg/kg/min (11/05/18 0600)     Scheduled Meds:   aspirin  81 mg Oral Daily    atorvastatin  80 mg Oral Nightly    azelastine  1 spray Nasal BID    [START ON 11/6/2018] ceFAZolin (ANCEF) IVPB  2 g Intravenous Once    cetirizine  5 mg Oral Daily    dofetilide  250 mcg Oral Q12H    famotidine  20 mg Oral Daily    fluticasone  2 spray Each Nare Daily    insulin aspart U-100  3 Units Subcutaneous TIDWM    insulin detemir U-100  9 Units Subcutaneous QHS    polyethylene glycol  17 g Oral Daily    prasugrel  10 mg Oral Daily    senna-docusate 8.6-50 mg  1 tablet Oral BID    sodium chloride 0.9%  3 mL Intravenous Q8H     PRN Meds:acetaminophen, dextrose 50%, dextrose 50%, docusate sodium, glucagon (human recombinant), glucose, glucose, insulin aspart U-100, ondansetron, ramelteon    Review of patient's allergies indicates:   Allergen Reactions    Amiodarone analogues Anaphylaxis    Ativan [lorazepam] Anxiety     Objective:     Vital Signs (Most Recent):  Temp: 98.8 °F (37.1 °C) (11/05/18 1530)  Pulse: (!) 123 (11/05/18 1800)  Resp: (!) 28 (11/05/18 1800)  BP: (!) 81/49 (11/05/18 1800)  SpO2: (!) 94 % (11/05/18 1809) Vital  Signs (24h Range):  Temp:  [97.6 °F (36.4 °C)-98.8 °F (37.1 °C)] 98.8 °F (37.1 °C)  Pulse:  [] 123  Resp:  [15-52] 28  SpO2:  [86 %-100 %] 94 %  BP: ()/(32-94) 81/49     Patient Vitals for the past 72 hrs (Last 3 readings):   Weight   11/03/18 2010 63.5 kg (140 lb)     Body mass index is 22.6 kg/m².      Intake/Output Summary (Last 24 hours) at 11/5/2018 1821  Last data filed at 11/5/2018 1415  Gross per 24 hour   Intake 1674 ml   Output 2650 ml   Net -976 ml       Hemodynamic Parameters:  CVP:  [5 mmHg-6 mmHg] 5 mmHg    Telemetry:  Paced    Physical Exam   Constitutional: He is oriented to person, place, and time. He appears well-developed and well-nourished.   HENT:   Mouth/Throat: Oropharynx is clear and moist.   Neck: No JVD (Central line on the right.  ) present.   Cardiovascular: Normal rate, regular rhythm and intact distal pulses. Exam reveals no gallop.   No murmur heard.       Pulmonary/Chest: Effort normal and breath sounds normal. He has no wheezes.   Rhonchi bilateral   Abdominal: Soft. Bowel sounds are normal. He exhibits no distension. There is no tenderness. There is no guarding.   Musculoskeletal: He exhibits no edema.   Neurological: He is alert and oriented to person, place, and time.   Skin: Skin is warm.   Nursing note and vitals reviewed.      Significant Labs:  CBC:  Recent Labs   Lab 11/03/18 2025 11/03/18 2350 11/05/18  0836   WBC 17.49* 16.37* 11.89   RBC 4.33* 3.69* 3.39*   HGB 12.5* 10.8* 9.7*   HCT 39.2* 34.6* 31.0*    234 198   MCV 91 94 91   MCH 28.9 29.3 28.6   MCHC 31.9* 31.2* 31.3*     BNP:  Recent Labs   Lab 11/03/18 2025 11/05/18  0836   BNP 3,222* 2,209*     CMP:  Recent Labs   Lab 11/03/18 2025 11/03/18 2350 11/04/18  0431 11/04/18 2000 11/05/18  0400   * 265* 196* 367* 249*   CALCIUM 10.5 9.5 9.6 9.3 9.5   ALBUMIN 3.5 3.1*  --   --   --    PROT 8.4 7.3  --   --   --    * 136 134* 133* 134*   K 4.6 4.3 3.9 3.7 3.6   CO2 20* 25 27 25 26   CL  95 100 97 95 96   BUN 58* 60* 57* 52* 43*   CREATININE 1.7* 1.5* 1.4 1.4 1.2   ALKPHOS 77 71  --   --   --    ALT 28 25  --   --   --    AST 32 26  --   --   --    BILITOT 1.3* 1.1*  --   --   --       Coagulation:   Recent Labs   Lab 11/02/18  1013 11/03/18 2025 11/05/18  0400   INR 1.4 2.4* 1.8*     LDH:  No results for input(s): LDH in the last 72 hours.  Microbiology:  Microbiology Results (last 7 days)     Procedure Component Value Units Date/Time    Blood culture #2 **CANNOT BE ORDERED STAT** [[id number]] Collected:  11/03/18 2134    Order Status:  Completed Specimen:  Blood from Peripheral, Antecubital, Right Updated:  11/04/18 2312     Blood Culture, Routine No Growth to date     Blood Culture, Routine No Growth to date    Blood culture #1 **CANNOT BE ORDERED STAT** [[id number]] Collected:  11/03/18 2059    Order Status:  Completed Specimen:  Blood from Peripheral, Antecubital, Right Updated:  11/04/18 2312     Blood Culture, Routine No Growth to date     Blood Culture, Routine No Growth to date    Respiratory Viral Panel by PCR Jaclynner; Nasal Swab [[id number]] Collected:  11/03/18 2133    Order Status:  Sent Specimen:  Respiratory Updated:  11/03/18 2145          BMP:   Recent Labs   Lab 11/04/18 2000 11/05/18  0400   * 249*   * 134*   K 3.7 3.6   CL 95 96   CO2 25 26   BUN 52* 43*   CREATININE 1.4 1.2   CALCIUM 9.3 9.5   MG 1.7  --      Cardiac Markers: No results for input(s): CKMB, TROPONINT, MYOGLOBIN in the last 72 hours.  Coagulation:   Recent Labs   Lab 11/05/18  0400   INR 1.8*     I have reviewed all pertinent labs within the past 24 hours.    Estimated Creatinine Clearance: 50.7 mL/min (based on SCr of 1.2 mg/dL).    Diagnostic Results:  I have reviewed all pertinent imaging results/findings within the past 24 hours.    Assessment and Plan:     Mr. Coker is a 74 y/o male admitted to [facility] for ADHF in the setting of recent Lasix adjustment and recent initiation of BB/CCB. PMHx of CAD  s/p CABG, ICM/HFrEF (LVef10%) s/p Medtronic CRT-D, CAD([facility] 6/2018: LIMA-LAD patent, % occluded at ostium, SVG-% occluded, SVG-D 100% occluded, pLCx 30%, 100% occluded OM, 100% LAD occlusion after takeoff of D1, D1 is tiny with 80% disease, SVG-OM 80% proximal stenosis with 50% at site of anastomosis.An SVG-OM OKSANA was placed at that time). Who presents with c/o acute sob which occurred around 7PM this evening. Patient was at usual health, states developed acute SOB, had been having worsening SUMNER. EMS called, he was found to be in SVT, given Adenosis and Cardizem and brought to ED. He was noted to be in SVT -764g hypotensive SBP 70s, given metoprolol IV, and 250cc NS bolus. Cardiology consulted. Medtronic device interrogated, A-sensed Bi-V paced.     At length discussion with spouse / patient, she has been logging his BP / HR / Weights on daily basis, patient has been hypotensive not given his Lisinopril / Metoprolol. Seen in clinic with Aman Adames, recently decreased lasix 80/80, 40/80.  Recently discharged 10/10-10/23 for ADHF, patient is a poor VAD candidate dt frailty and calcifications on CT, also found to be in AT s/p LAVERNE/DCCV started on Tikosyn initiate on 10/19/18          * SVT (supraventricular tachycardia)    - Appreciate EP consult. Had ICD interrogation yesterday with multiple self terminating SVT episodes  - S/W digoxin 0.125 mg. ( Avoid hypokalemia)  - Allergic to amiodarone  - Ablation in am.      Hypotension    - Patient got hypotensive after receiving Valsartan 40 mg . At home, patient has episodes of hypotension with SBP to 70-80 with metoprolol. Had similar events with medications in the past.  - Sensitive to medications  - Received 250 ml - NS - 2 bolus , placed in Trendelenberg position.  -Manual SBP 88-92, higher than cuff pressure. Monitor systolic blood pressure.  - Will receive 500 ml NS over 6 hours slowly as patient with EF 10%  - Likely from medication side  effect.  -Will continue with digoxin.      Cardiogenic shock    Mr. Coker is a 74 y/o male admitted to [facility] for ADHF in the setting of recent Lasix adjustment and recent initiation of BB/CCB. PMHx of CAD s/p CABG, ICM/HFrEF (LVef10%) s/p Medtronic CRT-D.   - Likely d/t ongoing SVT   - Elevated BNP ~3000   - CXR with diffuse pulmonary edema   - PTA lasix changed 80/80 -> 40/80   - TTE <10% EF, Severe RV dysfunction, moderate MR,  Moderate to severe AS, Mild TR    - Admit to [facility], Dr. Hendrickson aware   - Not a candidate for advanced options, if decompensates / does not improve may not be a candidate for MCS.  - Palliative consult to be ordered  - To discuss with Dr. Khan regarding US of the vessels  - S/p RIJ MML placed 11/4/18   - CVP 5  - Started on  5mcg this admission    -Need PICC line   -held Lasix 10mg gttt  - Closely monitor UOP, BMP, mag,   - Fluid restriction at 1500 cc with strict I/Os and daily STANDING weights  - Maintain on telemetry and daily EKGs   - Check Electrolytes, keep Mag >2 & K+ >4  - SCDs, TEDs, Nursing communication to elevated LE          Atrial tachycardia    H/O Aflutter - S/p LAVERNE / DCCV on Tikosyn:   Medtronic ICD interrogated, multiple SVT events noted.   - Continue Tikosyn BID,   - EP on board  - Hold Betablocker with ADHR and hypotension      Hepatic congestion    Bilirubin elevated 1.3 compared to baseline.   - CMP in AM   - Hopefully will clear with  / Lasix      KASEY (acute kidney injury)    Cardiorenal in nature, BL Cr 1.1, on admission 1.7. Improved to 1.2  -Monitor BMP   -Closely watch UOP  -If continues to rise consider renal u/s and urine lytes  -Avoid nephrotoxic medications      Type 2 diabetes mellitus with circulatory disorder, without long-term current use of insulin    Hold PTA Glipizide   - ISS with accuchecks      CAD (coronary artery disease)    CAD([facility] 6/2018: LIMA-LAD patent, % occluded at ostium, SVG-% occluded, SVG-D 100% occluded, pLCx 30%,  100% occluded OM, 100% LAD occlusion after takeoff of D1, D1 is tiny with 80% disease, SVG-OM 80% proximal stenosis with 50% at site of anastomosis.An SVG-OM OKSANA was placed at that time).     - Continue ASA / Prasugrel                Case seen and d/w Dr. Emeka Son MD  Heart Transplant  [facility]

## 2018-11-06 NOTE — ASSESSMENT & PLAN NOTE
H/O Aflutter - S/p LAVERNE / DCCV on Tikosyn:   Medtronic ICD interrogated, multiple SVT events noted.   - Continue Tikosyn BID,   - EP on board  - Hold Betablocker with ADHR and hypotension

## 2018-11-06 NOTE — NURSING
Transferred to EP Lab via bed on monitor and O2 with RN and transporter.  Wife to 3rd floor waiting area.

## 2018-11-06 NOTE — PLAN OF CARE
Called to patient's bedside as the patient SBP had dropped to 69 and MAP was 46. Was sleeping in bed and reports to me no symptoms of lightheaded or dizziness, or SOB. Upon arrival, his BP was 87/53 MAP of 64. Review of his BP's during show that his MAP has been in the low 60's. He has been given total of 1L and his CVP is 4 currently. BP recheck is 84/56. Will continue to monitor at this time.     Shawna Pepper MD, PGY-5  Cardiology fellow HTS

## 2018-11-06 NOTE — ASSESSMENT & PLAN NOTE
- Overnight few more episodes of SBP 70-80, improved with no active intervention. AM SBP 98, not on medications.      - On 11/5Patient got hypotensive after receiving Valsartan 40 mg on 11/5. See plan of care . At home, patient had episodes of hypotension with SBP to 70-80 with metoprolol. Had similar events with medications in the past.  - Sensitive to medications  - Received 250 ml - NS - 2 bolus , placed in Trendelenberg position.  -Manual SBP 88-92, higher than cuff pressure. Monitor systolic blood pressure.  - Received 500 ml NS over 6 hours slowly as patient with EF 10%  - Likely from medication side effect.  -Will continue with digoxin.

## 2018-11-06 NOTE — ASSESSMENT & PLAN NOTE
- Patient got hypotensive after receiving Valsartan 40 mg . At home, patient has episodes of hypotension with SBP to 70-80 with metoprolol. Had similar events with medications in the past.  - Sensitive to medications  - Received 250 ml - NS - 2 bolus , placed in Trendelenberg position.  -Manual SBP 88-92, higher than cuff pressure. Monitor systolic blood pressure.  - Will receive 500 ml NS over 6 hours slowly as patient with EF 10%  - Likely from medication side effect.  -Will continue with digoxin.

## 2018-11-06 NOTE — ASSESSMENT & PLAN NOTE
CAD([facility] 6/2018: LIMA-LAD patent, % occluded at ostium, SVG-% occluded, SVG-D 100% occluded, pLCx 30%, 100% occluded OM, 100% LAD occlusion after takeoff of D1, D1 is tiny with 80% disease, SVG-OM 80% proximal stenosis with 50% at site of anastomosis.An SVG-OM OKSANA was placed at that time).     - No records here, none seen in care everywhere.    -  Given OKSANA in 6/2018 - Continue DAPT for now - ASA / Prasugrel. Patient on coumadin as well for AFl.

## 2018-11-06 NOTE — SUBJECTIVE & OBJECTIVE
Interval History: Feeling well today. Denies any complaints.  Telemetry shows paced rhythm at a rate of 70s-130s.    Review of Systems   Constitution: Negative for chills and decreased appetite.   HENT: Negative for congestion, ear discharge and ear pain.    Eyes: Negative for blurred vision and discharge.   Cardiovascular: Positive for dyspnea on exertion and orthopnea. Negative for chest pain, claudication and cyanosis.   Respiratory: Negative for cough and hemoptysis.    Endocrine: Negative for cold intolerance and heat intolerance.   Skin: Negative for color change and nail changes.   Musculoskeletal: Negative for arthritis and back pain.   Gastrointestinal: Negative for bloating and abdominal pain.   Genitourinary: Negative for bladder incontinence and decreased libido.   Neurological: Negative for aphonia and brief paralysis.     Objective:     Vital Signs (Most Recent):  Temp: 97.8 °F (36.6 °C) (11/06/18 0830)  Pulse: (!) 115 (11/06/18 0838)  Resp: (!) 26 (11/06/18 0838)  BP: (!) 92/54 (11/06/18 0830)  SpO2: (!) 90 % (11/06/18 0838) Vital Signs (24h Range):  Temp:  [97.6 °F (36.4 °C)-98.8 °F (37.1 °C)] 97.8 °F (36.6 °C)  Pulse:  [] 115  Resp:  [15-52] 26  SpO2:  [83 %-100 %] 90 %  BP: ()/(32-94) 92/54     Weight: 63.5 kg (140 lb)  Body mass index is 22.6 kg/m².     SpO2: (!) 90 %  O2 Device (Oxygen Therapy): nasal cannula    Physical Exam   Constitutional: He is oriented to person, place, and time. He appears well-developed and well-nourished.   HENT:   Head: Normocephalic and atraumatic.   Nose: Nose normal.   Mouth/Throat: No oropharyngeal exudate.   Eyes: Right eye exhibits no discharge. Left eye exhibits no discharge. No scleral icterus.   Neck: Normal range of motion. Neck supple. No JVD present.   Cardiovascular: Normal rate, regular rhythm, S1 normal and S2 normal. Exam reveals no gallop, no S3, no S4, no distant heart sounds, no friction rub, no midsystolic click and no opening snap.   No  murmur heard.  Pulses:       Radial pulses are 2+ on the right side, and 2+ on the left side.        Femoral pulses are 2+ on the right side, and 2+ on the left side.  Pulmonary/Chest: Effort normal and breath sounds normal. No respiratory distress. He has no wheezes. He has no rales. He exhibits no tenderness.   Abdominal: Soft. Bowel sounds are normal. He exhibits no distension. There is no tenderness. There is no rebound.   Musculoskeletal: Normal range of motion. He exhibits no edema, tenderness or deformity.   Lymphadenopathy:     He has no cervical adenopathy.   Neurological: He is alert and oriented to person, place, and time. No cranial nerve deficit.   Skin: Skin is warm and dry.   Psychiatric: He has a normal mood and affect. His behavior is normal.       Significant Labs:   BMP:   Recent Labs   Lab 11/04/18 2000 11/05/18 0400 11/06/18 0342   * 249* 127*   * 134* 133*   K 3.7 3.6 4.0   CL 95 96 98   CO2 25 26 27   BUN 52* 43* 44*   CREATININE 1.4 1.2 1.2   CALCIUM 9.3 9.5 9.0   MG 1.7  --   --    , CMP:   Recent Labs   Lab 11/04/18 2000 11/05/18 0400 11/06/18 0342   * 134* 133*   K 3.7 3.6 4.0   CL 95 96 98   CO2 25 26 27   * 249* 127*   BUN 52* 43* 44*   CREATININE 1.4 1.2 1.2   CALCIUM 9.3 9.5 9.0   ANIONGAP 13 12 8   ESTGFRAFRICA 58.0* >60.0 >60.0   EGFRNONAA 50.2* >60.0 >60.0    and CBC:   Recent Labs   Lab 11/05/18  0836 11/06/18 0342   WBC 11.89 12.96*   HGB 9.7* 9.0*   HCT 31.0* 27.8*    210       Significant Imaging: Telemetry as above

## 2018-11-06 NOTE — ANESTHESIA PREPROCEDURE EVALUATION
11/06/2018  Bharat Coker is a 73 y.o., male with CHF, CAD and COPD here for SVT ablation. On 2L Oxygen intermittently.  Currently on dobutamine drip - BP 90's - low 100's systolic.  Pt has severely depressed LV and RV function with EF 10%.  Moderate to severe aortic stenosis.    Past Medical History:   Diagnosis Date    CHF (congestive heart failure)     COPD (chronic obstructive pulmonary disease)     Coronary artery disease     Diabetes mellitus      Lab Results   Component Value Date    WBC 12.96 (H) 11/06/2018    HGB 9.0 (L) 11/06/2018    HCT 27.8 (L) 11/06/2018    MCV 90 11/06/2018     11/06/2018       Chemistry        Component Value Date/Time     (L) 11/06/2018 0342    K 4.0 11/06/2018 0342    CL 98 11/06/2018 0342    CO2 27 11/06/2018 0342    BUN 44 (H) 11/06/2018 0342    CREATININE 1.2 11/06/2018 0342     (H) 11/06/2018 0342        Component Value Date/Time    CALCIUM 9.0 11/06/2018 0342    ALKPHOS 71 11/03/2018 2350    AST 26 11/03/2018 2350    ALT 25 11/03/2018 2350    BILITOT 1.1 (H) 11/03/2018 2350    ESTGFRAFRICA >60.0 11/06/2018 0342    EGFRNONAA >60.0 11/06/2018 0342        Lab Results   Component Value Date    INR 1.4 (H) 11/06/2018    INR 1.8 (H) 11/05/2018    INR 2.4 (H) 11/03/2018         Anesthesia Evaluation    I have reviewed the Patient Summary Reports.     I have reviewed the Medications.     Review of Systems  Anesthesia Hx:  No problems with previous Anesthesia    Social:  Non-Smoker    Cardiovascular:   Exercise tolerance: poor CAD  Dysrhythmias  CHF    Pulmonary:   COPD    Renal/:   Chronic Renal Disease        Physical Exam  General:  Well nourished    Airway/Jaw/Neck:  Airway Findings: Mouth Opening: Small, but > 3cm Tongue: Normal  General Airway Assessment: Adult  Mallampati: III  Improves to III with phonation.  TM Distance: Normal, at least 6 cm   Jaw/Neck Findings:  Neck ROM: Normal ROM      Dental:  Dental Findings: Periodontal disease, Severe   Chest/Lungs:  Chest/Lungs Findings: Normal Respiratory Rate, Decreased Breath Sounds Bilateral, Rales, Basilar     Heart/Vascular:  Heart Findings: Rate: Normal  Sounds: Normal        Mental Status:  Mental Status Findings:  Cooperative, Alert and Oriented         Anesthesia Plan  Type of Anesthesia, risks & benefits discussed:  Anesthesia Type:  general  Patient's Preference:   Intra-op Monitoring Plan: standard ASA monitors  Intra-op Monitoring Plan Comments:   Post Op Pain Control Plan: multimodal analgesia  Post Op Pain Control Plan Comments:   Induction:    Beta Blocker:  Patient is on a Beta-Blocker and has received one dose within the past 24 hours (No further documentation required).       Informed Consent: Patient understands risks and agrees with Anesthesia plan.  Questions answered.   ASA Score: 4     Day of Surgery Review of History & Physical:    H&P update referred to the surgeon.         Ready For Surgery From Anesthesia Perspective.

## 2018-11-06 NOTE — PLAN OF CARE
Problem: Patient Care Overview  Goal: Plan of Care Review  Outcome: Ongoing (interventions implemented as appropriate)  Ablation today.  Blood pressures more stable today.  Monitoring right groin site.  Pt is hopeful.

## 2018-11-06 NOTE — ASSESSMENT & PLAN NOTE
CAD([facility] 6/2018: LIMA-LAD patent, % occluded at ostium, SVG-% occluded, SVG-D 100% occluded, pLCx 30%, 100% occluded OM, 100% LAD occlusion after takeoff of D1, D1 is tiny with 80% disease, SVG-OM 80% proximal stenosis with 50% at site of anastomosis.An SVG-OM OKSANA was placed at that time).     - Continue ASA / Prasugrel

## 2018-11-06 NOTE — SUBJECTIVE & OBJECTIVE
Interval History: Patient seen and examined today at bedside. Had felt SOB during the episode of SVT but none in the hospital. No lightheadedness. UO well last night. He and his wife inquired about Dr. Khan recommendation of US for the vessels.     He received Valsartan 40 mg and digoxin in am. His SBP dropped to 70-80, resolved slowly with fluids. Initially lightheaded, improved with  trendelenburg position.     Continuous Infusions:   DOBUTamine 5 mcg/kg/min (11/05/18 0600)     Scheduled Meds:   aspirin  81 mg Oral Daily    atorvastatin  80 mg Oral Nightly    azelastine  1 spray Nasal BID    [START ON 11/6/2018] ceFAZolin (ANCEF) IVPB  2 g Intravenous Once    cetirizine  5 mg Oral Daily    dofetilide  250 mcg Oral Q12H    famotidine  20 mg Oral Daily    fluticasone  2 spray Each Nare Daily    insulin aspart U-100  3 Units Subcutaneous TIDWM    insulin detemir U-100  9 Units Subcutaneous QHS    polyethylene glycol  17 g Oral Daily    prasugrel  10 mg Oral Daily    senna-docusate 8.6-50 mg  1 tablet Oral BID    sodium chloride 0.9%  3 mL Intravenous Q8H     PRN Meds:acetaminophen, dextrose 50%, dextrose 50%, docusate sodium, glucagon (human recombinant), glucose, glucose, insulin aspart U-100, ondansetron, ramelteon    Review of patient's allergies indicates:   Allergen Reactions    Amiodarone analogues Anaphylaxis    Ativan [lorazepam] Anxiety     Objective:     Vital Signs (Most Recent):  Temp: 98.8 °F (37.1 °C) (11/05/18 1530)  Pulse: (!) 123 (11/05/18 1800)  Resp: (!) 28 (11/05/18 1800)  BP: (!) 81/49 (11/05/18 1800)  SpO2: (!) 94 % (11/05/18 1809) Vital Signs (24h Range):  Temp:  [97.6 °F (36.4 °C)-98.8 °F (37.1 °C)] 98.8 °F (37.1 °C)  Pulse:  [] 123  Resp:  [15-52] 28  SpO2:  [86 %-100 %] 94 %  BP: ()/(32-94) 81/49     Patient Vitals for the past 72 hrs (Last 3 readings):   Weight   11/03/18 2010 63.5 kg (140 lb)     Body mass index is 22.6 kg/m².      Intake/Output Summary (Last  24 hours) at 11/5/2018 1821  Last data filed at 11/5/2018 1415  Gross per 24 hour   Intake 1674 ml   Output 2650 ml   Net -976 ml       Hemodynamic Parameters:  CVP:  [5 mmHg-6 mmHg] 5 mmHg    Telemetry:  Paced    Physical Exam   Constitutional: He is oriented to person, place, and time. He appears well-developed and well-nourished.   HENT:   Mouth/Throat: Oropharynx is clear and moist.   Neck: No JVD (Central line on the right.  ) present.   Cardiovascular: Normal rate, regular rhythm and intact distal pulses. Exam reveals no gallop.   No murmur heard.       Pulmonary/Chest: Effort normal and breath sounds normal. He has no wheezes.   Rhonchi bilateral   Abdominal: Soft. Bowel sounds are normal. He exhibits no distension. There is no tenderness. There is no guarding.   Musculoskeletal: He exhibits no edema.   Neurological: He is alert and oriented to person, place, and time.   Skin: Skin is warm.   Nursing note and vitals reviewed.      Significant Labs:  CBC:  Recent Labs   Lab 11/03/18 2025 11/03/18 2350 11/05/18  0836   WBC 17.49* 16.37* 11.89   RBC 4.33* 3.69* 3.39*   HGB 12.5* 10.8* 9.7*   HCT 39.2* 34.6* 31.0*    234 198   MCV 91 94 91   MCH 28.9 29.3 28.6   MCHC 31.9* 31.2* 31.3*     BNP:  Recent Labs   Lab 11/03/18 2025 11/05/18  0836   BNP 3,222* 2,209*     CMP:  Recent Labs   Lab 11/03/18 2025 11/03/18 2350 11/04/18  0431 11/04/18  2000 11/05/18  0400   * 265* 196* 367* 249*   CALCIUM 10.5 9.5 9.6 9.3 9.5   ALBUMIN 3.5 3.1*  --   --   --    PROT 8.4 7.3  --   --   --    * 136 134* 133* 134*   K 4.6 4.3 3.9 3.7 3.6   CO2 20* 25 27 25 26   CL 95 100 97 95 96   BUN 58* 60* 57* 52* 43*   CREATININE 1.7* 1.5* 1.4 1.4 1.2   ALKPHOS 77 71  --   --   --    ALT 28 25  --   --   --    AST 32 26  --   --   --    BILITOT 1.3* 1.1*  --   --   --       Coagulation:   Recent Labs   Lab 11/02/18  1013 11/03/18 2025 11/05/18  0400   INR 1.4 2.4* 1.8*     LDH:  No results for input(s): LDH in the  last 72 hours.  Microbiology:  Microbiology Results (last 7 days)     Procedure Component Value Units Date/Time    Blood culture #2 **CANNOT BE ORDERED STAT** [[id number]] Collected:  11/03/18 2134    Order Status:  Completed Specimen:  Blood from Peripheral, Antecubital, Right Updated:  11/04/18 2312     Blood Culture, Routine No Growth to date     Blood Culture, Routine No Growth to date    Blood culture #1 **CANNOT BE ORDERED STAT** [[id number]] Collected:  11/03/18 2059    Order Status:  Completed Specimen:  Blood from Peripheral, Antecubital, Right Updated:  11/04/18 2312     Blood Culture, Routine No Growth to date     Blood Culture, Routine No Growth to date    Respiratory Viral Panel by PCR [facility]; Nasal Swab [[id number]] Collected:  11/03/18 2133    Order Status:  Sent Specimen:  Respiratory Updated:  11/03/18 2145          BMP:   Recent Labs   Lab 11/04/18 2000 11/05/18  0400   * 249*   * 134*   K 3.7 3.6   CL 95 96   CO2 25 26   BUN 52* 43*   CREATININE 1.4 1.2   CALCIUM 9.3 9.5   MG 1.7  --      Cardiac Markers: No results for input(s): CKMB, TROPONINT, MYOGLOBIN in the last 72 hours.  Coagulation:   Recent Labs   Lab 11/05/18  0400   INR 1.8*     I have reviewed all pertinent labs within the past 24 hours.    Estimated Creatinine Clearance: 50.7 mL/min (based on SCr of 1.2 mg/dL).    Diagnostic Results:  I have reviewed all pertinent imaging results/findings within the past 24 hours.

## 2018-11-06 NOTE — NURSING
Dr. Alvarado called jm Gonzales RN to update on pt's vital sign.  Dr. Son at bedside states she has been in communication with Dr. Alvarado.

## 2018-11-06 NOTE — NURSING
Notified Dr. Son of current blood pressure readings.  Pt is asymptomatic at this time, in bed in trendelenburg position.  Orders to ensure po fluid given and to give NS bolus.

## 2018-11-06 NOTE — ASSESSMENT & PLAN NOTE
Mr. Coker is a 72 y/o male admitted to [facility] for ADHF in the setting of recent Lasix adjustment and recent initiation of BB/CCB. PMHx of CAD s/p CABG, ICM/HFrEF (LVef10%) s/p Medtronic CRT-D.   - Resolved  - Likely d/t  SVT   - Elevated BNP ~3000, CXR with diffuse pulmonary edema on admission.At home lasix changed 80/80 -> 40/80 at home  - Held lasix on 11/4. CVP 5. C/W dobutamine. EP for ablation to see if function improves.    - TTE <10% EF, Severe RV dysfunction, moderate MR,  Moderate to severe AS, Mild TR    - Not a candidate for advanced options, if decompensates / does not improve may not be a candidate for MCS.  - Palliative consult to be ordered  - To discuss with Dr. Khan regarding US of the vessels  - S/p RIJ MML placed 11/4/18   - Started on  5mcg this admission    -Ordered PICC line   - Closely monitor UOP, BMP, mag,   - Fluid restriction at 1500 cc with strict I/Os and daily STANDING weights  - Maintain on telemetry and daily EKGs   - Check Electrolytes, keep Mag >2 & K+ >4  - SCDs, TEDs, Nursing communication to elevated LE

## 2018-11-06 NOTE — PROGRESS NOTES
[facility]  Cardiac Electrophysiology  Progress Note    Admission Date: 11/3/2018  Code Status: Full Code   Attending Physician: Jony Hendrickson Jr.,*   Expected Discharge Date: 11/9/2018  Principal Problem:SVT (supraventricular tachycardia)    Subjective:     Interval History: Feeling well today. Denies any complaints.  Telemetry shows paced rhythm at a rate of 70s-130s.    Review of Systems   Constitution: Negative for chills and decreased appetite.   HENT: Negative for congestion, ear discharge and ear pain.    Eyes: Negative for blurred vision and discharge.   Cardiovascular: Positive for dyspnea on exertion and orthopnea. Negative for chest pain, claudication and cyanosis.   Respiratory: Negative for cough and hemoptysis.    Endocrine: Negative for cold intolerance and heat intolerance.   Skin: Negative for color change and nail changes.   Musculoskeletal: Negative for arthritis and back pain.   Gastrointestinal: Negative for bloating and abdominal pain.   Genitourinary: Negative for bladder incontinence and decreased libido.   Neurological: Negative for aphonia and brief paralysis.     Objective:     Vital Signs (Most Recent):  Temp: 97.8 °F (36.6 °C) (11/06/18 0830)  Pulse: (!) 115 (11/06/18 0838)  Resp: (!) 26 (11/06/18 0838)  BP: (!) 92/54 (11/06/18 0830)  SpO2: (!) 90 % (11/06/18 0838) Vital Signs (24h Range):  Temp:  [97.6 °F (36.4 °C)-98.8 °F (37.1 °C)] 97.8 °F (36.6 °C)  Pulse:  [] 115  Resp:  [15-52] 26  SpO2:  [83 %-100 %] 90 %  BP: ()/(32-94) 92/54     Weight: 63.5 kg (140 lb)  Body mass index is 22.6 kg/m².     SpO2: (!) 90 %  O2 Device (Oxygen Therapy): nasal cannula    Physical Exam   Constitutional: He is oriented to person, place, and time. He appears well-developed and well-nourished.   HENT:   Head: Normocephalic and atraumatic.   Nose: Nose normal.   Mouth/Throat: No oropharyngeal exudate.   Eyes: Right eye exhibits no discharge. Left eye exhibits no  discharge. No scleral icterus.   Neck: Normal range of motion. Neck supple. No JVD present.   Cardiovascular: Normal rate, regular rhythm, S1 normal and S2 normal. Exam reveals no gallop, no S3, no S4, no distant heart sounds, no friction rub, no midsystolic click and no opening snap.   No murmur heard.  Pulses:       Radial pulses are 2+ on the right side, and 2+ on the left side.        Femoral pulses are 2+ on the right side, and 2+ on the left side.  Pulmonary/Chest: Effort normal and breath sounds normal. No respiratory distress. He has no wheezes. He has no rales. He exhibits no tenderness.   Abdominal: Soft. Bowel sounds are normal. He exhibits no distension. There is no tenderness. There is no rebound.   Musculoskeletal: Normal range of motion. He exhibits no edema, tenderness or deformity.   Lymphadenopathy:     He has no cervical adenopathy.   Neurological: He is alert and oriented to person, place, and time. No cranial nerve deficit.   Skin: Skin is warm and dry.   Psychiatric: He has a normal mood and affect. His behavior is normal.       Significant Labs:   BMP:   Recent Labs   Lab 11/04/18 2000 11/05/18 0400 11/06/18  0342   * 249* 127*   * 134* 133*   K 3.7 3.6 4.0   CL 95 96 98   CO2 25 26 27   BUN 52* 43* 44*   CREATININE 1.4 1.2 1.2   CALCIUM 9.3 9.5 9.0   MG 1.7  --   --    , CMP:   Recent Labs   Lab 11/04/18 2000 11/05/18 0400 11/06/18  0342   * 134* 133*   K 3.7 3.6 4.0   CL 95 96 98   CO2 25 26 27   * 249* 127*   BUN 52* 43* 44*   CREATININE 1.4 1.2 1.2   CALCIUM 9.3 9.5 9.0   ANIONGAP 13 12 8   ESTGFRAFRICA 58.0* >60.0 >60.0   EGFRNONAA 50.2* >60.0 >60.0    and CBC:   Recent Labs   Lab 11/05/18  0836 11/06/18  0342   WBC 11.89 12.96*   HGB 9.7* 9.0*   HCT 31.0* 27.8*    210       Significant Imaging: Telemetry as above    Assessment and Plan:     * SVT (supraventricular tachycardia)    S/p AT/AFL DCCV in October 2018 w Dr Ashby  Difficult to place on BB due  to hypotension     Plan:   -Proceed w AVN ablation w Dr Willams  -Patient consented (consent on the chart)  -EP pre-ablation order set in place including ppx ABx-Should you have any doubts please call EP consults               Yordan Spicer MD  Cardiac Electrophysiology  [facility]

## 2018-11-06 NOTE — ASSESSMENT & PLAN NOTE
Cardiorenal in nature, BL Cr 1.1, on admission 1.7. Improved to 1.2  -Monitor BMP   -Closely watch UOP  -If continues to rise consider renal u/s and urine lytes  -Avoid nephrotoxic medications

## 2018-11-06 NOTE — PLAN OF CARE
On 11/5- Patient received a dose of Valsartan 40 mg, later in the afternoon during IDT rounds, nurse called patient felt lightheaded and SBP around 70. Nurse placed the patient in Trendelenberg position, recommended to give cautiously 250 ml NS bolus given patient with EF -10%, severe RV dysfunction, moderate- severe AS, moderate MR and encourage hydration. Called back with SBP trending up. Informed after bolus - SBP 84, recommended another 250 ml bolus slow infusion around 2.45 pm and call back if sustains low as appropriate response of SBP to initial bolus and caution given severely reduced biventricular failure. Called and informed Dr. Hendrickson regarding SBP and fluid infusion, recommended no pressors as medication side effect. Patient dizziness, LH improved once lying in bed and oral intake. Given patient had prior episodes of hypotension with multiple blood pressure medications and home SBP drops to 70 with lasix, lisinopril- plan to monitor and let medication effect wane off. Staff called back around 3.30 pm  that SBP 68-70. Informed will come and reevaluate. At bedside, pt informed, no further dizziness since initial episode and none since he is in bed. CVP 5. Dr. Hendrickson arrived and manual SBP 10 points higher than automatic pressure at 90. Also nurse informed, patient lying on his left side when the pressures are recording and they record low but goes up with lying flat. Upon arrival in few minutesSBP 88 on cuff , asymptomatic. Plan to avoid pressors (as patient asymptomatic) as it is medication induced, monitor blood pressure, use manual cuff and avoid position changes. Also given advanced age, MAP may not be accurate as diastolic number affected by arterial resistance and asked to monitor SBP. Discussed at length to pt and staff.

## 2018-11-06 NOTE — ASSESSMENT & PLAN NOTE
- Appreciate EP consult. Had ICD interrogation yesterday with multiple self terminating SVT episodes  - S/W digoxin 0.125 mg. ( Avoid hypokalemia)  - Allergic to amiodarone. Will discuss with EP reg dofetilide.  - Ablation on 11/6  - Will restart coumadin after ablation.  -Will replace electrolytes.

## 2018-11-06 NOTE — SUBJECTIVE & OBJECTIVE
Interval History: Patient seen and examined today at bedside.Lying in bed.  No dizziness overnight though had episodes of hypotension, though nurse overnight documents readings affected with position change and improved with monitoring. Morning SBP -98. Good UO. Awating ablation today.    Continuous Infusions:   DOBUTamine 5 mcg/kg/min (11/06/18 1317)     Scheduled Meds:   aspirin  81 mg Oral Daily    atorvastatin  80 mg Oral Nightly    azelastine  1 spray Nasal BID    cetirizine  5 mg Oral Daily    digoxin  0.125 mg Oral Daily    dofetilide  250 mcg Oral Q12H    famotidine  20 mg Oral Daily    fluticasone  2 spray Each Nare Daily    insulin aspart U-100  3 Units Subcutaneous TIDWM    insulin detemir U-100  9 Units Subcutaneous QHS    polyethylene glycol  17 g Oral Daily    prasugrel  10 mg Oral Daily    senna-docusate 8.6-50 mg  1 tablet Oral BID    sodium chloride 0.9%  3 mL Intravenous Q8H    warfarin  4 mg Oral Daily     PRN Meds:acetaminophen, bupivacaine (PF) 0.25% (2.5 mg/ml), dextrose 50%, dextrose 50%, docusate sodium, glucagon (human recombinant), glucose, glucose, insulin aspart U-100, lidocaine (PF) 20 mg/ml (2%), ondansetron, ramelteon    Review of patient's allergies indicates:   Allergen Reactions    Amiodarone analogues Anaphylaxis    Ativan [lorazepam] Anxiety     Objective:     Vital Signs (Most Recent):  Temp: 97.9 °F (36.6 °C) (11/06/18 1130)  Pulse: (!) 116 (11/06/18 1130)  Resp: (!) 30 (11/06/18 1130)  BP: (!) 91/55 (11/06/18 1130)  SpO2: 96 % (11/06/18 1130) Vital Signs (24h Range):  Temp:  [97.6 °F (36.4 °C)-98.2 °F (36.8 °C)] 97.9 °F (36.6 °C)  Pulse:  [] 116  Resp:  [16-37] 30  SpO2:  [83 %-99 %] 96 %  BP: ()/(33-94) 91/55     Patient Vitals for the past 72 hrs (Last 3 readings):   Weight   11/03/18 2010 63.5 kg (140 lb)     Body mass index is 22.6 kg/m².      Intake/Output Summary (Last 24 hours) at 11/6/2018 1535  Last data filed at 11/6/2018 1445  Gross per  24 hour   Intake 420 ml   Output 425 ml   Net -5 ml       Hemodynamic Parameters:  CVP:  [5 mmHg-6 mmHg] 5 mmHg    Telemetry: Paced    Physical Exam   Constitutional: He is oriented to person, place, and time. He appears well-developed and well-nourished.   HENT:   Mouth/Throat: Oropharynx is clear and moist.   Neck:   Central line on right   Cardiovascular: Normal rate, regular rhythm, normal heart sounds and intact distal pulses. Exam reveals no gallop.   Pulmonary/Chest: Effort normal and breath sounds normal. No respiratory distress. He has no wheezes.   Rhonchi upto mid lung   Abdominal: Soft. Bowel sounds are normal. He exhibits no distension. There is no tenderness. There is no guarding.   Musculoskeletal: He exhibits no edema.   Neurological: He is alert and oriented to person, place, and time.   Skin: Skin is warm.       Significant Labs:  CBC:  Recent Labs   Lab 11/03/18 2350 11/05/18  0836 11/06/18  0342   WBC 16.37* 11.89 12.96*   RBC 3.69* 3.39* 3.10*   HGB 10.8* 9.7* 9.0*   HCT 34.6* 31.0* 27.8*    198 210   MCV 94 91 90   MCH 29.3 28.6 29.0   MCHC 31.2* 31.3* 32.4     BNP:  Recent Labs   Lab 11/03/18 2025 11/05/18  0836   BNP 3,222* 2,209*     CMP:  Recent Labs   Lab 11/03/18 2025 11/03/18 2350  11/04/18 2000 11/05/18  0400 11/06/18  0342   * 265*   < > 367* 249* 127*   CALCIUM 10.5 9.5   < > 9.3 9.5 9.0   ALBUMIN 3.5 3.1*  --   --   --   --    PROT 8.4 7.3  --   --   --   --    * 136   < > 133* 134* 133*   K 4.6 4.3   < > 3.7 3.6 4.0   CO2 20* 25   < > 25 26 27   CL 95 100   < > 95 96 98   BUN 58* 60*   < > 52* 43* 44*   CREATININE 1.7* 1.5*   < > 1.4 1.2 1.2   ALKPHOS 77 71  --   --   --   --    ALT 28 25  --   --   --   --    AST 32 26  --   --   --   --    BILITOT 1.3* 1.1*  --   --   --   --     < > = values in this interval not displayed.      Coagulation:   Recent Labs   Lab 11/03/18 2025 11/05/18  0400 11/06/18  0342   INR 2.4* 1.8* 1.4*     LDH:  No results for  input(s): LDH in the last 72 hours.  Microbiology:  Microbiology Results (last 7 days)     Procedure Component Value Units Date/Time    Respiratory Viral Panel by PCR [facility]; Nasal Swab [[id number]] Collected:  11/03/18 2133    Order Status:  Completed Specimen:  Respiratory Updated:  11/06/18 1322     Respiratory Virus Panel, source Nasal Swab     RVP - Adenovirus Not Detected     Comment: Detects Serotypes B and E. Detection of Serotype C may   be limited. If Adenovirus infection is suspected and a   Not Detected result is returned the sample should be   re-tested for Adenovirus using an independent method  (e.g. [employer] Adenovirus Quantitative Real-Time  PCR test.          Enterovirus Not Detected     Comment: Cross-reactivity has been observed between certain Rhinovirus  strains and the Enterovirus assay.          Human Bocavirus Not Detected     Human Coronavirus Not Detected     Comment: The Human Coronavirus assay detects Human coronavirus types  229E, OC43,NL63 and HKU1.          RVP - Human Metapneumovirus (hMPV) Not Detected     RVP - Influenza A Not Detected     Influenza A - E5K4-77 Not Detected     RVP - Influenza B Not Detected     Parainfluenza Not Detected     Respiratory Syncytial VirusVirus (RSV) A Not Detected     Comment: The Respiratory Syncytial Viral assay detects types A and B,  however it does not distinguish between the two.          RVP - Rhinovirus Not Detected     Comment: Cross-Reactivity has been observed between certain   Rhinovirus strains and the Enterovirus assay.  Target Enriched Mulitplex Polymerase Chain Reaction (TEM-PCR)  allows for the detection of multiple pathogens out of a single  reaction.  This test was developed and its performance   characteristics determined by [employer].  It has not   been cleared or approved by the U.S.Food and Drug Administration.  Results should be used in conjunction with clinical findings,   and should not form the sole basis for  a diagnosis or treatment  decision.  TEM-PCR is a licensed technology of [employer].         Narrative:       Receiving Lab:->[facility]    Blood culture #1 **CANNOT BE ORDERED STAT** [[id number]] Collected:  11/03/18 2059    Order Status:  Completed Specimen:  Blood from Peripheral, Antecubital, Right Updated:  11/05/18 2312     Blood Culture, Routine No Growth to date     Blood Culture, Routine No Growth to date     Blood Culture, Routine No Growth to date    Blood culture #2 **CANNOT BE ORDERED STAT** [[id number]] Collected:  11/03/18 2134    Order Status:  Completed Specimen:  Blood from Peripheral, Antecubital, Right Updated:  11/05/18 2312     Blood Culture, Routine No Growth to date     Blood Culture, Routine No Growth to date     Blood Culture, Routine No Growth to date          BMP:   Recent Labs   Lab 11/04/18 2000 11/06/18  0342   *   < > 127*   *   < > 133*   K 3.7   < > 4.0   CL 95   < > 98   CO2 25   < > 27   BUN 52*   < > 44*   CREATININE 1.4   < > 1.2   CALCIUM 9.3   < > 9.0   MG 1.7  --   --     < > = values in this interval not displayed.     Cardiac Markers: No results for input(s): CKMB, TROPONINT, MYOGLOBIN in the last 72 hours.  Coagulation:   Recent Labs   Lab 11/06/18  0342   INR 1.4*     I have reviewed all pertinent labs within the past 24 hours.    Estimated Creatinine Clearance: 50.7 mL/min (based on SCr of 1.2 mg/dL).    Diagnostic Results:  I have reviewed all pertinent imaging results/findings within the past 24 hours.

## 2018-11-06 NOTE — PLAN OF CARE
Problem: Patient Care Overview  Goal: Plan of Care Review  Outcome: Ongoing (interventions implemented as appropriate)  POC reviewed with patient and spouse over the phone. AAOx4. Systolic BP goal > 80. Pt BP gives a false reading when lies on left side. CVP 4-6. Pt remains on dobutamine drip. 3L nasal canula SATs 93-96. Voids spontaneously via urinal. No acute events overnight. Plans to get ablation this morning.

## 2018-11-06 NOTE — ASSESSMENT & PLAN NOTE
- Appreciate EP consult. Had ICD interrogation yesterday with multiple self terminating SVT episodes  - S/W digoxin 0.125 mg. ( Avoid hypokalemia)  - Allergic to amiodarone  - Ablation in am.

## 2018-11-06 NOTE — NURSING
Notified Dr. Rachel Son of pt systolic blood pressures in the 70's. Pt complained of lightheadedness while sitting up in chair. Assisted back to bed.  Orders to discontinue valsartan and digoxin and to increase po fluid intake.

## 2018-11-06 NOTE — NURSING
Dr. Alvarado and Dr. Son at bedside.  Assesed pt, in agreement with current care and plan.  Orders to give 500ml on NS over 5 hours.

## 2018-11-06 NOTE — ASSESSMENT & PLAN NOTE
Mr. Coker is a 72 y/o male admitted to [facility] for ADHF in the setting of recent Lasix adjustment and recent initiation of BB/CCB. PMHx of CAD s/p CABG, ICM/HFrEF (LVef10%) s/p Medtronic CRT-D.   - Likely d/t ongoing SVT   - Elevated BNP ~3000   - CXR with diffuse pulmonary edema   - PTA lasix changed 80/80 -> 40/80   - TTE <10% EF, Severe RV dysfunction, moderate MR,  Moderate to severe AS, Mild TR    - Admit to [facility], Dr. Hendrickson aware   - Not a candidate for advanced options, if decompensates / does not improve may not be a candidate for MCS.  - Palliative consult to be ordered  - To discuss with Dr. Khan regarding US of the vessels  - S/p RIJ MML placed 11/4/18   - CVP 5  - Started on  5mcg this admission    -Need PICC line   -held Lasix 10mg gttt  - Closely monitor UOP, BMP, mag,   - Fluid restriction at 1500 cc with strict I/Os and daily STANDING weights  - Maintain on telemetry and daily EKGs   - Check Electrolytes, keep Mag >2 & K+ >4  - SCDs, TEDs, Nursing communication to elevated LE

## 2018-11-06 NOTE — NURSING
At 2345 called Dr. Pepper. Pt SBP dropped in 60's and MAP of 47.     At 2350 Dr. Pepper at bedside. BP rechecked and was 84/56. Will continue to monitor pt. No new orders given.

## 2018-11-07 LAB
ALBUMIN SERPL BCP-MCNC: 2.4 G/DL
ALP SERPL-CCNC: 67 U/L
ALT SERPL W/O P-5'-P-CCNC: 13 U/L
ANION GAP SERPL CALC-SCNC: 8 MMOL/L
AST SERPL-CCNC: 30 U/L
BASOPHILS # BLD AUTO: 0.06 K/UL
BASOPHILS NFR BLD: 0.5 %
BILIRUB DIRECT SERPL-MCNC: 0.4 MG/DL
BILIRUB SERPL-MCNC: 0.9 MG/DL
BNP SERPL-MCNC: 1450 PG/ML
BUN SERPL-MCNC: 32 MG/DL
CALCIUM SERPL-MCNC: 8.9 MG/DL
CHLORIDE SERPL-SCNC: 97 MMOL/L
CO2 SERPL-SCNC: 27 MMOL/L
CREAT SERPL-MCNC: 1 MG/DL
DIFFERENTIAL METHOD: ABNORMAL
EOSINOPHIL # BLD AUTO: 0.7 K/UL
EOSINOPHIL NFR BLD: 6.2 %
ERYTHROCYTE [DISTWIDTH] IN BLOOD BY AUTOMATED COUNT: 15 %
EST. GFR  (AFRICAN AMERICAN): >60 ML/MIN/1.73 M^2
EST. GFR  (NON AFRICAN AMERICAN): >60 ML/MIN/1.73 M^2
FERRITIN SERPL-MCNC: 726 NG/ML
FOLATE SERPL-MCNC: 9.1 NG/ML
GLUCOSE SERPL-MCNC: 137 MG/DL
HCT VFR BLD AUTO: 26.6 %
HGB BLD-MCNC: 8.4 G/DL
IMM GRANULOCYTES # BLD AUTO: 0.08 K/UL
IMM GRANULOCYTES NFR BLD AUTO: 0.7 %
INR PPP: 1.2
IRON SERPL-MCNC: 21 UG/DL
LYMPHOCYTES # BLD AUTO: 1.1 K/UL
LYMPHOCYTES NFR BLD: 9.2 %
MCH RBC QN AUTO: 28.8 PG
MCHC RBC AUTO-ENTMCNC: 31.6 G/DL
MCV RBC AUTO: 91 FL
MONOCYTES # BLD AUTO: 0.9 K/UL
MONOCYTES NFR BLD: 7.9 %
NEUTROPHILS # BLD AUTO: 8.8 K/UL
NEUTROPHILS NFR BLD: 75.5 %
NRBC BLD-RTO: 0 /100 WBC
PLATELET # BLD AUTO: 197 K/UL
PMV BLD AUTO: 11 FL
POCT GLUCOSE: 150 MG/DL (ref 70–110)
POCT GLUCOSE: 158 MG/DL (ref 70–110)
POCT GLUCOSE: 174 MG/DL (ref 70–110)
POCT GLUCOSE: 189 MG/DL (ref 70–110)
POTASSIUM SERPL-SCNC: 4 MMOL/L
PROT SERPL-MCNC: 6.1 G/DL
PROTHROMBIN TIME: 12.4 SEC
RBC # BLD AUTO: 2.92 M/UL
SATURATED IRON: 10 %
SODIUM SERPL-SCNC: 132 MMOL/L
TOTAL IRON BINDING CAPACITY: 219 UG/DL
TRANSFERRIN SERPL-MCNC: 148 MG/DL
TRANSFERRIN SERPL-MCNC: 148 MG/DL
VIT B12 SERPL-MCNC: 281 PG/ML
WBC # BLD AUTO: 11.65 K/UL

## 2018-11-07 PROCEDURE — 36415 COLL VENOUS BLD VENIPUNCTURE: CPT | Mod: NTX

## 2018-11-07 PROCEDURE — 82746 ASSAY OF FOLIC ACID SERUM: CPT | Mod: NTX

## 2018-11-07 PROCEDURE — 83880 ASSAY OF NATRIURETIC PEPTIDE: CPT | Mod: NTX

## 2018-11-07 PROCEDURE — 27000221 HC OXYGEN, UP TO 24 HOURS: Mod: NTX

## 2018-11-07 PROCEDURE — 36569 INSJ PICC 5 YR+ W/O IMAGING: CPT | Mod: NTX

## 2018-11-07 PROCEDURE — 83540 ASSAY OF IRON: CPT | Mod: NTX

## 2018-11-07 PROCEDURE — 80076 HEPATIC FUNCTION PANEL: CPT | Mod: NTX

## 2018-11-07 PROCEDURE — 25000003 PHARM REV CODE 250: Mod: NTX | Performed by: INTERNAL MEDICINE

## 2018-11-07 PROCEDURE — 99233 SBSQ HOSP IP/OBS HIGH 50: CPT | Mod: GC,NTX,, | Performed by: INTERNAL MEDICINE

## 2018-11-07 PROCEDURE — 82728 ASSAY OF FERRITIN: CPT | Mod: NTX

## 2018-11-07 PROCEDURE — 84436 ASSAY OF TOTAL THYROXINE: CPT | Mod: NTX

## 2018-11-07 PROCEDURE — 82607 VITAMIN B-12: CPT | Mod: NTX

## 2018-11-07 PROCEDURE — 20000000 HC ICU ROOM: Mod: NTX

## 2018-11-07 PROCEDURE — 84443 ASSAY THYROID STIM HORMONE: CPT | Mod: NTX

## 2018-11-07 PROCEDURE — 97530 THERAPEUTIC ACTIVITIES: CPT | Mod: NTX

## 2018-11-07 PROCEDURE — 80048 BASIC METABOLIC PNL TOTAL CA: CPT | Mod: NTX

## 2018-11-07 PROCEDURE — 99233 SBSQ HOSP IP/OBS HIGH 50: CPT | Mod: NTX,,, | Performed by: INTERNAL MEDICINE

## 2018-11-07 PROCEDURE — 76937 US GUIDE VASCULAR ACCESS: CPT | Mod: NTX

## 2018-11-07 PROCEDURE — C1751 CATH, INF, PER/CENT/MIDLINE: HCPCS | Mod: NTX

## 2018-11-07 PROCEDURE — A4216 STERILE WATER/SALINE, 10 ML: HCPCS | Mod: NTX | Performed by: STUDENT IN AN ORGANIZED HEALTH CARE EDUCATION/TRAINING PROGRAM

## 2018-11-07 PROCEDURE — A4216 STERILE WATER/SALINE, 10 ML: HCPCS | Mod: NTX | Performed by: INTERNAL MEDICINE

## 2018-11-07 PROCEDURE — 25000003 PHARM REV CODE 250: Mod: NTX | Performed by: STUDENT IN AN ORGANIZED HEALTH CARE EDUCATION/TRAINING PROGRAM

## 2018-11-07 PROCEDURE — 94761 N-INVAS EAR/PLS OXIMETRY MLT: CPT | Mod: NTX

## 2018-11-07 PROCEDURE — 85025 COMPLETE CBC W/AUTO DIFF WBC: CPT | Mod: NTX

## 2018-11-07 PROCEDURE — 63600175 PHARM REV CODE 636 W HCPCS: Mod: NTX | Performed by: INTERNAL MEDICINE

## 2018-11-07 PROCEDURE — 85610 PROTHROMBIN TIME: CPT | Mod: NTX

## 2018-11-07 RX ORDER — ENALAPRIL MALEATE 1 MG/ML
1.25 SOLUTION ORAL 2 TIMES DAILY
Status: DISCONTINUED | OUTPATIENT
Start: 2018-11-07 | End: 2018-11-09

## 2018-11-07 RX ORDER — SODIUM CHLORIDE 0.9 % (FLUSH) 0.9 %
10 SYRINGE (ML) INJECTION EVERY 6 HOURS
Status: DISCONTINUED | OUTPATIENT
Start: 2018-11-07 | End: 2018-11-14 | Stop reason: HOSPADM

## 2018-11-07 RX ORDER — ENALAPRIL MALEATE 1 MG/ML
1.25 SOLUTION ORAL DAILY
Status: DISCONTINUED | OUTPATIENT
Start: 2018-11-07 | End: 2018-11-07

## 2018-11-07 RX ORDER — SODIUM CHLORIDE 0.9 % (FLUSH) 0.9 %
10 SYRINGE (ML) INJECTION
Status: DISCONTINUED | OUTPATIENT
Start: 2018-11-07 | End: 2018-11-14 | Stop reason: HOSPADM

## 2018-11-07 RX ADMIN — INSULIN ASPART 3 UNITS: 100 INJECTION, SOLUTION INTRAVENOUS; SUBCUTANEOUS at 08:11

## 2018-11-07 RX ADMIN — INSULIN ASPART 3 UNITS: 100 INJECTION, SOLUTION INTRAVENOUS; SUBCUTANEOUS at 11:11

## 2018-11-07 RX ADMIN — ENALAPRIL MALEATE 1.25 MG: 1 SOLUTION ORAL at 12:11

## 2018-11-07 RX ADMIN — DIGOXIN 0.12 MG: 125 TABLET ORAL at 08:11

## 2018-11-07 RX ADMIN — WARFARIN SODIUM 4 MG: 2 TABLET ORAL at 04:11

## 2018-11-07 RX ADMIN — Medication 10 ML: at 05:11

## 2018-11-07 RX ADMIN — DOFETILIDE 250 MCG: 0.25 CAPSULE ORAL at 09:11

## 2018-11-07 RX ADMIN — ASPIRIN 81 MG CHEWABLE TABLET 81 MG: 81 TABLET CHEWABLE at 08:11

## 2018-11-07 RX ADMIN — ENALAPRIL MALEATE 1.25 MG: 1 SOLUTION ORAL at 09:11

## 2018-11-07 RX ADMIN — CETIRIZINE HYDROCHLORIDE 5 MG: 5 TABLET ORAL at 08:11

## 2018-11-07 RX ADMIN — PRASUGREL 10 MG: 10 TABLET, FILM COATED ORAL at 08:11

## 2018-11-07 RX ADMIN — FAMOTIDINE 20 MG: 20 TABLET ORAL at 08:11

## 2018-11-07 RX ADMIN — Medication 3 ML: at 06:11

## 2018-11-07 RX ADMIN — AZELASTINE HYDROCHLORIDE 137 MCG: 137 SPRAY, METERED NASAL at 08:11

## 2018-11-07 RX ADMIN — INSULIN DETEMIR 9 UNITS: 100 INJECTION, SOLUTION SUBCUTANEOUS at 09:11

## 2018-11-07 RX ADMIN — INSULIN ASPART 3 UNITS: 100 INJECTION, SOLUTION INTRAVENOUS; SUBCUTANEOUS at 04:11

## 2018-11-07 RX ADMIN — Medication 10 ML: at 12:11

## 2018-11-07 RX ADMIN — ATORVASTATIN CALCIUM 80 MG: 20 TABLET, FILM COATED ORAL at 09:11

## 2018-11-07 RX ADMIN — DOBUTAMINE HYDROCHLORIDE 5 MCG/KG/MIN: 200 INJECTION INTRAVENOUS at 03:11

## 2018-11-07 RX ADMIN — DOFETILIDE 250 MCG: 0.25 CAPSULE ORAL at 08:11

## 2018-11-07 NOTE — CONSULTS
Double lumen PICC place to right basilic vein.  38cm in length, 0 cm exposed. Initial arm circumference 27cm. Lot # [id number].

## 2018-11-07 NOTE — PT/OT/SLP PROGRESS
"Physical Therapy Treatment    Patient Name:  Bharat Coker   MRN:  [id number]  Admitting Diagnosis: SVT (supraventricular tachycardia)  Recent Surgery: Procedure(s) (LRB):  ABLATION, AVN (N/A) 1 Day Post-Op    Recommendations:     Discharge Recommendations:  home   Discharge Equipment Recommendations: none   Barriers to discharge: None    Plan:     During this hospitalization, patient to be seen 3 x/week to address the listed problems via gait training, therapeutic activities, therapeutic exercises, neuromuscular re-education  · Plan of Care Expires:  12/02/18   Plan of Care Reviewed with: patient    This Plan of care has been discussed with the patient who was involved in its development and understands and is in agreement with the identified goals and treatment plan    Subjective     Communicated with RN prior to session.  Patient found seated in bedside chair upon PT entry to room.   "I can go for a walk."    Pain/Comfort:  · Pain Rating 1: 0/10  · Pain Rating Post-Intervention 1: 0/10    Objective:     Patient found with: blood pressure cuff, pulse ox (continuous), telemetry, central line, oxygen   Mental Status: Patient is oriented to AxOx4 and follows all verbal, multi-step commands. Patient is Alert and Cooperative during session.    General Precautions: Standard, Cardiac fall   Orthopedic Precautions:N/A   Braces: N/A   Respiratory Status: nasal cannula 2L  Vital Signs (Most Recent):    Temp: 98.1 °F (36.7 °C) (11/07/18 1100)  Pulse: 71 (11/07/18 1400)  Resp: (!) 40 (11/07/18 1400)  BP: (!) 92/51 (11/07/18 1400)  SpO2: (!) 94 % (11/07/18 1400)    Functional Mobility:  Bed Mobility:    Pt found/retruned to bedside chair     Transfers:   · Sit <> Stand Transfer: stand by assistance with no assistive device   · Stand <> Sit Transfer: stand by assistance with no assistive device   · x1 from EOB      Gait:  · Patient ambulated: 250ft   · Patient required: close SBA  · Patient used:  No Assistive Device  · Gait " Pattern observed: reciprocal gait  · Gait Deviation(s): decreased colin  · Impairments due to: decreased endurance  · Comments: pt initiated ambulation trial with good colin and smooth gait pattern. Last 50ft, pt began slowing colin and increased RR with SOB. Pt able to safely return to room. No LOB noted.    Therapeutic Activities & Exercises:     Education:  Patient provided with daily orientation and goals of this PT session. Patient and family agreed to participate in session. Patient and family aware of patient's deficits and therapy progression. They were educated to transfer to bedside chair/bedside commode/bathroom with RN/PCT present. Encouraged patient to perform daily exercises & mobility to increase endurance and decrease effects of bedrest.Time provided for therapeutic counseling and discussion of health disposition. All questions answered to patient's and family's satisfaction, within scope of PT practice; voiced no other concerns. White board updated in patient's room, RN notified of session.    Patient left up in chair, with head in midline, neutral pelvis & heels floated for skin protection with all lines intact, call button in reach and RN notified    AM-PAC 6 CLICK MOBILITY  Turning over in bed (including adjusting bedclothes, sheets and blankets)?: 3  Sitting down on and standing up from a chair with arms (e.g., wheelchair, bedside commode, etc.): 3  Moving from lying on back to sitting on the side of the bed?: 3  Moving to and from a bed to a chair (including a wheelchair)?: 3  Need to walk in hospital room?: 3  Climbing 3-5 steps with a railing?: 3  Basic Mobility Total Score: 18     Assessment:     Bharat Coker is a 73 y.o. male admitted with a medical diagnosis of SVT (supraventricular tachycardia).   Patient is making progress towards goals, participating well in this session. Pt continues to require significant assist with endurance training. Pt with noted improvement with  functional mobility. Bharat Coker's deficits effect their ability to safely and independently participate in self-care tasks and functional mobility which increases their caregiver burden. Due to his physical therapy diagnosis of debility and deconditioning, they continue to benefit from acute PT services to address the following limitations: high fall risk, weakness, instability, the need for supervised instruction of exercise. Bharat Nobles deficits effect their roles and responsibilities in which they were able to complete prior to admit. Education was provided to patient regarding importance of continued participation in therapy, patient's progress and discharge disposition. Pt would benefit from a collaborative PT/OT/SLP program to improve quality of life and focus on recovery of impairments.     Problem List: gait instability, impaired cardiopulmonary response to activity, impaired endurance, impaired balance. gait instability, impaired cardiopulmonary response to activity, impaired endurance, impaired balance  Rehab Prognosis: good; patient would benefit from acute skilled PT services to address these deficits and reach maximum level of function.      GOALS:   Multidisciplinary Problems     Physical Therapy Goals        Problem: Physical Therapy Goal    Goal Priority Disciplines Outcome Goal Variances Interventions   Physical Therapy Goal     PT, PT/OT Ongoing (interventions implemented as appropriate)     Description:  Goals to be met by: 18    Patient will increase functional independence with mobility by performin. Supine to sit with Modified [place] -not met  2. Sit to stand transfer with Modified [place] -not met  3. Gait  x 250 feet with Supervision -not met  4. Ascend/descend 5 stair with right Handrails Supervision -not met                      Time Tracking:     PT Received On: 18  PT Start Time: 1345     PT Stop Time: 1400  PT Total Time (min): 15 min     Billable  Minutes:   · Therapeutic Activity 15    Treatment Type: Treatment  PT/PTA: PT       Birgit Medrano PT, DPT  11/07/2018   Pager: [phone]

## 2018-11-07 NOTE — PLAN OF CARE
Problem: Patient Care Overview  Goal: Plan of Care Review  Outcome: Ongoing (interventions implemented as appropriate)    See vital signs and assessments in flowsheets. See below for updates on today's progress.     Pulmonary: 3L NC, no complaints sob    Cardiovascular: AICD, HR 100s, SBP >80, CVPs 3,8,6, bilateral upper and lower extremities pulses palpable    Neurological: aoox4, afebrile, moves all extremities    Gastrointestinal: bowel sounds active, no bm    Genitourinary: voids per urinal >400 UO    Endocrine: accucheck before meals    Integumentary/Other: R groin post cath with no hematoma, bleeding, or tenderness    Infusions: dobutamine      Patient progressing towards goals as tolerated, plan of care communicated and reviewed with Bharat Coker. All concerns addressed. Will continue to monitor.

## 2018-11-07 NOTE — SUBJECTIVE & OBJECTIVE
Interval History: Patient seen and examined today at bedside. Feeling better. No significant SOB, tolerated ablation. Had some low SBP last night with no acute symptoms and spontaneous resolution. Sitting in chair today.    Continuous Infusions:   DOBUTamine 5 mcg/kg/min (11/07/18 1400)     Scheduled Meds:   aspirin  81 mg Oral Daily    atorvastatin  80 mg Oral Nightly    azelastine  1 spray Nasal BID    cetirizine  5 mg Oral Daily    digoxin  0.125 mg Oral Daily    dofetilide  250 mcg Oral Q12H    enalapril maleate  1.25 mg Oral BID    famotidine  20 mg Oral Daily    fluticasone  2 spray Each Nare Daily    insulin aspart U-100  3 Units Subcutaneous TIDWM    insulin detemir U-100  9 Units Subcutaneous QHS    polyethylene glycol  17 g Oral Daily    prasugrel  10 mg Oral Daily    senna-docusate 8.6-50 mg  1 tablet Oral BID    sodium chloride 0.9%  10 mL Intravenous Q6H    warfarin  4 mg Oral Daily     PRN Meds:acetaminophen, dextrose 50%, dextrose 50%, docusate sodium, glucagon (human recombinant), glucose, glucose, insulin aspart U-100, ondansetron, ramelteon, Flushing PICC Protocol **AND** sodium chloride 0.9% **AND** sodium chloride 0.9%    Review of patient's allergies indicates:   Allergen Reactions    Amiodarone analogues Anaphylaxis    Ativan [lorazepam] Anxiety     Objective:     Vital Signs (Most Recent):  Temp: 98.1 °F (36.7 °C) (11/07/18 1100)  Pulse: 71 (11/07/18 1400)  Resp: (!) 40 (11/07/18 1400)  BP: (!) 92/51 (11/07/18 1400)  SpO2: (!) 94 % (11/07/18 1400) Vital Signs (24h Range):  Temp:  [97.5 °F (36.4 °C)-98.3 °F (36.8 °C)] 98.1 °F (36.7 °C)  Pulse:  [] 71  Resp:  [17-42] 40  SpO2:  [90 %-100 %] 94 %  BP: ()/(39-65) 92/51     No data found.  Body mass index is 22.6 kg/m².      Intake/Output Summary (Last 24 hours) at 11/7/2018 1516  Last data filed at 11/7/2018 1400  Gross per 24 hour   Intake 1332 ml   Output 850 ml   Net 482 ml       Hemodynamic Parameters:        Telemetry: Atrial sensed  V paced    Physical Exam   Constitutional: He is oriented to person, place, and time. He appears well-developed and well-nourished.   Neck: No JVD present.   Cardiovascular: Normal rate, regular rhythm and intact distal pulses.   Murmur (systolic murmur) heard.  Pulmonary/Chest: Effort normal and breath sounds normal. No respiratory distress. He has no wheezes. He has no rales.   Rhonchi present mid lung.   Abdominal: Soft. Bowel sounds are normal. He exhibits no distension. There is no tenderness. There is no guarding.   Musculoskeletal: He exhibits no edema.   Neurological: He is alert and oriented to person, place, and time.   Skin: Skin is warm.   Nursing note and vitals reviewed.      Significant Labs:  CBC:  Recent Labs   Lab 11/05/18 0836 11/06/18 0342 11/07/18  0300   WBC 11.89 12.96* 11.65   RBC 3.39* 3.10* 2.92*   HGB 9.7* 9.0* 8.4*   HCT 31.0* 27.8* 26.6*    210 197   MCV 91 90 91   MCH 28.6 29.0 28.8   MCHC 31.3* 32.4 31.6*     BNP:  Recent Labs   Lab 11/03/18 2025 11/05/18  0836 11/07/18  0300   BNP 3,222* 2,209* 1,450*     CMP:  Recent Labs   Lab 11/03/18 2025 11/03/18  2350  11/05/18  0400 11/06/18  0342 11/07/18  0300   * 265*   < > 249* 127* 137*   CALCIUM 10.5 9.5   < > 9.5 9.0 8.9   ALBUMIN 3.5 3.1*  --   --   --  2.4*   PROT 8.4 7.3  --   --   --  6.1   * 136   < > 134* 133* 132*   K 4.6 4.3   < > 3.6 4.0 4.0   CO2 20* 25   < > 26 27 27   CL 95 100   < > 96 98 97   BUN 58* 60*   < > 43* 44* 32*   CREATININE 1.7* 1.5*   < > 1.2 1.2 1.0   ALKPHOS 77 71  --   --   --  67   ALT 28 25  --   --   --  13   AST 32 26  --   --   --  30   BILITOT 1.3* 1.1*  --   --   --  0.9    < > = values in this interval not displayed.      Coagulation:   Recent Labs   Lab 11/05/18  0400 11/06/18  0342 11/07/18  0300   INR 1.8* 1.4* 1.2     LDH:  No results for input(s): LDH in the last 72 hours.  Microbiology:  Microbiology Results (last 7 days)     Procedure  Component Value Units Date/Time    Blood culture #2 **CANNOT BE ORDERED STAT** [[id number]] Collected:  11/03/18 2134    Order Status:  Completed Specimen:  Blood from Peripheral, Antecubital, Right Updated:  11/06/18 2312     Blood Culture, Routine No Growth to date     Blood Culture, Routine No Growth to date     Blood Culture, Routine No Growth to date     Blood Culture, Routine No Growth to date    Blood culture #1 **CANNOT BE ORDERED STAT** [[id number]] Collected:  11/03/18 2059    Order Status:  Completed Specimen:  Blood from Peripheral, Antecubital, Right Updated:  11/06/18 2312     Blood Culture, Routine No Growth to date     Blood Culture, Routine No Growth to date     Blood Culture, Routine No Growth to date     Blood Culture, Routine No Growth to date    Respiratory Viral Panel by PCR [facility]; Nasal Swab [[id number]] Collected:  11/03/18 2133    Order Status:  Completed Specimen:  Respiratory Updated:  11/06/18 1322     Respiratory Virus Panel, source Nasal Swab     RVP - Adenovirus Not Detected     Comment: Detects Serotypes B and E. Detection of Serotype C may   be limited. If Adenovirus infection is suspected and a   Not Detected result is returned the sample should be   re-tested for Adenovirus using an independent method  (e.g. [employer] Adenovirus Quantitative Real-Time  PCR test.          Enterovirus Not Detected     Comment: Cross-reactivity has been observed between certain Rhinovirus  strains and the Enterovirus assay.          Human Bocavirus Not Detected     Human Coronavirus Not Detected     Comment: The Human Coronavirus assay detects Human coronavirus types  229E, OC43,NL63 and HKU1.          RVP - Human Metapneumovirus (hMPV) Not Detected     RVP - Influenza A Not Detected     Influenza A - X5I4-84 Not Detected     RVP - Influenza B Not Detected     Parainfluenza Not Detected     Respiratory Syncytial VirusVirus (RSV) A Not Detected     Comment: The Respiratory Syncytial Viral assay  detects types A and B,  however it does not distinguish between the two.          RVP - Rhinovirus Not Detected     Comment: Cross-Reactivity has been observed between certain   Rhinovirus strains and the Enterovirus assay.  Target Enriched Mulitplex Polymerase Chain Reaction (TEM-PCR)  allows for the detection of multiple pathogens out of a single  reaction.  This test was developed and its performance   characteristics determined by [employer].  It has not   been cleared or approved by the U.[place].Food and Drug Administration.  Results should be used in conjunction with clinical findings,   and should not form the sole basis for a diagnosis or treatment  decision.  TEM-PCR is a licensed technology of [employer].         Narrative:       Receiving Lab:->[facility]          BMP:   Recent Labs   Lab 11/04/18 2000 11/07/18  0300   *   < > 137*   *   < > 132*   K 3.7   < > 4.0   CL 95   < > 97   CO2 25   < > 27   BUN 52*   < > 32*   CREATININE 1.4   < > 1.0   CALCIUM 9.3   < > 8.9   MG 1.7  --   --     < > = values in this interval not displayed.     Cardiac Markers: No results for input(s): CKMB, TROPONINT, MYOGLOBIN in the last 72 hours.  Coagulation:   Recent Labs   Lab 11/07/18  0300   INR 1.2     I have reviewed all pertinent labs within the past 24 hours.    Estimated Creatinine Clearance: 60.9 mL/min (based on SCr of 1 mg/dL).    Diagnostic Results:  I have reviewed all pertinent imaging results/findings within the past 24 hours.

## 2018-11-07 NOTE — ASSESSMENT & PLAN NOTE
- Appreciate EP consult. Had ICD interrogation on admission with multiple self terminating SVT episodes  -Ablation on 11/6  - Discussed with EP regarding settings change and dofetilide.  - S/W digoxin 0.125 mg. ( Avoid hypokalemia)  - Allergic to amiodarone.   - C/W coumadin, appreciate pharmacy assistance.  - Will replace electrolytes.

## 2018-11-07 NOTE — PROCEDURES
"Bharat Coker is a 73 y.o. male patient.    Temp: 98 °F (36.7 °C) (11/07/18 0700)  Pulse: 99 (11/07/18 1000)  Resp: (!) 30 (11/07/18 1000)  BP: (!) 97/51 (11/07/18 1000)  SpO2: 96 % (11/07/18 1000)  Weight: 63.5 kg (140 lb) (11/03/18 2010)  Height: 5' 6" (167.6 cm) (11/03/18 2010)    PICC  Date/Time: 11/7/2018 10:57 AM  Performed by: Danis Albright RN  Consent Done: Yes  Time out: Immediately prior to procedure a time out was called to verify the correct patient, procedure, equipment, support staff and site/side marked as required  Indications: med administration and vascular access  Anesthesia: general anesthesia  Local anesthetic: lidocaine 1% without epinephrine  Anesthetic Total (mL): 3  Skin prep agent dried: skin prep agent completely dried prior to procedure  Sterile barriers: all five maximum sterile barriers used - cap, mask, sterile gown, sterile gloves, and large sterile sheet  Hand hygiene: hand hygiene performed prior to central venous catheter insertion  Location details: right basilic  Catheter type: double lumen  Catheter size: 5 Fr  Catheter Length: 38cm    Ultrasound guidance: yes  Vessel Caliber: medium and patent, compressibility normal  Needle advanced into vessel with real time Ultrasound guidance.  Guidewire confirmed in vessel.  Image recorded and saved.  Sterile sheath used.  Number of attempts: 1  Post-procedure: blood return through all ports, chlorhexidine patch and sterile dressing applied  Specimens: No  Implants: No  Assessment: placement verified by x-ray  Complications: none          David Arnett  11/7/2018  "

## 2018-11-07 NOTE — ASSESSMENT & PLAN NOTE
- Overnight few more episodes of SBP 70-80, improved with no active intervention. AM SBP 98, tolerating enalapril so far, 1.25 mg bid started on 11/7.  - Will continue with digoxin.   - No lasix     - [On 11/5 -Patient got hypotensive after receiving Valsartan 40 mg on 11/5. See plan of care . At home, patient had episodes of hypotension with SBP to 70-80 with metoprolol. Had similar events with medications in the past. Sensitive to medications. Received 250 ml - NS - 2 bolus , placed in Trendelenberg position. Manual SBP 88-92, higher than cuff pressure. Monitor systolic blood pressure.Received 500 ml NS over 6 hours slowly as patient with EF 10% Likely from medication side effect.]

## 2018-11-07 NOTE — PROGRESS NOTES
[facility]  Heart Transplant  Progress Note    Patient Name: Bharat Coker  MRN: [id number]  Admission Date: 11/3/2018  Hospital Length of Stay: 4 days  Attending Physician: Jony Hendrickson Jr.,*  Primary Care Provider: Ronnie Richardson Jr, MD  Principal Problem:SVT (supraventricular tachycardia)    Subjective:     Interval History: Patient seen and examined today at bedside. Feeling better. No significant SOB, tolerated ablation. Had some low SBP last night with no acute symptoms and spontaneous resolution. Sitting in chair today.    Continuous Infusions:   DOBUTamine 5 mcg/kg/min (11/07/18 1400)     Scheduled Meds:   aspirin  81 mg Oral Daily    atorvastatin  80 mg Oral Nightly    azelastine  1 spray Nasal BID    cetirizine  5 mg Oral Daily    digoxin  0.125 mg Oral Daily    dofetilide  250 mcg Oral Q12H    enalapril maleate  1.25 mg Oral BID    famotidine  20 mg Oral Daily    fluticasone  2 spray Each Nare Daily    insulin aspart U-100  3 Units Subcutaneous TIDWM    insulin detemir U-100  9 Units Subcutaneous QHS    polyethylene glycol  17 g Oral Daily    prasugrel  10 mg Oral Daily    senna-docusate 8.6-50 mg  1 tablet Oral BID    sodium chloride 0.9%  10 mL Intravenous Q6H    warfarin  4 mg Oral Daily     PRN Meds:acetaminophen, dextrose 50%, dextrose 50%, docusate sodium, glucagon (human recombinant), glucose, glucose, insulin aspart U-100, ondansetron, ramelteon, Flushing PICC Protocol **AND** sodium chloride 0.9% **AND** sodium chloride 0.9%    Review of patient's allergies indicates:   Allergen Reactions    Amiodarone analogues Anaphylaxis    Ativan [lorazepam] Anxiety     Objective:     Vital Signs (Most Recent):  Temp: 98.1 °F (36.7 °C) (11/07/18 1100)  Pulse: 71 (11/07/18 1400)  Resp: (!) 40 (11/07/18 1400)  BP: (!) 92/51 (11/07/18 1400)  SpO2: (!) 94 % (11/07/18 1400) Vital Signs (24h Range):  Temp:  [97.5 °F (36.4 °C)-98.3 °F (36.8 °C)] 98.1 °F (36.7  °C)  Pulse:  [] 71  Resp:  [17-42] 40  SpO2:  [90 %-100 %] 94 %  BP: ()/(39-65) 92/51     No data found.  Body mass index is 22.6 kg/m².      Intake/Output Summary (Last 24 hours) at 11/7/2018 1516  Last data filed at 11/7/2018 1400  Gross per 24 hour   Intake 1332 ml   Output 850 ml   Net 482 ml       Hemodynamic Parameters:       Telemetry: Atrial sensed  V paced    Physical Exam   Constitutional: He is oriented to person, place, and time. He appears well-developed and well-nourished.   Neck: No JVD present.   Cardiovascular: Normal rate, regular rhythm and intact distal pulses.   Murmur (systolic murmur) heard.  Pulmonary/Chest: Effort normal and breath sounds normal. No respiratory distress. He has no wheezes. He has no rales.   Rhonchi present mid lung.   Abdominal: Soft. Bowel sounds are normal. He exhibits no distension. There is no tenderness. There is no guarding.   Musculoskeletal: He exhibits no edema.   Neurological: He is alert and oriented to person, place, and time.   Skin: Skin is warm.   Nursing note and vitals reviewed.      Significant Labs:  CBC:  Recent Labs   Lab 11/05/18 0836 11/06/18 0342 11/07/18  0300   WBC 11.89 12.96* 11.65   RBC 3.39* 3.10* 2.92*   HGB 9.7* 9.0* 8.4*   HCT 31.0* 27.8* 26.6*    210 197   MCV 91 90 91   MCH 28.6 29.0 28.8   MCHC 31.3* 32.4 31.6*     BNP:  Recent Labs   Lab 11/03/18 2025 11/05/18  0836 11/07/18  0300   BNP 3,222* 2,209* 1,450*     CMP:  Recent Labs   Lab 11/03/18 2025 11/03/18  2350  11/05/18  0400 11/06/18  0342 11/07/18  0300   * 265*   < > 249* 127* 137*   CALCIUM 10.5 9.5   < > 9.5 9.0 8.9   ALBUMIN 3.5 3.1*  --   --   --  2.4*   PROT 8.4 7.3  --   --   --  6.1   * 136   < > 134* 133* 132*   K 4.6 4.3   < > 3.6 4.0 4.0   CO2 20* 25   < > 26 27 27   CL 95 100   < > 96 98 97   BUN 58* 60*   < > 43* 44* 32*   CREATININE 1.7* 1.5*   < > 1.2 1.2 1.0   ALKPHOS 77 71  --   --   --  67   ALT 28 25  --   --   --  13   AST 32  26  --   --   --  30   BILITOT 1.3* 1.1*  --   --   --  0.9    < > = values in this interval not displayed.      Coagulation:   Recent Labs   Lab 11/05/18  0400 11/06/18  0342 11/07/18  0300   INR 1.8* 1.4* 1.2     LDH:  No results for input(s): LDH in the last 72 hours.  Microbiology:  Microbiology Results (last 7 days)     Procedure Component Value Units Date/Time    Blood culture #2 **CANNOT BE ORDERED STAT** [[id number]] Collected:  11/03/18 2134    Order Status:  Completed Specimen:  Blood from Peripheral, Antecubital, Right Updated:  11/06/18 2312     Blood Culture, Routine No Growth to date     Blood Culture, Routine No Growth to date     Blood Culture, Routine No Growth to date     Blood Culture, Routine No Growth to date    Blood culture #1 **CANNOT BE ORDERED STAT** [[id number]] Collected:  11/03/18 2059    Order Status:  Completed Specimen:  Blood from Peripheral, Antecubital, Right Updated:  11/06/18 2312     Blood Culture, Routine No Growth to date     Blood Culture, Routine No Growth to date     Blood Culture, Routine No Growth to date     Blood Culture, Routine No Growth to date    Respiratory Viral Panel by PCR [facility]; Nasal Swab [[id number]] Collected:  11/03/18 2133    Order Status:  Completed Specimen:  Respiratory Updated:  11/06/18 1322     Respiratory Virus Panel, source Nasal Swab     RVP - Adenovirus Not Detected     Comment: Detects Serotypes B and E. Detection of Serotype C may   be limited. If Adenovirus infection is suspected and a   Not Detected result is returned the sample should be   re-tested for Adenovirus using an independent method  (e.g. [employer] Adenovirus Quantitative Real-Time  PCR test.          Enterovirus Not Detected     Comment: Cross-reactivity has been observed between certain Rhinovirus  strains and the Enterovirus assay.          Human Bocavirus Not Detected     Human Coronavirus Not Detected     Comment: The Human Coronavirus assay detects Human coronavirus  types  229E, OC43,NL63 and HKU1.          RVP - Human Metapneumovirus (hMPV) Not Detected     RVP - Influenza A Not Detected     Influenza A - J8E0-03 Not Detected     RVP - Influenza B Not Detected     Parainfluenza Not Detected     Respiratory Syncytial VirusVirus (RSV) A Not Detected     Comment: The Respiratory Syncytial Viral assay detects types A and B,  however it does not distinguish between the two.          RVP - Rhinovirus Not Detected     Comment: Cross-Reactivity has been observed between certain   Rhinovirus strains and the Enterovirus assay.  Target Enriched Mulitplex Polymerase Chain Reaction (TEM-PCR)  allows for the detection of multiple pathogens out of a single  reaction.  This test was developed and its performance   characteristics determined by [employer].  It has not   been cleared or approved by the U.S.Food and Drug Administration.  Results should be used in conjunction with clinical findings,   and should not form the sole basis for a diagnosis or treatment  decision.  TEM-PCR is a licensed technology of [employer].         Narrative:       Receiving Lab:->[facility]          BMP:   Recent Labs   Lab 11/04/18 2000 11/07/18  0300   *   < > 137*   *   < > 132*   K 3.7   < > 4.0   CL 95   < > 97   CO2 25   < > 27   BUN 52*   < > 32*   CREATININE 1.4   < > 1.0   CALCIUM 9.3   < > 8.9   MG 1.7  --   --     < > = values in this interval not displayed.     Cardiac Markers: No results for input(s): CKMB, TROPONINT, MYOGLOBIN in the last 72 hours.  Coagulation:   Recent Labs   Lab 11/07/18  0300   INR 1.2     I have reviewed all pertinent labs within the past 24 hours.    Estimated Creatinine Clearance: 60.9 mL/min (based on SCr of 1 mg/dL).    Diagnostic Results:  I have reviewed all pertinent imaging results/findings within the past 24 hours.    Assessment and Plan:     Mr. Coker is a 74 y/o male admitted to \[facility]"" for ADHF in the setting of recent Lasix  adjustment and recent initiation of BB/CCB. PMHx of CAD s/p CABG, ICM/HFrEF (LVef10%) s/p Medtronic CRT-D, CAD([facility] 6/2018: LIMA-LAD patent, % occluded at ostium, SVG-% occluded, SVG-D 100% occluded, pLCx 30%, 100% occluded OM, 100% LAD occlusion after takeoff of D1, D1 is tiny with 80% disease, SVG-OM 80% proximal stenosis with 50% at site of anastomosis.An SVG-OM OKSANA was placed at that time). Who presents with c/o acute sob which occurred around 7PM this evening. Patient was at usual health, states developed acute SOB, had been having worsening SUMNER. EMS called, he was found to be in SVT, given Adenosis and Cardizem and brought to ED. He was noted to be in SVT -439f hypotensive SBP 70s, given metoprolol IV, and 250cc NS bolus. Cardiology consulted. Medtronic device interrogated, A-sensed Bi-V paced.     At length discussion with spouse / patient, she has been logging his BP / HR / Weights on daily basis, patient has been hypotensive not given his Lisinopril / Metoprolol. Seen in clinic with Aman Adames, recently decreased lasix 80/80, 40/80.  Recently discharged 10/10-10/23 for ADHF, patient is a poor VAD candidate dt frailty and calcifications on CT, also found to be in AT s/p LAVERNE/DCCV started on Tikosyn initiate on 10/19/18          * SVT (supraventricular tachycardia)    - Appreciate EP consult. Had ICD interrogation on admission with multiple self terminating SVT episodes  -Ablation on 11/6  - Discussed with EP regarding settings change and dofetilide.  - S/W digoxin 0.125 mg. ( Avoid hypokalemia)  - Allergic to amiodarone.   - C/W coumadin, appreciate pharmacy assistance.  - Will replace electrolytes.      Anemia    - Likely AOCI - Iron 21, TIBC- 219, Sat -10, Transferrin - 148, Ferritin - 726  - H/H dropped from 12.5 on 10/18 to 9 on this admission.  - Will do FOBT,   - Patient on triple therapy as recent OKSANA to SVG to OM on 6/2018 and AFl (S/P LAVERNE/DCCV)     Hypotension    - Overnight  few more episodes of SBP 70-80, improved with no active intervention. AM SBP 98, tolerating enalapril so far, 1.25 mg bid started on 11/7.  - Will continue with digoxin.   - No lasix     - [On 11/5 -Patient got hypotensive after receiving Valsartan 40 mg on 11/5. See plan of care . At home, patient had episodes of hypotension with SBP to 70-80 with metoprolol. Had similar events with medications in the past. Sensitive to medications. Received 250 ml - NS - 2 bolus , placed in Trendelenberg position. Manual SBP 88-92, higher than cuff pressure. Monitor systolic blood pressure.Received 500 ml NS over 6 hours slowly as patient with EF 10% Likely from medication side effect.]       Cardiogenic shock    Mr. Coker is a 74 y/o male admitted to hospitals for ADHF in the setting of recent Lasix adjustment and recent initiation of BB/CCB. PMHx of CAD s/p CABG, ICM/HFrEF (LVef10%) s/p Medtronic CRT-D.   - Resolved  - Likely d/t  SVT   - Elevated BNP ~3000 -> improved to 1450, not on lasix since 11/4, CXR with diffuse pulmonary edema on admission.At home lasix changed 80/80 -> 40/80 at home  - Held lasix on 11/4. CVP 6. - Started on  5mcg this admission, PICC placed.    - TTE <10% EF, Severe RV dysfunction, moderate MR,  Moderate to severe AS, Mild TR, E/e' 24  - Low dose enalapril started on 11/7- 1.25 mg BID ( SBP > 107 with first dose). Had episodes of hypotension with Valsartan on 11/5.  - S/W digoxin this admission    - Not a candidate for advanced options, if decompensates / does not improve may not be a candidate for MCS.  - Palliative consult to be ordered  - To discuss with Dr. Khan regarding US of the vessels  - S/p RIJ MML placed 11/4/18       - Closely monitor UOP, BMP, mag,   - Fluid restriction at 1500 cc with strict I/Os and daily STANDING weights  - Maintain on telemetry and daily EKGs   - Check Electrolytes, keep Mag >2 & K+ >4  - SCDs, TEDs, Nursing communication to elevated LE          Atrial tachycardia     H/O Aflutter - S/p LAVERNE / DCCV on Tikosyn.    Medtronic ICD interrogated, multiple SVT events noted.   - Continue Tikosyn BID,   - EP on board  - Hold Betablocker with ADHR and hypotension      Hepatic congestion    Bilirubin elevated 1.3 compared to baseline.   - CMP in AM   - Hopefully will clear with  / Lasix      KASEY (acute kidney injury)    Cardiorenal in nature, BL Cr 1.1, on admission 1.7. Improved to 1.0  -Monitor BMP   -Closely watch UOP  -If continues to rise consider renal u/s and urine lytes  -Avoid nephrotoxic medications      Type 2 diabetes mellitus with circulatory disorder, without long-term current use of insulin    Hold PTA Glipizide   - ISS with accuchecks      CAD (coronary artery disease)    CAD([facility] 6/2018: LIMA-LAD patent, % occluded at ostium, SVG-% occluded, SVG-D 100% occluded, pLCx 30%, 100% occluded OM, 100% LAD occlusion after takeoff of D1, D1 is tiny with 80% disease, SVG-OM 80% proximal stenosis with 50% at site of anastomosis.An SVG-OM OKSANA was placed at that time).     - No records here, none seen in care everywhere.    -  Given OKSANA in 6/2018 - Continue DAPT for now - ASA / Prasugrel. Patient on coumadin as well for AFl.                Case d/w Dr. Emeka Son MD  Heart Transplant  [facility]

## 2018-11-07 NOTE — ASSESSMENT & PLAN NOTE
- Likely AOCI - Iron 21, TIBC- 219, Sat -10, Transferrin - 148, Ferritin - 726  - H/H dropped from 12.5 on 10/18 to 9 on this admission.  - Will do FOBT,   - Patient on triple therapy as recent OKSANA to SVG to OM on 6/2018 and AFl (S/P LAVERNE/DCCV)

## 2018-11-07 NOTE — ANESTHESIA POSTPROCEDURE EVALUATION
"Anesthesia Post Evaluation    Patient: Bharat Coker    Procedure(s) Performed: Procedure(s) (LRB):  ABLATION, AVN (N/A)    Final Anesthesia Type: MAC  Patient location during evaluation: PACU  Patient participation: Yes- Able to Participate  Level of consciousness: awake and alert and oriented  Post-procedure vital signs: reviewed and stable  Pain management: adequate  Airway patency: patent  PONV status at discharge: No PONV  Anesthetic complications: no      Cardiovascular status: hemodynamically stable  Respiratory status: unassisted  Hydration status: euvolemic  Follow-up not needed.        Visit Vitals  BP (!) 100/56 (BP Location: Left arm, Patient Position: Lying)   Pulse 70   Temp 36.7 °C (98.1 °F) (Oral)   Resp (!) 32   Ht 5' 6" (1.676 m)   Wt 63.5 kg (140 lb)   SpO2 (!) 91%   BMI 22.60 kg/m²       Pain/Ewa Score: Pain Assessment Performed: Yes (11/7/2018 11:00 AM)  Presence of Pain: denies (11/7/2018 11:00 AM)        "

## 2018-11-07 NOTE — ASSESSMENT & PLAN NOTE
Cardiorenal in nature, BL Cr 1.1, on admission 1.7. Improved to 1.0  -Monitor BMP   -Closely watch UOP  -If continues to rise consider renal u/s and urine lytes  -Avoid nephrotoxic medications

## 2018-11-07 NOTE — PLAN OF CARE
Problem: Physical Therapy Goal  Goal: Physical Therapy Goal  Goals to be met by: 18    Patient will increase functional independence with mobility by performin. Supine to sit with Modified [place] -not met  2. Sit to stand transfer with Modified [place] -not met  3. Gait  x 250 feet with Supervision -not met  4. Ascend/descend 5 stair with right Handrails Supervision -not met     Outcome: Ongoing (interventions implemented as appropriate)    Pt would benefit from HH upon discharge.  Appropriate transfer level with nursing staff: Bed <> Chair:  Stand Pivot with Stand-by Assistance with 1 person.    Birgit Medrano PT, DPT  2018  Pager: [phone]

## 2018-11-07 NOTE — PLAN OF CARE
Notified about patient being hypotensive again tonight. Still asymptomatic. Notified about SBP in the 60's. On my recheck x 2. SBP is 84 and DBP is 50 MAP in the low 60's as it has been in the past. Patient is conversant and denies any symptoms of lightheadness or dizziness. Will readdress bolus if patient becomes symptomatic. Currently encouraged PO hydration.     Shawna Pepper MD, PGY-5  Cardiology fellow [facility]

## 2018-11-07 NOTE — ASSESSMENT & PLAN NOTE
CAD([facility] 6/2018: LIMA-LAD patent, % occluded at ostium, SVG-% occluded, SVG-D 100% occluded, pLCx 30%, 100% occluded OM, 100% LAD occlusion after takeoff of D1, D1 is tiny with 80% disease, SVG-OM 80% proximal stenosis with 50% at site of anastomosis.An SVG-OM OKSANA was placed at that time).     - No records here, none seen in care everywhere.    -  Given OKSANA in 6/2018 - Continue DAPT for now - ASA / Prasugrel. Patient on coumadin as well for AFl.

## 2018-11-07 NOTE — ASSESSMENT & PLAN NOTE
Mr. Coker is a 74 y/o male admitted to hospitals for ADHF in the setting of recent Lasix adjustment and recent initiation of BB/CCB. PMHx of CAD s/p CABG, ICM/HFrEF (LVef10%) s/p Medtronic CRT-D.   - Resolved  - Likely d/t  SVT   - Elevated BNP ~3000 -> improved to 1450, not on lasix since 11/4, CXR with diffuse pulmonary edema on admission.At home lasix changed 80/80 -> 40/80 at home  - Held lasix on 11/4. CVP 6. - Started on  5mcg this admission, PICC placed.    - TTE <10% EF, Severe RV dysfunction, moderate MR,  Moderate to severe AS, Mild TR, E/e' 24  - Low dose enalapril started on 11/7- 1.25 mg BID ( SBP > 107 with first dose). Had episodes of hypotension with Valsartan on 11/5.  - S/W digoxin this admission    - Not a candidate for advanced options, if decompensates / does not improve may not be a candidate for MCS.  - Palliative consult to be ordered  - To discuss with Dr. Khan regarding US of the vessels  - S/p RIJ MML placed 11/4/18       - Closely monitor UOP, BMP, mag,   - Fluid restriction at 1500 cc with strict I/Os and daily STANDING weights  - Maintain on telemetry and daily EKGs   - Check Electrolytes, keep Mag >2 & K+ >4  - SCDs, TEDs, Nursing communication to elevated LE

## 2018-11-08 LAB
ANION GAP SERPL CALC-SCNC: 7 MMOL/L
BACTERIA BLD CULT: NORMAL
BACTERIA BLD CULT: NORMAL
BASOPHILS # BLD AUTO: 0.06 K/UL
BASOPHILS NFR BLD: 0.5 %
BUN SERPL-MCNC: 29 MG/DL
CALCIUM SERPL-MCNC: 8.8 MG/DL
CHLORIDE SERPL-SCNC: 97 MMOL/L
CO2 SERPL-SCNC: 27 MMOL/L
CREAT SERPL-MCNC: 0.9 MG/DL
DIFFERENTIAL METHOD: ABNORMAL
EOSINOPHIL # BLD AUTO: 0.6 K/UL
EOSINOPHIL NFR BLD: 5.7 %
ERYTHROCYTE [DISTWIDTH] IN BLOOD BY AUTOMATED COUNT: 15 %
EST. GFR  (AFRICAN AMERICAN): >60 ML/MIN/1.73 M^2
EST. GFR  (NON AFRICAN AMERICAN): >60 ML/MIN/1.73 M^2
GLUCOSE SERPL-MCNC: 146 MG/DL
HCT VFR BLD AUTO: 24.9 %
HGB BLD-MCNC: 7.8 G/DL
IMM GRANULOCYTES # BLD AUTO: 0.07 K/UL
IMM GRANULOCYTES NFR BLD AUTO: 0.6 %
INR PPP: 1.2
LYMPHOCYTES # BLD AUTO: 1.1 K/UL
LYMPHOCYTES NFR BLD: 9.9 %
MCH RBC QN AUTO: 28.7 PG
MCHC RBC AUTO-ENTMCNC: 31.3 G/DL
MCV RBC AUTO: 92 FL
MONOCYTES # BLD AUTO: 0.8 K/UL
MONOCYTES NFR BLD: 7 %
NEUTROPHILS # BLD AUTO: 8.3 K/UL
NEUTROPHILS NFR BLD: 76.3 %
NRBC BLD-RTO: 0 /100 WBC
OSMOLALITY SERPL: 288 MOSM/KG
OSMOLALITY UR: 510 MOSM/KG
PLATELET # BLD AUTO: 210 K/UL
PMV BLD AUTO: 10.7 FL
POCT GLUCOSE: 121 MG/DL (ref 70–110)
POCT GLUCOSE: 157 MG/DL (ref 70–110)
POCT GLUCOSE: 159 MG/DL (ref 70–110)
POCT GLUCOSE: 228 MG/DL (ref 70–110)
POTASSIUM SERPL-SCNC: 4.2 MMOL/L
PROTHROMBIN TIME: 12 SEC
RBC # BLD AUTO: 2.72 M/UL
SODIUM SERPL-SCNC: 131 MMOL/L
SODIUM UR-SCNC: <20 MMOL/L
T4 SERPL-MCNC: 4.3 UG/DL
TSH SERPL DL<=0.005 MIU/L-ACNC: 1.46 UIU/ML
WBC # BLD AUTO: 10.93 K/UL

## 2018-11-08 PROCEDURE — 25000003 PHARM REV CODE 250: Mod: NTX | Performed by: STUDENT IN AN ORGANIZED HEALTH CARE EDUCATION/TRAINING PROGRAM

## 2018-11-08 PROCEDURE — 20600001 HC STEP DOWN PRIVATE ROOM: Mod: NTX

## 2018-11-08 PROCEDURE — 80048 BASIC METABOLIC PNL TOTAL CA: CPT | Mod: NTX

## 2018-11-08 PROCEDURE — 25000003 PHARM REV CODE 250: Mod: NTX | Performed by: INTERNAL MEDICINE

## 2018-11-08 PROCEDURE — 93005 ELECTROCARDIOGRAM TRACING: CPT | Mod: NTX

## 2018-11-08 PROCEDURE — 99232 SBSQ HOSP IP/OBS MODERATE 35: CPT | Mod: NTX,,, | Performed by: INTERNAL MEDICINE

## 2018-11-08 PROCEDURE — 84300 ASSAY OF URINE SODIUM: CPT | Mod: NTX

## 2018-11-08 PROCEDURE — 99233 SBSQ HOSP IP/OBS HIGH 50: CPT | Mod: GC,NTX,, | Performed by: INTERNAL MEDICINE

## 2018-11-08 PROCEDURE — 83935 ASSAY OF URINE OSMOLALITY: CPT | Mod: NTX

## 2018-11-08 PROCEDURE — 25000242 PHARM REV CODE 250 ALT 637 W/ HCPCS: Mod: NTX | Performed by: STUDENT IN AN ORGANIZED HEALTH CARE EDUCATION/TRAINING PROGRAM

## 2018-11-08 PROCEDURE — 85610 PROTHROMBIN TIME: CPT | Mod: NTX

## 2018-11-08 PROCEDURE — 63600175 PHARM REV CODE 636 W HCPCS: Mod: NTX | Performed by: INTERNAL MEDICINE

## 2018-11-08 PROCEDURE — 93010 ELECTROCARDIOGRAM REPORT: CPT | Mod: NTX,,, | Performed by: INTERNAL MEDICINE

## 2018-11-08 PROCEDURE — A4216 STERILE WATER/SALINE, 10 ML: HCPCS | Mod: NTX | Performed by: INTERNAL MEDICINE

## 2018-11-08 PROCEDURE — 83930 ASSAY OF BLOOD OSMOLALITY: CPT | Mod: NTX

## 2018-11-08 PROCEDURE — 85025 COMPLETE CBC W/AUTO DIFF WBC: CPT | Mod: NTX

## 2018-11-08 RX ORDER — WARFARIN 7.5 MG/1
7.5 TABLET ORAL DAILY
Status: DISCONTINUED | OUTPATIENT
Start: 2018-11-08 | End: 2018-11-14 | Stop reason: HOSPADM

## 2018-11-08 RX ORDER — PANTOPRAZOLE SODIUM 40 MG/1
40 TABLET, DELAYED RELEASE ORAL DAILY
Status: DISCONTINUED | OUTPATIENT
Start: 2018-11-08 | End: 2018-11-14 | Stop reason: HOSPADM

## 2018-11-08 RX ADMIN — ENALAPRIL MALEATE 1.25 MG: 1 SOLUTION ORAL at 09:11

## 2018-11-08 RX ADMIN — DIGOXIN 0.12 MG: 125 TABLET ORAL at 08:11

## 2018-11-08 RX ADMIN — FLUTICASONE PROPIONATE 100 MCG: 50 SPRAY, METERED NASAL at 08:11

## 2018-11-08 RX ADMIN — Medication 10 ML: at 12:11

## 2018-11-08 RX ADMIN — INSULIN ASPART 3 UNITS: 100 INJECTION, SOLUTION INTRAVENOUS; SUBCUTANEOUS at 11:11

## 2018-11-08 RX ADMIN — SENNOSIDES AND DOCUSATE SODIUM 1 TABLET: 8.6; 5 TABLET ORAL at 08:11

## 2018-11-08 RX ADMIN — ENALAPRIL MALEATE 1.25 MG: 1 SOLUTION ORAL at 08:11

## 2018-11-08 RX ADMIN — Medication 10 ML: at 11:11

## 2018-11-08 RX ADMIN — ATORVASTATIN CALCIUM 80 MG: 20 TABLET, FILM COATED ORAL at 09:11

## 2018-11-08 RX ADMIN — ASPIRIN 81 MG CHEWABLE TABLET 81 MG: 81 TABLET CHEWABLE at 08:11

## 2018-11-08 RX ADMIN — INSULIN ASPART 3 UNITS: 100 INJECTION, SOLUTION INTRAVENOUS; SUBCUTANEOUS at 07:11

## 2018-11-08 RX ADMIN — INSULIN ASPART 3 UNITS: 100 INJECTION, SOLUTION INTRAVENOUS; SUBCUTANEOUS at 05:11

## 2018-11-08 RX ADMIN — DOFETILIDE 250 MCG: 0.25 CAPSULE ORAL at 08:11

## 2018-11-08 RX ADMIN — POLYETHYLENE GLYCOL 3350 17 G: 17 POWDER, FOR SOLUTION ORAL at 08:11

## 2018-11-08 RX ADMIN — WARFARIN SODIUM 7.5 MG: 7.5 TABLET ORAL at 05:11

## 2018-11-08 RX ADMIN — AZELASTINE HYDROCHLORIDE 137 MCG: 137 SPRAY, METERED NASAL at 08:11

## 2018-11-08 RX ADMIN — PRASUGREL 10 MG: 10 TABLET, FILM COATED ORAL at 08:11

## 2018-11-08 RX ADMIN — INSULIN ASPART 1 UNITS: 100 INJECTION, SOLUTION INTRAVENOUS; SUBCUTANEOUS at 09:11

## 2018-11-08 RX ADMIN — Medication 10 ML: at 05:11

## 2018-11-08 RX ADMIN — DOBUTAMINE HYDROCHLORIDE 5 MCG/KG/MIN: 200 INJECTION INTRAVENOUS at 05:11

## 2018-11-08 RX ADMIN — CETIRIZINE HYDROCHLORIDE 5 MG: 5 TABLET ORAL at 08:11

## 2018-11-08 RX ADMIN — DOFETILIDE 250 MCG: 0.25 CAPSULE ORAL at 09:11

## 2018-11-08 RX ADMIN — FAMOTIDINE 20 MG: 20 TABLET ORAL at 08:11

## 2018-11-08 RX ADMIN — INSULIN DETEMIR 9 UNITS: 100 INJECTION, SOLUTION SUBCUTANEOUS at 09:11

## 2018-11-08 NOTE — PROGRESS NOTES
[facility]  Cardiac Electrophysiology  Progress Note    Admission Date: 11/3/2018  Code Status: Full Code   Attending Physician: Jony Hendrickson Jr.,*   Expected Discharge Date: 11/13/2018  Principal Problem:SVT (supraventricular tachycardia)    Subjective:     Interval History: Feeling well today. No complaints.  Telemetry: A-S V-P. 12 beats of monomorphic NSVT    Review of Systems   Constitution: Negative for chills and decreased appetite.   HENT: Negative for congestion, ear discharge and ear pain.    Eyes: Negative for blurred vision and discharge.   Cardiovascular: Negative for chest pain, claudication, cyanosis and dyspnea on exertion.   Respiratory: Negative for cough and hemoptysis.    Endocrine: Negative for cold intolerance and heat intolerance.   Skin: Negative for color change and nail changes.   Musculoskeletal: Negative for arthritis and back pain.   Gastrointestinal: Negative for bloating and abdominal pain.   Genitourinary: Negative for bladder incontinence and decreased libido.   Neurological: Negative for aphonia and brief paralysis.     Objective:     Vital Signs (Most Recent):  Temp: 98.2 °F (36.8 °C) (11/07/18 1500)  Pulse: 104 (11/07/18 1800)  Resp: (!) 26 (11/07/18 1800)  BP: (!) 93/58 (11/07/18 1800)  SpO2: 100 % (11/07/18 1800) Vital Signs (24h Range):  Temp:  [97.5 °F (36.4 °C)-98.3 °F (36.8 °C)] 98.2 °F (36.8 °C)  Pulse:  [] 104  Resp:  [17-42] 26  SpO2:  [91 %-100 %] 100 %  BP: ()/(39-66) 93/58     Weight: 63.5 kg (140 lb)  Body mass index is 22.6 kg/m².     SpO2: 100 %  O2 Device (Oxygen Therapy): nasal cannula    Physical Exam   Constitutional: He is oriented to person, place, and time. He appears well-developed and well-nourished.   HENT:   Head: Normocephalic and atraumatic.   Nose: Nose normal.   Mouth/Throat: No oropharyngeal exudate.   Eyes: Right eye exhibits no discharge. Left eye exhibits no discharge. No scleral icterus.   Neck: Normal range  of motion. Neck supple. No JVD present.   Cardiovascular: Normal rate, regular rhythm, S1 normal and S2 normal. Exam reveals no gallop, no S3, no S4, no distant heart sounds, no friction rub, no midsystolic click and no opening snap.   No murmur heard.  Pulses:       Radial pulses are 2+ on the right side, and 2+ on the left side.        Femoral pulses are 2+ on the right side, and 2+ on the left side.  Pulmonary/Chest: Effort normal and breath sounds normal. No respiratory distress. He has no wheezes. He has no rales. He exhibits no tenderness.   Abdominal: Soft. Bowel sounds are normal. He exhibits no distension. There is no tenderness. There is no rebound.   Musculoskeletal: Normal range of motion. He exhibits no edema, tenderness or deformity.   Lymphadenopathy:     He has no cervical adenopathy.   Neurological: He is alert and oriented to person, place, and time. No cranial nerve deficit.   Skin: Skin is warm and dry.   Psychiatric: He has a normal mood and affect. His behavior is normal.       Significant Labs:   BMP:   Recent Labs   Lab 11/06/18 0342 11/07/18  0300   * 137*   * 132*   K 4.0 4.0   CL 98 97   CO2 27 27   BUN 44* 32*   CREATININE 1.2 1.0   CALCIUM 9.0 8.9   , CMP:   Recent Labs   Lab 11/06/18 0342 11/07/18  0300   * 132*   K 4.0 4.0   CL 98 97   CO2 27 27   * 137*   BUN 44* 32*   CREATININE 1.2 1.0   CALCIUM 9.0 8.9   PROT  --  6.1   ALBUMIN  --  2.4*   BILITOT  --  0.9   ALKPHOS  --  67   AST  --  30   ALT  --  13   ANIONGAP 8 8   ESTGFRAFRICA >60.0 >60.0   EGFRNONAA >60.0 >60.0    and CBC:   Recent Labs   Lab 11/06/18 0342 11/07/18  0300   WBC 12.96* 11.65   HGB 9.0* 8.4*   HCT 27.8* 26.6*    197       Significant Imaging: Telemetry as above    Assessment and Plan:     * SVT (supraventricular tachycardia)    S/p AT/AFL DCCV in October 2018 w Dr Ashby  Difficult to place on BB due to hypotension     Stable after AVN ablation             Yordan Miles  Theodore Reyna MD  Cardiac Electrophysiology  [facility]

## 2018-11-08 NOTE — NURSING
Patient arrived to floor.Tele in place. Vs taken. No complaints of pain. Call bell within reach. Will continue to monitor.

## 2018-11-08 NOTE — PROGRESS NOTES
[facility]  Heart Transplant  Progress Note    Patient Name: Bharat Coker  MRN: [id number]  Admission Date: 11/3/2018  Hospital Length of Stay: 5 days  Attending Physician: Jony Hendrickson Jr.,*  Primary Care Provider: Ronnie Richardson Jr, MD  Principal Problem:SVT (supraventricular tachycardia)    Subjective:     Interval History: Patient seen and examined today at bedside. Sitting in chair, urinated well last night. Despite few SBP around 80, patient was asymptomatic. Tolerated Enalapril 1.2 mg bid started on 11/7/2018. No SOB, eating well.      Continuous Infusions:   DOBUTamine 5 mcg/kg/min (11/08/18 1200)     Scheduled Meds:   aspirin  81 mg Oral Daily    atorvastatin  80 mg Oral Nightly    azelastine  1 spray Nasal BID    cetirizine  5 mg Oral Daily    digoxin  0.125 mg Oral Daily    dofetilide  250 mcg Oral Q12H    enalapril maleate  1.25 mg Oral BID    fluticasone  2 spray Each Nare Daily    insulin aspart U-100  3 Units Subcutaneous TIDWM    insulin detemir U-100  9 Units Subcutaneous QHS    pantoprazole  40 mg Oral Daily    polyethylene glycol  17 g Oral Daily    prasugrel  10 mg Oral Daily    senna-docusate 8.6-50 mg  1 tablet Oral BID    sodium chloride 0.9%  10 mL Intravenous Q6H    warfarin  7.5 mg Oral Daily     PRN Meds:acetaminophen, dextrose 50%, dextrose 50%, docusate sodium, glucagon (human recombinant), glucose, glucose, insulin aspart U-100, ondansetron, ramelteon, sodium chloride, Flushing PICC Protocol **AND** sodium chloride 0.9% **AND** sodium chloride 0.9%    Review of patient's allergies indicates:   Allergen Reactions    Amiodarone analogues Anaphylaxis    Ativan [lorazepam] Anxiety     Objective:     Vital Signs (Most Recent):  Temp: 97.7 °F (36.5 °C) (11/08/18 1100)  Pulse: 93 (11/08/18 1200)  Resp: 16 (11/08/18 1200)  BP: (!) 85/48 (11/08/18 1200)  SpO2: (!) 94 % (11/08/18 1200) Vital Signs (24h Range):  Temp:  [97.7 °F (36.5 °C)-98.3 °F  (36.8 °C)] 97.7 °F (36.5 °C)  Pulse:  [] 93  Resp:  [16-40] 16  SpO2:  [89 %-100 %] 94 %  BP: ()/(38-66) 85/48     No data found.  Body mass index is 22.6 kg/m².      Intake/Output Summary (Last 24 hours) at 11/8/2018 1208  Last data filed at 11/8/2018 1200  Gross per 24 hour   Intake 458.5 ml   Output 750 ml   Net -291.5 ml       Hemodynamic Parameters:       Telemetry: A sensed V paced, NSVT 9 beats.    Physical Exam   Constitutional: He is oriented to person, place, and time. He appears well-developed and well-nourished.   HENT:   Mouth/Throat: Oropharynx is clear and moist.   Neck: No JVD present.   Cardiovascular: Normal rate, regular rhythm and intact distal pulses.   Murmur (Systolic mumur heard) heard.  Pulmonary/Chest: Effort normal and breath sounds normal. No respiratory distress. He has no wheezes. He has no rales.   Rhonchi lower to mid lung.   Abdominal: Soft. Bowel sounds are normal. He exhibits no distension. There is no tenderness. There is no guarding.   Musculoskeletal: He exhibits no edema.   Neurological: He is alert and oriented to person, place, and time.   Skin: Skin is warm.   Nursing note and vitals reviewed.      Significant Labs:  CBC:  Recent Labs   Lab 11/06/18 0342 11/07/18 0300 11/08/18  0305   WBC 12.96* 11.65 10.93   RBC 3.10* 2.92* 2.72*   HGB 9.0* 8.4* 7.8*   HCT 27.8* 26.6* 24.9*    197 210   MCV 90 91 92   MCH 29.0 28.8 28.7   MCHC 32.4 31.6* 31.3*     BNP:  Recent Labs   Lab 11/03/18 2025 11/05/18  0836 11/07/18  0300   BNP 3,222* 2,209* 1,450*     CMP:  Recent Labs   Lab 11/03/18 2025 11/03/18  2350  11/06/18  0342 11/07/18  0300 11/08/18  0305   * 265*   < > 127* 137* 146*   CALCIUM 10.5 9.5   < > 9.0 8.9 8.8   ALBUMIN 3.5 3.1*  --   --  2.4*  --    PROT 8.4 7.3  --   --  6.1  --    * 136   < > 133* 132* 131*   K 4.6 4.3   < > 4.0 4.0 4.2   CO2 20* 25   < > 27 27 27   CL 95 100   < > 98 97 97   BUN 58* 60*   < > 44* 32* 29*   CREATININE  1.7* 1.5*   < > 1.2 1.0 0.9   ALKPHOS 77 71  --   --  67  --    ALT 28 25  --   --  13  --    AST 32 26  --   --  30  --    BILITOT 1.3* 1.1*  --   --  0.9  --     < > = values in this interval not displayed.      Coagulation:   Recent Labs   Lab 11/06/18  0342 11/07/18  0300 11/08/18  0305   INR 1.4* 1.2 1.2     LDH:  No results for input(s): LDH in the last 72 hours.  Microbiology:  Microbiology Results (last 7 days)     Procedure Component Value Units Date/Time    Blood culture #2 **CANNOT BE ORDERED STAT** [[id number]] Collected:  11/03/18 2134    Order Status:  Completed Specimen:  Blood from Peripheral, Antecubital, Right Updated:  11/07/18 2312     Blood Culture, Routine No Growth to date     Blood Culture, Routine No Growth to date     Blood Culture, Routine No Growth to date     Blood Culture, Routine No Growth to date     Blood Culture, Routine No Growth to date    Blood culture #1 **CANNOT BE ORDERED STAT** [[id number]] Collected:  11/03/18 2059    Order Status:  Completed Specimen:  Blood from Peripheral, Antecubital, Right Updated:  11/07/18 2312     Blood Culture, Routine No Growth to date     Blood Culture, Routine No Growth to date     Blood Culture, Routine No Growth to date     Blood Culture, Routine No Growth to date     Blood Culture, Routine No Growth to date    Respiratory Viral Panel by PCR [facility]; Nasal Swab [[id number]] Collected:  11/03/18 2133    Order Status:  Completed Specimen:  Respiratory Updated:  11/06/18 1322     Respiratory Virus Panel, source Nasal Swab     RVP - Adenovirus Not Detected     Comment: Detects Serotypes B and E. Detection of Serotype C may   be limited. If Adenovirus infection is suspected and a   Not Detected result is returned the sample should be   re-tested for Adenovirus using an independent method  (e.g. [employer] Adenovirus Quantitative Real-Time  PCR test.          Enterovirus Not Detected     Comment: Cross-reactivity has been observed between certain  Rhinovirus  strains and the Enterovirus assay.          Human Bocavirus Not Detected     Human Coronavirus Not Detected     Comment: The Human Coronavirus assay detects Human coronavirus types  229E, OC43,NL63 and HKU1.          RVP - Human Metapneumovirus (hMPV) Not Detected     RVP - Influenza A Not Detected     Influenza A - [id number] Not Detected     RVP - Influenza B Not Detected     Parainfluenza Not Detected     Respiratory Syncytial VirusVirus (RSV) A Not Detected     Comment: The Respiratory Syncytial Viral assay detects types A and B,  however it does not distinguish between the two.          RVP - Rhinovirus Not Detected     Comment: Cross-Reactivity has been observed between certain   Rhinovirus strains and the Enterovirus assay.  Target Enriched Mulitplex Polymerase Chain Reaction (TEM-PCR)  allows for the detection of multiple pathogens out of a single  reaction.  This test was developed and its performance   characteristics determined by [employer].  It has not   been cleared or approved by the U.S.Food and Drug Administration.  Results should be used in conjunction with clinical findings,   and should not form the sole basis for a diagnosis or treatment  decision.  TEM-PCR is a licensed technology of [employer].         Narrative:       Receiving Lab:->[facility]          BMP:   Recent Labs   Lab 11/08/18  0305   *   *   K 4.2   CL 97   CO2 27   BUN 29*   CREATININE 0.9   CALCIUM 8.8     Cardiac Markers: No results for input(s): CKMB, TROPONINT, MYOGLOBIN in the last 72 hours.  Coagulation:   Recent Labs   Lab 11/08/18  0305   INR 1.2     I have reviewed all pertinent labs within the past 24 hours.    Estimated Creatinine Clearance: 67.6 mL/min (based on SCr of 0.9 mg/dL).    Diagnostic Results:  I have reviewed all pertinent imaging results/findings within the past 24 hours.    Assessment and Plan:     Mr. Coker is a 74 y/o male admitted to [facility] for ADHF in the  setting of recent Lasix adjustment and recent initiation of BB/CCB. PMHx of CAD s/p CABG, ICM/HFrEF (LVef10%) s/p Medtronic CRT-D, CAD([facility] 6/2018: LIMA-LAD patent, % occluded at ostium, SVG-% occluded, SVG-D 100% occluded, pLCx 30%, 100% occluded OM, 100% LAD occlusion after takeoff of D1, D1 is tiny with 80% disease, SVG-OM 80% proximal stenosis with 50% at site of anastomosis.An SVG-OM OKSANA was placed at that time). Who presents with c/o acute sob which occurred around 7PM this evening. Patient was at usual health, states developed acute SOB, had been having worsening SUMNER. EMS called, he was found to be in SVT, given Adenosis and Cardizem and brought to ED. He was noted to be in SVT -429g hypotensive SBP 70s, given metoprolol IV, and 250cc NS bolus. Cardiology consulted. Medtronic device interrogated, A-sensed Bi-V paced.     At length discussion with spouse / patient, she has been logging his BP / HR / Weights on daily basis, patient has been hypotensive not given his Lisinopril / Metoprolol. Seen in clinic with Aman Adames, recently decreased lasix 80/80, 40/80.  Recently discharged 10/10-10/23 for ADHF, patient is a poor VAD candidate dt frailty and calcifications on CT, also found to be in AT s/p LAVERNE/DCCV started on Tikosyn initiate on 10/19/18          * SVT (supraventricular tachycardia)    - Appreciate EP consult. Had ICD interrogation on admission with multiple self terminating SVT episodes  - Ablation on 11/6  - EP follow up appreciated, settings change  - C/W digoxin 0.125 mg. ( Avoid hypokalemia)  - Allergic to amiodarone.   - C/W coumadin, appreciate pharmacy assistance.  - Will replace electrolytes.      Anemia    - Likely AOCI - Iron 21, TIBC- 219, Sat -10, Transferrin - 148, Ferritin - 726  - H/H dropped from 12.5 on 10/18 to 9 ->8.4 <- 7.8 on this admission.  - Will do FOBT,   - Patient on triple therapy as recent OKSANA to SVG to OM on 6/2018 and AFl (S/P LAVERNE/DCCV)      Hypotension    - Overnight few more episodes of SBP 70-80, improved with no active intervention. AM SBP 98, tolerating enalapril so far, 1.25 mg bid started on 11/7.  - Will continue with digoxin.   - No lasix     - [On 11/5 -Patient got hypotensive after receiving Valsartan 40 mg on 11/5. See plan of care . At home, patient had episodes of hypotension with SBP to 70-80 with metoprolol. Had similar events with medications in the past. Sensitive to medications. Received 250 ml - NS - 2 bolus , placed in Trendelenberg position. Manual SBP 88-92, higher than cuff pressure. Monitor systolic blood pressure.Received 500 ml NS over 6 hours slowly as patient with EF 10% Likely from medication side effect.]       Cardiogenic shock    Mr. Coker is a 72 y/o male admitted to [facility] for ADHF in the setting of recent Lasix adjustment and recent initiation of BB/CCB. PMHx of CAD s/p CABG, ICM/HFrEF (LVef10%) s/p Medtronic CRT-D.   - Resolved  - Likely d/t  SVT   - Elevated BNP ~3000 -> improved to 1450, not on lasix since 11/4, CXR with diffuse pulmonary edema on admission.At home lasix changed 80/80 -> 40/80 at home  - Held lasix on 11/4. CVP 6. - Started on  5mcg this admission, PICC placed.    - TTE <10% EF, Severe RV dysfunction, moderate MR,  Moderate to severe AS, Mild TR, E/e' 24  - Low dose enalapril started on 11/7- 1.25 mg BID ( SBP > 107 with first dose). Had episodes of hypotension with Valsartan on 11/5.  - S/W digoxin this admission    - Not a candidate for advanced options, if decompensates / does not improve may not be a candidate for MCS.  - Palliative consult to be ordered  - To discuss with Dr. Khan regarding US of the vessels  - S/p RIJ MML placed 11/4/18       - Closely monitor UOP, BMP, mag,   - Fluid restriction at 1500 cc with strict I/Os and daily STANDING weights  - Maintain on telemetry and daily EKGs   - Check Electrolytes, keep Mag >2 & K+ >4  - SCDs, TEDs, Nursing communication to [facility]           Atrial tachycardia    H/O Aflutter - S/p LAVERNE / DCCV on Tikosyn.    Medtronic ICD interrogated, multiple SVT events noted.   - Continue Tikosyn BID,   - EP on board  - Hold Betablocker with ADHR and hypotension      Hepatic congestion    Bilirubin elevated 1.3 compared to baseline.   - CMP in AM   - Hopefully will clear with  / Lasix      KASEY (acute kidney injury)    Cardiorenal in nature, BL Cr 1.1, on admission 1.7. Improved to 0.9  -Monitor BMP   -Closely watch UOP  -If continues to rise consider renal u/s and urine lytes  -Avoid nephrotoxic medications      Type 2 diabetes mellitus with circulatory disorder, without long-term current use of insulin    Hold PTA Glipizide   - ISS with accuchecks      CAD (coronary artery disease)    CAD([facility] 6/2018: LIMA-LAD patent, % occluded at ostium, SVG-% occluded, SVG-D 100% occluded, pLCx 30%, 100% occluded OM, 100% LAD occlusion after takeoff of D1, D1 is tiny with 80% disease, SVG-OM 80% proximal stenosis with 50% at site of anastomosis.An SVG-OM OKSANA was placed at that time).   - No records here, none seen in care everywhere.  -  Given OKSANA in 6/2018 - Continue DAPT for now - ASA / Prasugrel. Patient on coumadin as well for AFl.                  Rachel Son MD  Heart Transplant  [facility]

## 2018-11-08 NOTE — SUBJECTIVE & OBJECTIVE
Interval History: Feeling well today. No complaints.  Telemetry: A-S V-P. 12 beats of monomorphic NSVT    Review of Systems   Constitution: Negative for chills and decreased appetite.   HENT: Negative for congestion, ear discharge and ear pain.    Eyes: Negative for blurred vision and discharge.   Cardiovascular: Negative for chest pain, claudication, cyanosis and dyspnea on exertion.   Respiratory: Negative for cough and hemoptysis.    Endocrine: Negative for cold intolerance and heat intolerance.   Skin: Negative for color change and nail changes.   Musculoskeletal: Negative for arthritis and back pain.   Gastrointestinal: Negative for bloating and abdominal pain.   Genitourinary: Negative for bladder incontinence and decreased libido.   Neurological: Negative for aphonia and brief paralysis.     Objective:     Vital Signs (Most Recent):  Temp: 98.2 °F (36.8 °C) (11/07/18 1500)  Pulse: 104 (11/07/18 1800)  Resp: (!) 26 (11/07/18 1800)  BP: (!) 93/58 (11/07/18 1800)  SpO2: 100 % (11/07/18 1800) Vital Signs (24h Range):  Temp:  [97.5 °F (36.4 °C)-98.3 °F (36.8 °C)] 98.2 °F (36.8 °C)  Pulse:  [] 104  Resp:  [17-42] 26  SpO2:  [91 %-100 %] 100 %  BP: ()/(39-66) 93/58     Weight: 63.5 kg (140 lb)  Body mass index is 22.6 kg/m².     SpO2: 100 %  O2 Device (Oxygen Therapy): nasal cannula    Physical Exam   Constitutional: He is oriented to person, place, and time. He appears well-developed and well-nourished.   HENT:   Head: Normocephalic and atraumatic.   Nose: Nose normal.   Mouth/Throat: No oropharyngeal exudate.   Eyes: Right eye exhibits no discharge. Left eye exhibits no discharge. No scleral icterus.   Neck: Normal range of motion. Neck supple. No JVD present.   Cardiovascular: Normal rate, regular rhythm, S1 normal and S2 normal. Exam reveals no gallop, no S3, no S4, no distant heart sounds, no friction rub, no midsystolic click and no opening snap.   No murmur heard.  Pulses:       Radial pulses are  2+ on the right side, and 2+ on the left side.        Femoral pulses are 2+ on the right side, and 2+ on the left side.  Pulmonary/Chest: Effort normal and breath sounds normal. No respiratory distress. He has no wheezes. He has no rales. He exhibits no tenderness.   Abdominal: Soft. Bowel sounds are normal. He exhibits no distension. There is no tenderness. There is no rebound.   Musculoskeletal: Normal range of motion. He exhibits no edema, tenderness or deformity.   Lymphadenopathy:     He has no cervical adenopathy.   Neurological: He is alert and oriented to person, place, and time. No cranial nerve deficit.   Skin: Skin is warm and dry.   Psychiatric: He has a normal mood and affect. His behavior is normal.       Significant Labs:   BMP:   Recent Labs   Lab 11/06/18 0342 11/07/18  0300   * 137*   * 132*   K 4.0 4.0   CL 98 97   CO2 27 27   BUN 44* 32*   CREATININE 1.2 1.0   CALCIUM 9.0 8.9   , CMP:   Recent Labs   Lab 11/06/18 0342 11/07/18  0300   * 132*   K 4.0 4.0   CL 98 97   CO2 27 27   * 137*   BUN 44* 32*   CREATININE 1.2 1.0   CALCIUM 9.0 8.9   PROT  --  6.1   ALBUMIN  --  2.4*   BILITOT  --  0.9   ALKPHOS  --  67   AST  --  30   ALT  --  13   ANIONGAP 8 8   ESTGFRAFRICA >60.0 >60.0   EGFRNONAA >60.0 >60.0    and CBC:   Recent Labs   Lab 11/06/18 0342 11/07/18  0300   WBC 12.96* 11.65   HGB 9.0* 8.4*   HCT 27.8* 26.6*    197       Significant Imaging: Telemetry as above

## 2018-11-08 NOTE — ASSESSMENT & PLAN NOTE
CAD([facility] 6/2018: LIMA-LAD patent, % occluded at ostium, SVG-% occluded, SVG-D 100% occluded, pLCx 30%, 100% occluded OM, 100% LAD occlusion after takeoff of D1, D1 is tiny with 80% disease, SVG-OM 80% proximal stenosis with 50% at site of anastomosis.An SVG-OM OKSANA was placed at that time).   - No records here, none seen in care everywhere.  -  Given OKSANA in 6/2018 - Continue DAPT for now - ASA / Prasugrel. Patient on coumadin as well for AFl.

## 2018-11-08 NOTE — ASSESSMENT & PLAN NOTE
Cardiorenal in nature, BL Cr 1.1, on admission 1.7. Improved to 0.9  -Monitor BMP   -Closely watch UOP  -If continues to rise consider renal u/s and urine lytes  -Avoid nephrotoxic medications

## 2018-11-08 NOTE — PLAN OF CARE
Problem: Patient Care Overview  Goal: Plan of Care Review  Outcome: Ongoing (interventions implemented as appropriate)     See vital signs and assessments in flowsheets. See below for updates on today's progress.      Pulmonary: 2L NC, no complaints sob     Cardiovascular: AICD, HR 100s, SBP >80, bilateral upper and lower extremities pulses palpable     Neurological: aoox4, afebrile, moves all extremities     Gastrointestinal: bowel sounds active, no bm     Genitourinary: voids per urinal >200 UO     Endocrine: accucheck before meals     Integumentary/Other: R groin post cath with no hematoma, bleeding, or tenderness     Infusions: dobutamine        Patient progressing towards goals as tolerated, plan of care communicated and reviewed with Bharat Coker. All concerns addressed. Will continue to monitor.

## 2018-11-08 NOTE — ASSESSMENT & PLAN NOTE
- Appreciate EP consult. Had ICD interrogation on admission with multiple self terminating SVT episodes  - Ablation on 11/6  - EP follow up appreciated, settings change  - C/W digoxin 0.125 mg. ( Avoid hypokalemia)  - Allergic to amiodarone.   - C/W coumadin, appreciate pharmacy assistance.  - Will replace electrolytes.

## 2018-11-08 NOTE — NURSING
Patient had a run of VTACH . Patient asymptomatic /55 97% NC HR 89.  Adelaida ODELL notified   Will continue to monitor.

## 2018-11-08 NOTE — ASSESSMENT & PLAN NOTE
S/p AT/AFL DCCV in October 2018 w Dr Ashby  Difficult to place on BB due to hypotension     Stable after AVN ablation

## 2018-11-08 NOTE — PROGRESS NOTES
[facility]  Cardiac Electrophysiology  Progress Note    Admission Date: 11/3/2018  Code Status: Full Code   Attending Physician: Jony Hendrickson Jr.,*   Expected Discharge Date: 11/13/2018  Principal Problem:SVT (supraventricular tachycardia)    Subjective:     Interval History: Feeling well today, no complaints except buttocks tenderness.  Telemetry: AS- HR 60s-120s.  NSVT of 9 beats  Interrogated CRT-D and found sinus high normal rate to tachycardia with AT of PACs, intermittent 3rd degree AV block, with escape junctional beats.    Review of Systems   Constitution: Negative for chills and decreased appetite.   HENT: Negative for congestion, ear discharge and ear pain.    Eyes: Negative for blurred vision and discharge.   Cardiovascular: Negative for chest pain, claudication, cyanosis and dyspnea on exertion.   Respiratory: Negative for cough and hemoptysis.    Endocrine: Negative for cold intolerance and heat intolerance.   Skin: Negative for color change and nail changes.   Musculoskeletal: Negative for arthritis and back pain.   Gastrointestinal: Negative for bloating and abdominal pain.   Genitourinary: Negative for bladder incontinence and decreased libido.   Neurological: Negative for aphonia and brief paralysis.     Objective:     Vital Signs (Most Recent):  Temp: 97.7 °F (36.5 °C) (11/08/18 1100)  Pulse: 97 (11/08/18 1400)  Resp: (!) 31 (11/08/18 1400)  BP: (!) 95/51 (11/08/18 1400)  SpO2: (!) 91 % (11/08/18 1400) Vital Signs (24h Range):  Temp:  [97.7 °F (36.5 °C)-98.3 °F (36.8 °C)] 97.7 °F (36.5 °C)  Pulse:  [] 97  Resp:  [16-37] 31  SpO2:  [89 %-100 %] 91 %  BP: ()/(38-66) 95/51     Weight: 63.5 kg (140 lb)  Body mass index is 22.6 kg/m².     SpO2: (!) 91 %  O2 Device (Oxygen Therapy): room air    Physical Exam   Constitutional: He is oriented to person, place, and time. He appears well-developed and well-nourished.   HENT:   Head: Normocephalic and atraumatic.    Nose: Nose normal.   Mouth/Throat: No oropharyngeal exudate.   Eyes: Right eye exhibits no discharge. Left eye exhibits no discharge. No scleral icterus.   Neck: Normal range of motion. Neck supple. No JVD present.   Cardiovascular: Normal rate, regular rhythm, S1 normal and S2 normal. Exam reveals no gallop, no S3, no S4, no distant heart sounds, no friction rub, no midsystolic click and no opening snap.   No murmur heard.  Pulses:       Radial pulses are 2+ on the right side, and 2+ on the left side.        Femoral pulses are 2+ on the right side, and 2+ on the left side.  Pulmonary/Chest: Effort normal and breath sounds normal. No respiratory distress. He has no wheezes. He has no rales. He exhibits no tenderness.   Abdominal: Soft. Bowel sounds are normal. He exhibits no distension. There is no tenderness. There is no rebound.   Musculoskeletal: Normal range of motion. He exhibits no edema, tenderness or deformity.   Lymphadenopathy:     He has no cervical adenopathy.   Neurological: He is alert and oriented to person, place, and time. No cranial nerve deficit.   Skin: Skin is warm and dry.   Psychiatric: He has a normal mood and affect. His behavior is normal.       Significant Labs:   BMP:   Recent Labs   Lab 11/07/18 0300 11/08/18 0305   * 146*   * 131*   K 4.0 4.2   CL 97 97   CO2 27 27   BUN 32* 29*   CREATININE 1.0 0.9   CALCIUM 8.9 8.8   , CMP:   Recent Labs   Lab 11/07/18 0300 11/08/18 0305   * 131*   K 4.0 4.2   CL 97 97   CO2 27 27   * 146*   BUN 32* 29*   CREATININE 1.0 0.9   CALCIUM 8.9 8.8   PROT 6.1  --    ALBUMIN 2.4*  --    BILITOT 0.9  --    ALKPHOS 67  --    AST 30  --    ALT 13  --    ANIONGAP 8 7*   ESTGFRAFRICA >60.0 >60.0   EGFRNONAA >60.0 >60.0    and CBC:   Recent Labs   Lab 11/07/18 0300 11/08/18 0305   WBC 11.65 10.93   HGB 8.4* 7.8*   HCT 26.6* 24.9*    210       Significant Imaging: Telemetry, CRT-D interrogation    Assessment and Plan:     *  SVT (supraventricular tachycardia)    S/p AT/AFL DCCV in October 2018 w Dr Ashby  Difficult to place on BB due to hypotension   Stable after AVN ablation    Interrogated CRT-D and found sinus high normal rate to tachycardia with AT of PACs, intermittent 3rd degree AV block, with escape junctional beats and abundant ventricular ectopies.    Recommendations:  -Switch from dofetilide to amiodarone 200 Q8h or TID  -May continue digoxin    Case discussed w Dr Zraco-Shy Spicer MD  Cardiac Electrophysiology  [facility]

## 2018-11-08 NOTE — SUBJECTIVE & OBJECTIVE
Interval History: Feeling well today, no complaints except buttocks tenderness.  Telemetry: AS- HR 60s-120s.  NSVT of 9 beats    Review of Systems   Constitution: Negative for chills and decreased appetite.   HENT: Negative for congestion, ear discharge and ear pain.    Eyes: Negative for blurred vision and discharge.   Cardiovascular: Negative for chest pain, claudication, cyanosis and dyspnea on exertion.   Respiratory: Negative for cough and hemoptysis.    Endocrine: Negative for cold intolerance and heat intolerance.   Skin: Negative for color change and nail changes.   Musculoskeletal: Negative for arthritis and back pain.   Gastrointestinal: Negative for bloating and abdominal pain.   Genitourinary: Negative for bladder incontinence and decreased libido.   Neurological: Negative for aphonia and brief paralysis.     Objective:     Vital Signs (Most Recent):  Temp: 97.7 °F (36.5 °C) (11/08/18 1100)  Pulse: 97 (11/08/18 1400)  Resp: (!) 31 (11/08/18 1400)  BP: (!) 95/51 (11/08/18 1400)  SpO2: (!) 91 % (11/08/18 1400) Vital Signs (24h Range):  Temp:  [97.7 °F (36.5 °C)-98.3 °F (36.8 °C)] 97.7 °F (36.5 °C)  Pulse:  [] 97  Resp:  [16-37] 31  SpO2:  [89 %-100 %] 91 %  BP: ()/(38-66) 95/51     Weight: 63.5 kg (140 lb)  Body mass index is 22.6 kg/m².     SpO2: (!) 91 %  O2 Device (Oxygen Therapy): room air    Physical Exam   Constitutional: He is oriented to person, place, and time. He appears well-developed and well-nourished.   HENT:   Head: Normocephalic and atraumatic.   Nose: Nose normal.   Mouth/Throat: No oropharyngeal exudate.   Eyes: Right eye exhibits no discharge. Left eye exhibits no discharge. No scleral icterus.   Neck: Normal range of motion. Neck supple. No JVD present.   Cardiovascular: Normal rate, regular rhythm, S1 normal and S2 normal. Exam reveals no gallop, no S3, no S4, no distant heart sounds, no friction rub, no midsystolic click and no opening snap.   No murmur heard.  Pulses:        Radial pulses are 2+ on the right side, and 2+ on the left side.        Femoral pulses are 2+ on the right side, and 2+ on the left side.  Pulmonary/Chest: Effort normal and breath sounds normal. No respiratory distress. He has no wheezes. He has no rales. He exhibits no tenderness.   Abdominal: Soft. Bowel sounds are normal. He exhibits no distension. There is no tenderness. There is no rebound.   Musculoskeletal: Normal range of motion. He exhibits no edema, tenderness or deformity.   Lymphadenopathy:     He has no cervical adenopathy.   Neurological: He is alert and oriented to person, place, and time. No cranial nerve deficit.   Skin: Skin is warm and dry.   Psychiatric: He has a normal mood and affect. His behavior is normal.       Significant Labs:   BMP:   Recent Labs   Lab 11/07/18 0300 11/08/18 0305   * 146*   * 131*   K 4.0 4.2   CL 97 97   CO2 27 27   BUN 32* 29*   CREATININE 1.0 0.9   CALCIUM 8.9 8.8   , CMP:   Recent Labs   Lab 11/07/18 0300 11/08/18 0305   * 131*   K 4.0 4.2   CL 97 97   CO2 27 27   * 146*   BUN 32* 29*   CREATININE 1.0 0.9   CALCIUM 8.9 8.8   PROT 6.1  --    ALBUMIN 2.4*  --    BILITOT 0.9  --    ALKPHOS 67  --    AST 30  --    ALT 13  --    ANIONGAP 8 7*   ESTGFRAFRICA >60.0 >60.0   EGFRNONAA >60.0 >60.0    and CBC:   Recent Labs   Lab 11/07/18 0300 11/08/18 0305   WBC 11.65 10.93   HGB 8.4* 7.8*   HCT 26.6* 24.9*    210       Significant Imaging: Telemetry

## 2018-11-08 NOTE — ASSESSMENT & PLAN NOTE
- Likely AOCI - Iron 21, TIBC- 219, Sat -10, Transferrin - 148, Ferritin - 726  - H/H dropped from 12.5 on 10/18 to 9 ->8.4 <- 7.8 on this admission.  - Will do FOBT,   - Patient on triple therapy as recent OKSANA to SVG to OM on 6/2018 and AFl (S/P LAVERNE/DCCV)

## 2018-11-08 NOTE — ASSESSMENT & PLAN NOTE
Mr. Coker is a 72 y/o male admitted to [facility] for ADHF in the setting of recent Lasix adjustment and recent initiation of BB/CCB. PMHx of CAD s/p CABG, ICM/HFrEF (LVef10%) s/p [employer] CRT-D.   - Resolved  - Likely d/t  SVT   - Elevated BNP ~3000 -> improved to 1450, not on lasix since 11/4, CXR with diffuse pulmonary edema on admission.At home lasix changed 80/80 -> 40/80 at home  - Held lasix on 11/4. CVP 6. - Started on  5mcg this admission, PICC placed.    - TTE <10% EF, Severe RV dysfunction, moderate MR,  Moderate to severe AS, Mild TR, E/e' 24  - Low dose enalapril started on 11/7- 1.25 mg BID ( SBP > 107 with first dose). Had episodes of hypotension with Valsartan on 11/5.  - S/W digoxin this admission    - Not a candidate for advanced options, if decompensates / does not improve may not be a candidate for MCS.  - Palliative consult to be ordered  - To discuss with Dr. Khan regarding US of the vessels  - S/p RIJ MML placed 11/4/18       - Closely monitor UOP, BMP, mag,   - Fluid restriction at 1500 cc with strict I/Os and daily STANDING weights  - Maintain on telemetry and daily EKGs   - Check Electrolytes, keep Mag >2 & K+ >4  - SCDs, TEDs, Nursing communication to elevated LE

## 2018-11-08 NOTE — SUBJECTIVE & OBJECTIVE
Interval History: Patient seen and examined today at bedside. Sitting in chair, urinated well last night. Despite few SBP around 80, patient was asymptomatic. Tolerated Enalapril 1.2 mg bid started on 11/7/2018. No SOB, eating well.      Continuous Infusions:   DOBUTamine 5 mcg/kg/min (11/08/18 1200)     Scheduled Meds:   aspirin  81 mg Oral Daily    atorvastatin  80 mg Oral Nightly    azelastine  1 spray Nasal BID    cetirizine  5 mg Oral Daily    digoxin  0.125 mg Oral Daily    dofetilide  250 mcg Oral Q12H    enalapril maleate  1.25 mg Oral BID    fluticasone  2 spray Each Nare Daily    insulin aspart U-100  3 Units Subcutaneous TIDWM    insulin detemir U-100  9 Units Subcutaneous QHS    pantoprazole  40 mg Oral Daily    polyethylene glycol  17 g Oral Daily    prasugrel  10 mg Oral Daily    senna-docusate 8.6-50 mg  1 tablet Oral BID    sodium chloride 0.9%  10 mL Intravenous Q6H    warfarin  7.5 mg Oral Daily     PRN Meds:acetaminophen, dextrose 50%, dextrose 50%, docusate sodium, glucagon (human recombinant), glucose, glucose, insulin aspart U-100, ondansetron, ramelteon, sodium chloride, Flushing PICC Protocol **AND** sodium chloride 0.9% **AND** sodium chloride 0.9%    Review of patient's allergies indicates:   Allergen Reactions    Amiodarone analogues Anaphylaxis    Ativan [lorazepam] Anxiety     Objective:     Vital Signs (Most Recent):  Temp: 97.7 °F (36.5 °C) (11/08/18 1100)  Pulse: 93 (11/08/18 1200)  Resp: 16 (11/08/18 1200)  BP: (!) 85/48 (11/08/18 1200)  SpO2: (!) 94 % (11/08/18 1200) Vital Signs (24h Range):  Temp:  [97.7 °F (36.5 °C)-98.3 °F (36.8 °C)] 97.7 °F (36.5 °C)  Pulse:  [] 93  Resp:  [16-40] 16  SpO2:  [89 %-100 %] 94 %  BP: ()/(38-66) 85/48     No data found.  Body mass index is 22.6 kg/m².      Intake/Output Summary (Last 24 hours) at 11/8/2018 1208  Last data filed at 11/8/2018 1200  Gross per 24 hour   Intake 458.5 ml   Output 750 ml   Net -291.5 ml        Hemodynamic Parameters:       Telemetry: A sensed V paced    Physical Exam   Constitutional: He is oriented to person, place, and time. He appears well-developed and well-nourished.   HENT:   Mouth/Throat: Oropharynx is clear and moist.   Neck: No JVD present.   Cardiovascular: Normal rate, regular rhythm and intact distal pulses.   Murmur (Systolic mumur heard) heard.  Pulmonary/Chest: Effort normal and breath sounds normal. No respiratory distress. He has no wheezes. He has no rales.   Rhonchi lower to mid lung.   Abdominal: Soft. Bowel sounds are normal. He exhibits no distension. There is no tenderness. There is no guarding.   Musculoskeletal: He exhibits no edema.   Neurological: He is alert and oriented to person, place, and time.   Skin: Skin is warm.   Nursing note and vitals reviewed.      Significant Labs:  CBC:  Recent Labs   Lab 11/06/18 0342 11/07/18  0300 11/08/18  0305   WBC 12.96* 11.65 10.93   RBC 3.10* 2.92* 2.72*   HGB 9.0* 8.4* 7.8*   HCT 27.8* 26.6* 24.9*    197 210   MCV 90 91 92   MCH 29.0 28.8 28.7   MCHC 32.4 31.6* 31.3*     BNP:  Recent Labs   Lab 11/03/18 2025 11/05/18  0836 11/07/18  0300   BNP 3,222* 2,209* 1,450*     CMP:  Recent Labs   Lab 11/03/18 2025 11/03/18  2350  11/06/18  0342 11/07/18  0300 11/08/18  0305   * 265*   < > 127* 137* 146*   CALCIUM 10.5 9.5   < > 9.0 8.9 8.8   ALBUMIN 3.5 3.1*  --   --  2.4*  --    PROT 8.4 7.3  --   --  6.1  --    * 136   < > 133* 132* 131*   K 4.6 4.3   < > 4.0 4.0 4.2   CO2 20* 25   < > 27 27 27   CL 95 100   < > 98 97 97   BUN 58* 60*   < > 44* 32* 29*   CREATININE 1.7* 1.5*   < > 1.2 1.0 0.9   ALKPHOS 77 71  --   --  67  --    ALT 28 25  --   --  13  --    AST 32 26  --   --  30  --    BILITOT 1.3* 1.1*  --   --  0.9  --     < > = values in this interval not displayed.      Coagulation:   Recent Labs   Lab 11/06/18  0342 11/07/18  0300 11/08/18  0305   INR 1.4* 1.2 1.2     LDH:  No results for input(s): LDH in the  last 72 hours.  Microbiology:  Microbiology Results (last 7 days)     Procedure Component Value Units Date/Time    Blood culture #2 **CANNOT BE ORDERED STAT** [[id number]] Collected:  11/03/18 2134    Order Status:  Completed Specimen:  Blood from Peripheral, Antecubital, Right Updated:  11/07/18 2312     Blood Culture, Routine No Growth to date     Blood Culture, Routine No Growth to date     Blood Culture, Routine No Growth to date     Blood Culture, Routine No Growth to date     Blood Culture, Routine No Growth to date    Blood culture #1 **CANNOT BE ORDERED STAT** [[id number]] Collected:  11/03/18 2059    Order Status:  Completed Specimen:  Blood from Peripheral, Antecubital, Right Updated:  11/07/18 2312     Blood Culture, Routine No Growth to date     Blood Culture, Routine No Growth to date     Blood Culture, Routine No Growth to date     Blood Culture, Routine No Growth to date     Blood Culture, Routine No Growth to date    Respiratory Viral Panel by PCR [facility]; Nasal Swab [[id number]] Collected:  11/03/18 2133    Order Status:  Completed Specimen:  Respiratory Updated:  11/06/18 1322     Respiratory Virus Panel, source Nasal Swab     RVP - Adenovirus Not Detected     Comment: Detects Serotypes B and E. Detection of Serotype C may   be limited. If Adenovirus infection is suspected and a   Not Detected result is returned the sample should be   re-tested for Adenovirus using an independent method  (e.g. [employer] Adenovirus Quantitative Real-Time  PCR test.          Enterovirus Not Detected     Comment: Cross-reactivity has been observed between certain Rhinovirus  strains and the Enterovirus assay.          Human Bocavirus Not Detected     Human Coronavirus Not Detected     Comment: The Human Coronavirus assay detects Human coronavirus types  229E, OC43,NL63 and HKU1.          RVP - Human Metapneumovirus (hMPV) Not Detected     RVP - Influenza A Not Detected     Influenza A - [id number] Not Detected      RVP - Influenza B Not Detected     Parainfluenza Not Detected     Respiratory Syncytial VirusVirus (RSV) A Not Detected     Comment: The Respiratory Syncytial Viral assay detects types A and B,  however it does not distinguish between the two.          RVP - Rhinovirus Not Detected     Comment: Cross-Reactivity has been observed between certain   Rhinovirus strains and the Enterovirus assay.  Target Enriched Mulitplex Polymerase Chain Reaction (TEM-PCR)  allows for the detection of multiple pathogens out of a single  reaction.  This test was developed and its performance   characteristics determined by [employer].  It has not   been cleared or approved by the U.[place].Food and Drug Administration.  Results should be used in conjunction with clinical findings,   and should not form the sole basis for a diagnosis or treatment  decision.  TEM-PCR is a licensed technology of [employer].         Narrative:       Receiving Lab:->[facility]          BMP:   Recent Labs   Lab 11/08/18  0305   *   *   K 4.2   CL 97   CO2 27   BUN 29*   CREATININE 0.9   CALCIUM 8.8     Cardiac Markers: No results for input(s): CKMB, TROPONINT, MYOGLOBIN in the last 72 hours.  Coagulation:   Recent Labs   Lab 11/08/18  0305   INR 1.2     I have reviewed all pertinent labs within the past 24 hours.    Estimated Creatinine Clearance: 67.6 mL/min (based on SCr of 0.9 mg/dL).    Diagnostic Results:  I have reviewed all pertinent imaging results/findings within the past 24 hours.

## 2018-11-09 LAB
ANION GAP SERPL CALC-SCNC: 6 MMOL/L
BASOPHILS # BLD AUTO: 0.04 K/UL
BASOPHILS NFR BLD: 0.3 %
BNP SERPL-MCNC: 1937 PG/ML
BUN SERPL-MCNC: 25 MG/DL
CALCIUM SERPL-MCNC: 8.5 MG/DL
CHLORIDE SERPL-SCNC: 96 MMOL/L
CO2 SERPL-SCNC: 28 MMOL/L
CORTIS SERPL-MCNC: 14.7 UG/DL
CREAT SERPL-MCNC: 0.9 MG/DL
DIFFERENTIAL METHOD: ABNORMAL
EOSINOPHIL # BLD AUTO: 0.6 K/UL
EOSINOPHIL NFR BLD: 5 %
ERYTHROCYTE [DISTWIDTH] IN BLOOD BY AUTOMATED COUNT: 14.8 %
EST. GFR  (AFRICAN AMERICAN): >60 ML/MIN/1.73 M^2
EST. GFR  (NON AFRICAN AMERICAN): >60 ML/MIN/1.73 M^2
GLUCOSE SERPL-MCNC: 138 MG/DL
HCT VFR BLD AUTO: 26.6 %
HGB BLD-MCNC: 8.4 G/DL
IMM GRANULOCYTES # BLD AUTO: 0.16 K/UL
IMM GRANULOCYTES NFR BLD AUTO: 1.2 %
INR PPP: 1.1
LYMPHOCYTES # BLD AUTO: 1.4 K/UL
LYMPHOCYTES NFR BLD: 10.7 %
MCH RBC QN AUTO: 28.5 PG
MCHC RBC AUTO-ENTMCNC: 31.6 G/DL
MCV RBC AUTO: 90 FL
MONOCYTES # BLD AUTO: 0.9 K/UL
MONOCYTES NFR BLD: 7.1 %
NEUTROPHILS # BLD AUTO: 9.8 K/UL
NEUTROPHILS NFR BLD: 75.7 %
NRBC BLD-RTO: 0 /100 WBC
PLATELET # BLD AUTO: 208 K/UL
PMV BLD AUTO: 10.9 FL
POCT GLUCOSE: 131 MG/DL (ref 70–110)
POCT GLUCOSE: 142 MG/DL (ref 70–110)
POCT GLUCOSE: 149 MG/DL (ref 70–110)
POCT GLUCOSE: 212 MG/DL (ref 70–110)
POTASSIUM SERPL-SCNC: 4.1 MMOL/L
PROTHROMBIN TIME: 11.7 SEC
RBC # BLD AUTO: 2.95 M/UL
SODIUM SERPL-SCNC: 130 MMOL/L
WBC # BLD AUTO: 12.91 K/UL

## 2018-11-09 PROCEDURE — 63600175 PHARM REV CODE 636 W HCPCS: Mod: NTX | Performed by: INTERNAL MEDICINE

## 2018-11-09 PROCEDURE — 25000003 PHARM REV CODE 250: Mod: NTX | Performed by: INTERNAL MEDICINE

## 2018-11-09 PROCEDURE — A4216 STERILE WATER/SALINE, 10 ML: HCPCS | Mod: NTX | Performed by: INTERNAL MEDICINE

## 2018-11-09 PROCEDURE — 97116 GAIT TRAINING THERAPY: CPT | Mod: NTX

## 2018-11-09 PROCEDURE — 20600001 HC STEP DOWN PRIVATE ROOM: Mod: NTX

## 2018-11-09 PROCEDURE — 85025 COMPLETE CBC W/AUTO DIFF WBC: CPT | Mod: NTX

## 2018-11-09 PROCEDURE — 83880 ASSAY OF NATRIURETIC PEPTIDE: CPT | Mod: NTX

## 2018-11-09 PROCEDURE — G8979 MOBILITY GOAL STATUS: HCPCS | Mod: CH,NTX

## 2018-11-09 PROCEDURE — G8980 MOBILITY D/C STATUS: HCPCS | Mod: CH,NTX

## 2018-11-09 PROCEDURE — 85610 PROTHROMBIN TIME: CPT | Mod: NTX

## 2018-11-09 PROCEDURE — 82533 TOTAL CORTISOL: CPT | Mod: NTX

## 2018-11-09 PROCEDURE — 99233 SBSQ HOSP IP/OBS HIGH 50: CPT | Mod: NTX,,, | Performed by: INTERNAL MEDICINE

## 2018-11-09 PROCEDURE — 36415 COLL VENOUS BLD VENIPUNCTURE: CPT | Mod: NTX

## 2018-11-09 PROCEDURE — 25000003 PHARM REV CODE 250: Mod: NTX | Performed by: STUDENT IN AN ORGANIZED HEALTH CARE EDUCATION/TRAINING PROGRAM

## 2018-11-09 PROCEDURE — S5571 INSULIN DISPOS PEN 3 ML: HCPCS | Mod: NTX | Performed by: INTERNAL MEDICINE

## 2018-11-09 PROCEDURE — 97530 THERAPEUTIC ACTIVITIES: CPT | Mod: NTX

## 2018-11-09 PROCEDURE — 80048 BASIC METABOLIC PNL TOTAL CA: CPT | Mod: NTX

## 2018-11-09 RX ORDER — ENALAPRIL MALEATE 2.5 MG/1
2.5 TABLET ORAL 2 TIMES DAILY
Status: DISCONTINUED | OUTPATIENT
Start: 2018-11-09 | End: 2018-11-14 | Stop reason: HOSPADM

## 2018-11-09 RX ORDER — DOFETILIDE 0.25 MG/1
250 CAPSULE ORAL EVERY 12 HOURS
Status: DISCONTINUED | OUTPATIENT
Start: 2018-11-09 | End: 2018-11-10

## 2018-11-09 RX ORDER — AMIODARONE HYDROCHLORIDE 200 MG/1
200 TABLET ORAL 2 TIMES DAILY
Status: DISCONTINUED | OUTPATIENT
Start: 2018-11-09 | End: 2018-11-09

## 2018-11-09 RX ADMIN — INSULIN ASPART 3 UNITS: 100 INJECTION, SOLUTION INTRAVENOUS; SUBCUTANEOUS at 07:11

## 2018-11-09 RX ADMIN — DIGOXIN 0.12 MG: 125 TABLET ORAL at 08:11

## 2018-11-09 RX ADMIN — INSULIN ASPART 1 UNITS: 100 INJECTION, SOLUTION INTRAVENOUS; SUBCUTANEOUS at 10:11

## 2018-11-09 RX ADMIN — CETIRIZINE HYDROCHLORIDE 5 MG: 5 TABLET ORAL at 08:11

## 2018-11-09 RX ADMIN — ATORVASTATIN CALCIUM 80 MG: 20 TABLET, FILM COATED ORAL at 10:11

## 2018-11-09 RX ADMIN — WARFARIN SODIUM 7.5 MG: 7.5 TABLET ORAL at 04:11

## 2018-11-09 RX ADMIN — PANTOPRAZOLE SODIUM 40 MG: 40 TABLET, DELAYED RELEASE ORAL at 08:11

## 2018-11-09 RX ADMIN — INSULIN DETEMIR 9 UNITS: 100 INJECTION, SOLUTION SUBCUTANEOUS at 10:11

## 2018-11-09 RX ADMIN — DOFETILIDE 250 MCG: 0.25 CAPSULE ORAL at 08:11

## 2018-11-09 RX ADMIN — ENALAPRIL MALEATE 2.5 MG: 2.5 TABLET ORAL at 11:11

## 2018-11-09 RX ADMIN — DOBUTAMINE HYDROCHLORIDE 5 MCG/KG/MIN: 200 INJECTION INTRAVENOUS at 06:11

## 2018-11-09 RX ADMIN — Medication 10 ML: at 12:11

## 2018-11-09 RX ADMIN — ASPIRIN 81 MG CHEWABLE TABLET 81 MG: 81 TABLET CHEWABLE at 08:11

## 2018-11-09 RX ADMIN — PRASUGREL 10 MG: 10 TABLET, FILM COATED ORAL at 08:11

## 2018-11-09 RX ADMIN — ACETAMINOPHEN 650 MG: 325 TABLET, FILM COATED ORAL at 11:11

## 2018-11-09 RX ADMIN — SALINE NASAL SPRAY 1 SPRAY: 1.5 SOLUTION NASAL at 09:11

## 2018-11-09 RX ADMIN — INSULIN ASPART 3 UNITS: 100 INJECTION, SOLUTION INTRAVENOUS; SUBCUTANEOUS at 04:11

## 2018-11-09 RX ADMIN — ENALAPRIL MALEATE 1.25 MG: 1 SOLUTION ORAL at 09:11

## 2018-11-09 RX ADMIN — INSULIN ASPART 3 UNITS: 100 INJECTION, SOLUTION INTRAVENOUS; SUBCUTANEOUS at 11:11

## 2018-11-09 RX ADMIN — DOFETILIDE 250 MCG: 0.25 CAPSULE ORAL at 10:11

## 2018-11-09 NOTE — PLAN OF CARE
Problem: Physical Therapy Goal  Goal: Physical Therapy Goal  Goals to be met by: 18    Patient will increase functional independence with mobility by performin. Supine to sit with Modified [place] -not met  2. Sit to stand transfer with Modified [place] -not met  3. Gait  x 250 feet with Supervision -not met  4. Ascend/descend 5 stair with right Handrails Supervision -not met     Outcome: Outcome(s) achieved Date Met: 18  Pt has met all goals, does not require assistance with mobility. Pt does not require further acute skilled therapy intervention. Discharge from PT services and re-consult if pt experiences a change in status.

## 2018-11-09 NOTE — NURSING
Bharat Coker is a 73 y.o.   male patient, who presents for a 6 minute walk test ordered by Adelaida ROMERO  . The patient's BMI is 22.6kg/m2. Predicted distance (lower limit of normal) is 367.47 meters.    Test Results:    The test was done by Keli CAMARENA  .  The patient stopped 3 times for a total of 10-20 seconds.  The total time walked was 120 seconds.  During walking, the patient reported: SOB and leg fatigue  . The patient used no assistive devices used  during testing.     11/09/2018---------Distance:   ( 270ft)     O2 Sat % Supplemental Oxygen Heart Rate Blood Pressure Alex Scale   Pre-exercise  (Resting)  98%  2L  80       During Exercise  86-91%  0  82       Post-exercise    87%   0 84          Recovery Time:  2 minutes to recover, O2 increased to 91% on room air at rest.

## 2018-11-09 NOTE — PROGRESS NOTES
[facility]  Heart Transplant  Progress Note    Patient Name: Bharat Coker  MRN: [id number]  Admission Date: 11/3/2018  Hospital Length of Stay: 6 days  Attending Physician: Jony Hendrickson Jr.,*  Primary Care Provider: Ronnie Richardson Jr, MD  Principal Problem:Supraventricular tachycardia    Subjective:     Interval History: Patient seen and examined today at bedside. He de    Continuous Infusions:   DOBUTamine 5 mcg/kg/min (11/09/18 0601)     Scheduled Meds:   aspirin  81 mg Oral Daily    atorvastatin  80 mg Oral Nightly    azelastine  1 spray Nasal BID    cetirizine  5 mg Oral Daily    digoxin  0.125 mg Oral Daily    dofetilide  250 mcg Oral Q12H    enalapril  2.5 mg Oral BID    fluticasone  2 spray Each Nare Daily    insulin aspart U-100  3 Units Subcutaneous TIDWM    insulin detemir U-100  9 Units Subcutaneous QHS    pantoprazole  40 mg Oral Daily    polyethylene glycol  17 g Oral Daily    prasugrel  10 mg Oral Daily    senna-docusate 8.6-50 mg  1 tablet Oral BID    sodium chloride 0.9%  10 mL Intravenous Q6H    warfarin  7.5 mg Oral Daily     PRN Meds:acetaminophen, dextrose 50%, dextrose 50%, docusate sodium, glucagon (human recombinant), glucose, glucose, insulin aspart U-100, ondansetron, ramelteon, sodium chloride, Flushing PICC Protocol **AND** sodium chloride 0.9% **AND** sodium chloride 0.9%    Review of patient's allergies indicates:   Allergen Reactions    Amiodarone analogues Anaphylaxis    Ativan [lorazepam] Anxiety     Objective:     Vital Signs (Most Recent):  Temp: 97.6 °F (36.4 °C) (11/09/18 1134)  Pulse: 92 (11/09/18 1134)  Resp: 18 (11/09/18 1134)  BP: (!) 102/58 (11/09/18 1134)  SpO2: 99 % (11/09/18 1134) Vital Signs (24h Range):  Temp:  [97.6 °F (36.4 °C)-98.4 °F (36.9 °C)] 97.6 °F (36.4 °C)  Pulse:  [] 92  Resp:  [18-35] 18  SpO2:  [83 %-99 %] 99 %  BP: ()/(41-58) 102/58     Patient Vitals for the past 72 hrs (Last 3 readings):    Weight   11/09/18 0700 76.7 kg (169 lb 1.5 oz)     Body mass index is 27.29 kg/m².      Intake/Output Summary (Last 24 hours) at 11/9/2018 1403  Last data filed at 11/9/2018 0500  Gross per 24 hour   Intake 429.5 ml   Output 700 ml   Net -270.5 ml       Hemodynamic Parameters:       Telemetry: A sensed V paced, 14 beat runs of NSVT.    Physical Exam   Constitutional: He is oriented to person, place, and time. He appears well-developed and well-nourished.   HENT:   Mouth/Throat: Oropharynx is clear and moist.   Neck: No JVD present.   Cardiovascular: Normal rate, regular rhythm and intact distal pulses.   Murmur (Systolic murmur.) heard.  Pulmonary/Chest: Effort normal and breath sounds normal. No respiratory distress. He has no wheezes. He has no rales.   Rhonchi stable mid to lower lung   Abdominal: Soft. Bowel sounds are normal. He exhibits no distension. There is no tenderness. There is no guarding.   Musculoskeletal: He exhibits no edema.   Neurological: He is alert and oriented to person, place, and time.   Skin: Skin is warm.   Nursing note and vitals reviewed.      Significant Labs:  CBC:  Recent Labs   Lab 11/07/18  0300 11/08/18 0305 11/09/18  0452   WBC 11.65 10.93 12.91*   RBC 2.92* 2.72* 2.95*   HGB 8.4* 7.8* 8.4*   HCT 26.6* 24.9* 26.6*    210 208   MCV 91 92 90   MCH 28.8 28.7 28.5   MCHC 31.6* 31.3* 31.6*     BNP:  Recent Labs   Lab 11/05/18  0836 11/07/18  0300 11/09/18  0452   BNP 2,209* 1,450* 1,937*     CMP:  Recent Labs   Lab 11/03/18  2025 11/03/18  2350  11/07/18  0300 11/08/18  0305 11/09/18  0452   * 265*   < > 137* 146* 138*   CALCIUM 10.5 9.5   < > 8.9 8.8 8.5*   ALBUMIN 3.5 3.1*  --  2.4*  --   --    PROT 8.4 7.3  --  6.1  --   --    * 136   < > 132* 131* 130*   K 4.6 4.3   < > 4.0 4.2 4.1   CO2 20* 25   < > 27 27 28   CL 95 100   < > 97 97 96   BUN 58* 60*   < > 32* 29* 25*   CREATININE 1.7* 1.5*   < > 1.0 0.9 0.9   ALKPHOS 77 71  --  67  --   --    ALT 28 25  --  13   --   --    AST 32 26  --  30  --   --    BILITOT 1.3* 1.1*  --  0.9  --   --     < > = values in this interval not displayed.      Coagulation:   Recent Labs   Lab 11/07/18  0300 11/08/18  0305 11/09/18  0452   INR 1.2 1.2 1.1     LDH:  No results for input(s): LDH in the last 72 hours.  Microbiology:  Microbiology Results (last 7 days)     Procedure Component Value Units Date/Time    Blood culture #2 **CANNOT BE ORDERED STAT** [[id number]] Collected:  11/03/18 2134    Order Status:  Completed Specimen:  Blood from Peripheral, Antecubital, Right Updated:  11/08/18 2312     Blood Culture, Routine No growth after 5 days.    Blood culture #1 **CANNOT BE ORDERED STAT** [[id number]] Collected:  11/03/18 2059    Order Status:  Completed Specimen:  Blood from Peripheral, Antecubital, Right Updated:  11/08/18 2312     Blood Culture, Routine No growth after 5 days.    Respiratory Viral Panel by PCR [place]; Nasal Swab [[id number]] Collected:  11/03/18 2133    Order Status:  Completed Specimen:  Respiratory Updated:  11/06/18 1322     Respiratory Virus Panel, source Nasal Swab     RVP - Adenovirus Not Detected     Comment: Detects Serotypes B and E. Detection of Serotype C may   be limited. If Adenovirus infection is suspected and a   Not Detected result is returned the sample should be   re-tested for Adenovirus using an independent method  (e.g. deltaDNA Adenovirus Quantitative Real-Time  PCR test.          Enterovirus Not Detected     Comment: Cross-reactivity has been observed between certain Rhinovirus  strains and the Enterovirus assay.          Human Bocavirus Not Detected     Human Coronavirus Not Detected     Comment: The Human Coronavirus assay detects Human coronavirus types  229E, OC43,NL63 and HKU1.          RVP - Human Metapneumovirus (hMPV) Not Detected     RVP - Influenza A Not Detected     Influenza A - E4Q1-14 Not Detected     RVP - Influenza B Not Detected     Parainfluenza Not Detected     Respiratory  Syncytial VirusVirus (RSV) A Not Detected     Comment: The Respiratory Syncytial Viral assay detects types A and B,  however it does not distinguish between the two.          RVP - Rhinovirus Not Detected     Comment: Cross-Reactivity has been observed between certain   Rhinovirus strains and the Enterovirus assay.  Target Enriched Mulitplex Polymerase Chain Reaction (TEM-PCR)  allows for the detection of multiple pathogens out of a single  reaction.  This test was developed and its performance   characteristics determined by [employer].  It has not   been cleared or approved by the U.S.Food and Drug Administration.  Results should be used in conjunction with clinical findings,   and should not form the sole basis for a diagnosis or treatment  decision.  TEM-PCR is a licensed technology of [employer]         Narrative:       Receiving Lab:->[facility]          BMP:   Recent Labs   Lab 11/09/18  0452   *   *   K 4.1   CL 96   CO2 28   BUN 25*   CREATININE 0.9   CALCIUM 8.5*     Cardiac Markers: No results for input(s): CKMB, TROPONINT, MYOGLOBIN in the last 72 hours.  Coagulation:   Recent Labs   Lab 11/09/18 0452   INR 1.1     I have reviewed all pertinent labs within the past 24 hours.    Estimated Creatinine Clearance: 73.5 mL/min (based on SCr of 0.9 mg/dL).    Diagnostic Results:  I have reviewed all pertinent imaging results/findings within the past 24 hours.    Assessment and Plan:     Mr. Coker is a 74 y/o male admitted to [facility] for ADHF in the setting of recent Lasix adjustment and recent initiation of BB/CCB. PMHx of CAD s/p CABG, ICM/HFrEF (LVef10%) s/p Medtronic CRT-D, CAD([facility] 6/2018: LIMA-LAD patent, % occluded at ostium, SVG-% occluded, SVG-D 100% occluded, pLCx 30%, 100% occluded OM, 100% LAD occlusion after takeoff of D1, D1 is tiny with 80% disease, SVG-OM 80% proximal stenosis with 50% at site of anastomosis.An SVG-OM OKSANA was placed at that time). Who  presents with c/o acute sob which occurred around 7PM this evening. Patient was at usual health, states developed acute SOB, had been having worsening SUMNER. EMS called, he was found to be in SVT, given Adenosis and Cardizem and brought to ED. He was noted to be in SVT -273t hypotensive SBP 70s, given metoprolol IV, and 250cc NS bolus. Cardiology consulted. Medtronic device interrogated, A-sensed Bi-V paced.     At length discussion with spouse / patient, she has been logging his BP / HR / Weights on daily basis, patient has been hypotensive not given his Lisinopril / Metoprolol. Seen in clinic with Aman Adames, recently decreased lasix 80/80, 40/80.  Recently discharged 10/10-10/23 for ADHF, patient is a poor VAD candidate dt frailty and calcifications on CT, also found to be in AT s/p LAVERNE/DCCV started on Tikosyn initiate on 10/19/18          * Supraventricular tachycardia    - Appreciate EP consult. Had ICD interrogation on admission with multiple self terminating SVT episodes  - Ablation on 11/6  - EP follow up appreciated, settings change.  - C/W digoxin 0.125 mg. ( Avoid hypokalemia)  - Allergic to amiodarone.   - C/W coumadin, appreciate pharmacy assistance.  - Will replace electrolytes.      Anemia    - Likely AOCI - Iron 21, TIBC- 219, Sat -10, Transferrin - 148, Ferritin - 726  - H/H dropped from 12.5 on 10/18 to 9 ->8.4 -> 7.8 -> 8.4on this admission.  - Will do FOBT,   - Patient on triple therapy as recent OKSANA to SVG to OM on 6/2018 and AFl (S/P LAVERNE/DCCV)     Hypotension    - Overnight few more episodes of SBP 70-80, improved with no active intervention. AM SBP 98, tolerating enalapril so far, 2.25 on 11/9 <- 1.25 mg bid started on 11/7.  - Will continue with digoxin.   - No lasix     - [On 11/5 -Patient got hypotensive after receiving Valsartan 40 mg on 11/5. See plan of care . At home, patient had episodes of hypotension with SBP to 70-80 with metoprolol. Had similar events with medications in  the past. Sensitive to medications. Received 250 ml - NS - 2 bolus , placed in Trendelenberg position. Manual SBP 88-92, higher than cuff pressure. Monitor systolic blood pressure.Received 500 ml NS over 6 hours slowly as patient with EF 10% Likely from medication side effect.]       Cardiogenic shock    Mr. Coker is a 74 y/o male [facility] for [facility]HF in the setting of recent Lasix adjustment and recent initiation of BB/CCB. PMHx of CAD s/p CABG, ICM/HFrEF (LVef10%) s/p Medtronic CRT-D.   - Resolved  - Likely d/t  SVT   - Elevated BNP ~3000 -> improved to 1450, not on lasix since , CXR with diffuse pulmonary edema on admission.At home lasix changed 80/80 -> 40/80 at home  - Held lasix on . CVP 6. - Started on  5mcg this admission, PICC placed.    - TTE <10% EF, Severe RV dysfunction, moderate MR,  Moderate to severe AS, Mild TR, E/e' 24  - Low dose enalapril started on - 1.25 mg BID -> 2.5mg on  ( SBP > 107 with first dose). Had episodes of hypotension with Valsartan on .  - S/W digoxin this admission    - Not a candidate for advanced options, if decompensates / does not improve may not be a candidate for MCS.  - Palliative consult to be ordered  - To discuss with Dr. Khan regarding US of the vessels  - S/p RIJ MML placed 18       - Closely monitor UOP, BMP, mag,   - Fluid restriction at 1500 cc with strict I/Os and daily STANDING weights  - Maintain on telemetry and daily EKGs   - Check Electrolytes, keep Mag >2 & K+ >4  - SCDs, TEDs, Nursing communication to elevated LE          Atrial tachycardia    H/O Aflutter - S/p LAVERNE / DCCV on Tikosyn.    Medtronic ICD interrogated, multiple SVT events noted.   - Continue Tikosyn BID,   - EP on board  - Hold Betablocker with ADHR and hypotension      Hepatic congestion    Bilirubin elevated 1.3 compared to baseline on admission  -C/W .     KASEY (acute kidney injury)    Cardiorenal in nature, BL Cr 1.1, on admission 1.7. Improved to  0.9  -Monitor BMP   -Closely watch UOP  -If continues to rise consider renal u/s and urine lytes  -Avoid nephrotoxic medications      Type 2 diabetes mellitus with circulatory disorder, without long-term current use of insulin    Hold PTA Glipizide   - ISS with accuchecks      CAD (coronary artery disease)    CAD([facility] 6/2018: LIMA-LAD patent, % occluded at ostium, SVG-% occluded, SVG-D 100% occluded, pLCx 30%, 100% occluded OM, 100% LAD occlusion after takeoff of D1, D1 is tiny with 80% disease, SVG-OM 80% proximal stenosis with 50% at site of anastomosis.An SVG-OM OKSANA was placed at that time).   - No records here, none seen in care everywhere.  -  Given OKSANA in 6/2018 - Continue DAPT for now - ASA / Prasugrel. Patient on coumadin as well for AFl.                Case d/w Dr. Emeka Son MD  Heart Transplant  [facility]

## 2018-11-09 NOTE — PLAN OF CARE
Reviewed insulin regimen and CBG.     Glucose at goal on current regimen except for one reading last night. No changes today.      Will continue to follow along; please call with any questions.

## 2018-11-09 NOTE — ASSESSMENT & PLAN NOTE
- Likely AOCI - Iron 21, TIBC- 219, Sat -10, Transferrin - 148, Ferritin - 726  - H/H dropped from 12.5 on 10/18 to 9 ->8.4 -> 7.8 -> 8.4on this admission.  - Will do FOBT,   - Patient on triple therapy as recent OKSANA to SVG to OM on 6/2018 and AFl (S/P LAVERNE/DCCV)

## 2018-11-09 NOTE — PLAN OF CARE
Problem: Patient Care Overview  Goal: Plan of Care Review  Patient remains free of falls, injuries and traumas. VSS. Systolic <80 maintained at this time. Patient weaned to 2L NC. Patient educated on the importance of using O2 to maintain adequate oxygen saturation.  6 minute walk test completed.  gtt maintained as ordered.  POC reviewed with patient. Understanding verbalized. Will continue to monitor.

## 2018-11-09 NOTE — ASSESSMENT & PLAN NOTE
Mr. Coker is a 72 y/o male admitted to [facility] for ADHF in the setting of recent Lasix adjustment and recent initiation of BB/CCB. PMHx of CAD s/p CABG, ICM/HFrEF (LVef10%) s/p Medtronic CRT-D.   - Resolved  - Likely d/t  SVT   - Elevated BNP ~3000 -> improved to 1450, not on lasix since 11/4, CXR with diffuse pulmonary edema on admission.At home lasix changed 80/80 -> 40/80 at home  - Held lasix on 11/4. CVP 6. - Started on  5mcg this admission, PICC placed.    - TTE <10% EF, Severe RV dysfunction, moderate MR,  Moderate to severe AS, Mild TR, E/e' 24  - Low dose enalapril started on 11/7- 1.25 mg BID -> 2.5mg on 11/9 ( SBP > 107 with first dose). Had episodes of hypotension with Valsartan on 11/5.  - S/W digoxin this admission    - Not a candidate for advanced options, if decompensates / does not improve may not be a candidate for MCS.  - Palliative consult to be ordered  - To discuss with Dr. Khan regarding US of the vessels  - S/p RIJ MML placed 11/4/18       - Closely monitor UOP, BMP, mag,   - Fluid restriction at 1500 cc with strict I/Os and daily STANDING weights  - Maintain on telemetry and daily EKGs   - Check Electrolytes, keep Mag >2 & K+ >4  - SCDs, TEDs, Nursing communication to elevated LE

## 2018-11-09 NOTE — PROGRESS NOTES
D/C planning:    MELISSA [facility] on [facility]. Karlie reports she is no longer assigned to pt, and MELISSA will need to follow up with Cayden London ([phone]). Karlie reports new orders will need to be faxed to [facility] as well. MELISSA faxed HH orders to [facility] and confirmed receipt. Cayden reports she will begin processing the orders and call MELISSA back on Tuesday ([facility] closed over [facility] weekend and this Monday.) Cayden reports pt's HH has been approved through December, so pt is only waiting on  approval. No other needs identified at this time. [facility]waiting [facility] approval. MELISSA continuing to follow and remains available.

## 2018-11-09 NOTE — PT/OT/SLP PROGRESS
Occupational Therapy   Treatment and Discharge Summary    Name: Bharat Coker  MRN: [id number]  Admitting Diagnosis:  Supraventricular tachycardia  3 Days Post-Op    Recommendations:     Discharge Recommendations: home with home health  Discharge Equipment Recommendations:  none  Barriers to discharge:  None    Subjective     Communicated with: RN prior to session. Pt found seated UIC with spouse and RN present. Pt agreeable to therapy session after 2nd attempt due to pt refusing in AM after recently walking hallway.     Pain/Comfort:  · Pain Rating 1: 0/10  · Pain Rating Post-Intervention 1: 0/10    Patients cultural, spiritual, [employer]alism conflicts given the current situation: none stated     Objective:     Patient found with: telemetry, oxygen    General Precautions: Standard, fall   Orthopedic Precautions:N/A   Braces: N/A     Occupational Performance:    Bed Mobility:    · Not performed as pt found seated UIC. Pt reported no concerns with bed mobility.     Functional Mobility/Transfers:  · Patient completed x3 reps Sit <> Stand Transfer from (bedside chair, bench, and office chair) with supervision  with  no assistive device   Functional Mobility: Pt engaged in functional mobility simulating household/community mobility with SBA using no AD while on room air.   · Pt completed ~255ft requiring x 2 standing rest breaks and then extended seated rest break.   · Pt reported fatigue during functional mobility and utilized handrail along wall in hallway for stability and during standing rest breaks.  · Pt's oxygen sats 87% during functional mobility without supplemental oxygen   · During seated rest break, pt's oxygen sats increased to 91-93%.    Activities of Daily Living:  · Upper Body Dressing: supervision to don gown like robe while seated UIC   · Lower Body Dressing: independence to pull up socks while seated UIC    Patient left up in chair with all lines intact, call button in reach, RN notified and wife   present    [facility] 6 Click:  [facility] Total Score: 23    Treatment & Education:  Pt educated by therapist on:   - Pt educated on role of OT, POC.   - Educated pt on being appropriate to transfer with nsg and PCT with supervision   - Time provided for therapeutic counseling and discussion of health disposition.   - Importance of OOB ax's with staff member assistance and sitting OOB majority of day.   - pt educated on benefits of OP PT in order to further progress overall endurance and strength.   - Pt completed ADLs and functional mobility for treatment session as noted above   - Pt verbalized understanding. Pt expressed no further concerns/questions.  - whiteboard updated   Education:    Assessment:     Bharat Coker is a 73 y.o. male with a medical diagnosis of Supraventricular tachycardia.  He presents with performance deficits affecting function are impaired cardiopulmonary response to activity, impaired endurance.  Pt tolerated session well despite feeling fatigued during functional mobility within hallway. Pt has met all OT goals and is to be d/c from acute OT 11/9/18. Anticipate d/c HH once pt is medically appropriate in order to perform home safety assessment and improve overall endurance and activity tolerance post acute care.     Rehab Prognosis:  good; patient would benefit from acute skilled OT services to address these deficits and reach maximum level of function.       Plan:     Patient to be seen 3 x/week to address the above listed problems via self-care/home management, therapeutic activities, therapeutic exercises  · Plan of Care Expires: 12/08/18  · Plan of Care Reviewed with: patient, spouse    This Plan of care has been discussed with the patient who was involved in its development and understands and is in agreement with the identified goals and treatment plan    GOALS:   Multidisciplinary Problems     Occupational Therapy Goals     Not on file          Multidisciplinary Problems (Resolved)         Problem: Occupational Therapy Goal    Goal Priority Disciplines Outcome Interventions   Occupational Therapy Goal   (Resolved)     OT, PT/OT Outcome(s) achieved    Description:  Goals to be met by: 7 days 11/12/18     Patient will increase functional independence with ADLs by performing:    UE Dressing with Supervision.  LE Dressing with Minimal Assistance.  Grooming while standing with Supervision.  Toileting from toilet with Supervision for hygiene and clothing management.   Toilet transfer to toilet with Supervision.                      Time Tracking:     OT Date of Treatment: 11/09/18  OT Start Time: 1312  OT Stop Time: 1330  OT Total Time (min): 18 min    Billable Minutes:Therapeutic Activity 18    Sabrina Harris OT  11/9/2018

## 2018-11-09 NOTE — PROCEDURES
ICD Programming Note:    Medtronic ICD interrogated:   First Insight MRI Quad CRT-D DTMA 1Q1  Serial [id number]    Mode: DDD  HR lower 50 / upper track 150, upper sense 130  Nonadaptive CRT V. Pacing LV -> RV    Pacing since 11/6/18:   86.9% Total  (effective 85.9%  AS-VS 7.0%  AS- 91.1%  AP-VS <0.1%  AP- 1.9%    Changes: Upper track reset to 100 bpm    Discussed with Dr. Carolee Alvarez MD  Cardiology Fellow PGY-5  Pager: [phone]

## 2018-11-09 NOTE — PLAN OF CARE
Problem: Patient Care Overview  Goal: Plan of Care Review  Outcome: Revised  Pt remains free from injury/falls for shift. Monitored Pts BG. SSI and scheduled insulin given. Educated Pt on meds no questions at this time. VSS.  No complaints of CP, SOB, pain/discomfort  Bed locked in lowest position, call bell within reach. All questions addressed.  Will continue to monitor.

## 2018-11-09 NOTE — ASSESSMENT & PLAN NOTE
- Overnight few more episodes of SBP 70-80, improved with no active intervention. AM SBP 98, tolerating enalapril so far, 2.25 on 11/9 <- 1.25 mg bid started on 11/7.  - Will continue with digoxin.   - No lasix     - [On 11/5 -Patient got hypotensive after receiving Valsartan 40 mg on 11/5. See plan of care . At home, patient had episodes of hypotension with SBP to 70-80 with metoprolol. Had similar events with medications in the past. Sensitive to medications. Received 250 ml - NS - 2 bolus , placed in Trendelenberg position. Manual SBP 88-92, higher than cuff pressure. Monitor systolic blood pressure.Received 500 ml NS over 6 hours slowly as patient with EF 10% Likely from medication side effect.]

## 2018-11-09 NOTE — PROGRESS NOTES
D/C planning:    [facility] ([phone]) and requested to speak with pt's SW. MELISSA was told Karlie Roth is the SW assigned to pt. Karlie reports pt is current with [facility]. MELISSA explained MD's are planning to D/C pt on IV . MELISSA faxed HH orders to Karlie ([phone]). SW providing ongoing psychosocial and counseling support, education, assistance, resources, and discharge planning as indicated. MELISSA continuing to follow and remains available.

## 2018-11-09 NOTE — ASSESSMENT & PLAN NOTE
- Appreciate EP consult. Had ICD interrogation on admission with multiple self terminating SVT episodes  - Ablation on 11/6  - EP follow up appreciated, settings change.  - C/W digoxin 0.125 mg. ( Avoid hypokalemia)  - Allergic to amiodarone.   - C/W coumadin, appreciate pharmacy assistance.  - Will replace electrolytes.

## 2018-11-09 NOTE — NURSING
Patient transferred from ICU today. Plan of care discussed with patient, no questions at this time. Fall pre-cautions in place.Will continue to monitor.

## 2018-11-09 NOTE — ASSESSMENT & PLAN NOTE
CAD([facility] 6/2018: LIMA-LAD patent, % occluded at ostium, SVG-% occluded, SVG-D 100% occluded, pLCx 30%, 100% occluded OM, 100% LAD occlusion after takeoff of D1, D1 is tiny with 80% disease, SVG-OM 80% proximal stenosis with 50% at site of anastomosis.An SVG-OM OKSANA was placed at that time).   - No records here, none seen in care everywhere.  -  Given OKSANA in 6/2018 - Continue DAPT for now - ASA / Prasugrel. Patient on coumadin as well for AFl.

## 2018-11-09 NOTE — NURSING
6 beats of NSVT noted on tele. Patient asymptomatic. Adelaida ODELL notified. No further orders given. Will continue to monitor.

## 2018-11-09 NOTE — PT/OT/SLP PROGRESS
"Physical Therapy Treatment and Discharge    Patient Name:  Bharat Coker   MRN:  [id number]    Recommendations:     Discharge Recommendations:  home   Discharge Equipment Recommendations: none   Barriers to discharge: None    Assessment:     Bharat Coker is a 73 y.o. male admitted with a medical diagnosis of Supraventricular tachycardia.  He presents with the following impairments/functional limitations:    none. Pt does not require assistance with mobility or ADLs. Pt does not require further acute skilled therapy intervention. Discharge from PT services and re-consult if pt experiences a change in status.       Rehab Prognosis:  good;    Recent Surgery: Procedure(s) (LRB):  ABLATION, AVN (N/A) 3 Days Post-Op    Plan:   · Plan of Care Expires:  Discharge from acute PT 11/9/2018   Plan of Care Reviewed with: patient    Subjective     Communicated with RN prior to session.  Patient found sitting in chair upon PT entry to room, agreeable to treatment.      Chief Complaint: Pt frustrated that he has to wear oxygen. "I have problems with my heart, not my respiratory system". Pt c/o weakness in B LE with prolonged ambulation.   Patient comments/goals: to get better and return home   Pain/Comfort:  · Pain Rating 1: 0/10  · Pain Rating Post-Intervention 1: 0/10    Patients cultural, spiritual, Mu-ism conflicts given the current situation: none reported     Objective:     Patient found with: telemetry, oxygen     General Precautions: Standard, fall   Orthopedic Precautions:N/A   Braces: N/A     Functional Mobility:  · Bed Mobility:  NT 2/2 pt [facility] upon arrival   · Transfers:     · Sit to Stand:  independence with no AD  · Gait: Pt ambulated 400 feet with no AD and distant supervision. Pt demo'd inconsistent colin and step size, no LOB, no SOB. Pt required 2 standing rest breaks 2/2 reports of fatigue in B LE   · Stairs:  Pt ascended/descended 4 stair(s) with No Assistive Device with right handrail with Modified " Independent.       AM-PAC 6 CLICK MOBILITY  Turning over in bed (including adjusting bedclothes, sheets and blankets)?: 4  Sitting down on and standing up from a chair with arms (e.g., wheelchair, bedside commode, etc.): 4  Moving from lying on back to sitting on the side of the bed?: 4  Moving to and from a bed to a chair (including a wheelchair)?: 4  Need to walk in hospital room?: 4  Climbing 3-5 steps with a railing?: 4  Basic Mobility Total Score: 24       Therapeutic Activities and Exercises:   Pt educated on role of PT/POC, safety with mobility. Pt encouraged to ambulate with wife 2-3x/day. Pt verbalized understanding.       Patient left up in chair with all lines intact, call button in reach and RN  notified..    GOALS:   Multidisciplinary Problems     Physical Therapy Goals     Not on file          Multidisciplinary Problems (Resolved)        Problem: Physical Therapy Goal    Goal Priority Disciplines Outcome Goal Variances Interventions   Physical Therapy Goal   (Resolved)     PT, PT/OT Outcome(s) achieved     Description:  Goals to be met by: 18    Patient will increase functional independence with mobility by performin. Supine to sit with Modified [place] -not met  2. Sit to stand transfer with Modified [place] -not met  3. Gait  x 250 feet with Supervision -not met  4. Ascend/descend 5 stair with right Handrails Supervision -not met                      Time Tracking:     PT Received On: 18  PT Start Time: 919     PT Stop Time: 932  PT Total Time (min): 13 min     Billable Minutes: Gait Training 13 mins     Treatment Type: Treatment  PT/PTA: PT           Liliane Mulligan, PT  2018

## 2018-11-09 NOTE — PROGRESS NOTES
Contacted Racquel ODELL about Pts BP dropping down to 84 systolic when goal >80. Racquel ODELL advised that it was ok, but for 0900 RN to call Team before to issue of enalapril. Will give the message to 0700 RN and have them to continue to monitor.

## 2018-11-09 NOTE — PLAN OF CARE
Problem: Occupational Therapy Goal  Goal: Occupational Therapy Goal  Goals to be met by: 7 days 11/12/18     Patient will increase functional independence with ADLs by performing:    UE Dressing with Supervision.  LE Dressing with Minimal Assistance.  Grooming while standing with Supervision.  Toileting from toilet with Supervision for hygiene and clothing management.   Toilet transfer to toilet with Supervision.     Outcome: Outcome(s) achieved Date Met: 11/09/18  Pt has met all OT goals at high levels of (I)ce and is to be d/c from OT 11/9/18    Sabrina Harris, OTR/L  [phone]  11/9/2018

## 2018-11-09 NOTE — SUBJECTIVE & OBJECTIVE
Interval History: Patient seen and examined today at bedside. He de    Continuous Infusions:   DOBUTamine 5 mcg/kg/min (11/09/18 0601)     Scheduled Meds:   aspirin  81 mg Oral Daily    atorvastatin  80 mg Oral Nightly    azelastine  1 spray Nasal BID    cetirizine  5 mg Oral Daily    digoxin  0.125 mg Oral Daily    dofetilide  250 mcg Oral Q12H    enalapril  2.5 mg Oral BID    fluticasone  2 spray Each Nare Daily    insulin aspart U-100  3 Units Subcutaneous TIDWM    insulin detemir U-100  9 Units Subcutaneous QHS    pantoprazole  40 mg Oral Daily    polyethylene glycol  17 g Oral Daily    prasugrel  10 mg Oral Daily    senna-docusate 8.6-50 mg  1 tablet Oral BID    sodium chloride 0.9%  10 mL Intravenous Q6H    warfarin  7.5 mg Oral Daily     PRN Meds:acetaminophen, dextrose 50%, dextrose 50%, docusate sodium, glucagon (human recombinant), glucose, glucose, insulin aspart U-100, ondansetron, ramelteon, sodium chloride, Flushing PICC Protocol **AND** sodium chloride 0.9% **AND** sodium chloride 0.9%    Review of patient's allergies indicates:   Allergen Reactions    Amiodarone analogues Anaphylaxis    Ativan [lorazepam] Anxiety     Objective:     Vital Signs (Most Recent):  Temp: 97.6 °F (36.4 °C) (11/09/18 1134)  Pulse: 92 (11/09/18 1134)  Resp: 18 (11/09/18 1134)  BP: (!) 102/58 (11/09/18 1134)  SpO2: 99 % (11/09/18 1134) Vital Signs (24h Range):  Temp:  [97.6 °F (36.4 °C)-98.4 °F (36.9 °C)] 97.6 °F (36.4 °C)  Pulse:  [] 92  Resp:  [18-35] 18  SpO2:  [83 %-99 %] 99 %  BP: ()/(41-58) 102/58     Patient Vitals for the past 72 hrs (Last 3 readings):   Weight   11/09/18 0700 76.7 kg (169 lb 1.5 oz)     Body mass index is 27.29 kg/m².      Intake/Output Summary (Last 24 hours) at 11/9/2018 1403  Last data filed at 11/9/2018 0500  Gross per 24 hour   Intake 429.5 ml   Output 700 ml   Net -270.5 ml       Hemodynamic Parameters:       Telemetry: A sensed V paced, 14 beat runs of  NSVT.    Physical Exam   Constitutional: He is oriented to person, place, and time. He appears well-developed and well-nourished.   HENT:   Mouth/Throat: Oropharynx is clear and moist.   Neck: No JVD present.   Cardiovascular: Normal rate, regular rhythm and intact distal pulses.   Murmur (Systolic murmur.) heard.  Pulmonary/Chest: Effort normal and breath sounds normal. No respiratory distress. He has no wheezes. He has no rales.   Rhonchi stable mid to lower lung   Abdominal: Soft. Bowel sounds are normal. He exhibits no distension. There is no tenderness. There is no guarding.   Musculoskeletal: He exhibits no edema.   Neurological: He is alert and oriented to person, place, and time.   Skin: Skin is warm.   Nursing note and vitals reviewed.      Significant Labs:  CBC:  Recent Labs   Lab 11/07/18  0300 11/08/18  0305 11/09/18  0452   WBC 11.65 10.93 12.91*   RBC 2.92* 2.72* 2.95*   HGB 8.4* 7.8* 8.4*   HCT 26.6* 24.9* 26.6*    210 208   MCV 91 92 90   MCH 28.8 28.7 28.5   MCHC 31.6* 31.3* 31.6*     BNP:  Recent Labs   Lab 11/05/18  0836 11/07/18  0300 11/09/18  0452   BNP 2,209* 1,450* 1,937*     CMP:  Recent Labs   Lab 11/03/18  2025 11/03/18  2350  11/07/18  0300 11/08/18  0305 11/09/18  0452   * 265*   < > 137* 146* 138*   CALCIUM 10.5 9.5   < > 8.9 8.8 8.5*   ALBUMIN 3.5 3.1*  --  2.4*  --   --    PROT 8.4 7.3  --  6.1  --   --    * 136   < > 132* 131* 130*   K 4.6 4.3   < > 4.0 4.2 4.1   CO2 20* 25   < > 27 27 28   CL 95 100   < > 97 97 96   BUN 58* 60*   < > 32* 29* 25*   CREATININE 1.7* 1.5*   < > 1.0 0.9 0.9   ALKPHOS 77 71  --  67  --   --    ALT 28 25  --  13  --   --    AST 32 26  --  30  --   --    BILITOT 1.3* 1.1*  --  0.9  --   --     < > = values in this interval not displayed.      Coagulation:   Recent Labs   Lab 11/07/18  0300 11/08/18  0305 11/09/18  0452   INR 1.2 1.2 1.1     LDH:  No results for input(s): LDH in the last 72 hours.  Microbiology:  Microbiology Results  (last 7 days)     Procedure Component Value Units Date/Time    Blood culture #2 **CANNOT BE ORDERED STAT** [[id number]] Collected:  11/03/18 2134    Order Status:  Completed Specimen:  Blood from Peripheral, Antecubital, Right Updated:  11/08/18 2312     Blood Culture, Routine No growth after 5 days.    Blood culture #1 **CANNOT BE ORDERED STAT** [[id number]] Collected:  11/03/18 2059    Order Status:  Completed Specimen:  Blood from Peripheral, Antecubital, Right Updated:  11/08/18 2312     Blood Culture, Routine No growth after 5 days.    Respiratory Viral Panel by PCR [facility]; Nasal Swab [[id number]] Collected:  11/03/18 2133    Order Status:  Completed Specimen:  Respiratory Updated:  11/06/18 1322     Respiratory Virus Panel, source Nasal Swab     RVP - Adenovirus Not Detected     Comment: Detects Serotypes B and E. Detection of Serotype C may   be limited. If Adenovirus infection is suspected and a   Not Detected result is returned the sample should be   re-tested for Adenovirus using an independent method  (e.g. [employer] Adenovirus Quantitative Real-Time  PCR test.          Enterovirus Not Detected     Comment: Cross-reactivity has been observed between certain Rhinovirus  strains and the Enterovirus assay.          Human Bocavirus Not Detected     Human Coronavirus Not Detected     Comment: The Human Coronavirus assay detects Human coronavirus types  229E, OC43,NL63 and HKU1.          RVP - Human Metapneumovirus (hMPV) Not Detected     RVP - Influenza A Not Detected     Influenza A - X2H2-35 Not Detected     RVP - Influenza B Not Detected     Parainfluenza Not Detected     Respiratory Syncytial VirusVirus (RSV) A Not Detected     Comment: The Respiratory Syncytial Viral assay detects types A and B,  however it does not distinguish between the two.          RVP - Rhinovirus Not Detected     Comment: Cross-Reactivity has been observed between certain   Rhinovirus strains and the Enterovirus assay.  Target  Enriched Mulitplex Polymerase Chain Reaction (TEM-PCR)  allows for the detection of multiple pathogens out of a single  reaction.  This test was developed and its performance   characteristics determined by [employer].  It has not   been cleared or approved by the U.S.Food and Drug Administration.  Results should be used in conjunction with clinical findings,   and should not form the sole basis for a diagnosis or treatment  decision.  TEM-PCR is a licensed technology of [employer].         Narrative:       Receiving Lab:->[facility]          BMP:   Recent Labs   Lab 11/09/18  0452   *   *   K 4.1   CL 96   CO2 28   BUN 25*   CREATININE 0.9   CALCIUM 8.5*     Cardiac Markers: No results for input(s): CKMB, TROPONINT, MYOGLOBIN in the last 72 hours.  Coagulation:   Recent Labs   Lab 11/09/18  0452   INR 1.1     I have reviewed all pertinent labs within the past 24 hours.    Estimated Creatinine Clearance: 73.5 mL/min (based on SCr of 0.9 mg/dL).    Diagnostic Results:  I have reviewed all pertinent imaging results/findings within the past 24 hours.

## 2018-11-10 LAB
ALBUMIN SERPL BCP-MCNC: 2.3 G/DL
ALP SERPL-CCNC: 56 U/L
ALT SERPL W/O P-5'-P-CCNC: 11 U/L
ANION GAP SERPL CALC-SCNC: 7 MMOL/L
AST SERPL-CCNC: 16 U/L
BASOPHILS # BLD AUTO: 0.05 K/UL
BASOPHILS NFR BLD: 0.4 %
BILIRUB DIRECT SERPL-MCNC: 0.4 MG/DL
BILIRUB SERPL-MCNC: 0.6 MG/DL
BUN SERPL-MCNC: 22 MG/DL
CALCIUM SERPL-MCNC: 8.9 MG/DL
CHLORIDE SERPL-SCNC: 99 MMOL/L
CO2 SERPL-SCNC: 27 MMOL/L
CREAT SERPL-MCNC: 0.8 MG/DL
DIFFERENTIAL METHOD: ABNORMAL
EOSINOPHIL # BLD AUTO: 0.8 K/UL
EOSINOPHIL NFR BLD: 6.1 %
ERYTHROCYTE [DISTWIDTH] IN BLOOD BY AUTOMATED COUNT: 15.1 %
EST. GFR  (AFRICAN AMERICAN): >60 ML/MIN/1.73 M^2
EST. GFR  (NON AFRICAN AMERICAN): >60 ML/MIN/1.73 M^2
GLUCOSE SERPL-MCNC: 112 MG/DL
HCT VFR BLD AUTO: 28.7 %
HGB BLD-MCNC: 9.2 G/DL
IMM GRANULOCYTES # BLD AUTO: 0.17 K/UL
IMM GRANULOCYTES NFR BLD AUTO: 1.3 %
INR PPP: 1.3
LYMPHOCYTES # BLD AUTO: 1.2 K/UL
LYMPHOCYTES NFR BLD: 9.1 %
MCH RBC QN AUTO: 29.1 PG
MCHC RBC AUTO-ENTMCNC: 32.1 G/DL
MCV RBC AUTO: 91 FL
MONOCYTES # BLD AUTO: 0.9 K/UL
MONOCYTES NFR BLD: 7 %
NEUTROPHILS # BLD AUTO: 9.6 K/UL
NEUTROPHILS NFR BLD: 76.1 %
NRBC BLD-RTO: 0 /100 WBC
PLATELET # BLD AUTO: 222 K/UL
PMV BLD AUTO: 10.3 FL
POCT GLUCOSE: 117 MG/DL (ref 70–110)
POCT GLUCOSE: 132 MG/DL (ref 70–110)
POCT GLUCOSE: 145 MG/DL (ref 70–110)
POCT GLUCOSE: 190 MG/DL (ref 70–110)
POTASSIUM SERPL-SCNC: 4.4 MMOL/L
PROT SERPL-MCNC: 5.9 G/DL
PROTHROMBIN TIME: 13.1 SEC
RBC # BLD AUTO: 3.16 M/UL
SODIUM SERPL-SCNC: 133 MMOL/L
WBC # BLD AUTO: 12.67 K/UL

## 2018-11-10 PROCEDURE — 85610 PROTHROMBIN TIME: CPT | Mod: NTX

## 2018-11-10 PROCEDURE — 80076 HEPATIC FUNCTION PANEL: CPT | Mod: NTX

## 2018-11-10 PROCEDURE — 20600001 HC STEP DOWN PRIVATE ROOM: Mod: NTX

## 2018-11-10 PROCEDURE — 85025 COMPLETE CBC W/AUTO DIFF WBC: CPT | Mod: NTX

## 2018-11-10 PROCEDURE — 25000003 PHARM REV CODE 250: Mod: NTX | Performed by: INTERNAL MEDICINE

## 2018-11-10 PROCEDURE — 80048 BASIC METABOLIC PNL TOTAL CA: CPT | Mod: NTX

## 2018-11-10 PROCEDURE — A4216 STERILE WATER/SALINE, 10 ML: HCPCS | Mod: NTX | Performed by: INTERNAL MEDICINE

## 2018-11-10 PROCEDURE — 63600175 PHARM REV CODE 636 W HCPCS: Mod: NTX | Performed by: INTERNAL MEDICINE

## 2018-11-10 PROCEDURE — 36415 COLL VENOUS BLD VENIPUNCTURE: CPT | Mod: NTX

## 2018-11-10 PROCEDURE — 99232 SBSQ HOSP IP/OBS MODERATE 35: CPT | Mod: NTX,,, | Performed by: INTERNAL MEDICINE

## 2018-11-10 PROCEDURE — 25000003 PHARM REV CODE 250: Mod: NTX | Performed by: STUDENT IN AN ORGANIZED HEALTH CARE EDUCATION/TRAINING PROGRAM

## 2018-11-10 RX ORDER — AMIODARONE HYDROCHLORIDE 200 MG/1
200 TABLET ORAL EVERY 8 HOURS
Status: DISCONTINUED | OUTPATIENT
Start: 2018-11-12 | End: 2018-11-14 | Stop reason: HOSPADM

## 2018-11-10 RX ADMIN — SENNOSIDES AND DOCUSATE SODIUM 1 TABLET: 8.6; 5 TABLET ORAL at 09:11

## 2018-11-10 RX ADMIN — Medication 10 ML: at 06:11

## 2018-11-10 RX ADMIN — ATORVASTATIN CALCIUM 80 MG: 20 TABLET, FILM COATED ORAL at 09:11

## 2018-11-10 RX ADMIN — ENALAPRIL MALEATE 2.5 MG: 2.5 TABLET ORAL at 08:11

## 2018-11-10 RX ADMIN — PRASUGREL 10 MG: 10 TABLET, FILM COATED ORAL at 08:11

## 2018-11-10 RX ADMIN — Medication 10 ML: at 12:11

## 2018-11-10 RX ADMIN — INSULIN ASPART 3 UNITS: 100 INJECTION, SOLUTION INTRAVENOUS; SUBCUTANEOUS at 08:11

## 2018-11-10 RX ADMIN — DOBUTAMINE HYDROCHLORIDE 5 MCG/KG/MIN: 200 INJECTION INTRAVENOUS at 11:11

## 2018-11-10 RX ADMIN — INSULIN DETEMIR 9 UNITS: 100 INJECTION, SOLUTION SUBCUTANEOUS at 09:11

## 2018-11-10 RX ADMIN — DIGOXIN 0.12 MG: 125 TABLET ORAL at 08:11

## 2018-11-10 RX ADMIN — INSULIN ASPART 3 UNITS: 100 INJECTION, SOLUTION INTRAVENOUS; SUBCUTANEOUS at 12:11

## 2018-11-10 RX ADMIN — AZELASTINE HYDROCHLORIDE 137 MCG: 137 SPRAY, METERED NASAL at 09:11

## 2018-11-10 RX ADMIN — CETIRIZINE HYDROCHLORIDE 5 MG: 5 TABLET ORAL at 08:11

## 2018-11-10 RX ADMIN — PANTOPRAZOLE SODIUM 40 MG: 40 TABLET, DELAYED RELEASE ORAL at 08:11

## 2018-11-10 RX ADMIN — ENALAPRIL MALEATE 2.5 MG: 2.5 TABLET ORAL at 09:11

## 2018-11-10 RX ADMIN — ASPIRIN 81 MG CHEWABLE TABLET 81 MG: 81 TABLET CHEWABLE at 08:11

## 2018-11-10 RX ADMIN — INSULIN ASPART 3 UNITS: 100 INJECTION, SOLUTION INTRAVENOUS; SUBCUTANEOUS at 05:11

## 2018-11-10 RX ADMIN — WARFARIN SODIUM 7.5 MG: 7.5 TABLET ORAL at 06:11

## 2018-11-10 NOTE — SUBJECTIVE & OBJECTIVE
Interval History: Denies cardiovascular symptoms (angina, dyspnea, palpitations, syncope).  Initially had concerns about starting amiodarone but is agreeable now.      Review of Systems   HENT: Negative for congestion and ear discharge.    Eyes: Negative for blurred vision and discharge.   Cardiovascular: Negative for chest pain and claudication.   Respiratory: Negative for cough and hemoptysis.    Endocrine: Negative for cold intolerance and heat intolerance.     Objective:     Vital Signs (Most Recent):  Temp: 98 °F (36.7 °C) (11/09/18 1518)  Pulse: 88 (11/09/18 1800)  Resp: 18 (11/09/18 1518)  BP: (!) 92/51 (11/09/18 1518)  SpO2: (!) 92 % (11/09/18 1518) Vital Signs (24h Range):  Temp:  [97.6 °F (36.4 °C)-98.4 °F (36.9 °C)] 98 °F (36.7 °C)  Pulse:  [60-98] 88  Resp:  [18] 18  SpO2:  [90 %-99 %] 92 %  BP: ()/(41-58) 92/51     Weight: 76.7 kg (169 lb 1.5 oz)  Body mass index is 27.29 kg/m².     SpO2: (!) 92 %  O2 Device (Oxygen Therapy): room air    Physical Exam   Constitutional: He is oriented to person, place, and time. He appears well-developed and well-nourished.   HENT:   Head: Normocephalic and atraumatic.   Nose: Nose normal.   Mouth/Throat: No oropharyngeal exudate.   Eyes: Right eye exhibits no discharge. Left eye exhibits no discharge. No scleral icterus.   Neck: Normal range of motion. Neck supple. No JVD present.   Cardiovascular: Normal rate, regular rhythm, S1 normal and S2 normal. Exam reveals no gallop, no S3, no S4, no distant heart sounds, no friction rub, no midsystolic click and no opening snap.   No murmur heard.  Pulses:       Radial pulses are 2+ on the right side, and 2+ on the left side.        Femoral pulses are 2+ on the right side, and 2+ on the left side.  Pulmonary/Chest: Effort normal and breath sounds normal. No respiratory distress. He has no wheezes. He has no rales. He exhibits no tenderness.   Abdominal: Soft. Bowel sounds are normal. He exhibits no distension. There is no  tenderness. There is no rebound.   Musculoskeletal: Normal range of motion. He exhibits no edema, tenderness or deformity.   Lymphadenopathy:     He has no cervical adenopathy.   Neurological: He is alert and oriented to person, place, and time. No cranial nerve deficit.   Skin: Skin is warm and dry.   Psychiatric: He has a normal mood and affect. His behavior is normal.       Significant Labs:   BMP:   Recent Labs   Lab 11/08/18 0305 11/09/18 0452   * 138*   * 130*   K 4.2 4.1   CL 97 96   CO2 27 28   BUN 29* 25*   CREATININE 0.9 0.9   CALCIUM 8.8 8.5*   , CMP:   Recent Labs   Lab 11/08/18 0305 11/09/18 0452   * 130*   K 4.2 4.1   CL 97 96   CO2 27 28   * 138*   BUN 29* 25*   CREATININE 0.9 0.9   CALCIUM 8.8 8.5*   ANIONGAP 7* 6*   ESTGFRAFRICA >60.0 >60.0   EGFRNONAA >60.0 >60.0    and CBC:   Recent Labs   Lab 11/08/18 0305 11/09/18 0452   WBC 10.93 12.91*   HGB 7.8* 8.4*   HCT 24.9* 26.6*    208       Significant Imaging: Telemetry

## 2018-11-10 NOTE — PLAN OF CARE
Problem: Patient Care Overview  Goal: Plan of Care Review  Outcome: Revised  Pt remains free from injury/falls for shift. Tikosyn given to Pt. Team Needs to be contacted on 11/10 on when to d/c Sukh per Racquel ODELL. Educated Pt on meds no questions at this time. VSS.  No complaints of CP, SOB, pain/discomfort  Bed locked in lowest position, call bell within reach. All questions addressed.  Will continue to monitor.

## 2018-11-10 NOTE — PROGRESS NOTES
[facility]  Heart Transplant  Progress Note    Patient Name: Bharat Coker  MRN: [id number]  Admission Date: 11/3/2018  Hospital Length of Stay: 7 days  Attending Physician: Jony Hendrickson Jr.,*  Primary Care Provider: Ronnie Richardson Jr, MD  Principal Problem:Supraventricular tachycardia    Subjective:     Interval History: Patient seen and examined today at bedside. Tolerated enalapril 2.5 m bid. Sitting in chair, asymptomatic. After discussion, would like to try amiodarone. Will hold tikosyn on 11/10 and give Amiodarone 48 hours later. Ordered 6 min walk test to estimate oxygen requirement.    Continuous Infusions:   DOBUTamine 5 mcg/kg/min (11/10/18 1143)     Scheduled Meds:   aspirin  81 mg Oral Daily    atorvastatin  80 mg Oral Nightly    azelastine  1 spray Nasal BID    cetirizine  5 mg Oral Daily    digoxin  0.125 mg Oral Daily    dofetilide  250 mcg Oral Q12H    enalapril  2.5 mg Oral BID    fluticasone  2 spray Each Nare Daily    insulin aspart U-100  3 Units Subcutaneous TIDWM    insulin detemir U-100  9 Units Subcutaneous QHS    pantoprazole  40 mg Oral Daily    polyethylene glycol  17 g Oral Daily    prasugrel  10 mg Oral Daily    senna-docusate 8.6-50 mg  1 tablet Oral BID    sodium chloride 0.9%  10 mL Intravenous Q6H    warfarin  7.5 mg Oral Daily     PRN Meds:acetaminophen, dextrose 50%, dextrose 50%, docusate sodium, glucagon (human recombinant), glucose, glucose, insulin aspart U-100, ondansetron, ramelteon, sodium chloride, Flushing PICC Protocol **AND** sodium chloride 0.9% **AND** sodium chloride 0.9%    Review of patient's allergies indicates:   Allergen Reactions    Amiodarone analogues Anaphylaxis    Ativan [lorazepam] Anxiety     Objective:     Vital Signs (Most Recent):  Temp: 97.6 °F (36.4 °C) (11/10/18 1522)  Pulse: 99 (11/10/18 1526)  Resp: 16 (11/10/18 1522)  BP: (!) 95/51 (11/10/18 1522)  SpO2: 97 % (11/10/18 1522) Vital Signs (24h  Range):  Temp:  [96.8 °F (36 °C)-99.2 °F (37.3 °C)] 97.6 °F (36.4 °C)  Pulse:  [59-99] 99  Resp:  [16-24] 16  SpO2:  [91 %-97 %] 97 %  BP: ()/(43-55) 95/51     Patient Vitals for the past 72 hrs (Last 3 readings):   Weight   11/10/18 0700 77.2 kg (170 lb 3.1 oz)   11/09/18 0700 76.7 kg (169 lb 1.5 oz)     Body mass index is 27.47 kg/m².      Intake/Output Summary (Last 24 hours) at 11/10/2018 1604  Last data filed at 11/10/2018 1100  Gross per 24 hour   Intake 1110.58 ml   Output 1375 ml   Net -264.42 ml       Hemodynamic Parameters:       Telemetry: A sensed , V paced, few beats of NSVT    Physical Exam   Constitutional: He appears well-developed and well-nourished.   HENT:   Mouth/Throat: Oropharynx is clear and moist.   Neck: No JVD present.   Cardiovascular: Normal rate, regular rhythm and intact distal pulses.   Murmur (Systolic murmur) heard.  Pulmonary/Chest: Effort normal and breath sounds normal. No respiratory distress. He has no wheezes. He has no rales.   Abdominal: Soft. Bowel sounds are normal. He exhibits no distension. There is no tenderness. There is no guarding.   Musculoskeletal: He exhibits no edema.   Neurological: He is alert.   Skin: Skin is warm.   Nursing note and vitals reviewed.      Significant Labs:  CBC:  Recent Labs   Lab 11/08/18  0305 11/09/18  0452 11/10/18  1006   WBC 10.93 12.91* 12.67   RBC 2.72* 2.95* 3.16*   HGB 7.8* 8.4* 9.2*   HCT 24.9* 26.6* 28.7*    208 222   MCV 92 90 91   MCH 28.7 28.5 29.1   MCHC 31.3* 31.6* 32.1     BNP:  Recent Labs   Lab 11/05/18  0836 11/07/18  0300 11/09/18  0452   BNP 2,209* 1,450* 1,937*     CMP:  Recent Labs   Lab 11/03/18  2350  11/07/18  0300 11/08/18  0305 11/09/18  0452 11/10/18  0655   *   < > 137* 146* 138* 112*   CALCIUM 9.5   < > 8.9 8.8 8.5* 8.9   ALBUMIN 3.1*  --  2.4*  --   --  2.3*   PROT 7.3  --  6.1  --   --  5.9*      < > 132* 131* 130* 133*   K 4.3   < > 4.0 4.2 4.1 4.4   CO2 25   < > 27 27 28 27       < > 97 97 96 99   BUN 60*   < > 32* 29* 25* 22   CREATININE 1.5*   < > 1.0 0.9 0.9 0.8   ALKPHOS 71  --  67  --   --  56   ALT 25  --  13  --   --  11   AST 26  --  30  --   --  16   BILITOT 1.1*  --  0.9  --   --  0.6    < > = values in this interval not displayed.      Coagulation:   Recent Labs   Lab 11/08/18  0305 11/09/18  0452 11/10/18  0655   INR 1.2 1.1 1.3*     LDH:  No results for input(s): LDH in the last 72 hours.  Microbiology:  Microbiology Results (last 7 days)     Procedure Component Value Units Date/Time    Blood culture #2 **CANNOT BE ORDERED STAT** [[id number]] Collected:  11/03/18 2134    Order Status:  Completed Specimen:  Blood from Peripheral, Antecubital, Right Updated:  11/08/18 2312     Blood Culture, Routine No growth after 5 days.    Blood culture #1 **CANNOT BE ORDERED STAT** [[id number]] Collected:  11/03/18 2059    Order Status:  Completed Specimen:  Blood from Peripheral, Antecubital, Right Updated:  11/08/18 2312     Blood Culture, Routine No growth after 5 days.    Respiratory Viral Panel by PCR [facility]; Nasal Swab [[id number]] Collected:  11/03/18 2133    Order Status:  Completed Specimen:  Respiratory Updated:  11/06/18 1322     Respiratory Virus Panel, source Nasal Swab     RVP - Adenovirus Not Detected     Comment: Detects Serotypes B and E. Detection of Serotype C may   be limited. If Adenovirus infection is suspected and a   Not Detected result is returned the sample should be   re-tested for Adenovirus using an independent method  (e.g. Triangulate Adenovirus Quantitative Real-Time  PCR test.          Enterovirus Not Detected     Comment: Cross-reactivity has been observed between certain Rhinovirus  strains and the Enterovirus assay.          Human Bocavirus Not Detected     Human Coronavirus Not Detected     Comment: The Human Coronavirus assay detects Human coronavirus types  229E, OC43,NL63 and HKU1.          RVP - Human Metapneumovirus (hMPV) Not Detected     RVP -  Influenza A Not Detected     Influenza A - U1U3-75 Not Detected     RVP - Influenza B Not Detected     Parainfluenza Not Detected     Respiratory Syncytial VirusVirus (RSV) A Not Detected     Comment: The Respiratory Syncytial Viral assay detects types A and B,  however it does not distinguish between the two.          RVP - Rhinovirus Not Detected     Comment: Cross-Reactivity has been observed between certain   Rhinovirus strains and the Enterovirus assay.  Target Enriched Mulitplex Polymerase Chain Reaction (TEM-PCR)  allows for the detection of multiple pathogens out of a single  reaction.  This test was developed and its performance   characteristics determined by [employer].  It has not   been cleared or approved by the U.S.Food and Drug Administration.  Results should be used in conjunction with clinical findings,   and should not form the sole basis for a diagnosis or treatment  decision.  TEM-PCR is a licensed technology of [employer].         Narrative:       Receiving Lab:->[facility]          BMP:   Recent Labs   Lab 11/10/18  0655   *   *   K 4.4   CL 99   CO2 27   BUN 22   CREATININE 0.8   CALCIUM 8.9     Cardiac Markers: No results for input(s): CKMB, TROPONINT, MYOGLOBIN in the last 72 hours.  Coagulation:   Recent Labs   Lab 11/10/18  0655   INR 1.3*     I have reviewed all pertinent labs within the past 24 hours.    Estimated Creatinine Clearance: 82.9 mL/min (based on SCr of 0.8 mg/dL).    Diagnostic Results:  I have reviewed all pertinent imaging results/findings within the past 24 hours.    Assessment and Plan:     Mr. Coker is a 72 y/o male admitted to [facility] for ADHF in the setting of recent Lasix adjustment and recent initiation of BB/CCB. PMHx of CAD s/p CABG, ICM/HFrEF (LVef10%) s/p Medtronic CRT-D, CAD([facility] 6/2018: LIMA-LAD patent, % occluded at ostium, SVG-% occluded, SVG-D 100% occluded, pLCx 30%, 100% occluded OM, 100% LAD occlusion after  takeoff of D1, D1 is tiny with 80% disease, SVG-OM 80% proximal stenosis with 50% at site of anastomosis.An SVG-OM OKSANA was placed at that time). Who presents with c/o acute sob which occurred around 7PM this evening. Patient was at usual health, states developed acute SOB, had been having worsening SUMNER. EMS called, he was found to be in SVT, given Adenosis and Cardizem and brought to ED. He was noted to be in SVT -972h hypotensive SBP 70s, given metoprolol IV, and 250cc NS bolus. Cardiology consulted. Medtronic device interrogated, A-sensed Bi-V paced.     At length discussion with spouse / patient, she has been logging his BP / HR / Weights on daily basis, patient has been hypotensive not given his Lisinopril / Metoprolol. Seen in clinic with Aman Adames, recently decreased lasix 80/80, 40/80.  Recently discharged 10/10-10/23 for ADHF, patient is a poor VAD candidate dt frailty and calcifications on CT, also found to be in AT s/p LAVERNE/DCCV started on Tikosyn initiate on 10/19/18          * Supraventricular tachycardia    - Appreciate EP consult. Had ICD interrogation on admission with multiple self terminating SVT episodes  - Ablation on 11/6  - EP follow up appreciated, settings changed.  - C/W digoxin 0.125 mg. ( Avoid hypokalemia)  - Patient never had true allergy, used 3 weeks of amiodarone and got SOB when he was first diagnosed with heart failure. Would like to try Amiodarone.  - DC Tikosyn on 11/10. No dose received on 11/10. First dose of amiodarone 200 mg po every 8 hours ordered for 11/12,  - C/W coumadin, appreciate pharmacy assistance.  - Will replace electrolytes.      Anemia    - Likely AOCI - Iron 21, TIBC- 219, Sat -10, Transferrin - 148, Ferritin - 726  - H/H dropped from 12.5 on 10/18 to 9 ->8.4 -> 7.8 -> 8.4 -> 9.2on this admission.  - Will do FOBT,   - Patient on triple therapy as recent OKSANA to SVG to OM on 6/2018 and AFl (S/P LAVERNE/DCCV)     Hypotension    -  SBP around 90, tolerating  enalapril so far, 2.25 on 11/9 <- 1.25 mg bid started on 11/7.  - Will continue with digoxin.   - No lasix     - [On 11/5 -Patient got hypotensive after receiving Valsartan 40 mg on 11/5. See plan of care . At home, patient had episodes of hypotension with SBP to 70-80 with metoprolol. Had similar events with medications in the past. Sensitive to medications. Received 250 ml - NS - 2 bolus , placed in Trendelenberg position. Manual SBP 88-92, higher than cuff pressure. Monitor systolic blood pressure.Received 500 ml NS over 6 hours slowly as patient with EF 10% Likely from medication side effect.]       Cardiogenic shock    Mr. Coker is a 74 y/o male admitted to [facility] for ADHF in the setting of recent Lasix adjustment and recent initiation of BB/CCB. PMHx of CAD s/p CABG, ICM/HFrEF (LVef10%) s/p Medtronic CRT-D.   - Resolved  - Likely d/t  SVT   - Elevated BNP ~3000 -> improved to 1450, not on lasix since 11/4, CXR with diffuse pulmonary edema on admission.At home lasix changed 80/80 -> 40/80 at home  - Held lasix on 11/4. CVP 6. - Started on  5mcg this admission, PICC placed.    - TTE <10% EF, Severe RV dysfunction, moderate MR,  Moderate to severe AS, Mild TR, E/e' 24  - Low dose enalapril started on 11/7- 1.25 mg BID -> 2.5mg on 11/9 ( SBP > 107 with first dose). Had episodes of hypotension with Valsartan on 11/5.  - S/W digoxin this admission    - Not a candidate for advanced options, if decompensates / does not improve may not be a candidate for MCS.  - Palliative consult to be ordered  - To discuss with Dr. Khan regarding US of the vessels  - S/p RIJ MML placed 11/4/18       - Closely monitor UOP, BMP, mag,   - Fluid restriction at 1500 cc with strict I/Os and daily STANDING weights  - Maintain on telemetry and daily EKGs   - Check Electrolytes, keep Mag >2 & K+ >4  - SCDs, TEDs, Nursing communication to elevated LE          Atrial tachycardia    H/O Aflutter - S/p LAVERNE / DCCV on Tikosyn on admission.  Change to Amio 48 hours after holding, ordered for 11/12. Coumadin continued. INR 1.3.   Medtronic ICD interrogated, multiple SVT events noted.   - Held on 11/10 Tikosyn BID,   - EP on board, s/w amiodarone on 11/12  - Hold Betablocker with ADHR and hypotension      Hepatic congestion    Bilirubin elevated 1.3 compared to baseline.   - CMP in AM   - Hopefully will clear with  / Lasix      KASEY (acute kidney injury)    Cardiorenal in nature, BL Cr 1.1, on admission 1.7. Improved to 0.9.  - Monitor BMP   - Closely watch UOP  - Avoid nephrotoxic medications      Type 2 diabetes mellitus with circulatory disorder, without long-term current use of insulin    Hold PTA Glipizide   - ISS with accuchecks      CAD (coronary artery disease)    CAD([facility] 6/2018: LIMA-LAD patent, % occluded at ostium, SVG-% occluded, SVG-D 100% occluded, pLCx 30%, 100% occluded OM, 100% LAD occlusion after takeoff of D1, D1 is tiny with 80% disease, SVG-OM 80% proximal stenosis with 50% at site of anastomosis.An SVG-OM OKSANA was placed at that time).   - No records here, none seen in care everywhere.  -  Given OKSANA in 6/2018 - Continue DAPT for now - ASA / Prasugrel. Patient on coumadin as well for AFl.                Case seen and d/w Dr. Delfina Son MD  Heart Transplant  [facility]

## 2018-11-10 NOTE — PROGRESS NOTES
Pts team contacted for clarification on his prescription of Tikosyn. Racquel ODELL Recommended to continue Tikosyn and have EP review orders tomorrow. Tikosyn given to PT will continue to monitor.

## 2018-11-10 NOTE — NURSING
Adelaida ODELL notified about continuing or discontinuing Tikosyn. Held morning dose as ordered. Will continue to monitor.

## 2018-11-10 NOTE — ASSESSMENT & PLAN NOTE
H/O Aflutter - S/p LAVERNE / DCCV on Tikosyn on admission. Change to Amio 48 hours after holding, ordered for 11/12. Coumadin continued. INR 1.3.   Medtronic ICD interrogated, multiple SVT events noted.   - Held on 11/10 Tikosyn BID,   - EP on board, s/w amiodarone on 11/12  - Hold Betablocker with ADHR and hypotension

## 2018-11-10 NOTE — SUBJECTIVE & OBJECTIVE
Interval History: Patient seen and examined today at bedside. Tolerated enalapril 2.5 m bid. Sitting in chair, asymptomatic. After discussion, would like to try amiodarone. Will hold tikosyn on 11/10 and give Amiodarone 48 hours later. Ordered 6 min walk test to estimate oxygen requirement.    Continuous Infusions:   DOBUTamine 5 mcg/kg/min (11/10/18 1143)     Scheduled Meds:   aspirin  81 mg Oral Daily    atorvastatin  80 mg Oral Nightly    azelastine  1 spray Nasal BID    cetirizine  5 mg Oral Daily    digoxin  0.125 mg Oral Daily    dofetilide  250 mcg Oral Q12H    enalapril  2.5 mg Oral BID    fluticasone  2 spray Each Nare Daily    insulin aspart U-100  3 Units Subcutaneous TIDWM    insulin detemir U-100  9 Units Subcutaneous QHS    pantoprazole  40 mg Oral Daily    polyethylene glycol  17 g Oral Daily    prasugrel  10 mg Oral Daily    senna-docusate 8.6-50 mg  1 tablet Oral BID    sodium chloride 0.9%  10 mL Intravenous Q6H    warfarin  7.5 mg Oral Daily     PRN Meds:acetaminophen, dextrose 50%, dextrose 50%, docusate sodium, glucagon (human recombinant), glucose, glucose, insulin aspart U-100, ondansetron, ramelteon, sodium chloride, Flushing PICC Protocol **AND** sodium chloride 0.9% **AND** sodium chloride 0.9%    Review of patient's allergies indicates:   Allergen Reactions    Amiodarone analogues Anaphylaxis    Ativan [lorazepam] Anxiety     Objective:     Vital Signs (Most Recent):  Temp: 97.6 °F (36.4 °C) (11/10/18 1522)  Pulse: 99 (11/10/18 1526)  Resp: 16 (11/10/18 1522)  BP: (!) 95/51 (11/10/18 1522)  SpO2: 97 % (11/10/18 1522) Vital Signs (24h Range):  Temp:  [96.8 °F (36 °C)-99.2 °F (37.3 °C)] 97.6 °F (36.4 °C)  Pulse:  [59-99] 99  Resp:  [16-24] 16  SpO2:  [91 %-97 %] 97 %  BP: ()/(43-55) 95/51     Patient Vitals for the past 72 hrs (Last 3 readings):   Weight   11/10/18 0700 77.2 kg (170 lb 3.1 oz)   11/09/18 0700 76.7 kg (169 lb 1.5 oz)     Body mass index is 27.47  kg/m².      Intake/Output Summary (Last 24 hours) at 11/10/2018 1604  Last data filed at 11/10/2018 1100  Gross per 24 hour   Intake 1110.58 ml   Output 1375 ml   Net -264.42 ml       Hemodynamic Parameters:       Telemetry: A sensed , V paced, few beats of NSVT    Physical Exam   Constitutional: He appears well-developed and well-nourished.   HENT:   Mouth/Throat: Oropharynx is clear and moist.   Neck: No JVD present.   Cardiovascular: Normal rate, regular rhythm and intact distal pulses.   Murmur (Systolic murmur) heard.  Pulmonary/Chest: Effort normal and breath sounds normal. No respiratory distress. He has no wheezes. He has no rales.   Abdominal: Soft. Bowel sounds are normal. He exhibits no distension. There is no tenderness. There is no guarding.   Musculoskeletal: He exhibits no edema.   Neurological: He is alert.   Skin: Skin is warm.   Nursing note and vitals reviewed.      Significant Labs:  CBC:  Recent Labs   Lab 11/08/18  0305 11/09/18  0452 11/10/18  1006   WBC 10.93 12.91* 12.67   RBC 2.72* 2.95* 3.16*   HGB 7.8* 8.4* 9.2*   HCT 24.9* 26.6* 28.7*    208 222   MCV 92 90 91   MCH 28.7 28.5 29.1   MCHC 31.3* 31.6* 32.1     BNP:  Recent Labs   Lab 11/05/18  0836 11/07/18  0300 11/09/18  0452   BNP 2,209* 1,450* 1,937*     CMP:  Recent Labs   Lab 11/03/18  2350  11/07/18  0300 11/08/18  0305 11/09/18  0452 11/10/18  0655   *   < > 137* 146* 138* 112*   CALCIUM 9.5   < > 8.9 8.8 8.5* 8.9   ALBUMIN 3.1*  --  2.4*  --   --  2.3*   PROT 7.3  --  6.1  --   --  5.9*      < > 132* 131* 130* 133*   K 4.3   < > 4.0 4.2 4.1 4.4   CO2 25   < > 27 27 28 27      < > 97 97 96 99   BUN 60*   < > 32* 29* 25* 22   CREATININE 1.5*   < > 1.0 0.9 0.9 0.8   ALKPHOS 71  --  67  --   --  56   ALT 25  --  13  --   --  11   AST 26  --  30  --   --  16   BILITOT 1.1*  --  0.9  --   --  0.6    < > = values in this interval not displayed.      Coagulation:   Recent Labs   Lab 11/08/18  0305 11/09/18  0452  11/10/18  0655   INR 1.2 1.1 1.3*     LDH:  No results for input(s): LDH in the last 72 hours.  Microbiology:  Microbiology Results (last 7 days)     Procedure Component Value Units Date/Time    Blood culture #2 **CANNOT BE ORDERED STAT** [[id number]] Collected:  11/03/18 2134    Order Status:  Completed Specimen:  Blood from Peripheral, Antecubital, Right Updated:  11/08/18 2312     Blood Culture, Routine No growth after 5 days.    Blood culture #1 **CANNOT BE ORDERED STAT** [[id number]] Collected:  11/03/18 2059    Order Status:  Completed Specimen:  Blood from Peripheral, Antecubital, Right Updated:  11/08/18 2312     Blood Culture, Routine No growth after 5 days.    Respiratory Viral Panel by PCR [facility]; Nasal Swab [[id number]] Collected:  11/03/18 2133    Order Status:  Completed Specimen:  Respiratory Updated:  11/06/18 1322     Respiratory Virus Panel, source Nasal Swab     RVP - Adenovirus Not Detected     Comment: Detects Serotypes B and E. Detection of Serotype C may   be limited. If Adenovirus infection is suspected and a   Not Detected result is returned the sample should be   re-tested for Adenovirus using an independent method  (e.g. [employer] Adenovirus Quantitative Real-Time  PCR test.          Enterovirus Not Detected     Comment: Cross-reactivity has been observed between certain Rhinovirus  strains and the Enterovirus assay.          Human Bocavirus Not Detected     Human Coronavirus Not Detected     Comment: The Human Coronavirus assay detects Human coronavirus types  229E, OC43,NL63 and HKU1.          RVP - Human Metapneumovirus (hMPV) Not Detected     RVP - Influenza A Not Detected     Influenza A - V1G6-34 Not Detected     RVP - Influenza B Not Detected     Parainfluenza Not Detected     Respiratory Syncytial VirusVirus (RSV) A Not Detected     Comment: The Respiratory Syncytial Viral assay detects types A and B,  however it does not distinguish between the two.          RVP - Rhinovirus  Not Detected     Comment: Cross-Reactivity has been observed between certain   Rhinovirus strains and the Enterovirus assay.  Target Enriched Mulitplex Polymerase Chain Reaction (TEM-PCR)  allows for the detection of multiple pathogens out of a single  reaction.  This test was developed and its performance   characteristics determined by [employer].  It has not   been cleared or approved by the U.S.Food and Drug Administration.  Results should be used in conjunction with clinical findings,   and should not form the sole basis for a diagnosis or treatment  decision.  TEM-PCR is a licensed technology of [employer].         Narrative:       Receiving Lab:->[facility]          BMP:   Recent Labs   Lab 11/10/18  0655   *   *   K 4.4   CL 99   CO2 27   BUN 22   CREATININE 0.8   CALCIUM 8.9     Cardiac Markers: No results for input(s): CKMB, TROPONINT, MYOGLOBIN in the last 72 hours.  Coagulation:   Recent Labs   Lab 11/10/18  0655   INR 1.3*     I have reviewed all pertinent labs within the past 24 hours.    Estimated Creatinine Clearance: 82.9 mL/min (based on SCr of 0.8 mg/dL).    Diagnostic Results:  I have reviewed all pertinent imaging results/findings within the past 24 hours.

## 2018-11-10 NOTE — PROGRESS NOTES
Racquel ODELL contacted for Pt having 5 beat run of VTach. Pt VSS and Asymptomatic. Will continue to monitor.

## 2018-11-10 NOTE — ASSESSMENT & PLAN NOTE
Cardiorenal in nature, BL Cr 1.1, on admission 1.7. Improved to 0.9.  - Monitor BMP   - Closely watch UOP  - Avoid nephrotoxic medications

## 2018-11-10 NOTE — ASSESSMENT & PLAN NOTE
CAD([facility] 6/2018: LIMA-LAD patent, % occluded at ostium, SVG-% occluded, SVG-D 100% occluded, pLCx 30%, 100% occluded OM, 100% LAD occlusion after takeoff of D1, D1 is tiny with 80% disease, SVG-OM 80% proximal stenosis with 50% at site of anastomosis.An SVG-OM OKSANA was placed at that time).   - No records here, none seen in care everywhere.  -  Given OKSANA in 6/2018 - Continue DAPT for now - ASA / Prasugrel. Patient on coumadin as well for AFl.

## 2018-11-10 NOTE — PROGRESS NOTES
Reviewed EP note. Recommend patient to discontinue Tykosin and start amiodarone 200mg TID after 48hrs. Also noted that patient is allergic to amiodarone, had Anaphylaxis. Recommended to continue tykosin and have EP review rx options for patient tomorrow.

## 2018-11-10 NOTE — PROGRESS NOTES
[facility]  Cardiac Electrophysiology  Progress Note    Admission Date: 11/3/2018  Code Status: Full Code   Attending Physician: Jony Hendrickson Jr.,*   Expected Discharge Date: 11/13/2018  Principal Problem:Supraventricular tachycardia    Subjective:     Interval History: Denies cardiovascular symptoms (angina, dyspnea, palpitations, syncope).  Initially had concerns about starting amiodarone but is agreeable now.      Review of Systems   HENT: Negative for congestion and ear discharge.    Eyes: Negative for blurred vision and discharge.   Cardiovascular: Negative for chest pain and claudication.   Respiratory: Negative for cough and hemoptysis.    Endocrine: Negative for cold intolerance and heat intolerance.     Objective:     Vital Signs (Most Recent):  Temp: 98 °F (36.7 °C) (11/09/18 1518)  Pulse: 88 (11/09/18 1800)  Resp: 18 (11/09/18 1518)  BP: (!) 92/51 (11/09/18 1518)  SpO2: (!) 92 % (11/09/18 1518) Vital Signs (24h Range):  Temp:  [97.6 °F (36.4 °C)-98.4 °F (36.9 °C)] 98 °F (36.7 °C)  Pulse:  [60-98] 88  Resp:  [18] 18  SpO2:  [90 %-99 %] 92 %  BP: ()/(41-58) 92/51     Weight: 76.7 kg (169 lb 1.5 oz)  Body mass index is 27.29 kg/m².     SpO2: (!) 92 %  O2 Device (Oxygen Therapy): room air    Physical Exam   Constitutional: He is oriented to person, place, and time. He appears well-developed and well-nourished.   HENT:   Head: Normocephalic and atraumatic.   Nose: Nose normal.   Mouth/Throat: No oropharyngeal exudate.   Eyes: Right eye exhibits no discharge. Left eye exhibits no discharge. No scleral icterus.   Neck: Normal range of motion. Neck supple. No JVD present.   Cardiovascular: Normal rate, regular rhythm, S1 normal and S2 normal. Exam reveals no gallop, no S3, no S4, no distant heart sounds, no friction rub, no midsystolic click and no opening snap.   No murmur heard.  Pulses:       Radial pulses are 2+ on the right side, and 2+ on the left side.        Femoral pulses  are 2+ on the right side, and 2+ on the left side.  Pulmonary/Chest: Effort normal and breath sounds normal. No respiratory distress. He has no wheezes. He has no rales. He exhibits no tenderness.   Abdominal: Soft. Bowel sounds are normal. He exhibits no distension. There is no tenderness. There is no rebound.   Musculoskeletal: Normal range of motion. He exhibits no edema, tenderness or deformity.   Lymphadenopathy:     He has no cervical adenopathy.   Neurological: He is alert and oriented to person, place, and time. No cranial nerve deficit.   Skin: Skin is warm and dry.   Psychiatric: He has a normal mood and affect. His behavior is normal.       Significant Labs:   BMP:   Recent Labs   Lab 11/08/18 0305 11/09/18 0452   * 138*   * 130*   K 4.2 4.1   CL 97 96   CO2 27 28   BUN 29* 25*   CREATININE 0.9 0.9   CALCIUM 8.8 8.5*   , CMP:   Recent Labs   Lab 11/08/18 0305 11/09/18 0452   * 130*   K 4.2 4.1   CL 97 96   CO2 27 28   * 138*   BUN 29* 25*   CREATININE 0.9 0.9   CALCIUM 8.8 8.5*   ANIONGAP 7* 6*   ESTGFRAFRICA >60.0 >60.0   EGFRNONAA >60.0 >60.0    and CBC:   Recent Labs   Lab 11/08/18 0305 11/09/18 0452   WBC 10.93 12.91*   HGB 7.8* 8.4*   HCT 24.9* 26.6*    208       Significant Imaging: Telemetry    Assessment and Plan:     * Supraventricular tachycardia    S/p AT/AFL DCCV in October 2018 w Dr Ashby  Difficult to place on BB due to hypotension     Interrogated CRT-D and found sinus high normal rate to tachycardia with AT of PACs, intermittent 3rd degree AV block, with escape junctional beats and abundant ventricular ectopies.     Recommendations:  -Discontinue dofetilide and start amiodarone (48h after last dose of dofetilide) 200 Q8h or TID  -May continue digoxin    Case discussed w Dr Florentino. We will sign off.         Yordan Spicer MD  Cardiac Electrophysiology  [facility]

## 2018-11-10 NOTE — ASSESSMENT & PLAN NOTE
- Likely AOCI - Iron 21, TIBC- 219, Sat -10, Transferrin - 148, Ferritin - 726  - H/H dropped from 12.5 on 10/18 to 9 ->8.4 -> 7.8 -> 8.4 -> 9.2on this admission.  - Will do FOBT,   - Patient on triple therapy as recent OKSANA to SVG to OM on 6/2018 and AFl (S/P LAVERNE/DCCV)

## 2018-11-10 NOTE — ASSESSMENT & PLAN NOTE
Mr. Coker is a 72 y/o male admitted to [facility] for ADHF in the setting of recent Lasix adjustment and recent initiation of BB/CCB. PMHx of CAD s/p CABG, ICM/HFrEF (LVef10%) s/p Medtronic CRT-D.   - Resolved  - Likely d/t  SVT   - Elevated BNP ~3000 -> improved to 1450, not on lasix since 11/4, CXR with diffuse pulmonary edema on admission.At home lasix changed 80/80 -> 40/80 at home  - Held lasix on 11/4. CVP 6. - Started on  5mcg this admission, PICC placed.    - TTE <10% EF, Severe RV dysfunction, moderate MR,  Moderate to severe AS, Mild TR, E/e' 24  - Low dose enalapril started on 11/7- 1.25 mg BID -> 2.5mg on 11/9 ( SBP > 107 with first dose). Had episodes of hypotension with Valsartan on 11/5.  - S/W digoxin this admission    - Not a candidate for advanced options, if decompensates / does not improve may not be a candidate for MCS.  - Palliative consult to be ordered  - To discuss with Dr. Khna regarding US of the vessels  - S/p RIJ MML placed 11/4/18       - Closely monitor UOP, BMP, mag,   - Fluid restriction at 1500 cc with strict I/Os and daily STANDING weights  - Maintain on telemetry and daily EKGs   - Check Electrolytes, keep Mag >2 & K+ >4  - SCDs, TEDs, Nursing communication to elevated LE

## 2018-11-10 NOTE — ASSESSMENT & PLAN NOTE
- Appreciate EP consult. Had ICD interrogation on admission with multiple self terminating SVT episodes  - Ablation on 11/6  - EP follow up appreciated, settings changed.  - C/W digoxin 0.125 mg. ( Avoid hypokalemia)  - Patient never had true allergy, used 3 weeks of amiodarone and got SOB when he was first diagnosed with heart failure. Would like to try Amiodarone.  - DC Tikosyn on 11/10. No dose received on 11/10. First dose of amiodarone 200 mg po every 8 hours ordered for 11/12,  - C/W coumadin, appreciate pharmacy assistance.  - Will replace electrolytes.

## 2018-11-10 NOTE — PLAN OF CARE
Problem: Patient Care Overview  Goal: Plan of Care Review  Patient remains free of falls, injuries and traumas. VSS. Systolic <80 maintained at this time. CBG monitoring continued. Scheduled insulin given. Patient educated on the importance of using O2 to maintain adequate oxygen saturation.  gtt maintained as ordered.  POC reviewed with patient. Understanding verbalized. Will continue to monitor.

## 2018-11-11 LAB
ANION GAP SERPL CALC-SCNC: 8 MMOL/L
BASOPHILS # BLD AUTO: 0.08 K/UL
BASOPHILS NFR BLD: 0.7 %
BUN SERPL-MCNC: 21 MG/DL
CALCIUM SERPL-MCNC: 9.2 MG/DL
CHLORIDE SERPL-SCNC: 99 MMOL/L
CO2 SERPL-SCNC: 27 MMOL/L
CREAT SERPL-MCNC: 0.9 MG/DL
DIFFERENTIAL METHOD: ABNORMAL
EOSINOPHIL # BLD AUTO: 0.7 K/UL
EOSINOPHIL NFR BLD: 5.8 %
ERYTHROCYTE [DISTWIDTH] IN BLOOD BY AUTOMATED COUNT: 14.9 %
EST. GFR  (AFRICAN AMERICAN): >60 ML/MIN/1.73 M^2
EST. GFR  (NON AFRICAN AMERICAN): >60 ML/MIN/1.73 M^2
GLUCOSE SERPL-MCNC: 137 MG/DL
HCT VFR BLD AUTO: 26 %
HGB BLD-MCNC: 7.8 G/DL
IMM GRANULOCYTES # BLD AUTO: 0.15 K/UL
IMM GRANULOCYTES NFR BLD AUTO: 1.3 %
INR PPP: 1.4
LYMPHOCYTES # BLD AUTO: 1.3 K/UL
LYMPHOCYTES NFR BLD: 10.9 %
MCH RBC QN AUTO: 27.9 PG
MCHC RBC AUTO-ENTMCNC: 30 G/DL
MCV RBC AUTO: 93 FL
MONOCYTES # BLD AUTO: 0.9 K/UL
MONOCYTES NFR BLD: 8 %
NEUTROPHILS # BLD AUTO: 8.6 K/UL
NEUTROPHILS NFR BLD: 73.3 %
NRBC BLD-RTO: 0 /100 WBC
PLATELET # BLD AUTO: 201 K/UL
PMV BLD AUTO: 10.8 FL
POCT GLUCOSE: 127 MG/DL (ref 70–110)
POCT GLUCOSE: 169 MG/DL (ref 70–110)
POCT GLUCOSE: 206 MG/DL (ref 70–110)
POCT GLUCOSE: 249 MG/DL (ref 70–110)
POTASSIUM SERPL-SCNC: 4.7 MMOL/L
PROTHROMBIN TIME: 13.9 SEC
RBC # BLD AUTO: 2.8 M/UL
SODIUM SERPL-SCNC: 134 MMOL/L
WBC # BLD AUTO: 11.66 K/UL

## 2018-11-11 PROCEDURE — 80048 BASIC METABOLIC PNL TOTAL CA: CPT | Mod: NTX

## 2018-11-11 PROCEDURE — 99232 SBSQ HOSP IP/OBS MODERATE 35: CPT | Mod: NTX,,, | Performed by: INTERNAL MEDICINE

## 2018-11-11 PROCEDURE — 25000003 PHARM REV CODE 250: Mod: NTX | Performed by: INTERNAL MEDICINE

## 2018-11-11 PROCEDURE — 20600001 HC STEP DOWN PRIVATE ROOM: Mod: NTX

## 2018-11-11 PROCEDURE — 63600175 PHARM REV CODE 636 W HCPCS: Mod: NTX | Performed by: INTERNAL MEDICINE

## 2018-11-11 PROCEDURE — A4216 STERILE WATER/SALINE, 10 ML: HCPCS | Mod: NTX | Performed by: INTERNAL MEDICINE

## 2018-11-11 PROCEDURE — 85610 PROTHROMBIN TIME: CPT | Mod: NTX

## 2018-11-11 PROCEDURE — 25000242 PHARM REV CODE 250 ALT 637 W/ HCPCS: Mod: NTX | Performed by: STUDENT IN AN ORGANIZED HEALTH CARE EDUCATION/TRAINING PROGRAM

## 2018-11-11 PROCEDURE — 85025 COMPLETE CBC W/AUTO DIFF WBC: CPT | Mod: NTX

## 2018-11-11 PROCEDURE — 36415 COLL VENOUS BLD VENIPUNCTURE: CPT | Mod: NTX

## 2018-11-11 PROCEDURE — 25000003 PHARM REV CODE 250: Mod: NTX | Performed by: STUDENT IN AN ORGANIZED HEALTH CARE EDUCATION/TRAINING PROGRAM

## 2018-11-11 RX ORDER — INSULIN ASPART 100 [IU]/ML
4 INJECTION, SOLUTION INTRAVENOUS; SUBCUTANEOUS
Status: DISCONTINUED | OUTPATIENT
Start: 2018-11-11 | End: 2018-11-13

## 2018-11-11 RX ADMIN — ATORVASTATIN CALCIUM 80 MG: 20 TABLET, FILM COATED ORAL at 10:11

## 2018-11-11 RX ADMIN — INSULIN ASPART 4 UNITS: 100 INJECTION, SOLUTION INTRAVENOUS; SUBCUTANEOUS at 12:11

## 2018-11-11 RX ADMIN — DOBUTAMINE HYDROCHLORIDE 5 MCG/KG/MIN: 200 INJECTION INTRAVENOUS at 03:11

## 2018-11-11 RX ADMIN — PANTOPRAZOLE SODIUM 40 MG: 40 TABLET, DELAYED RELEASE ORAL at 09:11

## 2018-11-11 RX ADMIN — INSULIN ASPART 1 UNITS: 100 INJECTION, SOLUTION INTRAVENOUS; SUBCUTANEOUS at 10:11

## 2018-11-11 RX ADMIN — ENALAPRIL MALEATE 2.5 MG: 2.5 TABLET ORAL at 09:11

## 2018-11-11 RX ADMIN — AZELASTINE HYDROCHLORIDE 137 MCG: 137 SPRAY, METERED NASAL at 10:11

## 2018-11-11 RX ADMIN — FLUTICASONE PROPIONATE 100 MCG: 50 SPRAY, METERED NASAL at 09:11

## 2018-11-11 RX ADMIN — AZELASTINE HYDROCHLORIDE 137 MCG: 137 SPRAY, METERED NASAL at 09:11

## 2018-11-11 RX ADMIN — Medication 10 ML: at 12:11

## 2018-11-11 RX ADMIN — INSULIN ASPART 4 UNITS: 100 INJECTION, SOLUTION INTRAVENOUS; SUBCUTANEOUS at 08:11

## 2018-11-11 RX ADMIN — CETIRIZINE HYDROCHLORIDE 5 MG: 5 TABLET ORAL at 09:11

## 2018-11-11 RX ADMIN — DIGOXIN 0.12 MG: 125 TABLET ORAL at 09:11

## 2018-11-11 RX ADMIN — INSULIN ASPART 2 UNITS: 100 INJECTION, SOLUTION INTRAVENOUS; SUBCUTANEOUS at 12:11

## 2018-11-11 RX ADMIN — ASPIRIN 81 MG CHEWABLE TABLET 81 MG: 81 TABLET CHEWABLE at 09:11

## 2018-11-11 RX ADMIN — INSULIN ASPART 4 UNITS: 100 INJECTION, SOLUTION INTRAVENOUS; SUBCUTANEOUS at 05:11

## 2018-11-11 RX ADMIN — PRASUGREL 10 MG: 10 TABLET, FILM COATED ORAL at 09:11

## 2018-11-11 RX ADMIN — INSULIN DETEMIR 9 UNITS: 100 INJECTION, SOLUTION SUBCUTANEOUS at 10:11

## 2018-11-11 RX ADMIN — WARFARIN SODIUM 7.5 MG: 7.5 TABLET ORAL at 05:11

## 2018-11-11 RX ADMIN — ENALAPRIL MALEATE 2.5 MG: 2.5 TABLET ORAL at 10:11

## 2018-11-11 NOTE — PLAN OF CARE
Reviewed insulin regimen and CBG.     Continue levemir 9 units daily  Increase novolog 3 > 4 units qAC  POC AC/HS  Low dose correction     Will continue to follow along; please call with any questions.

## 2018-11-11 NOTE — ASSESSMENT & PLAN NOTE
- Likely AOCI - Iron 21, TIBC- 219, Sat -10, Transferrin - 148, Ferritin - 726  - H/H dropped from 12.5 on 10/18 to 9 ->8.4 -> 7.8 -> 8.4 -> 9.2 -> 7.8 on this admission.  - Will do FOBT ( ordered last few days- awaiting).   - Patient on triple therapy as recent OKSAAN to SVG to OM on 6/2018 and AFl (S/P LAVERNE/DCCV)

## 2018-11-11 NOTE — ASSESSMENT & PLAN NOTE
H/O Aflutter - S/p LAVERNE / DCCV on Tikosyn on admission. Change to Amio 48 hours after holding, ordered for 11/12. Coumadin continued. INR 1.3 -> 1.4.  C/W same dose as pt will be receiving amiodarone in am.  Medtronic ICD interrogated, multiple SVT events noted.   - Held on 11/10 Tikosyn BID,   - EP on board, s/w amiodarone on 11/12  - Hold Betablocker with ADHR and hypotension

## 2018-11-11 NOTE — PROGRESS NOTES
[facility]  Heart Transplant  Progress Note    Patient Name: Bharat Coker  MRN: [id number]  Admission Date: 11/3/2018  Hospital Length of Stay: 8 days  Attending Physician: Jony Hendrickson Jr.,*  Primary Care Provider: Ronnie Richardson Jr, MD  Principal Problem:Supraventricular tachycardia    Subjective:     Interval History: Patient seen and examined today at bedside. No new complaints, denied any lightheadedness. Doing his activities. SBP tolerating well with current dose of 2.5 mg bid enalapril . Plan to start amiodarone in am.    Continuous Infusions:   DOBUTamine 5 mcg/kg/min (11/11/18 1549)     Scheduled Meds:   [START ON 11/12/2018] amiodarone  200 mg Oral Q8H    aspirin  81 mg Oral Daily    atorvastatin  80 mg Oral Nightly    azelastine  1 spray Nasal BID    cetirizine  5 mg Oral Daily    digoxin  0.125 mg Oral Daily    enalapril  2.5 mg Oral BID    fluticasone  2 spray Each Nare Daily    insulin aspart U-100  4 Units Subcutaneous TIDWM    insulin detemir U-100  9 Units Subcutaneous QHS    pantoprazole  40 mg Oral Daily    polyethylene glycol  17 g Oral Daily    prasugrel  10 mg Oral Daily    senna-docusate 8.6-50 mg  1 tablet Oral BID    sodium chloride 0.9%  10 mL Intravenous Q6H    warfarin  7.5 mg Oral Daily     PRN Meds:acetaminophen, dextrose 50%, dextrose 50%, docusate sodium, glucagon (human recombinant), glucose, glucose, insulin aspart U-100, ondansetron, ramelteon, sodium chloride, Flushing PICC Protocol **AND** sodium chloride 0.9% **AND** sodium chloride 0.9%    Review of patient's allergies indicates:   Allergen Reactions    Amiodarone analogues Anaphylaxis    Ativan [lorazepam] Anxiety     Objective:     Vital Signs (Most Recent):  Temp: 97 °F (36.1 °C) (11/11/18 1542)  Pulse: 70 (11/11/18 1600)  Resp: 18 (11/11/18 1542)  BP: (!) 96/49 (11/11/18 1542)  SpO2: (!) 88 % (11/11/18 1542) Vital Signs (24h Range):  Temp:  [97 °F (36.1 °C)-98.8 °F (37.1  °C)] 97 °F (36.1 °C)  Pulse:  [] 70  Resp:  [16-18] 18  SpO2:  [88 %-96 %] 88 %  BP: ()/(49-56) 96/49     Patient Vitals for the past 72 hrs (Last 3 readings):   Weight   11/11/18 0700 78.1 kg (172 lb 2.9 oz)   11/10/18 0700 77.2 kg (170 lb 3.1 oz)   11/09/18 0700 76.7 kg (169 lb 1.5 oz)     Body mass index is 27.79 kg/m².      Intake/Output Summary (Last 24 hours) at 11/11/2018 1657  Last data filed at 11/11/2018 1212  Gross per 24 hour   Intake 1494.95 ml   Output 1200 ml   Net 294.95 ml       Hemodynamic Parameters:       Telemetry: A sensed V paced, NSVT    Physical Exam   Constitutional: He is oriented to person, place, and time. He appears well-developed and well-nourished.   HENT:   Mouth/Throat: Oropharynx is clear and moist.   Neck: No JVD present.   Cardiovascular: Normal rate, regular rhythm and intact distal pulses.   Murmur (Systolic) heard.  Pulmonary/Chest: Effort normal and breath sounds normal. No respiratory distress. He has no wheezes. He has no rales.   Rhonchi mid to lower neck   Abdominal: Soft. Bowel sounds are normal. He exhibits no distension. There is no tenderness. There is no guarding.   Musculoskeletal: He exhibits no edema.   Neurological: He is alert and oriented to person, place, and time.   Skin: Skin is warm.   Nursing note and vitals reviewed.      Significant Labs:  CBC:  Recent Labs   Lab 11/09/18  0452 11/10/18  1006 11/11/18  0427   WBC 12.91* 12.67 11.66   RBC 2.95* 3.16* 2.80*   HGB 8.4* 9.2* 7.8*   HCT 26.6* 28.7* 26.0*    222 201   MCV 90 91 93   MCH 28.5 29.1 27.9   MCHC 31.6* 32.1 30.0*     BNP:  Recent Labs   Lab 11/05/18  0836 11/07/18  0300 11/09/18  0452   BNP 2,209* 1,450* 1,937*     CMP:  Recent Labs   Lab 11/07/18  0300  11/09/18  0452 11/10/18  0655 11/11/18  0427   *   < > 138* 112* 137*   CALCIUM 8.9   < > 8.5* 8.9 9.2   ALBUMIN 2.4*  --   --  2.3*  --    PROT 6.1  --   --  5.9*  --    *   < > 130* 133* 134*   K 4.0   < > 4.1 4.4  4.7   CO2 27   < > 28 27 27   CL 97   < > 96 99 99   BUN 32*   < > 25* 22 21   CREATININE 1.0   < > 0.9 0.8 0.9   ALKPHOS 67  --   --  56  --    ALT 13  --   --  11  --    AST 30  --   --  16  --    BILITOT 0.9  --   --  0.6  --     < > = values in this interval not displayed.      Coagulation:   Recent Labs   Lab 11/09/18  0452 11/10/18  0655 11/11/18  0427   INR 1.1 1.3* 1.4*     LDH:  No results for input(s): LDH in the last 72 hours.  Microbiology:  Microbiology Results (last 7 days)     Procedure Component Value Units Date/Time    Blood culture #2 **CANNOT BE ORDERED STAT** [[id number]] Collected:  11/03/18 2134    Order Status:  Completed Specimen:  Blood from Peripheral, Antecubital, Right Updated:  11/08/18 2312     Blood Culture, Routine No growth after 5 days.    Blood culture #1 **CANNOT BE ORDERED STAT** [[id number]] Collected:  11/03/18 2059    Order Status:  Completed Specimen:  Blood from Peripheral, Antecubital, Right Updated:  11/08/18 2312     Blood Culture, Routine No growth after 5 days.    Respiratory Viral Panel by PCR [place]; Nasal Swab [[id number]] Collected:  11/03/18 2133    Order Status:  Completed Specimen:  Respiratory Updated:  11/06/18 1322     Respiratory Virus Panel, source Nasal Swab     RVP - Adenovirus Not Detected     Comment: Detects Serotypes B and E. Detection of Serotype C may   be limited. If Adenovirus infection is suspected and a   Not Detected result is returned the sample should be   re-tested for Adenovirus using an independent method  (e.g. [employer] Adenovirus Quantitative Real-Time  PCR test.          Enterovirus Not Detected     Comment: Cross-reactivity has been observed between certain Rhinovirus  strains and the Enterovirus assay.          Human Bocavirus Not Detected     Human Coronavirus Not Detected     Comment: The Human Coronavirus assay detects Human coronavirus types  229E, OC43,NL63 and HKU1.          RVP - Human Metapneumovirus (hMPV) Not Detected      RVP - Influenza A Not Detected     Influenza A - [id number] Not Detected     RVP - Influenza B Not Detected     Parainfluenza Not Detected     Respiratory Syncytial VirusVirus (RSV) A Not Detected     Comment: The Respiratory Syncytial Viral assay detects types A and B,  however it does not distinguish between the two.          RVP - Rhinovirus Not Detected     Comment: Cross-Reactivity has been observed between certain   Rhinovirus strains and the Enterovirus assay.  Target Enriched Mulitplex Polymerase Chain Reaction (TEM-PCR)  allows for the detection of multiple pathogens out of a single  reaction.  This test was developed and its performance   characteristics determined by [employer].  It has not   been cleared or approved by the U.S.Food and Drug Administration.  Results should be used in conjunction with clinical findings,   and should not form the sole basis for a diagnosis or treatment  decision.  TEM-PCR is a licensed technology of [employer].         Narrative:       Receiving Lab:->[facility]          BMP:   Recent Labs   Lab 11/11/18 0427   *   *   K 4.7   CL 99   CO2 27   BUN 21   CREATININE 0.9   CALCIUM 9.2     Cardiac Markers: No results for input(s): CKMB, TROPONINT, MYOGLOBIN in the last 72 hours.  Coagulation:   Recent Labs   Lab 11/11/18 0427   INR 1.4*     I have reviewed all pertinent labs within the past 24 hours.    Estimated Creatinine Clearance: 74 mL/min (based on SCr of 0.9 mg/dL).    Diagnostic Results:  I have reviewed all pertinent imaging results/findings within the past 24 hours.    Assessment and Plan:     Mr. Coker is a 72 y/o male admitted to [facility] for ADHF in the setting of recent Lasix adjustment and recent initiation of BB/CCB. PMHx of CAD s/p CABG, ICM/HFrEF (LVef10%) s/p Medtronic CRT-D, CAD([facility] 6/2018: LIMA-LAD patent, % occluded at ostium, SVG-% occluded, SVG-D 100% occluded, pLCx 30%, 100% occluded OM, 100% LAD occlusion  after takeoff of D1, D1 is tiny with 80% disease, SVG-OM 80% proximal stenosis with 50% at site of anastomosis.An SVG-OM OKSANA was placed at that time). Who presents with c/o acute sob which occurred around 7PM this evening. Patient was at usual health, states developed acute SOB, had been having worsening SUMNER. EMS called, he was found to be in SVT, given Adenosis and Cardizem and brought to ED. He was noted to be in SVT -694m hypotensive SBP 70s, given metoprolol IV, and 250cc NS bolus. Cardiology consulted. Medtronic device interrogated, A-sensed Bi-V paced.     At length discussion with spouse / patient, she has been logging his BP / HR / Weights on daily basis, patient has been hypotensive not given his Lisinopril / Metoprolol. Seen in clinic with Aman Adames, recently decreased lasix 80/80, 40/80.  Recently discharged 10/10-10/23 for ADHF, patient is a poor VAD candidate dt frailty and calcifications on CT, also found to be in AT s/p LAVERNE/DCCV started on Tikosyn initiate on 10/19/18          * Supraventricular tachycardia    - Appreciate EP consult. Had ICD interrogation on admission with multiple self terminating SVT episodes  - Ablation on 11/6  - EP follow up appreciated, settings changed.  - C/W digoxin 0.125 mg. ( Avoid hypokalemia)  - Patient never had true allergy, used 3 weeks of amiodarone and got SOB when he was first diagnosed with heart failure. Would like to try Amiodarone. Ordered for 11/12.  - DC Tikosyn on 11/10. No dose received on 11/10. First dose of amiodarone 200 mg po every 8 hours ordered for 11/12,  - C/W coumadin, appreciate pharmacy assistance.  - Will replace electrolytes.      Anemia    - Likely AOCI - Iron 21, TIBC- 219, Sat -10, Transferrin - 148, Ferritin - 726  - H/H dropped from 12.5 on 10/18 to 9 ->8.4 -> 7.8 -> 8.4 -> 9.2 -> 7.8 on this admission.  - Will do FOBT ( ordered last few days- awaiting).   - Patient on triple therapy as recent OKSANA to SVG to OM on 6/2018 and  AFl (S/P LAVERNE/DCCV)     Hypotension    -  SBP around 90, tolerating enalapril so far, 2.25 on 11/9 <- 1.25 mg bid started on 11/7.  - Will continue with digoxin.   - No lasix     - [On 11/5 -Patient got hypotensive after receiving Valsartan 40 mg on 11/5. See plan of care . At home, patient had episodes of hypotension with SBP to 70-80 with metoprolol. Had similar events with medications in the past. Sensitive to medications. Received 250 ml - NS - 2 bolus , placed in Trendelenberg position. Manual SBP 88-92, higher than cuff pressure. Monitor systolic blood pressure.Received 500 ml NS over 6 hours slowly as patient with EF 10% Likely from medication side effect.]       Cardiogenic shock    Mr. Coker is a 72 y/o male admitted to [facility] for ADHF in the setting of recent Lasix adjustment and recent initiation of BB/CCB. PMHx of CAD s/p CABG, ICM/HFrEF (LVef10%) s/p Medtronic CRT-D.   - Resolved  - Likely d/t  SVT   - Elevated BNP ~3000 -> improved to 1450, not on lasix since 11/4, CXR with diffuse pulmonary edema on admission.At home lasix changed 80/80 -> 40/80 at home  - Held lasix on 11/4. CVP 6. - Started on  5mcg this admission, PICC placed.    - TTE <10% EF, Severe RV dysfunction, moderate MR,  Moderate to severe AS, Mild TR, E/e' 24  - Low dose enalapril started on 11/7- 1.25 mg BID -> 2.5mg on 11/9 ( SBP > 107 with first dose). Had episodes of hypotension with Valsartan on 11/5.  - S/W digoxin this admission    - Not a candidate for advanced options, if decompensates / does not improve may not be a candidate for MCS.  - Palliative consult to be ordered  - To discuss with Dr. Khan regarding US of the vessels  - S/p RIJ MML placed 11/4/18       - Closely monitor UOP, BMP, mag,   - Fluid restriction at 1500 cc with strict I/Os and daily STANDING weights  - Maintain on telemetry and daily EKGs   - Check Electrolytes, keep Mag >2 & K+ >4  - SCDs, TEDs, Nursing communication to elevated LE          Atrial  tachycardia    H/O Aflutter - S/p LAVERNE / DCCV on Tikosyn on admission. Change to Amio 48 hours after holding, ordered for 11/12. Coumadin continued. INR 1.3 -> 1.4.  C/W same dose as pt will be receiving amiodarone in am.  Medtronic ICD interrogated, multiple SVT events noted.   - Held on 11/10 Tikosyn BID,   - EP on board, s/w amiodarone on 11/12  - Hold Betablocker with ADHR and hypotension      Hepatic congestion    Bilirubin elevated 1.3 compared to baseline.   - CMP in AM   - Hopefully will clear with  / Lasix      KASEY (acute kidney injury)    Cardiorenal in nature, BL Cr 1.1, on admission 1.7. Improved to 0.9.  - Monitor BMP   - Closely watch UOP  - Avoid nephrotoxic medications      Type 2 diabetes mellitus with circulatory disorder, without long-term current use of insulin    Hold PTA Glipizide   - ISS with accuchecks      CAD (coronary artery disease)    CAD([facility] 6/2018: LIMA-LAD patent, % occluded at ostium, SVG-% occluded, SVG-D 100% occluded, pLCx 30%, 100% occluded OM, 100% LAD occlusion after takeoff of D1, D1 is tiny with 80% disease, SVG-OM 80% proximal stenosis with 50% at site of anastomosis.An SVG-OM OKSANA was placed at that time).   - No records here, none seen in care everywhere.  -  Given OKSANA in 6/2018 - Continue DAPT for now - ASA / Prasugrel. Patient on coumadin as well for AFl.                Case seen and d/w Dr. Delfina Son MD  Heart Transplant  [facility]

## 2018-11-11 NOTE — ASSESSMENT & PLAN NOTE
- Appreciate EP consult. Had ICD interrogation on admission with multiple self terminating SVT episodes  - Ablation on 11/6  - EP follow up appreciated, settings changed.  - C/W digoxin 0.125 mg. ( Avoid hypokalemia)  - Patient never had true allergy, used 3 weeks of amiodarone and got SOB when he was first diagnosed with heart failure. Would like to try Amiodarone. Ordered for 11/12.  - DC Tikosyn on 11/10. No dose received on 11/10. First dose of amiodarone 200 mg po every 8 hours ordered for 11/12,  - C/W coumadin, appreciate pharmacy assistance.  - Will replace electrolytes.

## 2018-11-11 NOTE — ASSESSMENT & PLAN NOTE
Mr. Coker is a 72 y/o male admitted to [facility] for ADHF in the setting of recent Lasix adjustment and recent initiation of BB/CCB. PMHx of CAD s/p CABG, ICM/HFrEF (LVef10%) s/p Medtronic CRT-D.   - Resolved  - Likely d/t  SVT   - Elevated BNP ~3000 -> improved to 1450, not on lasix since 11/4, CXR with diffuse pulmonary edema on admission.At home lasix changed 80/80 -> 40/80 at home  - Held lasix on 11/4. CVP 6. - Started on  5mcg this admission, PICC placed.    - TTE <10% EF, Severe RV dysfunction, moderate MR,  Moderate to severe AS, Mild TR, E/e' 24  - Low dose enalapril started on 11/7- 1.25 mg BID -> 2.5mg on 11/9 ( SBP > 107 with first dose). Had episodes of hypotension with Valsartan on 11/5.  - S/W digoxin this admission    - Not a candidate for advanced options, if decompensates / does not improve may not be a candidate for MCS.  - Palliative consult to be ordered  - To discuss with Dr. Khan regarding US of the vessels  - S/p RIJ MML placed 11/4/18       - Closely monitor UOP, BMP, mag,   - Fluid restriction at 1500 cc with strict I/Os and daily STANDING weights  - Maintain on telemetry and daily EKGs   - Check Electrolytes, keep Mag >2 & K+ >4  - SCDs, TEDs, Nursing communication to elevated LE

## 2018-11-11 NOTE — ASSESSMENT & PLAN NOTE
CAD([facility] 6/2018: LIMA-LAD patent, % occluded at ostium, SVG-% occluded, SVG-D 100% occluded, pLCx 30%, 100% occluded OM, 100% LAD occlusion after takeoff of D1, D1 is tiny with 80% disease, SVG-OM 80% proximal stenosis with 50% at site of anastomosis.An SVG-OM OKSANA was placed at that time).   - No records here, none seen in care everywhere.  -  Given OKSANA in 6/2018 - Continue DAPT for now - ASA / Prasugrel. Patient on coumadin as well for AFl.

## 2018-11-11 NOTE — ASSESSMENT & PLAN NOTE
-  SBP around 90, tolerating enalapril so far, 2.25 on 11/9 <- 1.25 mg bid started on 11/7.  - Will continue with digoxin.   - No lasix     - [On 11/5 -Patient got hypotensive after receiving Valsartan 40 mg on 11/5. See plan of care . At home, patient had episodes of hypotension with SBP to 70-80 with metoprolol. Had similar events with medications in the past. Sensitive to medications. Received 250 ml - NS - 2 bolus , placed in Trendelenberg position. Manual SBP 88-92, higher than cuff pressure. Monitor systolic blood pressure.Received 500 ml NS over 6 hours slowly as patient with EF 10% Likely from medication side effect.]

## 2018-11-11 NOTE — SUBJECTIVE & OBJECTIVE
Interval History: Patient seen and examined today at bedside. No new complaints, denied any lightheadedness. Doing his activities. SBP tolerating well with current dose of 2.5 mg bid enalapril . Plan to start amiodarone in am.    Continuous Infusions:   DOBUTamine 5 mcg/kg/min (11/11/18 1549)     Scheduled Meds:   [START ON 11/12/2018] amiodarone  200 mg Oral Q8H    aspirin  81 mg Oral Daily    atorvastatin  80 mg Oral Nightly    azelastine  1 spray Nasal BID    cetirizine  5 mg Oral Daily    digoxin  0.125 mg Oral Daily    enalapril  2.5 mg Oral BID    fluticasone  2 spray Each Nare Daily    insulin aspart U-100  4 Units Subcutaneous TIDWM    insulin detemir U-100  9 Units Subcutaneous QHS    pantoprazole  40 mg Oral Daily    polyethylene glycol  17 g Oral Daily    prasugrel  10 mg Oral Daily    senna-docusate 8.6-50 mg  1 tablet Oral BID    sodium chloride 0.9%  10 mL Intravenous Q6H    warfarin  7.5 mg Oral Daily     PRN Meds:acetaminophen, dextrose 50%, dextrose 50%, docusate sodium, glucagon (human recombinant), glucose, glucose, insulin aspart U-100, ondansetron, ramelteon, sodium chloride, Flushing PICC Protocol **AND** sodium chloride 0.9% **AND** sodium chloride 0.9%    Review of patient's allergies indicates:   Allergen Reactions    Amiodarone analogues Anaphylaxis    Ativan [lorazepam] Anxiety     Objective:     Vital Signs (Most Recent):  Temp: 97 °F (36.1 °C) (11/11/18 1542)  Pulse: 70 (11/11/18 1600)  Resp: 18 (11/11/18 1542)  BP: (!) 96/49 (11/11/18 1542)  SpO2: (!) 88 % (11/11/18 1542) Vital Signs (24h Range):  Temp:  [97 °F (36.1 °C)-98.8 °F (37.1 °C)] 97 °F (36.1 °C)  Pulse:  [] 70  Resp:  [16-18] 18  SpO2:  [88 %-96 %] 88 %  BP: ()/(49-56) 96/49     Patient Vitals for the past 72 hrs (Last 3 readings):   Weight   11/11/18 0700 78.1 kg (172 lb 2.9 oz)   11/10/18 0700 77.2 kg (170 lb 3.1 oz)   11/09/18 0700 76.7 kg (169 lb 1.5 oz)     Body mass index is 27.79  kg/m².      Intake/Output Summary (Last 24 hours) at 11/11/2018 1657  Last data filed at 11/11/2018 1212  Gross per 24 hour   Intake 1494.95 ml   Output 1200 ml   Net 294.95 ml       Hemodynamic Parameters:       Telemetry: A sensed V paced, NSVT    Physical Exam   Constitutional: He is oriented to person, place, and time. He appears well-developed and well-nourished.   HENT:   Mouth/Throat: Oropharynx is clear and moist.   Neck: No JVD present.   Cardiovascular: Normal rate, regular rhythm and intact distal pulses.   Murmur (Systolic) heard.  Pulmonary/Chest: Effort normal and breath sounds normal. No respiratory distress. He has no wheezes. He has no rales.   Rhonchi mid to lower neck   Abdominal: Soft. Bowel sounds are normal. He exhibits no distension. There is no tenderness. There is no guarding.   Musculoskeletal: He exhibits no edema.   Neurological: He is alert and oriented to person, place, and time.   Skin: Skin is warm.   Nursing note and vitals reviewed.      Significant Labs:  CBC:  Recent Labs   Lab 11/09/18  0452 11/10/18  1006 11/11/18  0427   WBC 12.91* 12.67 11.66   RBC 2.95* 3.16* 2.80*   HGB 8.4* 9.2* 7.8*   HCT 26.6* 28.7* 26.0*    222 201   MCV 90 91 93   MCH 28.5 29.1 27.9   MCHC 31.6* 32.1 30.0*     BNP:  Recent Labs   Lab 11/05/18  0836 11/07/18  0300 11/09/18  0452   BNP 2,209* 1,450* 1,937*     CMP:  Recent Labs   Lab 11/07/18  0300  11/09/18  0452 11/10/18  0655 11/11/18  0427   *   < > 138* 112* 137*   CALCIUM 8.9   < > 8.5* 8.9 9.2   ALBUMIN 2.4*  --   --  2.3*  --    PROT 6.1  --   --  5.9*  --    *   < > 130* 133* 134*   K 4.0   < > 4.1 4.4 4.7   CO2 27   < > 28 27 27   CL 97   < > 96 99 99   BUN 32*   < > 25* 22 21   CREATININE 1.0   < > 0.9 0.8 0.9   ALKPHOS 67  --   --  56  --    ALT 13  --   --  11  --    AST 30  --   --  16  --    BILITOT 0.9  --   --  0.6  --     < > = values in this interval not displayed.      Coagulation:   Recent Labs   Lab 11/09/18  7215  11/10/18  0655 11/11/18  0427   INR 1.1 1.3* 1.4*     LDH:  No results for input(s): LDH in the last 72 hours.  Microbiology:  Microbiology Results (last 7 days)     Procedure Component Value Units Date/Time    Blood culture #2 **CANNOT BE ORDERED STAT** [[id number]] Collected:  11/03/18 2134    Order Status:  Completed Specimen:  Blood from Peripheral, Antecubital, Right Updated:  11/08/18 2312     Blood Culture, Routine No growth after 5 days.    Blood culture #1 **CANNOT BE ORDERED STAT** [[id number]] Collected:  11/03/18 2059    Order Status:  Completed Specimen:  Blood from Peripheral, Antecubital, Right Updated:  11/08/18 2312     Blood Culture, Routine No growth after 5 days.    Respiratory Viral Panel by PCR [facility]; Nasal Swab [[id number]] Collected:  11/03/18 2133    Order Status:  Completed Specimen:  Respiratory Updated:  11/06/18 1322     Respiratory Virus Panel, source Nasal Swab     RVP - Adenovirus Not Detected     Comment: Detects Serotypes B and E. Detection of Serotype C may   be limited. If Adenovirus infection is suspected and a   Not Detected result is returned the sample should be   re-tested for Adenovirus using an independent method  (e.g. [facility] Adenovirus Quantitative Real-Time  PCR test.          Enterovirus Not Detected     Comment: Cross-reactivity has been observed between certain Rhinovirus  strains and the Enterovirus assay.          Human Bocavirus Not Detected     Human Coronavirus Not Detected     Comment: The Human Coronavirus assay detects Human coronavirus types  229E, OC43,NL63 and HKU1.          RVP - Human Metapneumovirus (hMPV) Not Detected     RVP - Influenza A Not Detected     Influenza A - V1C5-14 Not Detected     RVP - Influenza B Not Detected     Parainfluenza Not Detected     Respiratory Syncytial VirusVirus (RSV) A Not Detected     Comment: The Respiratory Syncytial Viral assay detects types A and B,  however it does not distinguish between the two.           RVP - Rhinovirus Not Detected     Comment: Cross-Reactivity has been observed between certain   Rhinovirus strains and the Enterovirus assay.  Target Enriched Mulitplex Polymerase Chain Reaction (TEM-PCR)  allows for the detection of multiple pathogens out of a single  reaction.  This test was developed and its performance   characteristics determined by [facility].  It has not   been cleared or approved by the U.S.Food and Drug Administration.  Results should be used in conjunction with clinical findings,   and should not form the sole basis for a diagnosis or treatment  decision.  TEM-PCR is a licensed technology of [employer].         Narrative:       Receiving Lab:->[facility]          BMP:   Recent Labs   Lab 11/11/18 0427   *   *   K 4.7   CL 99   CO2 27   BUN 21   CREATININE 0.9   CALCIUM 9.2     Cardiac Markers: No results for input(s): CKMB, TROPONINT, MYOGLOBIN in the last 72 hours.  Coagulation:   Recent Labs   Lab 11/11/18 0427   INR 1.4*     I have reviewed all pertinent labs within the past 24 hours.    Estimated Creatinine Clearance: 74 mL/min (based on SCr of 0.9 mg/dL).    Diagnostic Results:  I have reviewed all pertinent imaging results/findings within the past 24 hours.

## 2018-11-12 DIAGNOSIS — J96.21 ACUTE ON CHRONIC RESPIRATORY FAILURE WITH HYPOXIA: Primary | ICD-10-CM

## 2018-11-12 PROBLEM — R07.9 CHEST PAIN: Status: RESOLVED | Noted: 2018-11-03 | Resolved: 2018-11-12

## 2018-11-12 PROBLEM — N18.2 CKD (CHRONIC KIDNEY DISEASE), STAGE II: Chronic | Status: ACTIVE | Noted: 2018-11-12

## 2018-11-12 PROBLEM — J18.9 PNA (PNEUMONIA): Status: RESOLVED | Noted: 2018-10-12 | Resolved: 2018-11-12

## 2018-11-12 LAB
ANION GAP SERPL CALC-SCNC: 6 MMOL/L
BASOPHILS # BLD AUTO: 0.06 K/UL
BASOPHILS NFR BLD: 0.5 %
BUN SERPL-MCNC: 20 MG/DL
CALCIUM SERPL-MCNC: 9.1 MG/DL
CHLORIDE SERPL-SCNC: 99 MMOL/L
CO2 SERPL-SCNC: 28 MMOL/L
CREAT SERPL-MCNC: 0.8 MG/DL
DIFFERENTIAL METHOD: ABNORMAL
EOSINOPHIL # BLD AUTO: 0.7 K/UL
EOSINOPHIL NFR BLD: 5.7 %
ERYTHROCYTE [DISTWIDTH] IN BLOOD BY AUTOMATED COUNT: 15 %
EST. GFR  (AFRICAN AMERICAN): >60 ML/MIN/1.73 M^2
EST. GFR  (NON AFRICAN AMERICAN): >60 ML/MIN/1.73 M^2
GLUCOSE SERPL-MCNC: 120 MG/DL
HCT VFR BLD AUTO: 26.3 %
HGB BLD-MCNC: 8.3 G/DL
IMM GRANULOCYTES # BLD AUTO: 0.14 K/UL
IMM GRANULOCYTES NFR BLD AUTO: 1.2 %
INR PPP: 1.5
LYMPHOCYTES # BLD AUTO: 1.4 K/UL
LYMPHOCYTES NFR BLD: 11.9 %
MAGNESIUM SERPL-MCNC: 1.6 MG/DL
MCH RBC QN AUTO: 28.9 PG
MCHC RBC AUTO-ENTMCNC: 31.6 G/DL
MCV RBC AUTO: 92 FL
MONOCYTES # BLD AUTO: 0.9 K/UL
MONOCYTES NFR BLD: 7.9 %
NEUTROPHILS # BLD AUTO: 8.5 K/UL
NEUTROPHILS NFR BLD: 72.8 %
NRBC BLD-RTO: 0 /100 WBC
PHOSPHATE SERPL-MCNC: 3.7 MG/DL
PLATELET # BLD AUTO: 190 K/UL
PMV BLD AUTO: 10.8 FL
POCT GLUCOSE: 133 MG/DL (ref 70–110)
POCT GLUCOSE: 174 MG/DL (ref 70–110)
POCT GLUCOSE: 205 MG/DL (ref 70–110)
POCT GLUCOSE: 208 MG/DL (ref 70–110)
POTASSIUM SERPL-SCNC: 4.2 MMOL/L
PROTHROMBIN TIME: 15.2 SEC
RBC # BLD AUTO: 2.87 M/UL
SODIUM SERPL-SCNC: 133 MMOL/L
WBC # BLD AUTO: 11.72 K/UL

## 2018-11-12 PROCEDURE — 85610 PROTHROMBIN TIME: CPT | Mod: NTX

## 2018-11-12 PROCEDURE — 84100 ASSAY OF PHOSPHORUS: CPT | Mod: NTX

## 2018-11-12 PROCEDURE — 25000003 PHARM REV CODE 250: Mod: NTX | Performed by: STUDENT IN AN ORGANIZED HEALTH CARE EDUCATION/TRAINING PROGRAM

## 2018-11-12 PROCEDURE — 83735 ASSAY OF MAGNESIUM: CPT | Mod: NTX

## 2018-11-12 PROCEDURE — 80048 BASIC METABOLIC PNL TOTAL CA: CPT | Mod: NTX

## 2018-11-12 PROCEDURE — 25000003 PHARM REV CODE 250: Mod: NTX | Performed by: INTERNAL MEDICINE

## 2018-11-12 PROCEDURE — 85025 COMPLETE CBC W/AUTO DIFF WBC: CPT | Mod: NTX

## 2018-11-12 PROCEDURE — 63600175 PHARM REV CODE 636 W HCPCS: Mod: NTX | Performed by: INTERNAL MEDICINE

## 2018-11-12 PROCEDURE — 99232 SBSQ HOSP IP/OBS MODERATE 35: CPT | Mod: NTX,,, | Performed by: INTERNAL MEDICINE

## 2018-11-12 PROCEDURE — 20600001 HC STEP DOWN PRIVATE ROOM: Mod: NTX

## 2018-11-12 PROCEDURE — 25000242 PHARM REV CODE 250 ALT 637 W/ HCPCS: Mod: NTX | Performed by: STUDENT IN AN ORGANIZED HEALTH CARE EDUCATION/TRAINING PROGRAM

## 2018-11-12 RX ORDER — LANOLIN ALCOHOL/MO/W.PET/CERES
400 CREAM (GRAM) TOPICAL 2 TIMES DAILY
Status: COMPLETED | OUTPATIENT
Start: 2018-11-12 | End: 2018-11-12

## 2018-11-12 RX ORDER — CETIRIZINE HYDROCHLORIDE 5 MG/1
10 TABLET ORAL DAILY
Status: DISCONTINUED | OUTPATIENT
Start: 2018-11-13 | End: 2018-11-14 | Stop reason: HOSPADM

## 2018-11-12 RX ADMIN — AMIODARONE HYDROCHLORIDE 200 MG: 200 TABLET ORAL at 09:11

## 2018-11-12 RX ADMIN — WARFARIN SODIUM 7.5 MG: 7.5 TABLET ORAL at 04:11

## 2018-11-12 RX ADMIN — SPIRONOLACTONE 12.5 MG: 25 TABLET, FILM COATED ORAL at 12:11

## 2018-11-12 RX ADMIN — INSULIN DETEMIR 9 UNITS: 100 INJECTION, SOLUTION SUBCUTANEOUS at 09:11

## 2018-11-12 RX ADMIN — AZELASTINE HYDROCHLORIDE 137 MCG: 137 SPRAY, METERED NASAL at 09:11

## 2018-11-12 RX ADMIN — ENALAPRIL MALEATE 2.5 MG: 2.5 TABLET ORAL at 09:11

## 2018-11-12 RX ADMIN — INSULIN ASPART 4 UNITS: 100 INJECTION, SOLUTION INTRAVENOUS; SUBCUTANEOUS at 06:11

## 2018-11-12 RX ADMIN — INSULIN ASPART 2 UNITS: 100 INJECTION, SOLUTION INTRAVENOUS; SUBCUTANEOUS at 12:11

## 2018-11-12 RX ADMIN — MAGNESIUM OXIDE TAB 400 MG (241.3 MG ELEMENTAL MG) 400 MG: 400 (241.3 MG) TAB at 09:11

## 2018-11-12 RX ADMIN — INSULIN ASPART 4 UNITS: 100 INJECTION, SOLUTION INTRAVENOUS; SUBCUTANEOUS at 09:11

## 2018-11-12 RX ADMIN — CETIRIZINE HYDROCHLORIDE 5 MG: 5 TABLET ORAL at 09:11

## 2018-11-12 RX ADMIN — DIGOXIN 0.12 MG: 125 TABLET ORAL at 09:11

## 2018-11-12 RX ADMIN — INSULIN ASPART 4 UNITS: 100 INJECTION, SOLUTION INTRAVENOUS; SUBCUTANEOUS at 12:11

## 2018-11-12 RX ADMIN — DOBUTAMINE HYDROCHLORIDE 5 MCG/KG/MIN: 200 INJECTION INTRAVENOUS at 10:11

## 2018-11-12 RX ADMIN — PRASUGREL 10 MG: 10 TABLET, FILM COATED ORAL at 09:11

## 2018-11-12 RX ADMIN — PANTOPRAZOLE SODIUM 40 MG: 40 TABLET, DELAYED RELEASE ORAL at 09:11

## 2018-11-12 RX ADMIN — INSULIN ASPART 1 UNITS: 100 INJECTION, SOLUTION INTRAVENOUS; SUBCUTANEOUS at 09:11

## 2018-11-12 RX ADMIN — ATORVASTATIN CALCIUM 80 MG: 20 TABLET, FILM COATED ORAL at 09:11

## 2018-11-12 RX ADMIN — ASPIRIN 81 MG CHEWABLE TABLET 81 MG: 81 TABLET CHEWABLE at 09:11

## 2018-11-12 RX ADMIN — FLUTICASONE PROPIONATE 100 MCG: 50 SPRAY, METERED NASAL at 09:11

## 2018-11-12 NOTE — PROGRESS NOTES
Patient had an 11 beat run of Vtach. Patient asymptomatic resting in bed. Notified MD Braeden. No further ordered were given. Will continue to monitor.

## 2018-11-12 NOTE — NURSING
Patient is 84% at rest on room air. Patient is 90-95% on 2L NC once oxygen back in place. Will continue to monitor.

## 2018-11-12 NOTE — ASSESSMENT & PLAN NOTE
- Resolved  - Continue Enalapril  2.5 BID     - [On 11/5 -Patient got hypotensive after receiving Valsartan 40 mg on 11/5. See plan of care . At home, patient had episodes of hypotension with SBP to 70-80 with metoprolol. Had similar events with medications in the past. Sensitive to medications.

## 2018-11-12 NOTE — ASSESSMENT & PLAN NOTE
CAD([facility] 6/2018: LIMA-LAD patent, % occluded at ostium, SVG-% occluded, SVG-D 100% occluded, pLCx 30%, 100% occluded OM, 100% LAD occlusion after takeoff of D1, D1 is tiny with 80% disease, SVG-OM 80% proximal stenosis with 50% at site of anastomosis.An SVG-OM OKSANA was placed at that time).   - No records here, none seen in care everywhere.  -  Given OKSANA in 6/2018 - Continue DAPT for now - ASA / Prasugrel. Patient on coumadin as well for AFl.

## 2018-11-12 NOTE — ASSESSMENT & PLAN NOTE
- Likely AOCI - Iron 21, TIBC- 219, Sat -10, Transferrin - 148, Ferritin - 726  - Patient on triple therapy as recent OKSANA to SVG to OM on 6/2018 and AFl (S/P LAVERNE/DCCV)

## 2018-11-12 NOTE — PLAN OF CARE
Problem: Patient Care Overview  Goal: Plan of Care Review  Plan of care discussed with patient.  Patient ambulating independently, fall precautions in place. Patient has no complaints of pain.  gtt continued. Start PO amio in AM. Discussed medications and care. Patient has no questions at this time.White board updated. Bed locked in lowest position. Side rails up x2. Will continue to monitor.

## 2018-11-12 NOTE — PROGRESS NOTES
[facility]  Heart Transplant  Progress Note    Patient Name: Bharat Coker  MRN: [id number]  Admission Date: 11/3/2018  Hospital Length of Stay: 9 days  Attending Physician: Jony Hendrickson Jr.,*  Primary Care Provider: Ronnie Richardson Jr, MD  Principal Problem:Supraventricular tachycardia    Subjective:     Interval History: No acute events overnight. Continues to have intermittent runs of NSVT-11 beats overnight. Plan on starting amiodarone today. No other complaints today.     Continuous Infusions:   DOBUTamine 5 mcg/kg/min (11/11/18 1549)     Scheduled Meds:   amiodarone  200 mg Oral Q8H    aspirin  81 mg Oral Daily    atorvastatin  80 mg Oral Nightly    azelastine  1 spray Nasal BID    cetirizine  5 mg Oral Daily    digoxin  0.125 mg Oral Daily    enalapril  2.5 mg Oral BID    fluticasone  2 spray Each Nare Daily    insulin aspart U-100  4 Units Subcutaneous TIDWM    insulin detemir U-100  9 Units Subcutaneous QHS    magnesium oxide  400 mg Oral BID    pantoprazole  40 mg Oral Daily    polyethylene glycol  17 g Oral Daily    prasugrel  10 mg Oral Daily    senna-docusate 8.6-50 mg  1 tablet Oral BID    sodium chloride 0.9%  10 mL Intravenous Q6H    warfarin  7.5 mg Oral Daily     PRN Meds:acetaminophen, dextrose 50%, dextrose 50%, docusate sodium, glucagon (human recombinant), glucose, glucose, insulin aspart U-100, ondansetron, ramelteon, sodium chloride, Flushing PICC Protocol **AND** sodium chloride 0.9% **AND** sodium chloride 0.9%    Review of patient's allergies indicates:   Allergen Reactions    Amiodarone analogues Anaphylaxis    Ativan [lorazepam] Anxiety     Objective:     Vital Signs (Most Recent):  Temp: 96.1 °F (35.6 °C) (11/12/18 0428)  Pulse: 94 (11/12/18 0700)  Resp: 16 (11/12/18 0428)  BP: (!) 108/55 (11/12/18 0428)  SpO2: (!) 90 % (11/12/18 0428) Vital Signs (24h Range):  Temp:  [96.1 °F (35.6 °C)-98.7 °F (37.1 °C)] 96.1 °F (35.6 °C)  Pulse:   [66-99] 94  Resp:  [16-18] 16  SpO2:  [88 %-96 %] 90 %  BP: ()/(49-59) 108/55     Patient Vitals for the past 72 hrs (Last 3 readings):   Weight   11/12/18 0700 78 kg (172 lb)   11/11/18 0700 78.1 kg (172 lb 2.9 oz)   11/10/18 0700 77.2 kg (170 lb 3.1 oz)     Body mass index is 27.76 kg/m².      Intake/Output Summary (Last 24 hours) at 11/12/2018 0827  Last data filed at 11/12/2018 0500  Gross per 24 hour   Intake 922.58 ml   Output 1125 ml   Net -202.42 ml       Hemodynamic Parameters:       Telemetry: Intermittent NST, NSR.     Physical Exam   Constitutional: He is oriented to person, place, and time. He appears well-developed and well-nourished.   HENT:   Head: Normocephalic and atraumatic.   Eyes: EOM are normal. Pupils are equal, round, and reactive to light.   Neck: Normal range of motion. Neck supple. No JVD present.   Cardiovascular: Normal rate, regular rhythm, normal heart sounds and intact distal pulses. Exam reveals no gallop and no friction rub.   No murmur heard.  Pulmonary/Chest: Effort normal and breath sounds normal. No respiratory distress. He has no wheezes. He has no rales. He exhibits no tenderness.   Abdominal: Soft. Bowel sounds are normal. He exhibits no distension and no mass. There is no tenderness.   Musculoskeletal: Normal range of motion. He exhibits no edema.   Neurological: He is alert and oriented to person, place, and time.   Skin: Skin is warm and dry.   Psychiatric: He has a normal mood and affect.   Vitals reviewed.      Significant Labs:  CBC:  Recent Labs   Lab 11/10/18  1006 11/11/18  0427 11/12/18  0452   WBC 12.67 11.66 11.72   RBC 3.16* 2.80* 2.87*   HGB 9.2* 7.8* 8.3*   HCT 28.7* 26.0* 26.3*    201 190   MCV 91 93 92   MCH 29.1 27.9 28.9   MCHC 32.1 30.0* 31.6*     BNP:  Recent Labs   Lab 11/05/18  0836 11/07/18  0300 11/09/18  0452   BNP 2,209* 1,450* 1,937*     CMP:  Recent Labs   Lab 11/07/18  0300  11/10/18  0655 11/11/18  0427 11/12/18  0452   *   <  > 112* 137* 120*   CALCIUM 8.9   < > 8.9 9.2 9.1   ALBUMIN 2.4*  --  2.3*  --   --    PROT 6.1  --  5.9*  --   --    *   < > 133* 134* 133*   K 4.0   < > 4.4 4.7 4.2   CO2 27   < > 27 27 28   CL 97   < > 99 99 99   BUN 32*   < > 22 21 20   CREATININE 1.0   < > 0.8 0.9 0.8   ALKPHOS 67  --  56  --   --    ALT 13  --  11  --   --    AST 30  --  16  --   --    BILITOT 0.9  --  0.6  --   --     < > = values in this interval not displayed.      Coagulation:   Recent Labs   Lab 11/10/18  0655 11/11/18  0427 11/12/18  0452   INR 1.3* 1.4* 1.5*     LDH:  No results for input(s): LDH in the last 72 hours.  Microbiology:  Microbiology Results (last 7 days)     Procedure Component Value Units Date/Time    Blood culture #2 **CANNOT BE ORDERED STAT** [[id number]] Collected:  11/03/18 2134    Order Status:  Completed Specimen:  Blood from Peripheral, Antecubital, Right Updated:  11/08/18 2312     Blood Culture, Routine No growth after 5 days.    Blood culture #1 **CANNOT BE ORDERED STAT** [[id number]] Collected:  11/03/18 2059    Order Status:  Completed Specimen:  Blood from Peripheral, Antecubital, Right Updated:  11/08/18 2312     Blood Culture, Routine No growth after 5 days.    Respiratory Viral Panel by PCR [facility]; Nasal Swab [[id number]] Collected:  11/03/18 2133    Order Status:  Completed Specimen:  Respiratory Updated:  11/06/18 1322     Respiratory Virus Panel, source Nasal Swab     RVP - Adenovirus Not Detected     Comment: Detects Serotypes B and E. Detection of Serotype C may   be limited. If Adenovirus infection is suspected and a   Not Detected result is returned the sample should be   re-tested for Adenovirus using an independent method  (e.g. [employer] Adenovirus Quantitative Real-Time  PCR test.          Enterovirus Not Detected     Comment: Cross-reactivity has been observed between certain Rhinovirus  strains and the Enterovirus assay.          Human Bocavirus Not Detected     Human Coronavirus Not  Detected     Comment: The Human Coronavirus assay detects Human coronavirus types  229E, OC43,NL63 and HKU1.          RVP - Human Metapneumovirus (hMPV) Not Detected     RVP - Influenza A Not Detected     Influenza A - A0D2-65 Not Detected     RVP - Influenza B Not Detected     Parainfluenza Not Detected     Respiratory Syncytial VirusVirus (RSV) A Not Detected     Comment: The Respiratory Syncytial Viral assay detects types A and B,  however it does not distinguish between the two.          RVP - Rhinovirus Not Detected     Comment: Cross-Reactivity has been observed between certain   Rhinovirus strains and the Enterovirus assay.  Target Enriched Mulitplex Polymerase Chain Reaction (TEM-PCR)  allows for the detection of multiple pathogens out of a single  reaction.  This test was developed and its performance   characteristics determined by [employer].  It has not   been cleared or approved by the U.S.Food and Drug Administration.  Results should be used in conjunction with clinical findings,   and should not form the sole basis for a diagnosis or treatment  decision.  TEM-PCR is a licensed technology of [employer].         Narrative:       Receiving Lab:->[facility]          I have reviewed all pertinent labs within the past 24 hours.    Estimated Creatinine Clearance: 83.3 mL/min (based on SCr of 0.8 mg/dL).    Diagnostic Results:  CT: No results found in the last 24 hours.  CXR: No results found in the last 24 hours.  U/S: No results found in the last 24 hours.  I have reviewed and interpreted all pertinent imaging results/findings within the past 24 hours.    Assessment and Plan:     Mr. Coker is a 74 y/o male admitted to [facility] for ADHF in the setting of recent Lasix adjustment and recent initiation of BB/CCB. PMHx of CAD s/p CABG, ICM/HFrEF (LVef10%) s/p Medtronic CRT-D, CAD([facility] 6/2018: LIMA-LAD patent, % occluded at ostium, SVG-% occluded, SVG-D 100% occluded, pLCx 30%, 100%  occluded OM, 100% LAD occlusion after takeoff of D1, D1 is tiny with 80% disease, SVG-OM 80% proximal stenosis with 50% at site of anastomosis.An SVG-OM OKSANA was placed at that time). Who presents with c/o acute sob which occurred around 7PM this evening. Patient was at usual health, states developed acute SOB, had been having worsening SUMNER. EMS called, he was found to be in SVT, given Adenosis and Cardizem and brought to ED. He was noted to be in SVT -230i hypotensive SBP 70s, given metoprolol IV, and 250cc NS bolus. Cardiology consulted. Medtronic device interrogated, A-sensed Bi-V paced.     At length discussion with spouse / patient, she has been logging his BP / HR / Weights on daily basis, patient has been hypotensive not given his Lisinopril / Metoprolol. Seen in clinic with Aman Adames, recently decreased lasix 80/80, 40/80.  Recently discharged 10/10-10/23 for ADHF, patient is a poor VAD candidate dt frailty and calcifications on CT, also found to be in AT s/p LAVERNE/DCCV started on Tikosyn initiate on 10/19/18          * Supraventricular tachycardia    - Appreciate EP consult. Had ICD interrogation on admission with multiple self terminating SVT episodes  - Ablation on 11/6  - EP follow up appreciated, settings changed.  - C/W digoxin 0.125 mg. ( Avoid hypokalemia)  - Patient never had true allergy, used 3 weeks of amiodarone and got SOB when he was first diagnosed with heart failure. - Started Amiodarone 200 mg po every 8 hours  - DC Tikosyn on 11/10.   - C/W coumadin, appreciate pharmacy assistance.     Anemia    - Likely AOCI - Iron 21, TIBC- 219, Sat -10, Transferrin - 148, Ferritin - 726  - Patient on triple therapy as recent OKSANA to SVG to OM on 6/2018 and AFl (S/P LAVERNE/DCCV)     Hypotension    - Resolved  - Continue Enalapril  2.5 BID     - [On 11/5 -Patient got hypotensive after receiving Valsartan 40 mg on 11/5. See plan of care . At home, patient had episodes of hypotension with SBP to 70-80  with metoprolol. Had similar events with medications in the past. Sensitive to medications.      Cardiogenic shock    Mr. Coker is a 72 y/o male admitted to \[facility]"" for ADHF in the setting of recent Lasix adjustment and recent initiation of BB/CCB. PMHx of CAD s/p CABG, ICM/HFrEF (LVef10%) s/p Medtronic CRT-D.   - Resolved  - Likely d/t  SVT   - Elevated BNP ~3000 -> improved to 1450, not on lasix since 11/4, CXR with diffuse pulmonary edema on admission.  - Held lasix on 11/4.  - Started on  5mcg this admission, PICC placed.    - TTE <10% EF, Severe RV dysfunction, moderate MR,  Moderate to severe AS, Mild TR, E/e' 24  - Low dose enalapril started on 11/7- 1.25 mg BID -> 2.5mg on 11/9 ( SBP > 107 with first dose).   - GDMT: Not on beta blocker due to being on dobutamine. On Enalapril 2.5mg BID, Digoxin 125mcg.   - Start Spironolactone 12.5mg daily    - Not a candidate for advanced options, if decompensates / does not improve may not be a candidate for MCS.  - Palliative consult to be ordered      - Closely monitor UOP, BMP, mag,   - Fluid restriction at 1500 cc with strict I/Os and daily STANDING weights  - Maintain on telemetry and daily EKGs   - Check Electrolytes, keep Mag >2 & K+ >4     Atrial tachycardia    H/O Aflutter - S/p LAVERNE / DCCV on Tikosyn on admission.   - Changed to Amiodarone 11/12/18.   Medtronic ICD interrogated, multiple SVT events noted.   - EP on board     Hepatic congestion    Bilirubin elevated 1.3 compared to baseline.   - CMP in AM   - Hopefully will clear with  / Lasix      Type 2 diabetes mellitus with circulatory disorder, without long-term current use of insulin    Hold PTA Glipizide   - ISS with accuchecks      CAD (coronary artery disease)    CAD([facility] 6/2018: LIMA-LAD patent, % occluded at ostium, SVG-% occluded, SVG-D 100% occluded, pLCx 30%, 100% occluded OM, 100% LAD occlusion after takeoff of D1, D1 is tiny with 80% disease, SVG-OM 80% proximal stenosis with 50%  at site of anastomosis.An SVG-OM OKSANA was placed at that time).   - No records here, none seen in care everywhere.  -  Given OKSANA in 6/2018 - Continue DAPT for now - ASA / Prasugrel. Patient on coumadin as well for AFl.          Erick Lacy MD  Heart Transplant  [facility]

## 2018-11-12 NOTE — SUBJECTIVE & OBJECTIVE
Interval History: No acute events overnight. Continues to have intermittent runs of NSVT-11 beats overnight. Plan on starting amiodarone today. No other complaints today.     Continuous Infusions:   DOBUTamine 5 mcg/kg/min (11/11/18 1549)     Scheduled Meds:   amiodarone  200 mg Oral Q8H    aspirin  81 mg Oral Daily    atorvastatin  80 mg Oral Nightly    azelastine  1 spray Nasal BID    cetirizine  5 mg Oral Daily    digoxin  0.125 mg Oral Daily    enalapril  2.5 mg Oral BID    fluticasone  2 spray Each Nare Daily    insulin aspart U-100  4 Units Subcutaneous TIDWM    insulin detemir U-100  9 Units Subcutaneous QHS    magnesium oxide  400 mg Oral BID    pantoprazole  40 mg Oral Daily    polyethylene glycol  17 g Oral Daily    prasugrel  10 mg Oral Daily    senna-docusate 8.6-50 mg  1 tablet Oral BID    sodium chloride 0.9%  10 mL Intravenous Q6H    warfarin  7.5 mg Oral Daily     PRN Meds:acetaminophen, dextrose 50%, dextrose 50%, docusate sodium, glucagon (human recombinant), glucose, glucose, insulin aspart U-100, ondansetron, ramelteon, sodium chloride, Flushing PICC Protocol **AND** sodium chloride 0.9% **AND** sodium chloride 0.9%    Review of patient's allergies indicates:   Allergen Reactions    Amiodarone analogues Anaphylaxis    Ativan [lorazepam] Anxiety     Objective:     Vital Signs (Most Recent):  Temp: 96.1 °F (35.6 °C) (11/12/18 0428)  Pulse: 94 (11/12/18 0700)  Resp: 16 (11/12/18 0428)  BP: (!) 108/55 (11/12/18 0428)  SpO2: (!) 90 % (11/12/18 0428) Vital Signs (24h Range):  Temp:  [96.1 °F (35.6 °C)-98.7 °F (37.1 °C)] 96.1 °F (35.6 °C)  Pulse:  [66-99] 94  Resp:  [16-18] 16  SpO2:  [88 %-96 %] 90 %  BP: ()/(49-59) 108/55     Patient Vitals for the past 72 hrs (Last 3 readings):   Weight   11/12/18 0700 78 kg (172 lb)   11/11/18 0700 78.1 kg (172 lb 2.9 oz)   11/10/18 0700 77.2 kg (170 lb 3.1 oz)     Body mass index is 27.76 kg/m².      Intake/Output Summary (Last 24 hours)  at 11/12/2018 0827  Last data filed at 11/12/2018 0500  Gross per 24 hour   Intake 922.58 ml   Output 1125 ml   Net -202.42 ml       Hemodynamic Parameters:       Telemetry: Intermittent NST, NSR.     Physical Exam   Constitutional: He is oriented to person, place, and time. He appears well-developed and well-nourished.   HENT:   Head: Normocephalic and atraumatic.   Eyes: EOM are normal. Pupils are equal, round, and reactive to light.   Neck: Normal range of motion. Neck supple. No JVD present.   Cardiovascular: Normal rate, regular rhythm, normal heart sounds and intact distal pulses. Exam reveals no gallop and no friction rub.   No murmur heard.  Pulmonary/Chest: Effort normal and breath sounds normal. No respiratory distress. He has no wheezes. He has no rales. He exhibits no tenderness.   Abdominal: Soft. Bowel sounds are normal. He exhibits no distension and no mass. There is no tenderness.   Musculoskeletal: Normal range of motion. He exhibits no edema.   Neurological: He is alert and oriented to person, place, and time.   Skin: Skin is warm and dry.   Psychiatric: He has a normal mood and affect.   Vitals reviewed.      Significant Labs:  CBC:  Recent Labs   Lab 11/10/18  1006 11/11/18 0427 11/12/18  0452   WBC 12.67 11.66 11.72   RBC 3.16* 2.80* 2.87*   HGB 9.2* 7.8* 8.3*   HCT 28.7* 26.0* 26.3*    201 190   MCV 91 93 92   MCH 29.1 27.9 28.9   MCHC 32.1 30.0* 31.6*     BNP:  Recent Labs   Lab 11/05/18  0836 11/07/18  0300 11/09/18  0452   BNP 2,209* 1,450* 1,937*     CMP:  Recent Labs   Lab 11/07/18  0300  11/10/18  0655 11/11/18  0427 11/12/18  0452   *   < > 112* 137* 120*   CALCIUM 8.9   < > 8.9 9.2 9.1   ALBUMIN 2.4*  --  2.3*  --   --    PROT 6.1  --  5.9*  --   --    *   < > 133* 134* 133*   K 4.0   < > 4.4 4.7 4.2   CO2 27   < > 27 27 28   CL 97   < > 99 99 99   BUN 32*   < > 22 21 20   CREATININE 1.0   < > 0.8 0.9 0.8   ALKPHOS 67  --  56  --   --    ALT 13  --  11  --   --     AST 30  --  16  --   --    BILITOT 0.9  --  0.6  --   --     < > = values in this interval not displayed.      Coagulation:   Recent Labs   Lab 11/10/18  0655 11/11/18  0427 11/12/18  0452   INR 1.3* 1.4* 1.5*     LDH:  No results for input(s): LDH in the last 72 hours.  Microbiology:  Microbiology Results (last 7 days)     Procedure Component Value Units Date/Time    Blood culture #2 **CANNOT BE ORDERED STAT** [[id number]] Collected:  11/03/18 2134    Order Status:  Completed Specimen:  Blood from Peripheral, Antecubital, Right Updated:  11/08/18 2312     Blood Culture, Routine No growth after 5 days.    Blood culture #1 **CANNOT BE ORDERED STAT** [[id number]] Collected:  11/03/18 2059    Order Status:  Completed Specimen:  Blood from Peripheral, Antecubital, Right Updated:  11/08/18 2312     Blood Culture, Routine No growth after 5 days.    Respiratory Viral Panel by PCR [place]; Nasal Swab [[id number]] Collected:  11/03/18 2133    Order Status:  Completed Specimen:  Respiratory Updated:  11/06/18 1322     Respiratory Virus Panel, source Nasal Swab     RVP - Adenovirus Not Detected     Comment: Detects Serotypes B and E. Detection of Serotype C may   be limited. If Adenovirus infection is suspected and a   Not Detected result is returned the sample should be   re-tested for Adenovirus using an independent method  (e.g. [employer] Adenovirus Quantitative Real-Time  PCR test.          Enterovirus Not Detected     Comment: Cross-reactivity has been observed between certain Rhinovirus  strains and the Enterovirus assay.          Human Bocavirus Not Detected     Human Coronavirus Not Detected     Comment: The Human Coronavirus assay detects Human coronavirus types  229E, OC43,NL63 and HKU1.          RVP - Human Metapneumovirus (hMPV) Not Detected     RVP - Influenza A Not Detected     Influenza A - [id number] Not Detected     RVP - Influenza B Not Detected     Parainfluenza Not Detected     Respiratory Syncytial  VirusVirus (RSV) A Not Detected     Comment: The Respiratory Syncytial Viral assay detects types A and B,  however it does not distinguish between the two.          RVP - Rhinovirus Not Detected     Comment: Cross-Reactivity has been observed between certain   Rhinovirus strains and the Enterovirus assay.  Target Enriched Mulitplex Polymerase Chain Reaction (TEM-PCR)  allows for the detection of multiple pathogens out of a single  reaction.  This test was developed and its performance   characteristics determined by [employer].  It has not   been cleared or approved by the U.S.Food and Drug Administration.  Results should be used in conjunction with clinical findings,   and should not form the sole basis for a diagnosis or treatment  decision.  TEM-PCR is a licensed technology of [employer].         Narrative:       Receiving Lab:->[facility]          I have reviewed all pertinent labs within the past 24 hours.    Estimated Creatinine Clearance: 83.3 mL/min (based on SCr of 0.8 mg/dL).    Diagnostic Results:  CT: No results found in the last 24 hours.  CXR: No results found in the last 24 hours.  U/S: No results found in the last 24 hours.  I have reviewed and interpreted all pertinent imaging results/findings within the past 24 hours.

## 2018-11-12 NOTE — NURSING
Dr. Lacy at bedside this AM and VORB to administer Amiodarone despite noted allergy of Anaphylaxis to this medication. MD did not want to have Benadryl on board or Epinephrine ordered prophylactically. Patient thinks the anaphylaxis reaction was inaccurate and did not require any interventions during occurrence.

## 2018-11-12 NOTE — ASSESSMENT & PLAN NOTE
H/O Aflutter - S/p LAVERNE / DCCV on Tikosyn on admission.   - Changed to Amiodarone 11/12/18.   Medtronic ICD interrogated, multiple SVT events noted.   - EP on board

## 2018-11-12 NOTE — NURSING
Karolyn Zavala notified patient has only voided approximately 50cc on own. NP orders to BS and straight cath q6hrs, PRN. Orders placed and to be performed q6hrs. . Will straight cath and continue to monitor.

## 2018-11-12 NOTE — ASSESSMENT & PLAN NOTE
- Appreciate EP consult. Had ICD interrogation on admission with multiple self terminating SVT episodes  - Ablation on 11/6  - EP follow up appreciated, settings changed.  - C/W digoxin 0.125 mg. ( Avoid hypokalemia)  - Patient never had true allergy, used 3 weeks of amiodarone and got SOB when he was first diagnosed with heart failure. - Started Amiodarone 200 mg po every 8 hours  - DC Tikosyn on 11/10.   - C/W coumadin, appreciate pharmacy assistance.

## 2018-11-12 NOTE — PROGRESS NOTES
Update:    SW to pt's room for update. Pt and wife aaox4, calm, and pleasant. Pt and wife report coping adequately at this time. Pt's wife requesting update on  arrangements. [facility] explained [facility] and [facility]. Pt and wife report understanding.Pt and wife report no other needs from SW at this time. SW providing ongoing psychosocial and counseling support, education, assistance, resources, and discharge planning as indicated. SW continuing to follow and remains available.

## 2018-11-12 NOTE — NURSING
15 beats of Vtach noted. Patient asymptomatic, Adelaida ODELL notified. Will continue to monitor.

## 2018-11-12 NOTE — PHYSICIAN QUERY
PT Name: Bharat Coker  MR #: [id number]  Physician Query Form - CKD Clarification     CDS/: Valery Vance RN, CCDS              Contact information:[email or web address]  This form is a permanent document in the medical record.     Query Date: November 12, 2018    By submitting this query, we are merely seeking further clarification of documentation. Please utilize your independent clinical judgment when addressing the question(s) below.    The Medical record contains the following:     Indicators   Supporting Clinical Findings   Location in Medical Record   X CKD or Chronic Kidney (Renal) Failure / Disease Given HF and CKD,  KASEY- Cardiorenal in nature 11/5 endocrine note  11/4 h/p, 11/5- 11/11 prog notes   X BUN/Creatinine                          GFR Cr    1.7->1.4->1.2->0.9->0.8  gfr 39.7->50.2-> >60     11/3- 11/12 labs    Dehydration      Nausea / Vomiting      Dialysis / CRRT      Medication     X Treatment BL Cr 1.1, on admission 1.7, improved to 0.9  Monitor BMP 11/11 prog note   X Other Chronic Conditions DM, CAD, ICM,  11/4 h/p    Other       Provider, please further specify the stage of CKD.      [  ] Chronic Kidney Disease (CKD) (please specify stage* below)       [employer] Definitions  Stage Description  eGFR (mL/min)   [  ]     I Slight kidney damage with normal or increased filtration 90+   [  ]     II Mildly reduced kidney function 60-89   [ X ]     III Moderately reduced kidney function 30-59                        Other (please specify): _heart failure_______________________    Clinically Undetermined         Please document in your progress notes daily for the duration of treatment until resolved and include in your discharge summary.

## 2018-11-12 NOTE — PLAN OF CARE
Reviewed BG levels and insulin regimen.     Goal BG while inpatient: 140-180 mg/dl  Current BG levels are slightly above goal    Recommendations:   -Continue Levemir 9 units, hs  -Continue Novolog 4 units AC  -POC AC/HS  -Low dose correction    Discharge Recommendations: TBD

## 2018-11-12 NOTE — ASSESSMENT & PLAN NOTE
Mr. Coker is a 74 y/o male admitted to [facility] for ADHF in the setting of recent Lasix adjustment and recent initiation of BB/CCB. PMHx of CAD s/p CABG, ICM/HFrEF (LVef10%) s/p Medtronic CRT-D.   - Resolved  - Likely d/t  SVT   - Elevated BNP ~3000 -> improved to 1450, not on lasix since 11/4, CXR with diffuse pulmonary edema on admission.  - Held lasix on 11/4.  - Started on  5mcg this admission, PICC placed.    - TTE <10% EF, Severe RV dysfunction, moderate MR,  Moderate to severe AS, Mild TR, E/e' 24  - Low dose enalapril started on 11/7- 1.25 mg BID -> 2.5mg on 11/9 ( SBP > 107 with first dose).   - GDMT: Not on beta blocker due to being on dobutamine. On Enalapril 2.5mg BID, Digoxin 125mcg.   - Start Spironolactone 12.5mg daily    - Not a candidate for advanced options, if decompensates / does not improve may not be a candidate for MCS.  - Palliative consult to be ordered      - Closely monitor UOP, BMP, mag,   - Fluid restriction at 1500 cc with strict I/Os and daily STANDING weights  - Maintain on telemetry and daily EKGs   - Check Electrolytes, keep Mag >2 & K+ >4

## 2018-11-13 DIAGNOSIS — I50.43 ACUTE ON CHRONIC COMBINED SYSTOLIC AND DIASTOLIC CONGESTIVE HEART FAILURE: Primary | ICD-10-CM

## 2018-11-13 PROBLEM — R57.0 CARDIOGENIC SHOCK: Status: RESOLVED | Noted: 2018-11-03 | Resolved: 2018-11-13

## 2018-11-13 PROBLEM — I95.9 HYPOTENSION: Status: RESOLVED | Noted: 2018-11-04 | Resolved: 2018-11-13

## 2018-11-13 LAB
ANION GAP SERPL CALC-SCNC: 5 MMOL/L
BASOPHILS # BLD AUTO: 0.07 K/UL
BASOPHILS NFR BLD: 0.6 %
BUN SERPL-MCNC: 18 MG/DL
CALCIUM SERPL-MCNC: 9.3 MG/DL
CHLORIDE SERPL-SCNC: 99 MMOL/L
CO2 SERPL-SCNC: 29 MMOL/L
CREAT SERPL-MCNC: 0.8 MG/DL
DIFFERENTIAL METHOD: ABNORMAL
EOSINOPHIL # BLD AUTO: 0.6 K/UL
EOSINOPHIL NFR BLD: 5.2 %
ERYTHROCYTE [DISTWIDTH] IN BLOOD BY AUTOMATED COUNT: 15 %
EST. GFR  (AFRICAN AMERICAN): >60 ML/MIN/1.73 M^2
EST. GFR  (NON AFRICAN AMERICAN): >60 ML/MIN/1.73 M^2
GLUCOSE SERPL-MCNC: 124 MG/DL
HCT VFR BLD AUTO: 27.3 %
HGB BLD-MCNC: 8.6 G/DL
IMM GRANULOCYTES # BLD AUTO: 0.14 K/UL
IMM GRANULOCYTES NFR BLD AUTO: 1.2 %
INR PPP: 1.6
LYMPHOCYTES # BLD AUTO: 1.2 K/UL
LYMPHOCYTES NFR BLD: 9.9 %
MAGNESIUM SERPL-MCNC: 1.7 MG/DL
MCH RBC QN AUTO: 28.4 PG
MCHC RBC AUTO-ENTMCNC: 31.5 G/DL
MCV RBC AUTO: 90 FL
MONOCYTES # BLD AUTO: 0.9 K/UL
MONOCYTES NFR BLD: 7.7 %
NEUTROPHILS # BLD AUTO: 9.2 K/UL
NEUTROPHILS NFR BLD: 75.4 %
NRBC BLD-RTO: 0 /100 WBC
PLATELET # BLD AUTO: 215 K/UL
PMV BLD AUTO: 10.8 FL
POCT GLUCOSE: 130 MG/DL (ref 70–110)
POCT GLUCOSE: 150 MG/DL (ref 70–110)
POCT GLUCOSE: 181 MG/DL (ref 70–110)
POCT GLUCOSE: 205 MG/DL (ref 70–110)
POTASSIUM SERPL-SCNC: 4.6 MMOL/L
PROTHROMBIN TIME: 15.6 SEC
RBC # BLD AUTO: 3.03 M/UL
SODIUM SERPL-SCNC: 133 MMOL/L
WBC # BLD AUTO: 12.15 K/UL

## 2018-11-13 PROCEDURE — 25000003 PHARM REV CODE 250: Mod: NTX | Performed by: INTERNAL MEDICINE

## 2018-11-13 PROCEDURE — 36415 COLL VENOUS BLD VENIPUNCTURE: CPT | Mod: NTX

## 2018-11-13 PROCEDURE — 83735 ASSAY OF MAGNESIUM: CPT | Mod: NTX

## 2018-11-13 PROCEDURE — 20600001 HC STEP DOWN PRIVATE ROOM: Mod: NTX

## 2018-11-13 PROCEDURE — 99233 SBSQ HOSP IP/OBS HIGH 50: CPT | Mod: NTX,,, | Performed by: INTERNAL MEDICINE

## 2018-11-13 PROCEDURE — 85025 COMPLETE CBC W/AUTO DIFF WBC: CPT | Mod: NTX

## 2018-11-13 PROCEDURE — A4216 STERILE WATER/SALINE, 10 ML: HCPCS | Mod: NTX | Performed by: INTERNAL MEDICINE

## 2018-11-13 PROCEDURE — 25000003 PHARM REV CODE 250: Mod: NTX | Performed by: STUDENT IN AN ORGANIZED HEALTH CARE EDUCATION/TRAINING PROGRAM

## 2018-11-13 PROCEDURE — 85610 PROTHROMBIN TIME: CPT | Mod: NTX

## 2018-11-13 PROCEDURE — 80048 BASIC METABOLIC PNL TOTAL CA: CPT | Mod: NTX

## 2018-11-13 RX ORDER — DOBUTAMINE HYDROCHLORIDE 400 MG/100ML
5 INJECTION, SOLUTION INTRAVENOUS CONTINUOUS
Qty: 250 ML
Start: 2018-11-13

## 2018-11-13 RX ORDER — WARFARIN 2.5 MG/1
2.5 TABLET ORAL ONCE
Status: COMPLETED | OUTPATIENT
Start: 2018-11-13 | End: 2018-11-13

## 2018-11-13 RX ORDER — SPIRONOLACTONE 25 MG/1
12.5 TABLET ORAL DAILY
Qty: 15 TABLET | Refills: 0 | Status: SHIPPED | OUTPATIENT
Start: 2018-11-14 | End: 2018-11-13 | Stop reason: SDUPTHER

## 2018-11-13 RX ORDER — WARFARIN 7.5 MG/1
7.5 TABLET ORAL DAILY
Qty: 30 TABLET | Refills: 0 | Status: SHIPPED | OUTPATIENT
Start: 2018-11-13 | End: 2018-12-07 | Stop reason: SDUPTHER

## 2018-11-13 RX ORDER — SPIRONOLACTONE 25 MG/1
12.5 TABLET ORAL DAILY
Qty: 15 TABLET | Refills: 11 | Status: ON HOLD | OUTPATIENT
Start: 2018-11-14 | End: 2019-01-14 | Stop reason: HOSPADM

## 2018-11-13 RX ORDER — ENALAPRIL MALEATE 2.5 MG/1
TABLET ORAL
Qty: 180 TABLET | Refills: 0 | OUTPATIENT
Start: 2018-11-13

## 2018-11-13 RX ORDER — AMIODARONE HYDROCHLORIDE 200 MG/1
TABLET ORAL
Qty: 270 TABLET | Refills: 0 | OUTPATIENT
Start: 2018-11-13

## 2018-11-13 RX ORDER — WARFARIN 7.5 MG/1
TABLET ORAL
Qty: 90 TABLET | Refills: 0 | OUTPATIENT
Start: 2018-11-13

## 2018-11-13 RX ORDER — ENALAPRIL MALEATE 2.5 MG/1
2.5 TABLET ORAL 2 TIMES DAILY
Qty: 60 TABLET | Refills: 0 | Status: SHIPPED | OUTPATIENT
Start: 2018-11-13 | End: 2018-11-13 | Stop reason: SDUPTHER

## 2018-11-13 RX ORDER — AMIODARONE HYDROCHLORIDE 200 MG/1
200 TABLET ORAL EVERY 8 HOURS
Qty: 42 TABLET | Refills: 0 | Status: SHIPPED | OUTPATIENT
Start: 2018-11-13 | End: 2018-11-13 | Stop reason: SDUPTHER

## 2018-11-13 RX ORDER — DIGOXIN 125 MCG
0.12 TABLET ORAL DAILY
Qty: 30 TABLET | Refills: 0 | Status: SHIPPED | OUTPATIENT
Start: 2018-11-14 | End: 2018-11-13 | Stop reason: SDUPTHER

## 2018-11-13 RX ORDER — INSULIN ASPART 100 [IU]/ML
5 INJECTION, SOLUTION INTRAVENOUS; SUBCUTANEOUS
Status: DISCONTINUED | OUTPATIENT
Start: 2018-11-13 | End: 2018-11-14

## 2018-11-13 RX ORDER — DIGOXIN 125 MCG
0.12 TABLET ORAL DAILY
Qty: 30 TABLET | Refills: 11 | Status: ON HOLD | OUTPATIENT
Start: 2018-11-14 | End: 2019-01-14 | Stop reason: HOSPADM

## 2018-11-13 RX ORDER — INSULIN ASPART 100 [IU]/ML
5 INJECTION, SOLUTION INTRAVENOUS; SUBCUTANEOUS
Status: DISCONTINUED | OUTPATIENT
Start: 2018-11-13 | End: 2018-11-13

## 2018-11-13 RX ORDER — SPIRONOLACTONE 25 MG/1
TABLET ORAL
Qty: 45 TABLET | Refills: 0 | OUTPATIENT
Start: 2018-11-13

## 2018-11-13 RX ORDER — ENALAPRIL MALEATE 2.5 MG/1
2.5 TABLET ORAL 2 TIMES DAILY
Qty: 60 TABLET | Refills: 11 | Status: SHIPPED | OUTPATIENT
Start: 2018-11-13 | End: 2018-11-30 | Stop reason: DRUGHIGH

## 2018-11-13 RX ORDER — DIGOXIN 125 UG/1
TABLET ORAL
Qty: 90 TABLET | Refills: 0 | OUTPATIENT
Start: 2018-11-13

## 2018-11-13 RX ORDER — AMIODARONE HYDROCHLORIDE 200 MG/1
200 TABLET ORAL DAILY
Qty: 30 TABLET | Refills: 11 | Status: SHIPPED | OUTPATIENT
Start: 2018-11-27 | End: 2018-11-26

## 2018-11-13 RX ORDER — LANOLIN ALCOHOL/MO/W.PET/CERES
400 CREAM (GRAM) TOPICAL 3 TIMES DAILY
Status: COMPLETED | OUTPATIENT
Start: 2018-11-13 | End: 2018-11-13

## 2018-11-13 RX ADMIN — MAGNESIUM OXIDE TAB 400 MG (241.3 MG ELEMENTAL MG) 400 MG: 400 (241.3 MG) TAB at 09:11

## 2018-11-13 RX ADMIN — WARFARIN SODIUM 2.5 MG: 2.5 TABLET ORAL at 05:11

## 2018-11-13 RX ADMIN — Medication 10 ML: at 12:11

## 2018-11-13 RX ADMIN — PRASUGREL 10 MG: 10 TABLET, FILM COATED ORAL at 08:11

## 2018-11-13 RX ADMIN — AMIODARONE HYDROCHLORIDE 200 MG: 200 TABLET ORAL at 03:11

## 2018-11-13 RX ADMIN — INSULIN ASPART 5 UNITS: 100 INJECTION, SOLUTION INTRAVENOUS; SUBCUTANEOUS at 09:11

## 2018-11-13 RX ADMIN — Medication 10 ML: at 06:11

## 2018-11-13 RX ADMIN — ENALAPRIL MALEATE 2.5 MG: 2.5 TABLET ORAL at 09:11

## 2018-11-13 RX ADMIN — INSULIN DETEMIR 9 UNITS: 100 INJECTION, SOLUTION SUBCUTANEOUS at 09:11

## 2018-11-13 RX ADMIN — INSULIN ASPART 5 UNITS: 100 INJECTION, SOLUTION INTRAVENOUS; SUBCUTANEOUS at 12:11

## 2018-11-13 RX ADMIN — ASPIRIN 81 MG CHEWABLE TABLET 81 MG: 81 TABLET CHEWABLE at 08:11

## 2018-11-13 RX ADMIN — CETIRIZINE HYDROCHLORIDE 10 MG: 5 TABLET ORAL at 08:11

## 2018-11-13 RX ADMIN — PANTOPRAZOLE SODIUM 40 MG: 40 TABLET, DELAYED RELEASE ORAL at 08:11

## 2018-11-13 RX ADMIN — ATORVASTATIN CALCIUM 80 MG: 20 TABLET, FILM COATED ORAL at 09:11

## 2018-11-13 RX ADMIN — ENALAPRIL MALEATE 2.5 MG: 2.5 TABLET ORAL at 08:11

## 2018-11-13 RX ADMIN — Medication 10 ML: at 05:11

## 2018-11-13 RX ADMIN — INSULIN ASPART 5 UNITS: 100 INJECTION, SOLUTION INTRAVENOUS; SUBCUTANEOUS at 05:11

## 2018-11-13 RX ADMIN — AMIODARONE HYDROCHLORIDE 200 MG: 200 TABLET ORAL at 06:11

## 2018-11-13 RX ADMIN — SPIRONOLACTONE 12.5 MG: 25 TABLET, FILM COATED ORAL at 08:11

## 2018-11-13 RX ADMIN — AMIODARONE HYDROCHLORIDE 200 MG: 200 TABLET ORAL at 09:11

## 2018-11-13 RX ADMIN — WARFARIN SODIUM 7.5 MG: 7.5 TABLET ORAL at 05:11

## 2018-11-13 RX ADMIN — INSULIN ASPART 1 UNITS: 100 INJECTION, SOLUTION INTRAVENOUS; SUBCUTANEOUS at 09:11

## 2018-11-13 RX ADMIN — DIGOXIN 0.12 MG: 125 TABLET ORAL at 08:11

## 2018-11-13 RX ADMIN — MAGNESIUM OXIDE TAB 400 MG (241.3 MG ELEMENTAL MG) 400 MG: 400 (241.3 MG) TAB at 03:11

## 2018-11-13 NOTE — PROGRESS NOTES
" [facility]  Adult Nutrition  Progress Note    SUMMARY       Recommendations    Recommendation/Intervention:   1. Continue current cardiac diet with Boost Plus.   -If vit K content of Boost a concern, change to Optisource (vit K free supplement).   2. RD following.    Goals: Intake >50% of meals  Nutrition Goal Status: new  Communication of RD Recs: (POC)    Reason for Assessment    Reason for Assessment: length of stay  Diagnosis: cardiac disease(supraventricular tachycardia)  Relevant Medical History: CHF, COPD, CAD s/p CABG, DM, Afib, CKD2    General Information Comments: Pt admitted with cardiogenic shock, s/p ablation on 11/6. Pt reports good appetite at this time eating 100% of meals. Reports poor appetite for a few days PTA due to fluid. Denies wt loss. Following low sodium, 1500 ml fluid restricted diet. Family member at bedside with questions regarding Boost and fluid restrictions. Told her that if concerned she could count it as a fluid. Pt appears nourished.    Nutrition Discharge Planning: Adequate PO intake on cardiac diet    Nutrition Risk Screen    Nutrition Risk Screen: no indicators present    Nutrition/Diet History    Patient Reported Diet/Restrictions/Preferences: low salt(1500 ml FR)  Do you have any cultural, spiritual, Oriental orthodox conflicts, given your current situation?: none stated   Factors Affecting Nutritional Intake: None identified at this time    Anthropometrics    Temp: 97.9 °F (36.6 °C)  Height Method: Stated  Height: 5' 6" (167.6 cm)  Height (inches): 66 in  Weight Method: Standard Scale  Weight: 77.5 kg (170 lb 13.7 oz)  Weight (lb): 170.86 lb  Ideal Body Weight (IBW), Male: 142 lb  % Ideal Body Weight, Male (lb): 120.32 lb  BMI (Calculated): 27.6  BMI Grade: 25 - 29.9 - overweight       Lab/Procedures/Meds    Pertinent Labs Reviewed: reviewed  Pertinent Labs Comments: Na 133, Glu 124  Pertinent Medications Reviewed: reviewed  Pertinent Medications Comments: " statin, insulin, Mg, pantoprazole, polyethylene glycol, senna docusate, spironolactone, warfarin, dobutamine    Physical Findings/Assessment    Overall Physical Appearance: advanced age, nourished  Oral/Mouth Cavity: WDL  Skin: incision(s)    Estimated/Assessed Needs    Weight Used For Calorie Calculations: 73.5 kg (162 lb 0.6 oz)(dosing weight)  Energy Calorie Requirements (kcal): 1791  Energy Need Method: Alcorn-St Jeor(x 1.25 (PAL))  Protein Requirements: 78-93 gm (1.0-1.2 gm/kg)  Weight Used For Protein Calculations: 77.5 kg (170 lb 13.7 oz)  Fluid Requirements (mL): per MD     RDA Method (mL): 1791       Nutrition Prescription Ordered    Current Diet Order: Cardiac  Oral Nutrition Supplement: Boost Glucose Control    Evaluation of Received Nutrient/Fluid Intake    I/O: -259ml x 24 hrs (-1.8L since admit)  Comments: LBM 11/11  % Intake of Estimated Energy Needs: 75 - 100 %  % Meal Intake: 75 - 100 %    Nutrition Risk    Level of Risk/Frequency of Follow-up: low     Assessment and Plan    Nutrition Problem  Decreased sodium needs    Related to (etiology):   CHF and COPD    Signs and Symptoms (as evidenced by):   EF 10, admitted for cardiogenic shock     Interventions/Recommendations (treatment strategy):  Decreased sodium diet    Nutrition Diagnosis Status:   New      Monitor and Evaluation    Food and Nutrient Intake: energy intake, food and beverage intake  Food and Nutrient Adminstration: diet order  Knowledge/Beliefs/Attitudes: food and nutrition knowledge/skill  Physical Activity and Function: nutrition-related ADLs and IADLs  Anthropometric Measurements: weight, weight change, body mass index  Biochemical Data, Medical Tests and Procedures: electrolyte and renal panel, gastrointestinal profile, glucose/endocrine profile, inflammatory profile  Nutrition-Focused Physical Findings: overall appearance     Nutrition Follow-Up    RD Follow-up?: Yes

## 2018-11-13 NOTE — PLAN OF CARE
Problem: Patient Care Overview  Goal: Plan of Care Review  Plan of care discussed with patient.  Patient ambulating independently, fall precautions in place. Patient has no complaints of pain. Discussed medications and care.  gtt continued. . SSI coverage needed. D/C pending home  approval.  Patient has no questions at this time.White board updated. Bed locked in lowest position. Side rails up x2. Will continue to monitor.

## 2018-11-13 NOTE — PROGRESS NOTES
Patient had a 6 beat run of Vtach. Patient asymptomatic resting in bed. Notified MD Hipolito. No further orders were given. Will continue to monitor.

## 2018-11-13 NOTE — PLAN OF CARE
Problem: Patient Care Overview  Goal: Plan of Care Review      Recommendations     Recommendation/Intervention:   1. Continue current cardiac diet with Boost Plus.   -If vit K content of Boost a concern, change to Optisource (vit K free supplement).   2. RD following.     Goals: Intake >50% of meals  Nutrition Goal Status: new

## 2018-11-13 NOTE — PLAN OF CARE
Reviewed BG levels and insulin regimen.     Goal BG while inpatient: 140-180 mg/dl  Fasting BG level is at goal  Prandial BG levels are slightly above goal    Recommendations:   -Continue Levemir 9 units, hs  -Increase Novolog from 4 to 5 units AC  -POC AC/HS  -Low dose correction    Discharge Recommendations: TBD

## 2018-11-13 NOTE — PROGRESS NOTES
[facility]  Heart Transplant  Progress Note    Patient Name: Bharat Coker  MRN: [id number]  Admission Date: 11/3/2018  Hospital Length of Stay: 10 days  Attending Physician: Jony Hendrickson Jr.,*  Primary Care Provider: Ronnie Richardson Jr, MD  Principal Problem:Supraventricular tachycardia    Subjective:     Interval History: No acute events overnight. Continues to have intermittent runs of NSVT overnight. Started on amiodarone. Breathing is improved today. No complaints. Not ambulating. Remains on 2L NC at rest.     Continuous Infusions:   DOBUTamine 5 mcg/kg/min (11/12/18 6172)     Scheduled Meds:   amiodarone  200 mg Oral Q8H    aspirin  81 mg Oral Daily    atorvastatin  80 mg Oral Nightly    azelastine  1 spray Nasal BID    cetirizine  10 mg Oral Daily    digoxin  0.125 mg Oral Daily    enalapril  2.5 mg Oral BID    fluticasone  2 spray Each Nare Daily    insulin aspart U-100  5 Units Subcutaneous TIDWM    insulin detemir U-100  9 Units Subcutaneous QHS    pantoprazole  40 mg Oral Daily    polyethylene glycol  17 g Oral Daily    prasugrel  10 mg Oral Daily    senna-docusate 8.6-50 mg  1 tablet Oral BID    sodium chloride 0.9%  10 mL Intravenous Q6H    spironolactone  12.5 mg Oral Daily    warfarin  7.5 mg Oral Daily     PRN Meds:acetaminophen, dextrose 50%, dextrose 50%, docusate sodium, glucagon (human recombinant), glucose, glucose, insulin aspart U-100, ondansetron, ramelteon, sodium chloride, Flushing PICC Protocol **AND** sodium chloride 0.9% **AND** sodium chloride 0.9%    Review of patient's allergies indicates:   Allergen Reactions    Amiodarone analogues Anaphylaxis    Ativan [lorazepam] Anxiety     Objective:     Vital Signs (Most Recent):  Temp: 97.4 °F (36.3 °C) (11/13/18 0810)  Pulse: 99 (11/13/18 0810)  Resp: 18 (11/13/18 0810)  BP: (!) 102/51 (11/13/18 0810)  SpO2: (!) 93 % (11/13/18 0810) Vital Signs (24h Range):  Temp:  [96.2 °F (35.7 °C)-98.3 °F  (36.8 °C)] 97.4 °F (36.3 °C)  Pulse:  [] 99  Resp:  [16-18] 18  SpO2:  [84 %-97 %] 93 %  BP: ()/(50-60) 102/51     Patient Vitals for the past 72 hrs (Last 3 readings):   Weight   11/12/18 0700 78 kg (172 lb)   11/11/18 0700 78.1 kg (172 lb 2.9 oz)     Body mass index is 27.76 kg/m².      Intake/Output Summary (Last 24 hours) at 11/13/2018 0816  Last data filed at 11/13/2018 0500  Gross per 24 hour   Intake 676.5 ml   Output 1175 ml   Net -498.5 ml       Hemodynamic Parameters:       Telemetry: Intermittent NST, NSR.     Physical Exam   Constitutional: He is oriented to person, place, and time. He appears well-developed and well-nourished.   HENT:   Head: Normocephalic and atraumatic.   Eyes: EOM are normal. Pupils are equal, round, and reactive to light.   Neck: Normal range of motion. Neck supple. No JVD present.   Cardiovascular: Normal rate, regular rhythm, normal heart sounds and intact distal pulses. Exam reveals no gallop and no friction rub.   No murmur heard.  Pulmonary/Chest: Effort normal and breath sounds normal. No respiratory distress. He has no wheezes. He has no rales. He exhibits no tenderness.   Abdominal: Soft. Bowel sounds are normal. He exhibits no distension and no mass. There is no tenderness.   Musculoskeletal: Normal range of motion. He exhibits no edema.   Neurological: He is alert and oriented to person, place, and time.   Skin: Skin is warm and dry.   Psychiatric: He has a normal mood and affect.   Vitals reviewed.      Significant Labs:  CBC:  Recent Labs   Lab 11/11/18  0427 11/12/18  0452 11/13/18  0702   WBC 11.66 11.72 12.15   RBC 2.80* 2.87* 3.03*   HGB 7.8* 8.3* 8.6*   HCT 26.0* 26.3* 27.3*    190 215   MCV 93 92 90   MCH 27.9 28.9 28.4   MCHC 30.0* 31.6* 31.5*     BNP:  Recent Labs   Lab 11/07/18  0300 11/09/18  0452   BNP 1,450* 1,937*     CMP:  Recent Labs   Lab 11/07/18  0300  11/10/18  0655 11/11/18  0427 11/12/18  0452 11/13/18  0702   *   < > 112*  137* 120* 124*   CALCIUM 8.9   < > 8.9 9.2 9.1 9.3   ALBUMIN 2.4*  --  2.3*  --   --   --    PROT 6.1  --  5.9*  --   --   --    *   < > 133* 134* 133* 133*   K 4.0   < > 4.4 4.7 4.2 4.6   CO2 27   < > 27 27 28 29   CL 97   < > 99 99 99 99   BUN 32*   < > 22 21 20 18   CREATININE 1.0   < > 0.8 0.9 0.8 0.8   ALKPHOS 67  --  56  --   --   --    ALT 13  --  11  --   --   --    AST 30  --  16  --   --   --    BILITOT 0.9  --  0.6  --   --   --     < > = values in this interval not displayed.      Coagulation:   Recent Labs   Lab 11/11/18  0427 11/12/18  0452 11/13/18  0702   INR 1.4* 1.5* 1.6*     LDH:  No results for input(s): LDH in the last 72 hours.  Microbiology:  Microbiology Results (last 7 days)     Procedure Component Value Units Date/Time    Blood culture #2 **CANNOT BE ORDERED STAT** [[id number]] Collected:  11/03/18 2134    Order Status:  Completed Specimen:  Blood from Peripheral, Antecubital, Right Updated:  11/08/18 2312     Blood Culture, Routine No growth after 5 days.    Blood culture #1 **CANNOT BE ORDERED STAT** [[id number]] Collected:  11/03/18 2059    Order Status:  Completed Specimen:  Blood from Peripheral, Antecubital, Right Updated:  11/08/18 2312     Blood Culture, Routine No growth after 5 days.    Respiratory Viral Panel by PCR [facility]; Nasal Swab [[id number]] Collected:  11/03/18 2133    Order Status:  Completed Specimen:  Respiratory Updated:  11/06/18 1322     Respiratory Virus Panel, source Nasal Swab     RVP - Adenovirus Not Detected     Comment: Detects Serotypes B and E. Detection of Serotype C may   be limited. If Adenovirus infection is suspected and a   Not Detected result is returned the sample should be   re-tested for Adenovirus using an independent method  (e.g. [employer] Adenovirus Quantitative Real-Time  PCR test.          Enterovirus Not Detected     Comment: Cross-reactivity has been observed between certain Rhinovirus  strains and the Enterovirus assay.           Human Bocavirus Not Detected     Human Coronavirus Not Detected     Comment: The Human Coronavirus assay detects Human coronavirus types  229E, OC43,NL63 and HKU1.          RVP - Human Metapneumovirus (hMPV) Not Detected     RVP - Influenza A Not Detected     Influenza A - [id number] Not Detected     RVP - Influenza B Not Detected     Parainfluenza Not Detected     Respiratory Syncytial VirusVirus (RSV) A Not Detected     Comment: The Respiratory Syncytial Viral assay detects types A and B,  however it does not distinguish between the two.          RVP - Rhinovirus Not Detected     Comment: Cross-Reactivity has been observed between certain   Rhinovirus strains and the Enterovirus assay.  Target Enriched Mulitplex Polymerase Chain Reaction (TEM-PCR)  allows for the detection of multiple pathogens out of a single  reaction.  This test was developed and its performance   characteristics determined by [employer].  It has not   been cleared or approved by the U.S.Food and Drug Administration.  Results should be used in conjunction with clinical findings,   and should not form the sole basis for a diagnosis or treatment  decision.  TEM-PCR is a licensed technology of [employer].         Narrative:       Receiving Lab:->[facility]          I have reviewed all pertinent labs within the past 24 hours.    Estimated Creatinine Clearance: 83.3 mL/min (based on SCr of 0.8 mg/dL).    Diagnostic Results:  CT: No results found in the last 24 hours.  CXR: No results found in the last 24 hours.  U/S: No results found in the last 24 hours.  I have reviewed and interpreted all pertinent imaging results/findings within the past 24 hours.    Assessment and Plan:     Mr. Coker is a 72 y/o male admitted to [facility] for ADHF in the setting of recent Lasix adjustment and recent initiation of BB/CCB. PMHx of CAD s/p CABG, ICM/HFrEF (LVef10%) s/p Medtronic CRT-D, CAD([facility] 6/2018: LIMA-LAD patent, % occluded at ostium,  SVG-% occluded, SVG-D 100% occluded, pLCx 30%, 100% occluded OM, 100% LAD occlusion after takeoff of D1, D1 is tiny with 80% disease, SVG-OM 80% proximal stenosis with 50% at site of anastomosis.An SVG-OM OKSANA was placed at that time). Who presents with c/o acute sob which occurred around 7PM this evening. Patient was at usual health, states developed acute SOB, had been having worsening SUMNER. EMS called, he was found to be in SVT, given Adenosis and Cardizem and brought to ED. He was noted to be in SVT -277n hypotensive SBP 70s, given metoprolol IV, and 250cc NS bolus. Cardiology consulted. Medtronic device interrogated, A-sensed Bi-V paced.     At length discussion with spouse / patient, she has been logging his BP / HR / Weights on daily basis, patient has been hypotensive not given his Lisinopril / Metoprolol. Seen in clinic with Aman Adames, recently decreased lasix 80/80, 40/80.  Recently discharged 10/10-10/23 for ADHF, patient is a poor VAD candidate dt frailty and calcifications on CT, also found to be in AT s/p LAVERNE/DCCV started on Tikosyn initiate on 10/19/18          * Supraventricular tachycardia    - Appreciate EP consult. Had ICD interrogation on admission with multiple self terminating SVT episodes  - Ablation on 11/6  - EP follow up appreciated, settings changed.  - C/W digoxin 0.125 mg. ( Avoid hypokalemia)  - Patient never had true allergy, used 3 weeks of amiodarone and got SOB when he was first diagnosed with heart failure. - Started Amiodarone 200 mg po every 8 hours  - DC Tikosyn on 11/10.   - C/W coumadin, appreciate pharmacy assistance.     Anemia    - Likely AOCI - Iron 21, TIBC- 219, Sat -10, Transferrin - 148, Ferritin - 726  - Patient on triple therapy as recent OKSANA to SVG to OM on 6/2018 and AFl (S/P LAVERNE/DCCV)     Hypotension    - Resolved  - Continue Enalapril  2.5 BID     - [On 11/5 -Patient got hypotensive after receiving Valsartan 40 mg on 11/5. See plan of care . At  home, patient had episodes of hypotension with SBP to 70-80 with metoprolol. Had similar events with medications in the past. Sensitive to medications.      Cardiogenic shock    Mr. Coker is a 72 y/o male admitted to [facility] for ADHF in the setting of recent Lasix adjustment and recent initiation of BB/CCB. PMHx of CAD s/p CABG, ICM/HFrEF (LVef10%) s/p Medtronic CRT-D.   - Resolved  - Likely d/t  SVT   - Elevated BNP ~3000 -> improved to 1450, not on lasix since 11/4, CXR with diffuse pulmonary edema on admission.  - Held lasix on 11/4.  - Started on  5mcg this admission, PICC placed.    - TTE <10% EF, Severe RV dysfunction, moderate MR,  Moderate to severe AS, Mild TR, E/e' 24  - Low dose enalapril started on 11/7- 1.25 mg BID -> 2.5mg on 11/9 ( SBP > 107 with first dose).   - GDMT: Not on beta blocker due to being on dobutamine. On Enalapril 2.5mg BID, Digoxin 125mcg.   - Start Spironolactone 12.5mg daily    - Not a candidate for advanced options, if decompensates / does not improve may not be a candidate for MCS.  - Palliative consult to be ordered      - Closely monitor UOP, BMP, mag,   - Fluid restriction at 1500 cc with strict I/Os and daily STANDING weights  - Maintain on telemetry and daily EKGs   - Check Electrolytes, keep Mag >2 & K+ >4     Atrial tachycardia    H/O Aflutter - S/p LAVERNE / DCCV on Tikosyn on admission.   - Changed to Amiodarone 11/12/18.   Medtronic ICD interrogated, multiple SVT events noted.   - EP on board     Hepatic congestion    Bilirubin elevated 1.3 compared to baseline.   - CMP in AM   - Hopefully will clear with  / Lasix      Type 2 diabetes mellitus with circulatory disorder, without long-term current use of insulin    Hold PTA Glipizide   - ISS with accuchecks      CAD (coronary artery disease)    CAD([facility] 6/2018: LIMA-LAD patent, % occluded at ostium, SVG-% occluded, SVG-D 100% occluded, pLCx 30%, 100% occluded OM, 100% LAD occlusion after takeoff of D1, D1 is  tiny with 80% disease, SVG-OM 80% proximal stenosis with 50% at site of anastomosis.An SVG-OM OKSANA was placed at that time).   - No records here, none seen in care everywhere.  -  Given OKSANA in 6/2018 - Continue DAPT for now - ASA / Prasugrel. Patient on coumadin as well for AFl.          Erick Lacy MD  Heart Transplant  [facility]

## 2018-11-13 NOTE — PROGRESS NOTES
Update:    8:30 am - [facility]. [facility] at [facility] and confirmed she received the orders. Cayden reports HH is still approved, however she is still waiting on  to be approved.     2:30- S to pt's room for update. Pt and wife aaox4, calm, and pleasant. Pt's wife with many questions about medications pt will be D/C'ed on. MELISSA requested Dr. Lacy discuss medication with pt's wife.     4:30 - MELISSA spoke to [facility] at [facility] who reports  is approved and can be arranged through [facility]. MELISSA notified Justa with [facility]. Cayden reports O2 tanks were delivered to pt, and wife is requesting a small portable concentrator instead. MELISSA faxed over new orders for a small portable concentrator. MELISSA spoke to Mone at [facility] who requested SW fax orders to her as well. MELISSA faxed orders to [facility] and confirmed receipt. [facility] states pt's prescriptions need to be faxed to [phone]. MELISSA provided this information to MD and PharmD.     MELISSA providing ongoing psychosocial and counseling support, education, assistance, resources, and discharge planning as indicated. MELISSA continuing to follow and remains available.

## 2018-11-14 VITALS
HEIGHT: 66 IN | SYSTOLIC BLOOD PRESSURE: 104 MMHG | OXYGEN SATURATION: 93 % | HEART RATE: 88 BPM | TEMPERATURE: 98 F | DIASTOLIC BLOOD PRESSURE: 57 MMHG | RESPIRATION RATE: 18 BRPM | WEIGHT: 171.31 LBS | BODY MASS INDEX: 27.53 KG/M2

## 2018-11-14 PROBLEM — I95.0 IDIOPATHIC HYPOTENSION: Status: ACTIVE | Noted: 2018-11-04

## 2018-11-14 LAB
ANION GAP SERPL CALC-SCNC: 7 MMOL/L
BASOPHILS # BLD AUTO: 0.09 K/UL
BASOPHILS NFR BLD: 0.7 %
BNP SERPL-MCNC: 2575 PG/ML
BUN SERPL-MCNC: 19 MG/DL
CALCIUM SERPL-MCNC: 8.8 MG/DL
CHLORIDE SERPL-SCNC: 100 MMOL/L
CO2 SERPL-SCNC: 25 MMOL/L
CREAT SERPL-MCNC: 0.9 MG/DL
DIFFERENTIAL METHOD: ABNORMAL
EOSINOPHIL # BLD AUTO: 0.5 K/UL
EOSINOPHIL NFR BLD: 3.9 %
ERYTHROCYTE [DISTWIDTH] IN BLOOD BY AUTOMATED COUNT: 15.1 %
EST. GFR  (AFRICAN AMERICAN): >60 ML/MIN/1.73 M^2
EST. GFR  (NON AFRICAN AMERICAN): >60 ML/MIN/1.73 M^2
GLUCOSE SERPL-MCNC: 164 MG/DL
HCT VFR BLD AUTO: 26.1 %
HGB BLD-MCNC: 8 G/DL
IMM GRANULOCYTES # BLD AUTO: 0.17 K/UL
IMM GRANULOCYTES NFR BLD AUTO: 1.3 %
INR PPP: 1.9
LYMPHOCYTES # BLD AUTO: 0.9 K/UL
LYMPHOCYTES NFR BLD: 6.7 %
MCH RBC QN AUTO: 28.6 PG
MCHC RBC AUTO-ENTMCNC: 30.7 G/DL
MCV RBC AUTO: 93 FL
MONOCYTES # BLD AUTO: 1.1 K/UL
MONOCYTES NFR BLD: 8.3 %
NEUTROPHILS # BLD AUTO: 10.4 K/UL
NEUTROPHILS NFR BLD: 79.1 %
NRBC BLD-RTO: 0 /100 WBC
PLATELET # BLD AUTO: 235 K/UL
PMV BLD AUTO: 10.5 FL
POCT GLUCOSE: 150 MG/DL (ref 70–110)
POCT GLUCOSE: 155 MG/DL (ref 70–110)
POTASSIUM SERPL-SCNC: 4.7 MMOL/L
PROTHROMBIN TIME: 18.8 SEC
RBC # BLD AUTO: 2.8 M/UL
SODIUM SERPL-SCNC: 132 MMOL/L
WBC # BLD AUTO: 13.18 K/UL

## 2018-11-14 PROCEDURE — A4216 STERILE WATER/SALINE, 10 ML: HCPCS | Mod: NTX | Performed by: INTERNAL MEDICINE

## 2018-11-14 PROCEDURE — 25000003 PHARM REV CODE 250: Mod: NTX | Performed by: INTERNAL MEDICINE

## 2018-11-14 PROCEDURE — 99238 HOSP IP/OBS DSCHRG MGMT 30/<: CPT | Mod: NTX,,, | Performed by: INTERNAL MEDICINE

## 2018-11-14 PROCEDURE — 83880 ASSAY OF NATRIURETIC PEPTIDE: CPT | Mod: NTX

## 2018-11-14 PROCEDURE — 63600175 PHARM REV CODE 636 W HCPCS: Mod: NTX | Performed by: INTERNAL MEDICINE

## 2018-11-14 PROCEDURE — 85025 COMPLETE CBC W/AUTO DIFF WBC: CPT | Mod: NTX

## 2018-11-14 PROCEDURE — 25000003 PHARM REV CODE 250: Mod: NTX | Performed by: STUDENT IN AN ORGANIZED HEALTH CARE EDUCATION/TRAINING PROGRAM

## 2018-11-14 PROCEDURE — 36415 COLL VENOUS BLD VENIPUNCTURE: CPT | Mod: NTX

## 2018-11-14 PROCEDURE — 85610 PROTHROMBIN TIME: CPT | Mod: NTX

## 2018-11-14 PROCEDURE — 80048 BASIC METABOLIC PNL TOTAL CA: CPT | Mod: NTX

## 2018-11-14 RX ORDER — INSULIN ASPART 100 [IU]/ML
6 INJECTION, SOLUTION INTRAVENOUS; SUBCUTANEOUS
Status: DISCONTINUED | OUTPATIENT
Start: 2018-11-14 | End: 2018-11-14 | Stop reason: HOSPADM

## 2018-11-14 RX ADMIN — CETIRIZINE HYDROCHLORIDE 10 MG: 5 TABLET ORAL at 08:11

## 2018-11-14 RX ADMIN — ASPIRIN 81 MG CHEWABLE TABLET 81 MG: 81 TABLET CHEWABLE at 08:11

## 2018-11-14 RX ADMIN — AZELASTINE HYDROCHLORIDE 137 MCG: 137 SPRAY, METERED NASAL at 08:11

## 2018-11-14 RX ADMIN — ENALAPRIL MALEATE 2.5 MG: 2.5 TABLET ORAL at 08:11

## 2018-11-14 RX ADMIN — POLYETHYLENE GLYCOL 3350 17 G: 17 POWDER, FOR SOLUTION ORAL at 08:11

## 2018-11-14 RX ADMIN — FLUTICASONE PROPIONATE 100 MCG: 50 SPRAY, METERED NASAL at 08:11

## 2018-11-14 RX ADMIN — PANTOPRAZOLE SODIUM 40 MG: 40 TABLET, DELAYED RELEASE ORAL at 08:11

## 2018-11-14 RX ADMIN — PRASUGREL 10 MG: 10 TABLET, FILM COATED ORAL at 08:11

## 2018-11-14 RX ADMIN — SPIRONOLACTONE 12.5 MG: 25 TABLET, FILM COATED ORAL at 08:11

## 2018-11-14 RX ADMIN — Medication 10 ML: at 05:11

## 2018-11-14 RX ADMIN — AMIODARONE HYDROCHLORIDE 200 MG: 200 TABLET ORAL at 05:11

## 2018-11-14 RX ADMIN — AMIODARONE HYDROCHLORIDE 200 MG: 200 TABLET ORAL at 02:11

## 2018-11-14 RX ADMIN — DOBUTAMINE HYDROCHLORIDE 5 MCG/KG/MIN: 200 INJECTION INTRAVENOUS at 04:11

## 2018-11-14 RX ADMIN — Medication 10 ML: at 12:11

## 2018-11-14 RX ADMIN — DIGOXIN 0.12 MG: 125 TABLET ORAL at 08:11

## 2018-11-14 RX ADMIN — SENNOSIDES AND DOCUSATE SODIUM 1 TABLET: 8.6; 5 TABLET ORAL at 08:11

## 2018-11-14 RX ADMIN — INSULIN ASPART 6 UNITS: 100 INJECTION, SOLUTION INTRAVENOUS; SUBCUTANEOUS at 12:11

## 2018-11-14 NOTE — PLAN OF CARE
Problem: Patient Care Overview  Goal: Plan of Care Review  Pt remains free from any falls, injuries or traumas. Bed low and locked. Call bell within reach. sr up x2. Encouraged pt to call for any assistance. No pressure injury noted. Blood glucose managed with scheduled and prn insulin.

## 2018-11-14 NOTE — PLAN OF CARE
Reviewed BG levels and insulin regimen.     Goal BG while inpatient: 140-180 mg/dl  Current BG levels are slightly above goal    Recommendations:   -Increase Levemir from 9 to 10 units, hs  -Increase Novolog from 5 to 6 units AC  -POC AC/HS  -Low dose correction

## 2018-11-14 NOTE — PROGRESS NOTES
D/C note:    SW to pt's room for D/C. Pt and wife aaox4, calm, and pleasant. Pt and wife report in agreement with plan to D/C home today with IV  and HH. [facility] with [facility] reports [facility] with [facility]  ([phone]) and IV  arranged with [facility] ([phone] ph, [phone] fax). Justa with [facility] and Mone with [employer]  notified of plan for pt to D/C today. O2 delivered to pt's room, and pt's wife reports concentrator delivered to pt's home yesterday. Pt's wife requesting portable concentrator instead of tanks. Cayden reports she is still waiting on MD to approve portable concentrator, and it will be delivered to pt's home once approved. Pt and wife report coping adequately. Pt and wife report no other questions or concerns for SW at this time. Pt's wife to provide transport home. SW providing psychosocial and counseling support, education, assistance, resources, and D/C planning as indicated. SW remains available.

## 2018-11-14 NOTE — DISCHARGE SUMMARY
[facility]  Heart Transplant  Discharge Summary      Patient Name: Bharat Coker  MRN: [id number]  Admission Date: 11/3/2018  Hospital Length of Stay: 11 days  Discharge Date and Time: 11/14/2018 11:18 AM  Attending Physician: Jony Hendrickson Jr.,*   Discharging Provider: Erick Lacy MD  Primary Care Provider: Ronnie Richardson Jr, MD     HPI: Mr. Coker is a 74 y/o male [facility] in the setting of recent Lasix adjustment and recent initiation of BB/CCB. PMHx of CAD s/p CABG, ICM/HFrEF (LVef10%) s/p Medtronic CRT-D, CAD([facility] 6/2018: LIMA-LAD patent, % occluded at ostium, SVG-% occluded, SVG-D 100% occluded, pLCx 30%, 100% occluded OM, 100% LAD occlusion after takeoff of D1, D1 is tiny with 80% disease, SVG-OM 80% proximal stenosis with 50% at site of anastomosis.An SVG-OM OKSANA was placed at that time). Who presents with c/o acute sob which occurred around 7PM this evening. Patient was at usual health, states developed acute SOB, had been having worsening SUMNER. EMS called, he was found to be in SVT, given Adenosis and Cardizem and brought to ED. He was noted to be in SVT -541q hypotensive SBP 70s, given metoprolol IV, and 250cc NS bolus. Cardiology consulted. Medtronic device interrogated, A-sensed Bi-V paced.     At length discussion with spouse / patient, she has been logging his BP / HR / Weights on daily basis, patient has been hypotensive not given his Lisinopril / Metoprolol. Seen in clinic with Aman Adames, recently decreased lasix 80/80, 40/80.  Recently discharged 10/10-10/23 for ADHF, patient is a poor VAD candidate dt frailty and calcifications on CT, also found to be in AT s/p LAVERNE/DCCV started on Tikosyn initiate on 10/19/18          Procedure(s) (LRB):  ABLATION, AVN (N/A)     Hospital Course: Admitted for SVT and hypotension. BP improved with IV fluid boluses and holding BP medications. EP performed an AV rodrigo ablation for SVT on 11/6/18.  Discontinued Tikosyn, started on Amiodarone. Restarted on low dose Enalapril in addition to Digoxin and spironolactone for GDMIT. Discontinued beta blocker and started on Dobutamine for decompensated heart failure. PICC line placed 11/7. He was evaluated for advanced options but was determine to not be a candidate for to calcium on CT scans and debility. Continue on DAPT for OKSAAN 06/2018, additionally on coumadin. Qualified for home oxygen. On day of discharge was discharged with home dobutamine and oxygen. Patient does not need follow up with advanced heart failure as he is not a candidate for advanced options. To be followed by general cardiology. He is a  and [facility]       Consults (From admission, onward)        Status Ordering Provider     Inpatient consult to Cardiology  Once     Provider:  (Not yet assigned)    Completed JUAN A PHILLIPS     Inpatient consult to Electrophysiology  Once     Provider:  (Not yet assigned)    Completed KYRIE NOONAN     Inpatient consult to Endocrinology  Once     Provider:  (Not yet assigned)    Completed KYRIE NOONAN     Inpatient consult to PICC team (NIAS)  Once     Provider:  (Not yet assigned)    Completed KYRIE NOONAN     Nutrition Services Referral  Once     Provider:  (Not yet assigned)    Completed LILIAN JUNG          Significant Diagnostic Studies: Labs:   BMP:   Recent Labs   Lab 11/13/18  0702 11/14/18  0409   * 164*   * 132*   K 4.6 4.7   CL 99 100   CO2 29 25   BUN 18 19   CREATININE 0.8 0.9   CALCIUM 9.3 8.8   MG 1.7  --    , CMP   Recent Labs   Lab 11/13/18  0702 11/14/18  0409   * 132*   K 4.6 4.7   CL 99 100   CO2 29 25   * 164*   BUN 18 19   CREATININE 0.8 0.9   CALCIUM 9.3 8.8   ANIONGAP 5* 7*   ESTGFRAFRICA >60.0 >60.0   EGFRNONAA >60.0 >60.0   , CBC   Recent Labs   Lab 11/13/18  0702 11/14/18  0409   WBC 12.15 13.18*   HGB 8.6* 8.0*   HCT 27.3*  26.1*    235   , INR   Lab Results   Component Value Date    INR 1.9 (H) 11/14/2018    INR 1.6 (H) 11/13/2018    INR 1.5 (H) 11/12/2018   , Lipid Panel No results found for: CHOL, HDL, LDLCALC, TRIG, CHOLHDL, Troponin No results for input(s): TROPONINI in the last 168 hours., A1C:   Recent Labs   Lab 10/11/18  0458   HGBA1C 6.9*    and All labs within the past 24 hours have been reviewed  Radiology: X-Ray: CXR: X-Ray Chest 1 View (CXR):   Results for orders placed or performed during the hospital encounter of 11/03/18   X-Ray Chest 1 View for PICC_Central line    Narrative    EXAMINATION:  XR CHEST 1 VIEW    CLINICAL HISTORY:  Evaluate PICC line placement;    COMPARISON:  Comparison is made to 11/03/2018 at 22:43.    FINDINGS:  Vascular catheter now enters from the right arm, its tip in the superior vena cava near its junction with the right atrium.  Postoperative changes again noted in the thorax, as is a cardiac pacing device and a right jugular origin vascular catheter, the latter also having its tip in the superior vena cava.  Heart size and the appearance of the cardiomediastinal silhouette are unchanged since the examination referenced above.  Lung zones are also stable, with prominent diffuse accentuation of interstitial markings in both lungs again observed, with no significant superimposed airspace consolidation or volume loss evident.  No pleural fluid of any substantial volume is noted on either side.  No pneumothorax.      Impression    No significant detrimental interval change in the appearance of the chest since 11/03/2018 at 22:43.  Vascular placement as above.      Electronically signed by: Praveen Escobar MD  Date:    11/07/2018  Time:    12:16     CT scan: CT ABDOMEN PELVIS WITH CONTRAST: No results found for this visit on 11/03/18. and CT ABDOMEN PELVIS WITHOUT CONTRAST: No results found for this visit on 11/03/18.  Cardiac Graphics: ECG: , Echocardiogram:   2D echo with color flow doppler:    Results for orders placed or performed during the hospital encounter of 10/10/18   2D echo with color flow doppler   Result Value Ref Range    QEF 9 (A) 55 - 65    Mitral Valve Regurgitation MODERATE (A)     Diastolic Dysfunction Yes (A)     Est. PA Systolic Pressure 26.04     Tricuspid Valve Regurgitation MILD     and Stress Test:     Pending Diagnostic Studies:     None        Final Active Diagnoses:    Diagnosis Date Noted POA    PRINCIPAL PROBLEM:  Supraventricular tachycardia [I47.1] 10/18/2018 Yes    Pacemaker [Z95.0]  Yes    Status post peripherally inserted central catheter (PICC) central line placement [Z95.828]  Not Applicable    CKD (chronic kidney disease), stage II [N18.2] 11/12/2018 Yes     Chronic    Acute on chronic respiratory failure with hypoxia [J96.21] 11/12/2018 Yes     Chronic    Anemia [D64.9] 11/06/2018 Yes    Idiopathic hypotension [I95.0] 11/04/2018 Yes    Atrial tachycardia [I47.1] 10/17/2018 Yes    Hepatic congestion [K76.1]  Yes    CAD (coronary artery disease) [I25.10] 10/10/2018 Yes    Type 2 diabetes mellitus with circulatory disorder, without long-term current use of insulin [E11.59] 10/10/2018 Yes      Problems Resolved During this Admission:    Diagnosis Date Noted Date Resolved POA    SOB (shortness of breath) [R06.02]  11/13/2018 Unknown    Cardiogenic shock [R57.0] 11/03/2018 11/13/2018 Yes    Chest pain [R07.9] 11/03/2018 11/12/2018 Yes    KASEY (acute kidney injury) [N17.9]  11/12/2018 Yes      Discharged Condition: fair    Disposition: Home or Self Care    Follow Up:  Follow-up Information     Onesimo Willams MD In 6 weeks.    Specialties:  Electrophysiology, Cardiology  Contact information:  [address] [facility] [id number] [phone]                 Patient Instructions:      Ambulatory Referral to Electrophysiology   Referral Priority: Routine Referral Type: Consultation   Referral Reason: Specialty Services Required   Requested Specialty:  Electrophysiology   Number of Visits Requested: 1     Diet diabetic     Notify your health care provider if you experience any of the following:  increased confusion or weakness     Notify your health care provider if you experience any of the following:  persistent dizziness, light-headedness, or visual disturbances     Notify your health care provider if you experience any of the following:  worsening rash     Notify your health care provider if you experience any of the following:  severe persistent headache     Notify your health care provider if you experience any of the following:  difficulty breathing or increased cough     Notify your health care provider if you experience any of the following:  redness, tenderness, or signs of infection (pain, swelling, redness, odor or green/yellow discharge around incision site)     Notify your health care provider if you experience any of the following:  severe uncontrolled pain     Notify your health care provider if you experience any of the following:  persistent nausea and vomiting or diarrhea     Notify your health care provider if you experience any of the following:  temperature >100.4     Activity as tolerated     Medications:  Reconciled Home Medications:      Medication List      START taking these medications    amiodarone 200 MG Tab  Commonly known as:  PACERONE  Take 1 tablet (200 mg total) by mouth once daily.  Start taking on:  11/27/2018     digoxin 125 mcg tablet  Commonly known as:  LANOXIN  Take 1 tablet (0.125 mg total) by mouth once daily.     DOBUTamine 500 mg/250 mL (2,000 mcg/mL)  Commonly known as:  DOBUTREX  Inject 317.5 mcg/min into the vein continuous.     enalapril 2.5 MG tablet  Commonly known as:  VASOTEC  Take 1 tablet (2.5 mg total) by mouth 2 (two) times daily.     spironolactone 25 MG tablet  Commonly known as:  ALDACTONE  Take 0.5 tablets (12.5 mg total) by mouth once daily.        CHANGE how you take these medications    warfarin 7.5  MG tablet  Commonly known as:  COUMADIN  Take 1 tablet (7.5 mg total) by mouth Daily.  What changed:    · medication strength  · how much to take        CONTINUE taking these medications    aspirin 81 MG Chew  Take 81 mg by mouth once daily.     atorvastatin 80 MG tablet  Commonly known as:  LIPITOR  Take 80 mg by mouth nightly.     azelastine 137 mcg (0.1 %) nasal spray  Commonly known as:  ASTELIN  1 spray by Nasal route 2 (two) times daily.     benzonatate 200 MG capsule  Commonly known as:  TESSALON  Take 200 mg by mouth 3 (three) times daily as needed for Cough.     cetirizine 5 MG tablet  Commonly known as:  ZYRTEC  Take 1 tablet (5 mg total) by mouth once daily.     docusate sodium 100 MG capsule  Commonly known as:  COLACE  Take 100 mg by mouth 2 (two) times daily as needed for Constipation.     fluticasone 50 mcg/actuation nasal spray  Commonly known as:  FLONASE  2 sprays by Each Nare route once daily.     glipiZIDE 2.5 MG Tr24  Commonly known as:  GLUCOTROL  Take 5 mg by mouth 2 (two) times daily.     prasugrel 10 mg Tab  Commonly known as:  EFFIENT  Take 1 tablet (10 mg total) by mouth once daily.     ranitidine 150 MG tablet  Commonly known as:  ZANTAC  Take 150 mg by mouth once daily.        STOP taking these medications    dofetilide 250 MCG Cap  Commonly known as:  TIKOSYN     enoxaparin 60 mg/0.6 mL Syrg  Commonly known as:  LOVENOX     furosemide 80 MG tablet  Commonly known as:  LASIX     metoprolol succinate 25 MG 24 hr tablet  Commonly known as:  TOPROL-XL     potassium chloride SA 20 MEQ tablet  Commonly known as:  JUNG FISHER MD  Heart Transplant  [facility]

## 2018-11-14 NOTE — PLAN OF CARE
Problem: Patient Care Overview  Goal: Plan of Care Review  Plan of care discussed with patient.  Patient ambulating independently, fall precautions in place. Patient has no complaints of pain. Discussed medications and care. gtt continued. D/C pending home  and O2.  Patient has no questions at this time.White board updated. Bed locked in lowest position. Side rails up x2. Will continue to monitor.

## 2018-11-15 ENCOUNTER — PATIENT OUTREACH (OUTPATIENT)
Dept: ADMINISTRATIVE | Facility: CLINIC | Age: 73
End: 2018-11-15

## 2018-11-15 NOTE — PATIENT INSTRUCTIONS
Having Catheter Ablation  A heart rhythm problem (arrhythmia) can make your heart beat too fast or in an irregular pattern. The problem is often caused by cells in your heart that arent working as they should. Or, it may be the result of an abnormal electric circuit. It may cause bothersome symptoms, such as an irregular heartbeat, dizziness, shortness of breath, chest pain, or fainting. Your doctor has recommended catheter ablation to treat your arrhythmia. This non-surgical procedure destroys the cells that are causing the problem.  Before the procedure    Before your catheter ablation, you will meet with a specially trained heart doctor (cardiac electrophysiologist) who will do the procedure. He or she will tell you how to get ready. You will likely be told to stop or change your heart rhythm medicines for a period of time before the procedure. Follow your doctors instructions. Also:  · Tell the doctor about all prescription and over-the-counter medicines you take. This includes herbs, supplements, and vitamins. It also includes daily medicines, such as insulin or blood thinners. If you are allergic to any medicines, tell the doctor.  · Have any routine tests, such as blood tests, as recommended.  · Dont eat or drink anything 12 hours before the procedure.  How catheter ablation is done  Catheter ablation uses thin, flexible wires (electrode catheters) to find and destroy (ablate) problem cells. Heres how the procedure is done:  · The hearts signals are mapped. To find the problem, an electrophysiology study (EPS) is done. During this study, the doctor tries to start (induce) your arrhythmia. An electrical map of the heart is then created. This shows the type of arrhythmia you have and where the problem is. Using the map as a guide, the doctor knows where to ablate.  · Problem areas are destroyed using heat or cold therapy. Once the EPS shows where the problem is, the doctor threads an electrode  catheter through a blood vessel to that area in the heart. Energy is sent through the catheter to destroy the problem cells.  · The hearts rhythm is tested again. After ablating the problem cells, the doctor tries to restart (reinduce) your arrhythmia. If a fast rhythm cant be induced, the ablation is a success. But if a fast rhythm does start again, you may need more ablation.  Your experience during catheter ablation  In most cases, catheter ablation is done in an electrophysiology (EP) lab. It often takes 2 to 4 hours, and sometimes longer. Youll receive medicine to prevent pain. Medicine will also help you relax or sleep during the procedure. If you feel uncomfortable during the procedure, tell the doctor or nurse:  · Getting started. The healthcare team washes the skin on your groin (or rarely, the neck). Any hair in that area may be removed. This is where the catheters will be inserted. An IV (intravenous) line is started in your arm. Medicines and fluids are given through this IV. To help keep the insertion site germ-free (sterile), your body is draped with sheets. Only the area where the catheters will be inserted is exposed.  · Inserting the catheters. The healthcare provider numbs the skin where the catheters will be inserted with pain medicine (local anesthetic). Then the provider uses a small needle to make punctures in your vein or artery. He or she puts catheters through these punctures and guides them to your heart. The provider uses X-ray monitors to help guide the catheters.  · The provider then puts wires in several places in the heart to map the electrical signals. The wires also stimulate the heart.  · Finishing up. When the procedure is finished, the provider takes the catheters out of your body. He or she puts pressure on the puncture sites to stop any bleeding. No stitches are needed. Youre then taken to a recovery room to rest. You'll need to remain lying down for 2 to 6 hours. You'll  also be asked not to move the leg where the catheters were inserted for a few hours. This is to make sure the insertion sites don't bleed.  Risks and complications  The risks of catheter ablation are fairly low compared to the benefits you receive. Discuss these risks with your doctor before the procedure. Possible risks and complications include:  · Bleeding or bruising at the catheter insertion site  · Blood clots  · A slow heart rhythm (requiring a permanent pacemaker)  · Perforation of the heart muscle, blood vessel, or lung (may require an emergency procedure)  · Damage to a heart valve (rare)  · Stroke or heart attack, also known as acute myocardial infarction, or AMI (rare)  · Infection, which is a risk after any invasive procedure. This may be indicated by a fever of 100.4°F (38.0°C) or higher, drainage, or redness and pain at the catheter insertion site.  · Death (extremely rare)   Date Last Reviewed: 5/1/2016  © 7570-2520 [employer]. [address] [place] [address], [place] [address]. All rights reserved. This information is not intended as a substitute for professional medical care. Always follow your healthcare professional's instructions.

## 2018-11-16 ENCOUNTER — TELEPHONE (OUTPATIENT)
Dept: TRANSPLANT | Facility: CLINIC | Age: 73
End: 2018-11-16

## 2018-11-16 NOTE — TELEPHONE ENCOUNTER
----- Message from Floresita Gomez sent at 11/16/2018 11:21 AM CST -----  Contact: Nia Wiggins call Nia at [facility] [phone].  She needs to speak to you about some issues with pt's BP..    Thank you

## 2018-11-16 NOTE — TELEPHONE ENCOUNTER
Called pt in regards to message from Lorenzo. Pt's wife concerned with pt having decreased BP (98/58) this am. SO reports having an incident of dizziness last night, but pt reports that he believes that he got up from a lying position too fast. Pt had similar experience this am after using bathroom with resulting BP stated earlier. Pt again reports that he believes that he got up too fast and felt dizzy. SO concerned about administering BP medication while pt experiencing low BP. SO instructed to follow same instruction that she used for previous bp medications (hold for SBP<80). SO instructed to continue to monitor pt's weight daily and report for wt gain. Encouraged SO to call if pt has increased SOB, continued decreased BP or for any other concerns. So verbally acknowledged and expressed gratitude.

## 2018-11-25 ENCOUNTER — TELEPHONE (OUTPATIENT)
Dept: TRANSPLANT | Facility: HOSPITAL | Age: 73
End: 2018-11-25

## 2018-11-26 ENCOUNTER — ANTI-COAG VISIT (OUTPATIENT)
Dept: CARDIOLOGY | Facility: CLINIC | Age: 73
End: 2018-11-26

## 2018-11-26 ENCOUNTER — HOSPITAL ENCOUNTER (OUTPATIENT)
Dept: CARDIOLOGY | Facility: CLINIC | Age: 73
Discharge: HOME | End: 2018-11-26
Payer: MEDICARE

## 2018-11-26 ENCOUNTER — TELEPHONE (OUTPATIENT)
Dept: TRANSPLANT | Facility: CLINIC | Age: 73
End: 2018-11-26

## 2018-11-26 ENCOUNTER — OFFICE VISIT (OUTPATIENT)
Dept: ELECTROPHYSIOLOGY | Facility: CLINIC | Age: 73
End: 2018-11-26
Payer: MEDICARE

## 2018-11-26 VITALS
BODY MASS INDEX: 26.19 KG/M2 | WEIGHT: 162.94 LBS | HEIGHT: 66 IN | HEART RATE: 96 BPM | SYSTOLIC BLOOD PRESSURE: 118 MMHG | DIASTOLIC BLOOD PRESSURE: 60 MMHG

## 2018-11-26 DIAGNOSIS — Z95.810 CARDIAC RESYNCHRONIZATION THERAPY DEFIBRILLATOR (CRT-D) IN PLACE: ICD-10-CM

## 2018-11-26 DIAGNOSIS — I48.20 CHRONIC ATRIAL FIBRILLATION: ICD-10-CM

## 2018-11-26 DIAGNOSIS — Z79.01 LONG TERM (CURRENT) USE OF ANTICOAGULANTS: ICD-10-CM

## 2018-11-26 DIAGNOSIS — I50.9 CONGESTIVE HEART FAILURE, UNSPECIFIED HF CHRONICITY, UNSPECIFIED HEART FAILURE TYPE: ICD-10-CM

## 2018-11-26 DIAGNOSIS — I25.5 ISCHEMIC CARDIOMYOPATHY: ICD-10-CM

## 2018-11-26 DIAGNOSIS — I47.19 ATRIAL TACHYCARDIA: ICD-10-CM

## 2018-11-26 DIAGNOSIS — I48.91 ATRIAL FIBRILLATION STATUS POST CARDIOVERSION: ICD-10-CM

## 2018-11-26 DIAGNOSIS — I47.10 SUPRAVENTRICULAR TACHYCARDIA: ICD-10-CM

## 2018-11-26 DIAGNOSIS — I47.20 VT (VENTRICULAR TACHYCARDIA): Primary | ICD-10-CM

## 2018-11-26 PROCEDURE — 93000 ELECTROCARDIOGRAM COMPLETE: CPT | Mod: S$GLB,,, | Performed by: INTERNAL MEDICINE

## 2018-11-26 PROCEDURE — 99214 OFFICE O/P EST MOD 30 MIN: CPT | Mod: S$GLB,,, | Performed by: NURSE PRACTITIONER

## 2018-11-26 PROCEDURE — 99999 PR PBB SHADOW E&M-EST. PATIENT-LVL III: CPT | Mod: PBBFAC,,, | Performed by: NURSE PRACTITIONER

## 2018-11-26 PROCEDURE — 1101F PT FALLS ASSESS-DOCD LE1/YR: CPT | Mod: CPTII,S$GLB,, | Performed by: NURSE PRACTITIONER

## 2018-11-26 NOTE — PROGRESS NOTES
Mr. Coker is a patient of Dr. Ashby.      Subjective:   Patient ID:  Bharat Coker is a 73 y.o. male who presents for follow-up of Atrial Fibrillation  .     HPI:    Mr. Coker is a 73 y.o. male with chronic AF, HFrEF (EF <10%), severe CAD (s/p PCI and CABG), CRT-D (9/2018), SVT, COPD and DM here for hospital follow up.     Background:    Mr. Coker is a 74 y/o male admitted to [facility] for ADHF in the setting of recent Lasix adjustment and recent initiation of BB/CCB. PMHx of CAD s/p CABG, ICM/HFrEF (LVef10%) s/p Medtronic CRT-D, CAD([facility] 6/2018: LIMA-LAD patent, % occluded at ostium, SVG-% occluded, SVG-D 100% occluded, pLCx 30%, 100% occluded OM, 100% LAD occlusion after takeoff of D1, D1 is tiny with 80% disease, SVG-OM 80% proximal stenosis with 50% at site of anastomosis.An SVG-OM OKSANA was placed at that time). He developed acute SOB, had been having worsening SUMNER. EMS called, he was found to be in SVT, given Adenosis and Cardizem and brought to ED. He was noted to be in SVT -641k hypotensive SBP 70s, given metoprolol IV, and 250cc NS bolus. Cardiology consulted. Medtronic device interrogated, A-sensed Bi-V paced.      At length discussion with spouse / patient, she has been logging his BP / HR / Weights on daily basis, patient has been hypotensive not given his Lisinopril / Metoprolol. Seen in clinic with Aman Adames, recently decreased lasix 80/80, 40/80.  Recently discharged 10/10-10/23 for ADHF, patient is a poor VAD candidate dt frailty and calcifications on CT, also found to be in AT s/p LAVERNE/DCCV started on Tikosyn initiated on 10/19/18 with Dr. Ashby.     EP performed an AV rodrigo ablation for SVT on 11/6/18. Discontinued Tikosyn, started on Amiodarone. Restarted on low dose Enalapril in addition to Digoxin and spironolactone for GDMIT. Discontinued beta blocker and started on Dobutamine for decompensated heart failure. PICC line placed 11/7. He was evaluated for advanced options  "but was determine to not be a candidate for to calcium on CT scans and debility. Continue on DAPT for OKSANA 06/2018, additionally on coumadin. Qualified for home oxygen. On day of discharge was discharged with home dobutamine and oxygen. Patient does not need follow up with advanced heart failure as he is not a candidate for advanced options. To be followed by general cardiology. He is a  and [facility]     From Dr. Willams's note: If we proceed with programming to VVI mode, he would likely get pacemaker syndrome when in NSR/ST. If on the other hand we keep him in DDD, he will likely tack ATs to upper rate. A DDI mode will allow low rate atrial pacing but it will also keep rate at the lowest fixed V rate and a constant AV Wenckebach pattern would likely occur. Best compromise is to limit the upper rate to about 90 or 100.    Update (11/26/2018):    Yesterday he called Dr. Elliott's office with complaints of abdominal edema and SOB. Lasix was increased and he was referred to general cardiology for management. BNP today >3,000.  Today he says that he has no appetite, he continues to have abdominal swelling. His family says that he diuresed 5500mL last night. He did feel better this morning but is starting to feel worse again, with some SOB, fatigue, and cough.     He is currently taking coumadin for stroke prophylaxis and denies significant bleeding episodes. He is currently being treated with amiodarone 200mg for rhythm control and digoxin 125mcg for HR control. Kidney function is stable, with a creatinine of 1.1 on 11/26/2018.    Device interrogation today shows total V pacing 89% with SR in the 90s-100bpm. Upper limit was increased to 105 to hopefully bring biV pacing percentage over 90% while still limiting AT tacking. "AF" burden <0.1%.     I have personally reviewed the patient's EKG today, which shows ASVP at 96bpm. OH interval is 118. QTc is " 492.    Recent Cardiac Tests:    LAVERNE (10/19/2018):  CONCLUSIONS     1 - Severely depressed left ventricular systolic function (EF 10 % or less).     2 - Severely depressed right ventricular function .     3 - Moderate to severe aortic stenosis.     4 - Moderate mitral regurgitation.     5 - Mild tricuspid regurgitation.     6 - Left atrial appendage is bilobed with no visualized thrombus.     7 - Biatrial enlargement.     Current Outpatient Medications   Medication Sig    [START ON 11/27/2018] amiodarone (PACERONE) 200 MG Tab Take 1 tablet (200 mg total) by mouth once daily.    aspirin 81 MG Chew Take 81 mg by mouth once daily.    atorvastatin (LIPITOR) 80 MG tablet Take 80 mg by mouth nightly.    azelastine (ASTELIN) 137 mcg (0.1 %) nasal spray 1 spray by Nasal route 2 (two) times daily.    benzonatate (TESSALON) 200 MG capsule Take 200 mg by mouth 3 (three) times daily as needed for Cough.    cetirizine (ZYRTEC) 5 MG tablet Take 1 tablet (5 mg total) by mouth once daily.    digoxin (LANOXIN) 125 mcg tablet Take 1 tablet (0.125 mg total) by mouth once daily.    DOBUTamine (DOBUTREX) 500 mg/250 mL (2,000 mcg/mL) Inject 317.5 mcg/min into the vein continuous.    docusate sodium (COLACE) 100 MG capsule Take 100 mg by mouth 2 (two) times daily as needed for Constipation.    enalapril (VASOTEC) 2.5 MG tablet Take 1 tablet (2.5 mg total) by mouth 2 (two) times daily.    fluticasone (FLONASE) 50 mcg/actuation nasal spray 2 sprays by Each Nare route once daily.    glipiZIDE (GLUCOTROL) 2.5 MG TR24 Take 5 mg by mouth 2 (two) times daily.    prasugrel (EFFIENT) 10 mg Tab Take 1 tablet (10 mg total) by mouth once daily.    ranitidine (ZANTAC) 150 MG tablet Take 150 mg by mouth once daily.    spironolactone (ALDACTONE) 25 MG tablet Take 0.5 tablets (12.5 mg total) by mouth once daily.    warfarin (COUMADIN) 7.5 MG tablet Take 1 tablet (7.5 mg total) by mouth Daily.     No current facility-administered  "medications for this visit.      Review of Systems   Constitution: Negative for malaise/fatigue.   Cardiovascular: Positive for dyspnea on exertion. Negative for chest pain, irregular heartbeat, leg swelling and palpitations.        Abdominal edema   Respiratory: Negative for shortness of breath.    Hematologic/Lymphatic: Negative for bleeding problem.   Skin: Negative for rash.   Musculoskeletal: Negative for myalgias.   Gastrointestinal: Negative for hematemesis, hematochezia and nausea.   Genitourinary: Negative for hematuria.   Neurological: Negative for light-headedness.   Psychiatric/Behavioral: Negative for altered mental status.   Allergic/Immunologic: Negative for persistent infections.     Objective:        /60   Pulse 96   Ht 5' 6" (1.676 m)   Wt 73.9 kg (162 lb 14.7 oz)   BMI 26.30 kg/m²     Physical Exam   Constitutional: He is oriented to person, place, and time. He appears well-developed and well-nourished.   HENT:   Head: Normocephalic.   Nose: Nose normal.   Eyes: Pupils are equal, round, and reactive to light.   Cardiovascular: Normal rate, regular rhythm, S1 normal and S2 normal.   No murmur heard.  Pulses:       Radial pulses are 2+ on the right side, and 2+ on the left side.   Pulmonary/Chest: Breath sounds normal. No respiratory distress.   Device to LUCW   Abdominal: Normal appearance.   Mild edema   Musculoskeletal: Normal range of motion. He exhibits no edema.   Neurological: He is alert and oriented to person, place, and time.   Skin: Skin is warm and dry. No erythema.   Psychiatric: He has a normal mood and affect. His speech is normal and behavior is normal.   Nursing note and vitals reviewed.    Lab Results   Component Value Date     (L) 11/26/2018    K 4.8 11/26/2018    MG 1.7 11/13/2018    BUN 36 (H) 11/26/2018    CREATININE 1.1 11/26/2018    ALT 12 11/26/2018    AST 19 11/26/2018    HGB 8.4 (L) 11/26/2018    HCT 28.3 (L) 11/26/2018    TSH 1.457 11/07/2018       Recent " Labs   Lab 11/12/18  0452 11/13/18  0702 11/14/18  0409 11/26/18  1418   INR 1.5 H 1.6 H 1.9 H 3.0 H       Assessment:     1. VT (ventricular tachycardia)    2. Ischemic cardiomyopathy    3. Atrial tachycardia    4. Supraventricular tachycardia    5. Atrial fibrillation status post cardioversion    6. Cardiac resynchronization therapy defibrillator (CRT-D) in place    7. Long term (current) use of anticoagulants [Z79.01]      Plan:     In summary, Mr. Coker is a 73 y.o. male with chronic AF, HFrEF, severe CAD (s/p PCI and CABG), SVT, COPD and DM here for hospital follow up.   He is now s/p DCCV and AVN ablation for SVT/AT.   Case complex and discussed with Dr. Ashby in clinic. Patient was originally placed on amiodarone to limit AT, but will discontinue amiodarone now that he is s/p AVN ablation.  Upper rate 105 to limit AT tacking but maximize biV pacing. BiV Pacing currently 88%. He remains on digoxin and coumadin. Dr. Ashby would like to have the patient follow with Dr. Willams from this point forward.  He did have some fluid overload but seems to have diuresed well last night. Fluid now not obvious on clinical exam. BNP elevated but was over 2000 at hospital discharge. BMP shows he may be getting dry.   He needs close care for heart failure management. Transplant has indicated this should be general cardiology now that the patient is not a VAD candidate.   Made appt for patient to see Dr. Posey tomorrow morning at 8 am, who can provide him with further instructions re: diuretic protocol.    Stop amiodarone.  Appt with Dr. Posey tomorrow 8am.  Follow up with Dr. Willams.    *A copy of this note has been sent to Dr. Ashby*    Follow-up in about 6 weeks (around 1/7/2019).    ------------------------------------------------------------------    NICK Pineda, NP-C  [facility]

## 2018-11-26 NOTE — TELEPHONE ENCOUNTER
Returned call to pt's wife. VM message left encouraging the SO to return call. Also left message that labs had been ordered for 1:05 today prior to the pt's other appointments.

## 2018-11-26 NOTE — PROGRESS NOTES
Patient's wife is at the front lobby desk and stating there is no order for lab to draw her 's INR.  Patient was suppose to go to [facility] on 11/5/18 for appointment but was in hospital.  They did not notify [facility] of this change in health/admission to hospital nor reschedule missed appointment.  I place an order for an INR to be drawn today.  It was also reported that patient is now received home health from [facility] agency.  I will contact them to set up this resource for INR results.    UPDATE  I spoke to David at [facility] and she verified patient is getting home visits weekly.  Once I get today's INR result and pharm D doses we can fax order for next INR to be done with home health visit.

## 2018-11-26 NOTE — PROGRESS NOTES
Encounter created from new INR result.  I routed to TOMAS FELIX for dosing and date of next INR.   The INR order needs to be faxed to [facility] for next INR.  Home health is a new resource for this patient.    UPDATE:  Jaylon Rogers is requesting to know what are the recent doses of Coumadin patient has been taking.   I will route to Sudhir Cramer to continue to contact patient to question then advise per pharm D orders.

## 2018-11-26 NOTE — TELEPHONE ENCOUNTER
----- Message from Cielo Hall MA sent at 11/23/2018  4:58 PM CST -----  Contact: wife [phone]  She is asking to speak to you about amiodarone med. Having trouble with med. Please call

## 2018-11-26 NOTE — TELEPHONE ENCOUNTER
Patient's caregiver called regarding increasing fullness in abdomen, although weight not up, appetite decreased, increased swelling, and he feels he is having increased fluid retention. Has technically lost 1 pound over the last few days, however has not eaten anything which may account for the 1 lb difference. Appears has been off lasix for the last 2 weeks since discharge home from the hospital, BNP at time of discharge was in the mid 2000 range, however patient still seemed to be euvolemic on exam per notes.     Patient is very uncomfortable, will have him take a 40mg lasix dose today, last labs are from the day of discharge from the hospital.     Please see if we can get repeat labs for him tomorrow when he returns to see EP.    Additionally: patient has yet to see general cardiology, never got set up with an appointment, has not seen a cardiologist since discharge 11/16.     Will route this note to both our schedulers and our coordinator (Nia appears to be the previous coordinator, even though patient is not a candidate for advanced options, will see if he can help follow up on this, since patient will not follow up in our clinic anymore after this).     Please set him up for a CMP, BNP tomorrow on arrival for EP appointment, additionally please help in getting a general cardiology appointment as soon as possible, thank you,    Nayeli Elliott,   [facility] Staff

## 2018-11-27 ENCOUNTER — OFFICE VISIT (OUTPATIENT)
Dept: CARDIOLOGY | Facility: CLINIC | Age: 73
End: 2018-11-27
Payer: MEDICARE

## 2018-11-27 VITALS
SYSTOLIC BLOOD PRESSURE: 93 MMHG | WEIGHT: 169.63 LBS | BODY MASS INDEX: 27.26 KG/M2 | DIASTOLIC BLOOD PRESSURE: 55 MMHG | HEIGHT: 66 IN | HEART RATE: 97 BPM

## 2018-11-27 DIAGNOSIS — I50.22 CHRONIC SYSTOLIC HEART FAILURE: Primary | ICD-10-CM

## 2018-11-27 DIAGNOSIS — Z95.810 CARDIAC RESYNCHRONIZATION THERAPY DEFIBRILLATOR (CRT-D) IN PLACE: ICD-10-CM

## 2018-11-27 DIAGNOSIS — R09.02 HYPOXEMIA: ICD-10-CM

## 2018-11-27 DIAGNOSIS — E11.59 TYPE 2 DIABETES MELLITUS WITH OTHER CIRCULATORY COMPLICATION, WITHOUT LONG-TERM CURRENT USE OF INSULIN: ICD-10-CM

## 2018-11-27 DIAGNOSIS — Z79.01 LONG TERM (CURRENT) USE OF ANTICOAGULANTS: ICD-10-CM

## 2018-11-27 DIAGNOSIS — I25.5 ISCHEMIC CARDIOMYOPATHY: ICD-10-CM

## 2018-11-27 PROCEDURE — 99214 OFFICE O/P EST MOD 30 MIN: CPT | Mod: S$GLB,,, | Performed by: INTERNAL MEDICINE

## 2018-11-27 PROCEDURE — 1101F PT FALLS ASSESS-DOCD LE1/YR: CPT | Mod: CPTII,S$GLB,, | Performed by: INTERNAL MEDICINE

## 2018-11-27 PROCEDURE — 3044F HG A1C LEVEL LT 7.0%: CPT | Mod: CPTII,S$GLB,, | Performed by: INTERNAL MEDICINE

## 2018-11-27 PROCEDURE — 99999 PR PBB SHADOW E&M-EST. PATIENT-LVL III: CPT | Mod: PBBFAC,,, | Performed by: INTERNAL MEDICINE

## 2018-11-27 RX ORDER — GLIPIZIDE 5 MG/1
5 TABLET ORAL
COMMUNITY
End: 2019-01-04

## 2018-11-27 NOTE — PATIENT INSTRUCTIONS
I agree that your symptoms worsening heart failure consist of   1) decrease in appetite and   2) abdominal swelling   Take 40 mg of furosemide when you have these symptoms.  You should also take the same dose of furosemide if your weight increases 2-3 lb overnight

## 2018-11-27 NOTE — PROGRESS NOTES
Questioned patient's caregiver Nava, confirmed pts taking 7.5mg everyday; stop lovenox injections when d/c from hospital 11/14  Patient reports being consistent w/ drinking boost/ensures daily; confirmed greens twice a week  NO other changes;

## 2018-11-27 NOTE — PROGRESS NOTES
Subjective:     Patient ID:  Bharat Coker is a 73 y.o. male who presents for evaluation of Congestive Heart Failure      Problem List:  CAD  CABG - 1995  CHF - LVEF <10%  Atrial tach/fib  ICD  Diabetes since 2000    HPI:     Bharat Coker has a complicated history.  This is his 1st visit with me.  His wife Clarice Simon keeps excellent records.  He has an ICM w a LVEF of <10% but was discharged from the heart failure team because of lack of advanced treatment options. He is a  and [facility] of [facility] He is assigned to the [facility] team, but his doctors vary.  He suffered an MI on 6/10/2018 and was admitted to [facility].  A OKSANA was placed in the SVG-OM.  He was treated for congestive heart failure.  On 9/5/2018 he was readmitted with congestive failure and a Bi-V ICD was placed.  He was readmitted to hospital and on 10/2/2018 coronary angiography revealed a patent SVG-OM.  Another hospitalization at [facility], the same month was for SVT with aberrancy.  Amiodarone was started. He was transferred to [facility] for consideration of advanced options for CHF.  He was diuresed and discharged on 10/23/2018. After discharge from hospital he was revaluated in the CT surgery Clinic by Dr. Khan.  A LVAD w  axillary outflow was discussed.  However he was readmitted to hospital 11/3 - 11/14/2018.  AV rodrigo ablation was performed. During that hospitalization, Dr. Hendrickson noted the following: This is his 5th hospitalization since August 2018 with longest stretch out of hospital 10 days and second longest 3 days.  He is cachectic with temporal wasting, muscle wasting, poor thoracic muscle mass, wife reports that he cannot participate with PT due to severe SOB and pain. . . .  I do not think that he is an appropriate candidate for advanced options.  After our discussion and his recent evaluation, he too does not feel pursuit of advanced options is warranted.   A PICC  line was placed and intravenous dobutamine was started. Guideline directed medical therapy for CHF has been hampered by hypotension.  He does not report lower extremity edema.  He has abdominal swelling.  He does not report shortness of breath on exertion, orthopnea or PND.  However but is minimally active, using furniture to to hold onto while walking around the house and requires continue oxygen at 2 liters/minute.  Oxygen sats at home on room air have been in the 80s.  Currently takes He takes low-dose enalapril and spironolactone.  He has taken furosemide 2 days ago on the recommendation of the heart failure consultant on call; only once since discharge from hospital.  I reviewed the mid taking records that his wife has kept since discharge from hospital.  His symptoms of worsening heart failure are anorexia and abdominal swelling.  His wife has noted that this is usually not associated with an increase in weight.  In fact his weight has decreased from 169-170 lb to 164-165 lbs. BUN and creatinine increased from 20/2 and 0.8 to 36 and 1.1 mg/dl.    See EP and HF notes dated 11/26/18 and 10/29/18.  Amiodarone was discontinued yesterday, because is s/p AV rodrigo ablation.  There is mention of Tikosyn but I do not see that on his med list anymore.  On warfarin for atrial fibrillation.  The notes state chronic atrial fibrillation but he is in sinus rhythm with ventricular pacing today.        Review of Systems   Constitution: Positive for weakness and malaise/fatigue. Negative for weight gain and weight loss.   HENT: Negative for hearing loss and nosebleeds.    Eyes: Negative for visual disturbance.   Cardiovascular: Positive for dyspnea on exertion, irregular heartbeat and palpitations. Negative for chest pain, claudication, leg swelling, orthopnea, paroxysmal nocturnal dyspnea and syncope.   Respiratory: Negative for cough, hemoptysis, sputum production and wheezing.    Hematologic/Lymphatic: Does not bruise/bleed  easily.   Musculoskeletal: Positive for muscle weakness. Negative for arthritis, back pain, falls, joint pain, muscle cramps and myalgias.   Gastrointestinal: Negative for abdominal pain, heartburn and melena.   Genitourinary: Positive for frequency and nocturia. Negative for hematuria.   Neurological: Negative for dizziness, headaches, light-headedness, loss of balance, numbness and paresthesias.   Psychiatric/Behavioral: Negative for depression. The patient is not nervous/anxious.          Current Outpatient Medications   Medication Sig    aspirin 81 MG Chew Take 81 mg by mouth once daily.    atorvastatin (LIPITOR) 80 MG tablet Take 80 mg by mouth nightly.    azelastine (ASTELIN) 137 mcg (0.1 %) nasal spray 1 spray by Nasal route 2 (two) times daily.    benzonatate (TESSALON) 200 MG capsule Take 200 mg by mouth 3 (three) times daily as needed for Cough.    cetirizine (ZYRTEC) 5 MG tablet Take 1 tablet (5 mg total) by mouth once daily.    digoxin (LANOXIN) 125 mcg tablet Take 1 tablet (0.125 mg total) by mouth once daily.    DOBUTamine (DOBUTREX) 500 mg/250 mL (2,000 mcg/mL) Inject 317.5 mcg/min into the vein continuous.    docusate sodium (COLACE) 100 MG capsule Take 100 mg by mouth 2 (two) times daily as needed for Constipation.    enalapril (VASOTEC) 2.5 MG tablet Take 1 tablet (2.5 mg total) by mouth 2 (two) times daily.    fluticasone (FLONASE) 50 mcg/actuation nasal spray 2 sprays by Each Nare route once daily.    glipiZIDE (GLUCOTROL) 2.5 MG TR24 Take 5 mg by mouth 2 (two) times daily.    prasugrel (EFFIENT) 10 mg Tab Take 1 tablet (10 mg total) by mouth once daily.    ranitidine (ZANTAC) 150 MG tablet Take 150 mg by mouth once daily.    spironolactone (ALDACTONE) 25 MG tablet Take 0.5 tablets (12.5 mg total) by mouth once daily.    warfarin (COUMADIN) 7.5 MG tablet Take 1 tablet (7.5 mg total) by mouth Daily.     No current facility-administered medications for this visit.          Past  "Medical History:   Diagnosis Date    CHF (congestive heart failure)     Chronic atrial fibrillation 10/26/2018    CKD (chronic kidney disease), stage II 11/12/2018    COPD (chronic obstructive pulmonary disease)     Coronary artery disease     Diabetes mellitus       Past Surgical History:   Procedure Laterality Date    ABLATION N/A 11/6/2018    Procedure: ABLATION, AVN;  Surgeon: Onesimo Willams MD;  Location: [facility] EP LAB;  Service: Cardiology;  Laterality: N/A;  svt, AVN RFA, BRENNON, anes, FAS, 6073, MDT ICD in situ    ABLATION, AVN N/A 11/6/2018    Performed by Onesimo Willams MD at [facility] EP LAB    APPENDECTOMY      CARDIAC SURGERY      ECHOCARDIOGRAM,TRANSESOPHAGEAL N/A 10/19/2018    Performed by [facility] Diagnostic Provider at [facility] CATH LAB    EYE SURGERY      FRACTURE SURGERY      TONSILLECTOMY           Social History     Tobacco Use    Smoking status: Former Smoker     Types: Cigarettes    Smokeless tobacco: Never Used   Substance Use Topics    Alcohol use: No     Frequency: Never    Drug use: No         Family History   Problem Relation Age of Onset    Stroke Father     Heart disease Father     Heart disease Sister     Heart disease Brother          Objective:     Physical Exam   Constitutional: He is oriented to person, place, and time. He appears well-developed and well-nourished.   BP (!) 93/55   Pulse 97   Ht 5' 6" (1.676 m)   Wt 76.9 kg (169 lb 10.3 oz)   BMI 27.38 kg/m²      HENT:   Head: Normocephalic.   Neck: JVD (5 cm above the clavicle with the head end  raised 30°) present.   Cardiovascular: Normal rate, regular rhythm, S1 normal and S2 normal.   No murmur heard.  Pulses:       Radial pulses are 2+ on the right side, and 2+ on the left side.        Posterior tibial pulses are 1+ on the right side, and 1+ on the left side.   Pulmonary/Chest: Breath sounds normal. Chest wall is not dull to percussion.   ICD site in the left infraclavicular region looks good without " redness, warmth, tenderness or discharge.   Abdominal: Soft. He exhibits no mass. There is no splenomegaly or hepatomegaly.   Musculoskeletal:        Right lower leg: He exhibits no edema.        Left lower leg: He exhibits no edema.   He was seated in a wheelchair but was able to climb up on the examination table with little assistance.  PICC line in right arm looks good, without redness swelling or discharge.   Neurological: He is alert and oriented to person, place, and time. Gait normal.   Skin: No bruising noted. Nails show no clubbing.   Psychiatric: He has a normal mood and affect. His speech is normal and behavior is normal.         Lab Results   Component Value Date    ALT 12 11/26/2018     Lab Results   Component Value Date    ALT 12 11/26/2018    AST 19 11/26/2018    ALKPHOS 76 11/26/2018    BILITOT 1.0 11/26/2018      Lab Results   Component Value Date    CREATININE 1.1 11/26/2018    BUN 36 (H) 11/26/2018     (L) 11/26/2018    K 4.8 11/26/2018     11/26/2018    CO2 26 11/26/2018         Assessment and Plan:     1. Chronic systolic heart failure  2. Ischemic cardiomyopathy  NYHA Class IV on continuous intravenous dobutamine.  Unable to tolerate guideline directed medical therapy for heart failure.   Continue spironolactone and low-dose enalapril.  P.r.n. furosemide for weight gain.  Explained to wife that anorexia and abdominal swelling were symptoms of worsening heart failure and p.r.n. furosemide without accompanying weight gain for that was appropriate for that.  Discuss goals of care.  Even though an LVAD with axillary output was discussed, this is not an option.  - Oxygen concentrator; Future  - Basic metabolic panel; Future  - Digoxin level; Future    4. Cardiac resynchronization therapy defibrillator (CRT-D) in place  S/p av rodrigo ablation.    5. Long term (current) use of anticoagulants [Z79.01]  Paroxysmal atrial fib. Continue warfarin.  INR is being monitored by our Coumadin  Clinic.    6. Type 2 diabetes mellitus with other circulatory complication, without long-term current use of insulin    Follow-up in about 2 weeks (around 12/11/2018).

## 2018-11-27 NOTE — LETTER
November 27, 2018      Monse Pool, NP  [address] [facility] [address]           [place] [facility]  [address] [facility]  [place] [address]  Phone: [phone]          Patient: Bharat Coker   MR Number: [id number]   YOB: 1945   Date of Visit: 11/27/2018       Dear Monse Pool:    Thank you for referring Bharat Coker to me for evaluation. Attached you will find relevant portions of my assessment and plan of care.    If you have questions, please do not hesitate to call me. I look forward to following Bharat Coker along with you.    Sincerely,    Vel Posey MD    Enclosure  CC:  No Recipients    If you would like to receive this communication electronically, please contact [email or web address] or [phone] to request more information on [facility] access.    For providers and/or their staff who would like to refer a patient to [facility], please contact us through our one-stop-shop provider referral line, [facility], at [phone].    If you feel you have received this communication in error or would no longer like to receive these types of communications, please e-mail [email or web address]

## 2018-11-29 LAB — INR PPP: 2.8

## 2018-11-30 ENCOUNTER — TELEPHONE (OUTPATIENT)
Dept: CARDIOLOGY | Facility: CLINIC | Age: 73
End: 2018-11-30

## 2018-11-30 RX ORDER — ENALAPRIL MALEATE 2.5 MG/1
2.5 TABLET ORAL NIGHTLY
Status: ON HOLD | COMMUNITY
End: 2019-01-14 | Stop reason: HOSPADM

## 2018-11-30 NOTE — TELEPHONE ENCOUNTER
Skyla, PT([phone]) with [facility]  calling to report BP 70/38 upon arrival to pt's home, gave pt 8oz of water to drink, rechecked BP. Repeat BP 80/40. Skyla reports wife reproted pt having more weakness this week, needing to take a rest after doing simple tasks such as brushing his teet. Pt also experiencing nausea off and on the past few days.  notified, ordered pt hold Vasotec 2,5mg today then decrease to Vasotec 2.5mg at bedtime beginning tomorrow evening. Skyla and wife notified, verbalized understanding.

## 2018-12-03 ENCOUNTER — ANTI-COAG VISIT (OUTPATIENT)
Dept: CARDIOLOGY | Facility: CLINIC | Age: 73
End: 2018-12-03

## 2018-12-03 ENCOUNTER — TELEPHONE (OUTPATIENT)
Dept: CARDIOLOGY | Facility: CLINIC | Age: 73
End: 2018-12-03

## 2018-12-03 DIAGNOSIS — Z79.01 LONG TERM (CURRENT) USE OF ANTICOAGULANTS: Primary | ICD-10-CM

## 2018-12-03 DIAGNOSIS — I48.20 CHRONIC ATRIAL FIBRILLATION: ICD-10-CM

## 2018-12-03 NOTE — TELEPHONE ENCOUNTER
Tea with [facility] ([phone]) visited pt today. Tea reports 02 saturation 85% on room air at rest. Tea states 02 saturation 93% with oxygen.

## 2018-12-04 NOTE — PROGRESS NOTES
Shayne ([phone])  /[facility] called on 12/03/18 to inform us that they just recently admitted this patient. This patient is a ([facility]) patient and that the orders were coming from the [facility] outside that they could not do any inr on patient. Spoke to wife/Nava on 12/03/18 she said that it would be very difficult to her to bring patient to have labs. He has (I.V's) and that would be very difficult. She also said that she would call the ([facility]) to see if she could get an order for 12/04/18 because HH still has to come out. As of 12/04 /18 Spoke to Shayne they still could not do an inr because with the ([facility]) it is a process and that is what they told the wife. But she also told the wife that maybe she should look into ([facility]). She said wife repleid she would like to see what the ([facility]) is going to do. Please advise.

## 2018-12-05 NOTE — PROGRESS NOTES
[facility] to ask [facility] to [facility] to draw INR and fax us results. FAxed to Cayden London at [phone]

## 2018-12-07 ENCOUNTER — ANTI-COAG VISIT (OUTPATIENT)
Dept: CARDIOLOGY | Facility: CLINIC | Age: 73
End: 2018-12-07

## 2018-12-07 ENCOUNTER — LAB VISIT (OUTPATIENT)
Dept: LAB | Facility: HOSPITAL | Age: 73
End: 2018-12-07
Payer: MEDICARE

## 2018-12-07 DIAGNOSIS — I48.20 CHRONIC ATRIAL FIBRILLATION: ICD-10-CM

## 2018-12-07 DIAGNOSIS — Z79.01 LONG TERM (CURRENT) USE OF ANTICOAGULANTS: Primary | ICD-10-CM

## 2018-12-07 DIAGNOSIS — Z79.01 LONG TERM (CURRENT) USE OF ANTICOAGULANTS: ICD-10-CM

## 2018-12-07 LAB
INR PPP: 4.1
PROTHROMBIN TIME: 40.1 SEC

## 2018-12-07 PROCEDURE — 85610 PROTHROMBIN TIME: CPT

## 2018-12-07 PROCEDURE — 36415 COLL VENOUS BLD VENIPUNCTURE: CPT

## 2018-12-07 RX ORDER — WARFARIN 7.5 MG/1
7.5 TABLET ORAL DAILY
Qty: 30 TABLET | Refills: 5 | Status: ON HOLD | OUTPATIENT
Start: 2018-12-07 | End: 2019-01-14 | Stop reason: HOSPADM

## 2018-12-07 RX ORDER — WARFARIN 7.5 MG/1
TABLET ORAL
Qty: 30 TABLET | Refills: 0 | OUTPATIENT
Start: 2018-12-07

## 2018-12-07 NOTE — PROGRESS NOTES
INR elevated today without explanation. Patient seemed to be doing well on this dose so will hold today and challenge again. If INR still elevated or at upper end of range on f/u, may need maintenance dose reduction.

## 2018-12-07 NOTE — PROGRESS NOTES
Wife called to report that their trying to get home health re instated but will not be done today, INR was due 12/06 but was not drawn, appointment scheduled to today -12/07/18

## 2018-12-08 ENCOUNTER — NURSE TRIAGE (OUTPATIENT)
Dept: ADMINISTRATIVE | Facility: CLINIC | Age: 73
End: 2018-12-08

## 2018-12-08 NOTE — TELEPHONE ENCOUNTER
"  Reason for Disposition   Diagnosis of eye allergy never confirmed    Answer Assessment - Initial Assessment Questions  1. SEVERITY: "How bad is the itching?"  (e.g., Scale 1-10; mild, moderate or severe)     bilat started this am   2. ONSET: "When did the eye symptoms start?" (e.g., hours or days ago)     This am   3. EYELIDS: "Are the eyelids swollen?" If so, ask: "How much?"     Around edges red and puffy from rubbing   4. EYE DISCHARGE: "Is there any discharge from the eye, or eyelid crusting?" If so, ask: "How much?"     No   5. TRIGGER: "What do you think triggered the allergic reaction?" (e.g., dust, smoke, pollen, new eye make-up)     Pollen   6. RECURRENT PROBLEM: "Have you experienced eye allergies before?" If so, ask: "When was the last time?" and "What medicine worked best in the past?"    Yes   7. OTHER SYMPTOMS: "Do you have any other symptoms?" (e.g., runny nose)  Afeb, RN, sneezing.    Protocols used:  [facility]  Spouse called re allergies rubbing eyes - wants to see if he benadryl ok   Spoke with dr martinez may have bendaryl q6-8 hours or fexofenadine/allergra  pazeo gtts in eye- MD does not rec as MD not familiar with them. Call office on Monday with questions. Spouse notified. Call back with questions.   "

## 2018-12-10 ENCOUNTER — TELEPHONE (OUTPATIENT)
Dept: CARDIOLOGY | Facility: CLINIC | Age: 73
End: 2018-12-10

## 2018-12-10 NOTE — TELEPHONE ENCOUNTER
Pt's wife Marion calling to notify our office of contact information if any orders need to be added to [facility] orders [facility] by [facility]. Nava asked we send a fax to [phone] Attn TOMAS Allen. Also asked that we include pt's name on the orders and also list his last 4 digits of his SS#.[facility] office also asking we note on fax cover page how many pages are being faxed. Per [facility]. Home health agency can only accept orders from [facility] physicians caring for pt due to pt's [facility] benefits paying for the services.

## 2018-12-12 ENCOUNTER — TELEPHONE (OUTPATIENT)
Dept: CARDIOLOGY | Facility: CLINIC | Age: 73
End: 2018-12-12

## 2018-12-12 NOTE — TELEPHONE ENCOUNTER
----- Message from Floresita Gomez sent at 12/12/2018  3:21 PM CST -----  Contact: Nava Wiggins call pt's wife Nava Simon at [phone].  She needs to know if you received pt's lab results and to find out if he needs to take his potassium.    Thank you

## 2018-12-12 NOTE — TELEPHONE ENCOUNTER
Marion called stating that pt took Furosemide 40mg on yesterday and the day before and wants to know if he should take his Potassium 10 Meq. Nava also stated that she told pt's home health nurse to fax his lab results to us. I advised Nava that we didn't receive lab results and she stated that she will call them again to have them to fax the results. Please advise.

## 2018-12-13 ENCOUNTER — OFFICE VISIT (OUTPATIENT)
Dept: CARDIOLOGY | Facility: CLINIC | Age: 73
End: 2018-12-13
Payer: MEDICARE

## 2018-12-13 VITALS
DIASTOLIC BLOOD PRESSURE: 55 MMHG | HEIGHT: 66 IN | BODY MASS INDEX: 26.56 KG/M2 | HEART RATE: 104 BPM | SYSTOLIC BLOOD PRESSURE: 93 MMHG | WEIGHT: 165.25 LBS

## 2018-12-13 DIAGNOSIS — Z79.01 LONG TERM (CURRENT) USE OF ANTICOAGULANTS: ICD-10-CM

## 2018-12-13 DIAGNOSIS — Z95.810 CARDIAC RESYNCHRONIZATION THERAPY DEFIBRILLATOR (CRT-D) IN PLACE: ICD-10-CM

## 2018-12-13 DIAGNOSIS — E87.1 HYPONATREMIA: ICD-10-CM

## 2018-12-13 DIAGNOSIS — I48.20 CHRONIC ATRIAL FIBRILLATION: ICD-10-CM

## 2018-12-13 DIAGNOSIS — I50.22 CHRONIC SYSTOLIC CONGESTIVE HEART FAILURE: Primary | ICD-10-CM

## 2018-12-13 DIAGNOSIS — I25.5 ISCHEMIC CARDIOMYOPATHY: ICD-10-CM

## 2018-12-13 PROCEDURE — 99214 OFFICE O/P EST MOD 30 MIN: CPT | Mod: S$GLB,,, | Performed by: INTERNAL MEDICINE

## 2018-12-13 PROCEDURE — 1101F PT FALLS ASSESS-DOCD LE1/YR: CPT | Mod: CPTII,S$GLB,, | Performed by: INTERNAL MEDICINE

## 2018-12-13 PROCEDURE — 99999 PR PBB SHADOW E&M-EST. PATIENT-LVL III: CPT | Mod: PBBFAC,,, | Performed by: INTERNAL MEDICINE

## 2018-12-13 RX ORDER — FUROSEMIDE 40 MG/1
40 TABLET ORAL
Status: ON HOLD | COMMUNITY
End: 2019-01-14 | Stop reason: HOSPADM

## 2018-12-13 RX ORDER — DIPHENHYDRAMINE HCL 25 MG
25 CAPSULE ORAL 2 TIMES DAILY PRN
COMMUNITY
End: 2019-01-04

## 2018-12-13 NOTE — LETTER
December 17, 2018      Erick Lacy MD  [address] [facility] [address]           [place] - Cardiology  [address] [facility]  [place] [address]  Phone: [phone]          Patient: Bharat Coker   MR Number: [id number]   YOB: 1945   Date of Visit: 12/13/2018       Dear Dr. Erick Lacy:    Thank you for referring Bharat Coker to me for evaluation. Attached you will find relevant portions of my assessment and plan of care.    If you have questions, please do not hesitate to call me. I look forward to following Bharat Coker along with you.    Sincerely,    Vel Posey MD    Enclosure  CC:  No Recipients    If you would like to receive this communication electronically, please contact [email or web address] or [phone] to request more information on [facility] Link access.    For providers and/or their staff who would like to refer a patient to [facility], please contact us through our one-stop-shop provider referral line, [facility], at [phone].    If you feel you have received this communication in error or would no longer like to receive these types of communications, please e-mail [email or web address]

## 2018-12-13 NOTE — PATIENT INSTRUCTIONS
Tonic water 2 oz can help w cramps   Wearing socks can also help prevent cramps.    You should not take any NSAIDs because you take a blood thinner. NSAIDs are : Ibuprofen, Advil, Motrin, Aleeve, Naproxen, Meloxicam, Mobic, Diclofenac and Voltaren.  Do not take Celebrex either.  You can take Tylenol for pain. It is not a NSAID. Limit Tylenol to 4 tabs (500 mg each) in a 24 hr period.    OK to continue Lasix aka furosemide the same way.  You do not need potassium.  Continue to stay away from sodium.

## 2018-12-13 NOTE — PROGRESS NOTES
Subjective:   Patient ID:  Bharat Coker is a 73 y.o. male who presents for follow-up of chronic systolic heart failure      Problem List:  CHF - LVEF <10%  - on home dobutamine  CAD  CABG - 1995  OKSANA SVG - 6/18   Atrial tach/fib  ICD  Diabetes since 2000    HPI:   Bharat Coker has NYHA class IV CHF, EF 19% on home IV dobutamine and continuous oxygen.  He is not a candidate for advanced therapies and was [facility].  He is accompanied to clinic by his wife.  His wife keeps meticulous records.    He has symptoms of heart failure both right and left-sided.  He feels worse when he has abdominal distention and loses appetite.  That is when his wife gives him furosemide.  In the last 2 weeks he has required furosemide 7 times:  On Dec 1 and2 , then 5 and 6, then 10, 11 and 12th.  Labs drawn earlier this week were notable for a serum sodium of 129 and chloride of 89.  BUN and creatinine were 32 and 1.3.  The enalapril had to be reduced from 2.5 mg b.i.d. To 2.5 mg q.h.s. because of hypotension.  On dual anti-platelet therapy and warfarin.  He does not report excessive bruising, nose bleeds, melena or bleeding from other sites.       Review of Systems   Constitution: Positive for weakness and malaise/fatigue. Negative for weight gain and weight loss.   HENT: Negative for hearing loss and nosebleeds.    Eyes: Negative for visual disturbance.   Cardiovascular: Positive for dyspnea on exertion. Negative for chest pain, claudication, irregular heartbeat, leg swelling, orthopnea, palpitations, paroxysmal nocturnal dyspnea and syncope.   Respiratory: Positive for cough and sputum production. Negative for hemoptysis and wheezing.    Hematologic/Lymphatic: Does not bruise/bleed easily.   Musculoskeletal: Positive for muscle weakness. Negative for arthritis, back pain, falls, joint pain, muscle cramps and myalgias.   Gastrointestinal: Negative for abdominal pain, heartburn and melena.    Genitourinary: Negative for frequency, hematuria and nocturia.   Neurological: Negative for dizziness, headaches, light-headedness, loss of balance, numbness and paresthesias.   Psychiatric/Behavioral: Negative for depression. The patient is not nervous/anxious.        Current Outpatient Medications   Medication Sig    aspirin 81 MG Chew Take 81 mg by mouth once daily.    atorvastatin (LIPITOR) 80 MG tablet Take 80 mg by mouth nightly.    azelastine (ASTELIN) 137 mcg (0.1 %) nasal spray 1 spray by Nasal route as needed.     benzonatate (TESSALON) 200 MG capsule Take 200 mg by mouth 3 (three) times daily as needed for Cough.    cetirizine (ZYRTEC) 5 MG tablet Take 1 tablet (5 mg total) by mouth once daily.    digoxin (LANOXIN) 125 mcg tablet Take 1 tablet (0.125 mg total) by mouth once daily.    diphenhydrAMINE (BENADRYL) 25 mg capsule Take 25 mg by mouth 2 (two) times daily as needed for Itching.    DOBUTamine (DOBUTREX) 500 mg/250 mL (2,000 mcg/mL) Inject 317.5 mcg/min into the vein continuous.    docusate sodium (COLACE) 100 MG capsule Take 100 mg by mouth 2 (two) times daily as needed for Constipation.    enalapril (VASOTEC) 2.5 MG tablet Take 2.5 mg by mouth every evening.    fluticasone (FLONASE) 50 mcg/actuation nasal spray 2 sprays by Each Nare route as needed.     furosemide (LASIX) 40 MG tablet Take 40 mg by mouth. As needed    glipiZIDE (GLUCOTROL) 5 MG tablet Take 5 mg by mouth 2 (two) times daily before meals.    prasugrel (EFFIENT) 10 mg Tab Take 1 tablet (10 mg total) by mouth once daily.    ranitidine (ZANTAC) 150 MG tablet Take 150 mg by mouth once daily.    spironolactone (ALDACTONE) 25 MG tablet Take 0.5 tablets (12.5 mg total) by mouth once daily.    warfarin (COUMADIN) 7.5 MG tablet Take 1 tablet (7.5 mg total) by mouth Daily.         Social History     Tobacco Use    Smoking status: Former Smoker     Types: Cigarettes    Smokeless tobacco: Never Used   Substance Use Topics  "   Alcohol use: No     Frequency: Never    Drug use: No         Objective:     Physical Exam   Constitutional: He appears well-developed and well-nourished.   BP (!) 93/55   Pulse 104   Ht 5' 6" (1.676 m)   Wt 75 kg (165 lb 3.8 oz)   BMI 26.67 kg/m²      HENT:   Head: Normocephalic.   Neck: JVD: About 5 cm above the clavicle with the head elevated at 30°   Cardiovascular: Normal rate, regular rhythm, S1 normal and S2 normal.   No murmur heard.  Pulses:       Radial pulses are 1+ on the left side.   Pulmonary/Chest: Breath sounds normal.   There are crackles in the right base only and the lower 1/3 on the left side.   Abdominal: Soft. He exhibits no distension, no pulsatile liver and no mass. There is hepatomegaly (palpable 2 cm below the right costal margin).   Musculoskeletal:        Right lower leg: He exhibits no edema.        Left lower leg: He exhibits no edema.   Neurological: He is alert.   Psychiatric: He has a normal mood and affect. His speech is normal.         Lab Results   Component Value Date     (H) 11/26/2018    CREATININE 1.1 11/26/2018    BUN 36 (H) 11/26/2018     (L) 11/26/2018    K 4.8 11/26/2018     11/26/2018    CO2 26 11/26/2018     Lab Results   Component Value Date    ALT 12 11/26/2018    AST 19 11/26/2018    ALKPHOS 76 11/26/2018    BILITOT 1.0 11/26/2018           Assessment and Plan:     1. Chronic systolic congestive heart failure - NYHA Class IV  2. Ischemic cardiomyopathy  3. Hyponatremia  Unable to tolerate guideline directed therapy for heart failure due to hypotension. Continue current meds.    Follow labs weekly.    I would like for the home health nurse to auscultate his lungs and report to us if he/she finds evidence of increased pulmonary congestion.    4. Chronic atrial fibrillation  5. Long term (current) use of anticoagulants [Z79.01]  Continue anticoagulation.    6. Cardiac resynchronization therapy defibrillator (CRT-D) in place  7. Atrial " tachycardia  8. VT  FUP w EP.      Follow-up in about 4 weeks (around 1/10/2019).

## 2018-12-14 ENCOUNTER — NURSE TRIAGE (OUTPATIENT)
Dept: ADMINISTRATIVE | Facility: CLINIC | Age: 73
End: 2018-12-14

## 2018-12-15 NOTE — TELEPHONE ENCOUNTER
"  Reason for Disposition   Taking any of the following medications: digoxin (Lanoxin), lithium, theophylline, phenytoin (Dilantin)    Answer Assessment - Initial Assessment Questions  1. NAUSEA SEVERITY: "How bad is the nausea?" (e.g., mild, moderate, severe; dehydration, weight loss)    - MILD: loss of appetite without change in eating habits    - MODERATE: decreased oral intake without significant weight loss, dehydration, or malnutrition    - SEVERE: inadequate caloric or fluid intake, significant weight loss, symptoms of dehydration     Loses appetite with fluid back up. Had one fluid pill yest and today, no SOB, never gets ankle swelling. Can have 1500 ml - had 500 ml. Last uop 915pm. + nausea. Took swallow of milk , ate descent bkft at 1030am. Drinking boost at 6pm. + flatus, no BM today. Last BM yest am. No abd pain. Takes dulcolax BID. Not changed wt in 3 days.   2. ONSET: "When did the nausea begin?"     5pm , slept for a while   3. VOMITING: "Any vomiting?" If so, ask: "How many times today?"     No   4. RECURRENT SYMPTOM: "Have you had nausea before?" If so, ask: "When was the last time?" "What happened that time?"     Yes, had zofran ODT at home with daughter   5. CAUSE: "What do you think is causing the nausea?"     Lack of food. Had benandyl and lasix without food.    [facility]  Family called re allergies - gave Zyrtec and Benadryl, feeling nausea this evening - told to use Phenergan   No vomiting. Nausea from post nausea drip, not much of appetite, taking lasix . Requesting motion sickness meds   Always SOB with exertion. No SOB at rest , O2 at 2L 24/7. No CP, no pain with deep breath   O2 sat =75%, changed fingers 85%. Baseline mid 80s to 90. up to 90 % sitting   BS= not checked. 216 . due to take glipizide.   Spouse feels congested in head- cant take decongestant.   Spouse requesting Dramamine /Zofran   Pharm [phone]   MD transferred to speak with MD on call.   "

## 2018-12-20 ENCOUNTER — TELEPHONE (OUTPATIENT)
Dept: CARDIOLOGY | Facility: CLINIC | Age: 73
End: 2018-12-20

## 2018-12-20 ENCOUNTER — ANTI-COAG VISIT (OUTPATIENT)
Dept: CARDIOLOGY | Facility: CLINIC | Age: 73
End: 2018-12-20

## 2018-12-20 DIAGNOSIS — I48.20 CHRONIC ATRIAL FIBRILLATION: ICD-10-CM

## 2018-12-20 DIAGNOSIS — Z79.01 LONG TERM (CURRENT) USE OF ANTICOAGULANTS: ICD-10-CM

## 2018-12-20 LAB — INR PPP: 1.84

## 2018-12-20 NOTE — PROGRESS NOTES
Verbal result called in by wife   [facility]    PT 20.7   INR 1.84   Advised wife to have [facility] fax hard copy

## 2018-12-20 NOTE — TELEPHONE ENCOUNTER
Labs obtained by [facility] on 12/18/2018 are as follows sodium 129, potassium 5.1, BUN 30, creatinine 1.2.  Serum sodium is low but is unchanged from earlier in December.  BUN and creatinine are also unchanged.  Will repeat again in 1 week.

## 2018-12-20 NOTE — PROGRESS NOTES
The pt's INR is barely subtherapeutic, even with missing two doses of his warfarin this week.  As his last INR was elevated, I am decreasing his total weekly warfarin dose to prevent a supratherapeutic INR once all doses are taken.  See calendar for dosing.

## 2018-12-28 ENCOUNTER — ANTI-COAG VISIT (OUTPATIENT)
Dept: CARDIOLOGY | Facility: CLINIC | Age: 73
End: 2018-12-28

## 2018-12-28 DIAGNOSIS — Z79.01 LONG TERM (CURRENT) USE OF ANTICOAGULANTS: ICD-10-CM

## 2018-12-28 DIAGNOSIS — I48.20 CHRONIC ATRIAL FIBRILLATION: ICD-10-CM

## 2018-12-28 LAB — INR PPP: 2.7

## 2018-12-28 NOTE — PROGRESS NOTES
INR within range today. Dose was recently decreased and patient reports a missed dose. Will decrease weekly dose again based on that information.

## 2019-01-03 LAB — INR PPP: 1.9

## 2019-01-04 ENCOUNTER — ANTI-COAG VISIT (OUTPATIENT)
Dept: CARDIOLOGY | Facility: CLINIC | Age: 74
End: 2019-01-04

## 2019-01-04 ENCOUNTER — OFFICE VISIT (OUTPATIENT)
Dept: CARDIOLOGY | Facility: CLINIC | Age: 74
End: 2019-01-04
Payer: MEDICARE

## 2019-01-04 VITALS
WEIGHT: 167.31 LBS | SYSTOLIC BLOOD PRESSURE: 99 MMHG | HEART RATE: 104 BPM | HEIGHT: 66 IN | DIASTOLIC BLOOD PRESSURE: 59 MMHG | BODY MASS INDEX: 26.89 KG/M2

## 2019-01-04 DIAGNOSIS — I48.20 CHRONIC ATRIAL FIBRILLATION: ICD-10-CM

## 2019-01-04 DIAGNOSIS — I50.22 CHRONIC SYSTOLIC CONGESTIVE HEART FAILURE: Primary | ICD-10-CM

## 2019-01-04 DIAGNOSIS — E11.59 TYPE 2 DIABETES MELLITUS WITH OTHER CIRCULATORY COMPLICATION, WITHOUT LONG-TERM CURRENT USE OF INSULIN: ICD-10-CM

## 2019-01-04 DIAGNOSIS — I25.5 ISCHEMIC CARDIOMYOPATHY: ICD-10-CM

## 2019-01-04 DIAGNOSIS — Z79.01 LONG TERM (CURRENT) USE OF ANTICOAGULANTS: ICD-10-CM

## 2019-01-04 PROCEDURE — 99214 PR OFFICE/OUTPT VISIT, EST, LEVL IV, 30-39 MIN: ICD-10-PCS | Mod: S$GLB,,, | Performed by: INTERNAL MEDICINE

## 2019-01-04 PROCEDURE — 1101F PR PT FALLS ASSESS DOC 0-1 FALLS W/OUT INJ PAST YR: ICD-10-PCS | Mod: CPTII,S$GLB,, | Performed by: INTERNAL MEDICINE

## 2019-01-04 PROCEDURE — 99999 PR PBB SHADOW E&M-EST. PATIENT-LVL III: ICD-10-PCS | Mod: PBBFAC,,, | Performed by: INTERNAL MEDICINE

## 2019-01-04 PROCEDURE — 3044F PR MOST RECENT HEMOGLOBIN A1C LEVEL <7.0%: ICD-10-PCS | Mod: CPTII,S$GLB,, | Performed by: INTERNAL MEDICINE

## 2019-01-04 PROCEDURE — 1101F PT FALLS ASSESS-DOCD LE1/YR: CPT | Mod: CPTII,S$GLB,, | Performed by: INTERNAL MEDICINE

## 2019-01-04 PROCEDURE — 3044F HG A1C LEVEL LT 7.0%: CPT | Mod: CPTII,S$GLB,, | Performed by: INTERNAL MEDICINE

## 2019-01-04 PROCEDURE — 99214 OFFICE O/P EST MOD 30 MIN: CPT | Mod: S$GLB,,, | Performed by: INTERNAL MEDICINE

## 2019-01-04 PROCEDURE — 99999 PR PBB SHADOW E&M-EST. PATIENT-LVL III: CPT | Mod: PBBFAC,,, | Performed by: INTERNAL MEDICINE

## 2019-01-04 RX ORDER — GUAIFENESIN 600 MG/1
1200 TABLET, EXTENDED RELEASE ORAL
COMMUNITY

## 2019-01-04 NOTE — PROGRESS NOTES
Subjective:   Patient ID:  Bharat Coker is a 73 y.o. male who presents for follow-up of chronic systolic heart failure      Problem List:  CHF - LVEF <10%  - on home dobutamine  CAD  CABG - 1995  OKSANA SVG - 6/18   Atrial tach/fib  ICD  Diabetes since 2000    HPI:   Bharat Coker is here with his wife.  He has NYHA class IV heart failure on home dobutamine.  He also requires oxygen continuously.  [facility]  He is getting labs weekly.  Most recently BUN was 33 and creatinine 1.2 mg/dl.  Prior to that BUN was 43 and creatinine is 1.5.  He takes Lasix p.r.n..  The last few weeks he has been needing it 3 or so times a week.  I reviewed the detailed chart that his wife maintains.  Glipizide was stopped because he was getting hypoglycemic but since it was discontinue the have been a few blood sugars greater than 200 mg/dl.      Review of Systems   Constitution: Positive for malaise/fatigue. Negative for weight gain and weight loss.   Cardiovascular: Negative for chest pain, dyspnea on exertion, leg swelling and palpitations.   Respiratory: Positive for cough and sputum production. Negative for hemoptysis.    Hematologic/Lymphatic: Does not bruise/bleed easily.   Musculoskeletal: Negative for arthritis and muscle cramps.   Gastrointestinal: Negative for abdominal pain, heartburn and melena.   Genitourinary: Negative for hematuria and nocturia.   Neurological: Negative for headaches, light-headedness and seizures.   Psychiatric/Behavioral: Negative for depression.       Current Outpatient Medications   Medication Sig    aspirin 81 MG Chew Take 81 mg by mouth once daily.    atorvastatin (LIPITOR) 80 MG tablet Take 80 mg by mouth nightly.    azelastine (ASTELIN) 137 mcg (0.1 %) nasal spray 1 spray by Nasal route as needed.     benzonatate (TESSALON) 200 MG capsule Take 200 mg by mouth 3 (three) times daily as needed for Cough.    cetirizine (ZYRTEC) 5 MG tablet Take 1 tablet (5 mg total) by  "mouth once daily.    digoxin (LANOXIN) 125 mcg tablet Take 1 tablet (0.125 mg total) by mouth once daily.    DOBUTamine (DOBUTREX) 500 mg/250 mL (2,000 mcg/mL) Inject 317.5 mcg/min into the vein continuous.    docusate sodium (COLACE) 100 MG capsule Take 100 mg by mouth 2 (two) times daily as needed for Constipation.    enalapril (VASOTEC) 2.5 MG tablet Take 2.5 mg by mouth every evening.    fluticasone (FLONASE) 50 mcg/actuation nasal spray 2 sprays by Each Nare route as needed.     furosemide (LASIX) 40 MG tablet Take 40 mg by mouth. As needed    guaiFENesin (MUCINEX) 600 mg 12 hr tablet Take 1,200 mg by mouth as needed for Congestion.    prasugrel (EFFIENT) 10 mg Tab Take 1 tablet (10 mg total) by mouth once daily.    ranitidine (ZANTAC) 150 MG tablet Take 150 mg by mouth once daily.    spironolactone (ALDACTONE) 25 MG tablet Take 0.5 tablets (12.5 mg total) by mouth once daily.    warfarin (COUMADIN) 7.5 MG tablet Take 1 tablet (7.5 mg total) by mouth Daily. (Patient taking differently: Take 7.5 mg by mouth Daily. 1 tablet 5 days per week, 1/2 tablet 2 days per week, followed by Coumadin clinic)         Social History     Tobacco Use    Smoking status: Former Smoker     Types: Cigarettes    Smokeless tobacco: Never Used   Substance Use Topics    Alcohol use: No     Frequency: Never    Drug use: No         Objective:     Physical Exam   Constitutional: He is oriented to person, place, and time. He appears well-developed and well-nourished.   BP (!) 99/59   Pulse 104   Ht 5' 6" (1.676 m)   Wt 75.9 kg (167 lb 5.3 oz)   BMI 27.01 kg/m²      HENT:   Head: Normocephalic.   Neck: No hepatojugular reflux and no JVD present.   Cardiovascular: Normal rate, S1 normal and S2 normal. An irregularly irregular rhythm present. Exam reveals gallop and S3.   No murmur heard.  Pulses:       Radial pulses are 2+ on the right side, and 2+ on the left side.   Pulmonary/Chest: He has rales in the right lower field " and the left lower field. Chest wall is not dull to percussion.   Abdominal: Soft. He exhibits no mass. There is no splenomegaly or hepatomegaly.   Musculoskeletal:        Right lower leg: He exhibits no edema.        Left lower leg: He exhibits no edema.   Neurological: He is alert and oriented to person, place, and time. Gait normal.   Skin: No bruising noted. Nails show no clubbing.   Psychiatric: He has a normal mood and affect. His speech is normal and behavior is normal.           No results found for: CHOL, HDL, LDLCALC, TRIG, CHOLHDL  Lab Results   Component Value Date     (H) 11/26/2018    CREATININE 1.1 11/26/2018    BUN 36 (H) 11/26/2018     (L) 11/26/2018    K 4.8 11/26/2018     11/26/2018    CO2 26 11/26/2018     Lab Results   Component Value Date    ALT 12 11/26/2018    AST 19 11/26/2018    ALKPHOS 76 11/26/2018    BILITOT 1.0 11/26/2018           Assessment and Plan:     1. Chronic systolic congestive heart failure - NYHA Class IV   2. Ischemic cardiomyopathy  Continue home IV dobutamine and oxygen.  Continue weekly labs.  Decrease dietary K intake. Continue spironolactone.  No other change in meds.    3. Chronic atrial fibrillation  Continue warfarin.    4. Type 2 diabetes mellitus with other circulatory complication, without long-term current use of insulin  Consider resuming medication to lower blood sugar.      Follow-up in about 6 weeks (around 2/15/2019).

## 2019-01-04 NOTE — PATIENT INSTRUCTIONS
Avoid potassium containing foods:    OJ  Bananas  Tomatoes  Fruit juices  Vegetable juices:  low sodium V8*  Salt substitutes    ==============    Mucinex DM - OK for a cough and congestion.  Flonase, Nasonex nose spray - OK  Claritin, Zyrtec, Allegra, Xyzal - all okay as long as they do not contain the letter D at the end

## 2019-01-07 ENCOUNTER — HOSPITAL ENCOUNTER (INPATIENT)
Facility: HOSPITAL | Age: 74
LOS: 8 days | Discharge: HOSPICE | DRG: 291 | End: 2019-01-15
Attending: EMERGENCY MEDICINE | Admitting: EMERGENCY MEDICINE
Payer: MEDICARE

## 2019-01-07 ENCOUNTER — TELEPHONE (OUTPATIENT)
Dept: CARDIOLOGY | Facility: CLINIC | Age: 74
End: 2019-01-07

## 2019-01-07 DIAGNOSIS — I50.43 ACUTE ON CHRONIC COMBINED SYSTOLIC AND DIASTOLIC CONGESTIVE HEART FAILURE: Primary | ICD-10-CM

## 2019-01-07 DIAGNOSIS — D72.829 LEUKOCYTOSIS, UNSPECIFIED TYPE: ICD-10-CM

## 2019-01-07 DIAGNOSIS — I25.10 CORONARY ARTERY DISEASE INVOLVING NATIVE CORONARY ARTERY OF NATIVE HEART WITHOUT ANGINA PECTORIS: ICD-10-CM

## 2019-01-07 DIAGNOSIS — E87.1 HYPONATREMIA: ICD-10-CM

## 2019-01-07 DIAGNOSIS — Z66 DNR (DO NOT RESUSCITATE): ICD-10-CM

## 2019-01-07 DIAGNOSIS — E87.5 HYPERKALEMIA: ICD-10-CM

## 2019-01-07 DIAGNOSIS — Z51.5 PALLIATIVE CARE ENCOUNTER: ICD-10-CM

## 2019-01-07 DIAGNOSIS — E80.6 HYPERBILIRUBINEMIA: ICD-10-CM

## 2019-01-07 DIAGNOSIS — N17.9 AKI (ACUTE KIDNEY INJURY): ICD-10-CM

## 2019-01-07 DIAGNOSIS — K72.00 ACUTE LIVER FAILURE WITHOUT HEPATIC COMA: ICD-10-CM

## 2019-01-07 DIAGNOSIS — R79.1 ELEVATED INR: ICD-10-CM

## 2019-01-07 DIAGNOSIS — R57.0 CARDIOGENIC SHOCK: ICD-10-CM

## 2019-01-07 DIAGNOSIS — I48.20 CHRONIC ATRIAL FIBRILLATION: ICD-10-CM

## 2019-01-07 DIAGNOSIS — Z71.89 GOALS OF CARE, COUNSELING/DISCUSSION: ICD-10-CM

## 2019-01-07 DIAGNOSIS — R06.02 SHORTNESS OF BREATH: ICD-10-CM

## 2019-01-07 LAB
ALBUMIN SERPL BCP-MCNC: 3 G/DL
ALP SERPL-CCNC: 175 U/L
ALT SERPL W/O P-5'-P-CCNC: 853 U/L
ANION GAP SERPL CALC-SCNC: 15 MMOL/L
AST SERPL-CCNC: 1428 U/L
BASOPHILS # BLD AUTO: 0.08 K/UL
BASOPHILS NFR BLD: 0.6 %
BILIRUB SERPL-MCNC: 2.2 MG/DL
BNP SERPL-MCNC: 3568 PG/ML
BUN SERPL-MCNC: 46 MG/DL
CALCIUM SERPL-MCNC: 9.4 MG/DL
CHLORIDE SERPL-SCNC: 90 MMOL/L
CO2 SERPL-SCNC: 22 MMOL/L
CREAT SERPL-MCNC: 2.1 MG/DL
DIFFERENTIAL METHOD: ABNORMAL
EOSINOPHIL # BLD AUTO: 0 K/UL
EOSINOPHIL NFR BLD: 0.1 %
ERYTHROCYTE [DISTWIDTH] IN BLOOD BY AUTOMATED COUNT: 18.2 %
EST. GFR  (AFRICAN AMERICAN): 35.3 ML/MIN/1.73 M^2
EST. GFR  (NON AFRICAN AMERICAN): 30.5 ML/MIN/1.73 M^2
GLUCOSE SERPL-MCNC: 142 MG/DL
HCT VFR BLD AUTO: 35.9 %
HGB BLD-MCNC: 10.9 G/DL
IMM GRANULOCYTES # BLD AUTO: 0.25 K/UL
IMM GRANULOCYTES NFR BLD AUTO: 1.9 %
INR PPP: 6.3
LIPASE SERPL-CCNC: 86 U/L
LYMPHOCYTES # BLD AUTO: 0.9 K/UL
LYMPHOCYTES NFR BLD: 7.1 %
MAGNESIUM SERPL-MCNC: 1.7 MG/DL
MCH RBC QN AUTO: 26.1 PG
MCHC RBC AUTO-ENTMCNC: 30.4 G/DL
MCV RBC AUTO: 86 FL
MONOCYTES # BLD AUTO: 1.8 K/UL
MONOCYTES NFR BLD: 13.5 %
NEUTROPHILS # BLD AUTO: 10.1 K/UL
NEUTROPHILS NFR BLD: 76.8 %
NRBC BLD-RTO: 0 /100 WBC
PLATELET # BLD AUTO: 256 K/UL
PMV BLD AUTO: 10.4 FL
POTASSIUM SERPL-SCNC: 6.1 MMOL/L
PROT SERPL-MCNC: 8.5 G/DL
PROTHROMBIN TIME: 63.3 SEC
RBC # BLD AUTO: 4.18 M/UL
SODIUM SERPL-SCNC: 127 MMOL/L
TROPONIN I SERPL DL<=0.01 NG/ML-MCNC: 0.06 NG/ML
WBC # BLD AUTO: 13.15 K/UL

## 2019-01-07 PROCEDURE — 99291 PR CRITICAL CARE, E/M 30-74 MINUTES: ICD-10-PCS | Mod: ,,, | Performed by: EMERGENCY MEDICINE

## 2019-01-07 PROCEDURE — 12000002 HC ACUTE/MED SURGE SEMI-PRIVATE ROOM

## 2019-01-07 PROCEDURE — 93010 EKG 12-LEAD: ICD-10-PCS | Mod: ,,, | Performed by: INTERNAL MEDICINE

## 2019-01-07 PROCEDURE — 99291 CRITICAL CARE FIRST HOUR: CPT | Mod: ,,, | Performed by: EMERGENCY MEDICINE

## 2019-01-07 PROCEDURE — 99233 PR SUBSEQUENT HOSPITAL CARE,LEVL III: ICD-10-PCS | Mod: GC,,, | Performed by: INTERNAL MEDICINE

## 2019-01-07 PROCEDURE — 80053 COMPREHEN METABOLIC PANEL: CPT

## 2019-01-07 PROCEDURE — 96376 TX/PRO/DX INJ SAME DRUG ADON: CPT

## 2019-01-07 PROCEDURE — 83880 ASSAY OF NATRIURETIC PEPTIDE: CPT

## 2019-01-07 PROCEDURE — 84484 ASSAY OF TROPONIN QUANT: CPT

## 2019-01-07 PROCEDURE — 93005 ELECTROCARDIOGRAM TRACING: CPT

## 2019-01-07 PROCEDURE — 99285 EMERGENCY DEPT VISIT HI MDM: CPT | Mod: 25

## 2019-01-07 PROCEDURE — 93010 ELECTROCARDIOGRAM REPORT: CPT | Mod: ,,, | Performed by: INTERNAL MEDICINE

## 2019-01-07 PROCEDURE — 83690 ASSAY OF LIPASE: CPT

## 2019-01-07 PROCEDURE — 96374 THER/PROPH/DIAG INJ IV PUSH: CPT

## 2019-01-07 PROCEDURE — 63600175 PHARM REV CODE 636 W HCPCS: Performed by: EMERGENCY MEDICINE

## 2019-01-07 PROCEDURE — 85610 PROTHROMBIN TIME: CPT

## 2019-01-07 PROCEDURE — 83735 ASSAY OF MAGNESIUM: CPT

## 2019-01-07 PROCEDURE — 99233 SBSQ HOSP IP/OBS HIGH 50: CPT | Mod: GC,,, | Performed by: INTERNAL MEDICINE

## 2019-01-07 PROCEDURE — 85025 COMPLETE CBC W/AUTO DIFF WBC: CPT

## 2019-01-07 PROCEDURE — 96375 TX/PRO/DX INJ NEW DRUG ADDON: CPT

## 2019-01-07 RX ORDER — FUROSEMIDE 10 MG/ML
40 INJECTION INTRAMUSCULAR; INTRAVENOUS
Status: COMPLETED | OUTPATIENT
Start: 2019-01-07 | End: 2019-01-07

## 2019-01-07 RX ADMIN — FUROSEMIDE 40 MG: 10 INJECTION, SOLUTION INTRAVENOUS at 10:01

## 2019-01-07 NOTE — TELEPHONE ENCOUNTER
"Wife calling to report "Bharat is having a rough day" pt complaining of abdominal bloating and increased shortness of breath. Wife states she gave pt Lasix 40mg last PM but still having fluid retention. Wife states she will give pt an additional Lasix 40mg today. Wife asking how much Lasix can pt receive.  notified, ordered OK to have pt take Lasix 80mg this PM but if pt does not have good results with Lasix 80mg he should proceed to ER for treatment with possible IV diuretic. Wife notified, verbalized understanding.  "

## 2019-01-08 PROBLEM — R57.0 CARDIOGENIC SHOCK: Status: ACTIVE | Noted: 2019-01-08

## 2019-01-08 PROBLEM — I13.10 CARDIORENAL SYNDROME: Status: ACTIVE | Noted: 2019-01-08

## 2019-01-08 PROBLEM — E87.5 HYPERKALEMIA, DIMINISHED RENAL EXCRETION: Status: ACTIVE | Noted: 2019-01-08

## 2019-01-08 PROBLEM — I48.20 CHRONIC ATRIAL FIBRILLATION: Status: ACTIVE | Noted: 2018-10-26

## 2019-01-08 PROBLEM — R79.1 SUPRATHERAPEUTIC INR: Status: ACTIVE | Noted: 2019-01-08

## 2019-01-08 PROBLEM — I50.82 BIVENTRICULAR CONGESTIVE HEART FAILURE: Status: ACTIVE | Noted: 2019-01-07

## 2019-01-08 LAB
ALBUMIN SERPL BCP-MCNC: 2.8 G/DL
ALP SERPL-CCNC: 163 U/L
ALT SERPL W/O P-5'-P-CCNC: 1649 U/L
ANION GAP SERPL CALC-SCNC: 11 MMOL/L
ANION GAP SERPL CALC-SCNC: 17 MMOL/L
AST SERPL-CCNC: 3131 U/L
BASOPHILS # BLD AUTO: 0.03 K/UL
BASOPHILS NFR BLD: 0.2 %
BILIRUB SERPL-MCNC: 2.4 MG/DL
BUN SERPL-MCNC: 51 MG/DL
BUN SERPL-MCNC: 59 MG/DL
CALCIUM SERPL-MCNC: 8.8 MG/DL
CALCIUM SERPL-MCNC: 9 MG/DL
CHLORIDE SERPL-SCNC: 90 MMOL/L
CHLORIDE SERPL-SCNC: 91 MMOL/L
CO2 SERPL-SCNC: 22 MMOL/L
CO2 SERPL-SCNC: 26 MMOL/L
CREAT SERPL-MCNC: 2.3 MG/DL
CREAT SERPL-MCNC: 2.5 MG/DL
DIFFERENTIAL METHOD: ABNORMAL
EOSINOPHIL # BLD AUTO: 0 K/UL
EOSINOPHIL NFR BLD: 0.1 %
ERYTHROCYTE [DISTWIDTH] IN BLOOD BY AUTOMATED COUNT: 17.8 %
EST. GFR  (AFRICAN AMERICAN): 28.6 ML/MIN/1.73 M^2
EST. GFR  (AFRICAN AMERICAN): 31.6 ML/MIN/1.73 M^2
EST. GFR  (NON AFRICAN AMERICAN): 24.7 ML/MIN/1.73 M^2
EST. GFR  (NON AFRICAN AMERICAN): 27.3 ML/MIN/1.73 M^2
GLUCOSE SERPL-MCNC: 120 MG/DL
GLUCOSE SERPL-MCNC: 147 MG/DL
HCT VFR BLD AUTO: 33.7 %
HGB BLD-MCNC: 10 G/DL
IMM GRANULOCYTES # BLD AUTO: 0.27 K/UL
IMM GRANULOCYTES NFR BLD AUTO: 1.8 %
INFLUENZA A, MOLECULAR: NEGATIVE
INFLUENZA B, MOLECULAR: NEGATIVE
INR PPP: 9.1
LYMPHOCYTES # BLD AUTO: 0.7 K/UL
LYMPHOCYTES NFR BLD: 4.7 %
MAGNESIUM SERPL-MCNC: 1.7 MG/DL
MAGNESIUM SERPL-MCNC: 1.9 MG/DL
MCH RBC QN AUTO: 25.4 PG
MCHC RBC AUTO-ENTMCNC: 29.7 G/DL
MCV RBC AUTO: 86 FL
MONOCYTES # BLD AUTO: 1.5 K/UL
MONOCYTES NFR BLD: 10.2 %
NEUTROPHILS # BLD AUTO: 12.3 K/UL
NEUTROPHILS NFR BLD: 83 %
NRBC BLD-RTO: 0 /100 WBC
PLATELET # BLD AUTO: 257 K/UL
PMV BLD AUTO: 10.2 FL
POTASSIUM SERPL-SCNC: 4.9 MMOL/L
POTASSIUM SERPL-SCNC: 5.5 MMOL/L
PROT SERPL-MCNC: 7.9 G/DL
PROTHROMBIN TIME: 94.7 SEC
RBC # BLD AUTO: 3.94 M/UL
SODIUM SERPL-SCNC: 128 MMOL/L
SODIUM SERPL-SCNC: 129 MMOL/L
SPECIMEN SOURCE: NORMAL
WBC # BLD AUTO: 14.86 K/UL

## 2019-01-08 PROCEDURE — 36415 COLL VENOUS BLD VENIPUNCTURE: CPT

## 2019-01-08 PROCEDURE — 87502 INFLUENZA DNA AMP PROBE: CPT

## 2019-01-08 PROCEDURE — 83735 ASSAY OF MAGNESIUM: CPT

## 2019-01-08 PROCEDURE — 25000003 PHARM REV CODE 250: Performed by: HOSPITALIST

## 2019-01-08 PROCEDURE — 99223 1ST HOSP IP/OBS HIGH 75: CPT | Mod: ,,, | Performed by: HOSPITALIST

## 2019-01-08 PROCEDURE — 63600175 PHARM REV CODE 636 W HCPCS: Performed by: STUDENT IN AN ORGANIZED HEALTH CARE EDUCATION/TRAINING PROGRAM

## 2019-01-08 PROCEDURE — 80053 COMPREHEN METABOLIC PANEL: CPT

## 2019-01-08 PROCEDURE — 99223 PR INITIAL HOSPITAL CARE,LEVL III: ICD-10-PCS | Mod: ,,, | Performed by: HOSPITALIST

## 2019-01-08 PROCEDURE — 81001 URINALYSIS AUTO W/SCOPE: CPT

## 2019-01-08 PROCEDURE — 25000003 PHARM REV CODE 250: Performed by: STUDENT IN AN ORGANIZED HEALTH CARE EDUCATION/TRAINING PROGRAM

## 2019-01-08 PROCEDURE — 20600001 HC STEP DOWN PRIVATE ROOM

## 2019-01-08 PROCEDURE — 85610 PROTHROMBIN TIME: CPT

## 2019-01-08 PROCEDURE — 85025 COMPLETE CBC W/AUTO DIFF WBC: CPT

## 2019-01-08 PROCEDURE — 80048 BASIC METABOLIC PNL TOTAL CA: CPT

## 2019-01-08 RX ORDER — DIGOXIN 125 MCG
0.12 TABLET ORAL DAILY
Status: DISCONTINUED | OUTPATIENT
Start: 2019-01-08 | End: 2019-01-12

## 2019-01-08 RX ORDER — DOBUTAMINE HYDROCHLORIDE 400 MG/100ML
5 INJECTION, SOLUTION INTRAVENOUS CONTINUOUS
Status: DISCONTINUED | OUTPATIENT
Start: 2019-01-08 | End: 2019-01-15 | Stop reason: HOSPADM

## 2019-01-08 RX ORDER — DOCUSATE SODIUM 100 MG/1
100 CAPSULE, LIQUID FILLED ORAL 2 TIMES DAILY PRN
Status: DISCONTINUED | OUTPATIENT
Start: 2019-01-08 | End: 2019-01-15 | Stop reason: HOSPADM

## 2019-01-08 RX ORDER — PRASUGREL 10 MG/1
10 TABLET, FILM COATED ORAL DAILY
Status: DISCONTINUED | OUTPATIENT
Start: 2019-01-08 | End: 2019-01-08

## 2019-01-08 RX ORDER — BENZONATATE 100 MG/1
200 CAPSULE ORAL 3 TIMES DAILY PRN
Status: DISCONTINUED | OUTPATIENT
Start: 2019-01-08 | End: 2019-01-15 | Stop reason: HOSPADM

## 2019-01-08 RX ORDER — FAMOTIDINE 20 MG/1
20 TABLET, FILM COATED ORAL 2 TIMES DAILY
Status: DISCONTINUED | OUTPATIENT
Start: 2019-01-08 | End: 2019-01-09

## 2019-01-08 RX ORDER — SODIUM CHLORIDE 0.9 % (FLUSH) 0.9 %
3 SYRINGE (ML) INJECTION
Status: DISCONTINUED | OUTPATIENT
Start: 2019-01-08 | End: 2019-01-08

## 2019-01-08 RX ORDER — SODIUM CHLORIDE 0.9 % (FLUSH) 0.9 %
5 SYRINGE (ML) INJECTION
Status: DISCONTINUED | OUTPATIENT
Start: 2019-01-08 | End: 2019-01-15 | Stop reason: HOSPADM

## 2019-01-08 RX ORDER — NAPROXEN SODIUM 220 MG/1
81 TABLET, FILM COATED ORAL DAILY
Status: DISCONTINUED | OUTPATIENT
Start: 2019-01-08 | End: 2019-01-15 | Stop reason: HOSPADM

## 2019-01-08 RX ORDER — FUROSEMIDE 10 MG/ML
40 INJECTION INTRAMUSCULAR; INTRAVENOUS
Status: COMPLETED | OUTPATIENT
Start: 2019-01-08 | End: 2019-01-08

## 2019-01-08 RX ADMIN — ASPIRIN 81 MG CHEWABLE TABLET 81 MG: 81 TABLET CHEWABLE at 09:01

## 2019-01-08 RX ADMIN — FAMOTIDINE 20 MG: 20 TABLET ORAL at 09:01

## 2019-01-08 RX ADMIN — FUROSEMIDE 10 MG/HR: 10 INJECTION, SOLUTION INTRAMUSCULAR; INTRAVENOUS at 03:01

## 2019-01-08 RX ADMIN — FUROSEMIDE 40 MG: 10 INJECTION, SOLUTION INTRAVENOUS at 02:01

## 2019-01-08 RX ADMIN — DIGOXIN 0.12 MG: 125 TABLET ORAL at 09:01

## 2019-01-08 RX ADMIN — DOBUTAMINE IN DEXTROSE 5 MCG/KG/MIN: 200 INJECTION, SOLUTION INTRAVENOUS at 03:01

## 2019-01-08 RX ADMIN — BENZONATATE 200 MG: 100 CAPSULE ORAL at 09:01

## 2019-01-08 RX ADMIN — PRASUGREL 10 MG: 10 TABLET, FILM COATED ORAL at 09:01

## 2019-01-08 RX ADMIN — FUROSEMIDE 10 MG/HR: 10 INJECTION, SOLUTION INTRAMUSCULAR; INTRAVENOUS at 09:01

## 2019-01-08 NOTE — HPI
Mr. Coker is a 72 y/o male, Cardiology consulted for Acute on chronic heart failure / Cardiogenic shock, PMhx of CAD s/p CABG, ICM/HFrEF (LVef10%) on home  5mcg/kg/min (palliative) s/p Medtr[employer] CRT-D, CAD([facility] 6/2018: LIMA-LAD patent, % occluded at ostium, SVG-% occluded, SVG-D 100% occluded, pLCx 30%, 100% occluded OM, 100% LAD occlusion after takeoff of D1, D1 is tiny with 80% disease, SVG-OM 80% proximal stenosis with 50% at site of anastomosis.An SVG-OM OKSANA was placed at that time), not a candidate for advanced options.     Patient is here with spouse, he had been doing well, had his  pump exchanged yesterday around 0900 (01/7/18), unfortunately pump was monae turned off, patient went ~ 24 hours w/o  ggt. Yesterday developed working SOB / SUMNER, increase LE edema and weight gain. Discussed with Dr. Posey, recommended additional 40mg PO (total of 80mg) without any improvement in UOP. Subsequently advised to seek medical attention.     ED evaluation:   Sinus tach HR 100s, BP 70/40s  INR 6.2 / K 6.1 / Mag 1.7  KASEY likely Cardiorenal (Cr 2.1)   Congestive hepatopathy elevated AST / ALT, Bilirubin 2.2  BNP >3000

## 2019-01-08 NOTE — H&P
[facility] Hospital Medicine  History & Physical    Patient Name: Bharat Coker  MRN: [id number]  Admission Date: 1/7/2019  Attending Physician: No att. providers found   Primary Care Provider: Ronnie Richardson Jr, MD    [facility] Medicine Team: Networked reference to record PCT  Shashank Arcos MD     Patient information was obtained from patient, spouse/SO, past medical records and ER records.     Subjective:     Principal Problem:Biventricular congestive heart failure    Chief Complaint:   Chief Complaint   Patient presents with    Shortness of Breath     Presents to Ed via EMS c/o SOB since 1600. Pt on 2 L per NC at home. Home health nurse gave 80mg IV lasix prior to EMS arrivak        HPI: 74M with NYHA class IV biventricular systolic and diastolic heart failure on continuous home dobutamine infusion presents to ED complaining of shortness of breath, which began earlier in the day.  The previous night he took a dose of lasix 40mg due to increasing abdominal distension and appetite loss, which usually presages the beginning of decompensation, and took another dose this past morning but had only put out about 500mL prior to coming in.  Upon arrival in the ED it was noted that his dobutamine pump was off.  His wife changed the bag the prior night and it was working then but is not sure if she turned it back on afterwards.  He weighs himself daily and his weight is up 1 pound over the past 3 days.  He feels a little better since the dobutamine was restarted but is still uncomfortable.    Past Medical History:   Diagnosis Date    CHF (congestive heart failure)     Chronic atrial fibrillation 10/26/2018    CKD (chronic kidney disease), stage II 11/12/2018    COPD (chronic obstructive pulmonary disease)     Coronary artery disease     Diabetes mellitus        Past Surgical History:   Procedure Laterality Date    ABLATION, AVN N/A 11/6/2018    Performed by Onesimo Willams MD at  [facility] EP LAB    APPENDECTOMY      CARDIAC SURGERY      ECHOCARDIOGRAM,TRANSESOPHAGEAL N/A 10/19/2018    Performed by [facility] Diagnostic Provider at [facility] CATH LAB    EYE SURGERY      FRACTURE SURGERY      TONSILLECTOMY         Review of patient's allergies indicates:   Allergen Reactions    Ativan [lorazepam] Anxiety       No current facility-administered medications on file prior to encounter.      Current Outpatient Medications on File Prior to Encounter   Medication Sig    aspirin 81 MG Chew Take 81 mg by mouth once daily.    atorvastatin (LIPITOR) 80 MG tablet Take 80 mg by mouth nightly.    cetirizine (ZYRTEC) 5 MG tablet Take 1 tablet (5 mg total) by mouth once daily.    digoxin (LANOXIN) 125 mcg tablet Take 1 tablet (0.125 mg total) by mouth once daily.    DOBUTamine (DOBUTREX) 500 mg/250 mL (2,000 mcg/mL) Inject 317.5 mcg/min into the vein continuous.    docusate sodium (COLACE) 100 MG capsule Take 100 mg by mouth 2 (two) times daily as needed for Constipation.    enalapril (VASOTEC) 2.5 MG tablet Take 2.5 mg by mouth every evening.    furosemide (LASIX) 40 MG tablet Take 40 mg by mouth. As needed    ranitidine (ZANTAC) 150 MG tablet Take 150 mg by mouth once daily.    spironolactone (ALDACTONE) 25 MG tablet Take 0.5 tablets (12.5 mg total) by mouth once daily.    warfarin (COUMADIN) 7.5 MG tablet Take 1 tablet (7.5 mg total) by mouth Daily. (Patient taking differently: Take 7.5 mg by mouth Daily. 1 tablet 5 days per week, 1/2 tablet 2 days per week, followed by Coumadin clinic)    azelastine (ASTELIN) 137 mcg (0.1 %) nasal spray 1 spray by Nasal route as needed.     benzonatate (TESSALON) 200 MG capsule Take 200 mg by mouth 3 (three) times daily as needed for Cough.    fluticasone (FLONASE) 50 mcg/actuation nasal spray 2 sprays by Each Nare route as needed.     guaiFENesin (MUCINEX) 600 mg 12 hr tablet Take 1,200 mg by mouth as needed for Congestion.    prasugrel (EFFIENT) 10 mg Tab Take 1  tablet (10 mg total) by mouth once daily.     Family History     Problem Relation (Age of Onset)    Heart disease Father, Sister, Brother    Stroke Father        Tobacco Use    Smoking status: Former Smoker     Types: Cigarettes    Smokeless tobacco: Never Used   Substance and Sexual Activity    Alcohol use: No     Frequency: Never    Drug use: No    Sexual activity: Not on file     Review of Systems   Constitutional: Positive for appetite change, fatigue and unexpected weight change.   HENT: Negative for congestion and nosebleeds.    Eyes: Negative for photophobia and visual disturbance.   Respiratory: Positive for shortness of breath. Negative for cough.    Cardiovascular: Negative for chest pain, palpitations and leg swelling.   Gastrointestinal: Positive for abdominal distention and abdominal pain. Negative for nausea and vomiting.   Endocrine: Negative for polydipsia and polyuria.   Genitourinary: Negative for dysuria and hematuria.   Musculoskeletal: Negative for arthralgias and myalgias.   Skin: Negative for color change and rash.   Neurological: Negative for dizziness, syncope and light-headedness.     Objective:     Vital Signs (Most Recent):  Temp: 98.8 °F (37.1 °C) (01/07/19 2024)  Pulse: (!) 55 (01/08/19 0132)  Resp: 13 (01/07/19 2347)  BP: (!) 89/53 (01/08/19 0132)  SpO2: 96 % (01/08/19 0132) Vital Signs (24h Range):  Temp:  [98.8 °F (37.1 °C)] 98.8 °F (37.1 °C)  Pulse:  [] 55  Resp:  [13-24] 13  SpO2:  [95 %-100 %] 96 %  BP: ()/(51-61) 89/53        There is no height or weight on file to calculate BMI.    Physical Exam   Constitutional: He is oriented to person, place, and time. He appears well-developed and well-nourished. He is cooperative. He appears ill. Nasal cannula in place.   Appears uncomfortable, shifting position periodically.   HENT:   Head: Normocephalic and atraumatic.   Mouth/Throat: Oropharynx is clear and moist.   Eyes: Conjunctivae and EOM are normal. Pupils are  equal, round, and reactive to light. No scleral icterus.   Neck: Normal range of motion. Neck supple. JVD present.   Cardiovascular: Normal rate, regular rhythm, S1 normal and S2 normal. Exam reveals no gallop.   No murmur heard.  Thready peripheral pulses   Pulmonary/Chest: Accessory muscle usage present. Bradypneic: pursed lip breathing. He has wheezes in the right lower field and the left lower field. He has rales in the right lower field and the left lower field.   Abdominal: Bowel sounds are normal. He exhibits distension. There is generalized tenderness. There is no rigidity, no rebound and no guarding.   Musculoskeletal: Normal range of motion. He exhibits no edema, tenderness or deformity.   Neurological: He is alert and oriented to person, place, and time. No cranial nerve deficit.   Skin: Skin is warm and dry. No erythema.   Nursing note and vitals reviewed.        CRANIAL NERVES     CN III, IV, VI   Pupils are equal, round, and reactive to light.  Extraocular motions are normal.        Significant Labs:   CBC:   Recent Labs   Lab 01/07/19 2222   WBC 13.15*   HGB 10.9*   HCT 35.9*        CMP:   Recent Labs   Lab 01/07/19 2222   *   K 6.1*   CL 90*   CO2 22*   *   BUN 46*   CREATININE 2.1*   CALCIUM 9.4   PROT 8.5*   ALBUMIN 3.0*   BILITOT 2.2*   ALKPHOS 175*   AST 1,428*   *   ANIONGAP 15   EGFRNONAA 30.5*     Cardiac Markers:   Recent Labs   Lab 01/07/19 2222   BNP 3,568*     Coagulation:   Recent Labs   Lab 01/07/19 2222   INR 6.3*       Significant Imaging: CXR: I have reviewed all pertinent results/findings within the past 24 hours and my personal findings are:  bilateral diffuse pulmonary edema with loss of both hemidiaphragms and heart borders    Assessment/Plan:     * Biventricular congestive heart failure    With chronic cardiogenic shock requiring dobutamine.  History suggestive of gradual start of decompensation over past couple of days and likely accelerated by not  having dobutamine over past day.  Infusion restarted at 5mcg/kg/min.  BP at time of my evaluation was 70s/40s making ability to tolerate diuresis difficult, not to mention perilous to monitor on floor.  Case discussed with Cardiology fellow who will get patient admitted to CCU service instead for closer monitoring.        Supratherapeutic INR    Likely secondary to congestive hepatopathy.  Hold coumadin and trend out INR.       Cardiorenal syndrome    Acute elevation in creatinine likely due to decompensated pump failure.  Hold ACEI for now.       Acute on chronic respiratory failure with hypoxia    Secondary to pulmonary edema in context of decompensated biventricular heart failure.  Wean oxygen as able while diuresing.       Chronic atrial fibrillation    S/p ablation; on coumadin.  Holding for now due to elevated INR.       Hepatic congestion    Acutely elevated LFTs due to congestive hepatopathy from right-sided failure.  Trend CMP.         VTE Risk Mitigation (From admission, onward)        Ordered     IP VTE HIGH RISK PATIENT  Once      01/08/19 0158             Shashank Arcos MD  Department of Hospital Medicine   [facility]

## 2019-01-08 NOTE — ED NOTES
Telemetry Verification   Patient placed on Telemetry Box  Verified with War Room  Box # [id number]       Rate 102   Rhythm Sinus tachycardia

## 2019-01-08 NOTE — SUBJECTIVE & OBJECTIVE
Past Medical History:   Diagnosis Date    CHF (congestive heart failure)     Chronic atrial fibrillation 10/26/2018    CKD (chronic kidney disease), stage II 11/12/2018    COPD (chronic obstructive pulmonary disease)     Coronary artery disease     Diabetes mellitus        Past Surgical History:   Procedure Laterality Date    ABLATION, AVN N/A 11/6/2018    Performed by Onesimo Willams MD at [facility] EP LAB    APPENDECTOMY      CARDIAC SURGERY      ECHOCARDIOGRAM,TRANSESOPHAGEAL N/A 10/19/2018    Performed by [facility] Diagnostic Provider at [facility] CATH LAB    EYE SURGERY      FRACTURE SURGERY      TONSILLECTOMY         Review of patient's allergies indicates:   Allergen Reactions    Ativan [lorazepam] Anxiety       No current facility-administered medications on file prior to encounter.      Current Outpatient Medications on File Prior to Encounter   Medication Sig    aspirin 81 MG Chew Take 81 mg by mouth once daily.    atorvastatin (LIPITOR) 80 MG tablet Take 80 mg by mouth nightly.    cetirizine (ZYRTEC) 5 MG tablet Take 1 tablet (5 mg total) by mouth once daily.    digoxin (LANOXIN) 125 mcg tablet Take 1 tablet (0.125 mg total) by mouth once daily.    DOBUTamine (DOBUTREX) 500 mg/250 mL (2,000 mcg/mL) Inject 317.5 mcg/min into the vein continuous.    docusate sodium (COLACE) 100 MG capsule Take 100 mg by mouth 2 (two) times daily as needed for Constipation.    enalapril (VASOTEC) 2.5 MG tablet Take 2.5 mg by mouth every evening.    furosemide (LASIX) 40 MG tablet Take 40 mg by mouth. As needed    ranitidine (ZANTAC) 150 MG tablet Take 150 mg by mouth once daily.    spironolactone (ALDACTONE) 25 MG tablet Take 0.5 tablets (12.5 mg total) by mouth once daily.    warfarin (COUMADIN) 7.5 MG tablet Take 1 tablet (7.5 mg total) by mouth Daily. (Patient taking differently: Take 7.5 mg by mouth Daily. 1 tablet 5 days per week, 1/2 tablet 2 days per week, followed by Coumadin clinic)    azelastine  (ASTELIN) 137 mcg (0.1 %) nasal spray 1 spray by Nasal route as needed.     benzonatate (TESSALON) 200 MG capsule Take 200 mg by mouth 3 (three) times daily as needed for Cough.    fluticasone (FLONASE) 50 mcg/actuation nasal spray 2 sprays by Each Nare route as needed.     guaiFENesin (MUCINEX) 600 mg 12 hr tablet Take 1,200 mg by mouth as needed for Congestion.    prasugrel (EFFIENT) 10 mg Tab Take 1 tablet (10 mg total) by mouth once daily.     Family History     Problem Relation (Age of Onset)    Heart disease Father, Sister, Brother    Stroke Father        Tobacco Use    Smoking status: Former Smoker     Types: Cigarettes    Smokeless tobacco: Never Used   Substance and Sexual Activity    Alcohol use: No     Frequency: Never    Drug use: No    Sexual activity: Not on file     Review of Systems   Constitutional: Positive for appetite change, fatigue and unexpected weight change.   HENT: Negative for congestion and nosebleeds.    Eyes: Negative for photophobia and visual disturbance.   Respiratory: Positive for shortness of breath. Negative for cough.    Cardiovascular: Negative for chest pain, palpitations and leg swelling.   Gastrointestinal: Positive for abdominal distention and abdominal pain. Negative for nausea and vomiting.   Endocrine: Negative for polydipsia and polyuria.   Genitourinary: Negative for dysuria and hematuria.   Musculoskeletal: Negative for arthralgias and myalgias.   Skin: Negative for color change and rash.   Neurological: Negative for dizziness, syncope and light-headedness.     Objective:     Vital Signs (Most Recent):  Temp: 98.8 °F (37.1 °C) (01/07/19 2024)  Pulse: (!) 55 (01/08/19 0132)  Resp: 13 (01/07/19 2347)  BP: (!) 89/53 (01/08/19 0132)  SpO2: 96 % (01/08/19 0132) Vital Signs (24h Range):  Temp:  [98.8 °F (37.1 °C)] 98.8 °F (37.1 °C)  Pulse:  [] 55  Resp:  [13-24] 13  SpO2:  [95 %-100 %] 96 %  BP: ()/(51-61) 89/53        There is no height or weight on  file to calculate BMI.    Physical Exam   Constitutional: He is oriented to person, place, and time. He appears well-developed and well-nourished. He is cooperative. He appears ill. Nasal cannula in place.   Appears uncomfortable, shifting position periodically.   HENT:   Head: Normocephalic and atraumatic.   Mouth/Throat: Oropharynx is clear and moist.   Eyes: Conjunctivae and EOM are normal. Pupils are equal, round, and reactive to light. No scleral icterus.   Neck: Normal range of motion. Neck supple. JVD present.   Cardiovascular: Normal rate, regular rhythm, S1 normal and S2 normal. Exam reveals no gallop.   No murmur heard.  Thready peripheral pulses   Pulmonary/Chest: Accessory muscle usage present. Bradypneic: pursed lip breathing. He has wheezes in the right lower field and the left lower field. He has rales in the right lower field and the left lower field.   Abdominal: Bowel sounds are normal. He exhibits distension. There is generalized tenderness. There is no rigidity, no rebound and no guarding.   Musculoskeletal: Normal range of motion. He exhibits no edema, tenderness or deformity.   Neurological: He is alert and oriented to person, place, and time. No cranial nerve deficit.   Skin: Skin is warm and dry. No erythema.   Nursing note and vitals reviewed.        CRANIAL NERVES     CN III, IV, VI   Pupils are equal, round, and reactive to light.  Extraocular motions are normal.        Significant Labs:   CBC:   Recent Labs   Lab 01/07/19 2222   WBC 13.15*   HGB 10.9*   HCT 35.9*        CMP:   Recent Labs   Lab 01/07/19 2222   *   K 6.1*   CL 90*   CO2 22*   *   BUN 46*   CREATININE 2.1*   CALCIUM 9.4   PROT 8.5*   ALBUMIN 3.0*   BILITOT 2.2*   ALKPHOS 175*   AST 1,428*   *   ANIONGAP 15   EGFRNONAA 30.5*     Cardiac Markers:   Recent Labs   Lab 01/07/19 2222   BNP 3,568*     Coagulation:   Recent Labs   Lab 01/07/19 2222   INR 6.3*       Significant Imaging: CXR: I have  reviewed all pertinent results/findings within the past 24 hours and my personal findings are:  bilateral diffuse pulmonary edema with loss of both hemidiaphragms and heart borders

## 2019-01-08 NOTE — SUBJECTIVE & OBJECTIVE
Past Medical History:   Diagnosis Date    CHF (congestive heart failure)     Chronic atrial fibrillation 10/26/2018    CKD (chronic kidney disease), stage II 11/12/2018    COPD (chronic obstructive pulmonary disease)     Coronary artery disease     Diabetes mellitus        Past Surgical History:   Procedure Laterality Date    ABLATION, AVN N/A 11/6/2018    Performed by Onesimo Willams MD at [facility] EP LAB    APPENDECTOMY      CARDIAC SURGERY      ECHOCARDIOGRAM,TRANSESOPHAGEAL N/A 10/19/2018    Performed by [facility] Diagnostic Provider at [facility] CATH LAB    EYE SURGERY      FRACTURE SURGERY      TONSILLECTOMY         Review of patient's allergies indicates:   Allergen Reactions    Ativan [lorazepam] Anxiety       No current facility-administered medications on file prior to encounter.      Current Outpatient Medications on File Prior to Encounter   Medication Sig    aspirin 81 MG Chew Take 81 mg by mouth once daily.    atorvastatin (LIPITOR) 80 MG tablet Take 80 mg by mouth nightly.    azelastine (ASTELIN) 137 mcg (0.1 %) nasal spray 1 spray by Nasal route as needed.     benzonatate (TESSALON) 200 MG capsule Take 200 mg by mouth 3 (three) times daily as needed for Cough.    cetirizine (ZYRTEC) 5 MG tablet Take 1 tablet (5 mg total) by mouth once daily.    digoxin (LANOXIN) 125 mcg tablet Take 1 tablet (0.125 mg total) by mouth once daily.    DOBUTamine (DOBUTREX) 500 mg/250 mL (2,000 mcg/mL) Inject 317.5 mcg/min into the vein continuous.    docusate sodium (COLACE) 100 MG capsule Take 100 mg by mouth 2 (two) times daily as needed for Constipation.    enalapril (VASOTEC) 2.5 MG tablet Take 2.5 mg by mouth every evening.    fluticasone (FLONASE) 50 mcg/actuation nasal spray 2 sprays by Each Nare route as needed.     furosemide (LASIX) 40 MG tablet Take 40 mg by mouth. As needed    guaiFENesin (MUCINEX) 600 mg 12 hr tablet Take 1,200 mg by mouth as needed for Congestion.    prasugrel (EFFIENT) 10  mg Tab Take 1 tablet (10 mg total) by mouth once daily.    ranitidine (ZANTAC) 150 MG tablet Take 150 mg by mouth once daily.    spironolactone (ALDACTONE) 25 MG tablet Take 0.5 tablets (12.5 mg total) by mouth once daily.    warfarin (COUMADIN) 7.5 MG tablet Take 1 tablet (7.5 mg total) by mouth Daily. (Patient taking differently: Take 7.5 mg by mouth Daily. 1 tablet 5 days per week, 1/2 tablet 2 days per week, followed by Coumadin clinic)     Family History     Problem Relation (Age of Onset)    Heart disease Father, Sister, Brother    Stroke Father        Tobacco Use    Smoking status: Former Smoker     Types: Cigarettes    Smokeless tobacco: Never Used   Substance and Sexual Activity    Alcohol use: No     Frequency: Never    Drug use: No    Sexual activity: Not on file     Review of Systems   Constitution: Positive for decreased appetite, weakness, malaise/fatigue, night sweats and weight gain. Negative for chills, diaphoresis, fever and weight loss.   Eyes: Negative for blurred vision.   Cardiovascular: Positive for dyspnea on exertion, leg swelling, orthopnea and palpitations. Negative for chest pain, irregular heartbeat and near-syncope.   Respiratory: Positive for shortness of breath. Negative for cough, snoring and wheezing.    Gastrointestinal: Positive for bloating. Negative for abdominal pain, nausea and vomiting.   Genitourinary: Negative for bladder incontinence and urgency.     Objective:     Vital Signs (Most Recent):  Temp: 98.8 °F (37.1 °C) (01/07/19 2024)  Pulse: 104 (01/08/19 0232)  Resp: 13 (01/07/19 2347)  BP: (!) 100/58 (01/08/19 0232)  SpO2: 95 % (01/08/19 0232) Vital Signs (24h Range):  Temp:  [98.8 °F (37.1 °C)] 98.8 °F (37.1 °C)  Pulse:  [] 104  Resp:  [13-24] 13  SpO2:  [95 %-100 %] 95 %  BP: ()/(51-61) 100/58        There is no height or weight on file to calculate BMI.    SpO2: 95 %  O2 Device (Oxygen Therapy): nasal cannula    No intake or output data in the 24  hours ending 01/08/19 0239    Lines/Drains/Airways     Peripherally Inserted Central Catheter Line                 PICC Double Lumen 11/07/18 1059 right basilic 61 days          Peripheral Intravenous Line                 Peripheral IV - Single Lumen 10/18/18 0500 Left;Posterior Forearm 81 days         Peripheral IV - Single Lumen 01/07/19 2203 Left Antecubital less than 1 day                Physical Exam   Constitutional: He is oriented to person, place, and time. He appears cachectic. He appears ill. He appears distressed.   Neck: JVD present.   Cardiovascular: Regular rhythm, normal heart sounds and intact distal pulses. Tachycardia present. Exam reveals no gallop and no friction rub.   No murmur heard.  Pulses:       Carotid pulses are 2+ on the right side, and 2+ on the left side.       Radial pulses are 2+ on the right side, and 2+ on the left side.        Femoral pulses are 2+ on the right side, and 2+ on the left side.       Popliteal pulses are 2+ on the right side, and 2+ on the left side.        Dorsalis pedis pulses are 2+ on the right side, and 2+ on the left side.        Posterior tibial pulses are 2+ on the right side, and 2+ on the left side.   Pulmonary/Chest: He is in respiratory distress. He has no wheezes. He has rales in the right middle field, the right lower field, the left middle field and the left lower field. He exhibits no tenderness.   Abdominal: Soft. Bowel sounds are normal. He exhibits no distension and no mass. There is no tenderness. There is no rebound and no guarding.   Musculoskeletal: Normal range of motion. He exhibits no edema.   Neurological: He is alert and oriented to person, place, and time.   Skin: Skin is warm and dry. He is not diaphoretic. No erythema.   Nursing note and vitals reviewed.      Significant Labs:   CMP   Recent Labs   Lab 01/07/19  2222   *   K 6.1*   CL 90*   CO2 22*   *   BUN 46*   CREATININE 2.1*   CALCIUM 9.4   PROT 8.5*   ALBUMIN 3.0*    BILITOT 2.2*   ALKPHOS 175*   AST 1,428*   *   ANIONGAP 15   ESTGFRAFRICA 35.3*   EGFRNONAA 30.5*   , CBC   Recent Labs   Lab 01/07/19 2222   WBC 13.15*   HGB 10.9*   HCT 35.9*      , INR   Recent Labs   Lab 01/07/19 2222   INR 6.3*   , Lipid Panel No results for input(s): CHOL, HDL, LDLCALC, TRIG, CHOLHDL in the last 48 hours., Troponin   Recent Labs   Lab 01/07/19 2222   TROPONINI 0.063*    and All pertinent lab results from the last 24 hours have been reviewed.    Significant Imaging: Echocardiogram:   2D echo with color flow doppler:   Results for orders placed or performed during the hospital encounter of 10/10/18   2D echo with color flow doppler   Result Value Ref Range    QEF 9 (A) 55 - 65    Mitral Valve Regurgitation MODERATE (A)     Diastolic Dysfunction Yes (A)     Est. PA Systolic Pressure 26.04     Tricuspid Valve Regurgitation MILD

## 2019-01-08 NOTE — SUBJECTIVE & OBJECTIVE
Interval History:      Mr. Coker is a 72 y/o male who presents to the ED with acute onset of SOB and weight gain started on the day of his admission.     His past medical history includes CAD s/p CABG, ICM/HFrEF (LVef10%) s/p Medtronic CRT-D, CAD([facility] 6/2018: LIMA-LAD patent, % occluded at ostium, SVG-% occluded, SVG-D 100% occluded, pLCx 30%, 100% occluded OM, 100% LAD occlusion after takeoff of D1, D1 is tiny with 80% disease, SVG-OM 80% proximal stenosis with 50% at site of anastomosis.An SVG-OM OKSANA was placed at that time).   He was last hospitalized in November and deemed to be poor candidate for advanced options. He was started on  at 5 mcg/kg/hour and with follow up with cardiology. He talked to Dr Posey today who advised him to take extra lasix and if no improvement to come to the ED for clinical evaluation.   Labs on ED presentation showed KASEY from cardiorenal syndrome with elevated AST/ALT. BNP > 3000.  Wife reports that she had to exchange his  pump yesterday and probably did not realize but left the pump turned off.       Review of Systems   Constitution: Positive for decreased appetite, malaise/fatigue and weight loss.   HENT: Negative.    Eyes: Negative.    Cardiovascular: Positive for dyspnea on exertion, leg swelling, orthopnea and paroxysmal nocturnal dyspnea. Negative for chest pain, claudication, cyanosis, irregular heartbeat, near-syncope, palpitations and syncope.   Respiratory: Positive for cough and shortness of breath. Negative for hemoptysis, sleep disturbances due to breathing, snoring, sputum production and wheezing.    Endocrine: Negative.    Hematologic/Lymphatic: Negative.    Genitourinary: Negative.      Objective:     Vital Signs (Most Recent):  Temp: 98.8 °F (37.1 °C) (01/07/19 2024)  Pulse: 104 (01/07/19 2231)  Resp: (!) 24 (01/07/19 2024)  BP: (!) 96/57 (01/07/19 2231)  SpO2: 97 % (01/07/19 2231) Vital Signs (24h Range):  Temp:  [98.8 °F (37.1 °C)] 98.8 °F  (37.1 °C)  Pulse:  [104] 104  Resp:  [24] 24  SpO2:  [97 %-100 %] 97 %  BP: ()/(57-61) 96/57        There is no height or weight on file to calculate BMI.     SpO2: 97 %  O2 Device (Oxygen Therapy): nasal cannula    No intake or output data in the 24 hours ending 01/07/19 2258    Lines/Drains/Airways     Peripherally Inserted Central Catheter Line                 PICC Double Lumen 11/07/18 1059 right basilic 61 days          Peripheral Intravenous Line                 Peripheral IV - Single Lumen 10/18/18 0500 Left;Posterior Forearm 81 days         Peripheral IV - Single Lumen 01/07/19 2203 Left Antecubital less than 1 day                Physical Exam   Constitutional: He is oriented to person, place, and time. He appears well-developed and well-nourished.   HENT:   Head: Normocephalic and atraumatic.   Eyes: Conjunctivae are normal.   Neck: Neck supple. No JVD present. No thyromegaly present.   Cardiovascular: Normal rate and regular rhythm. Exam reveals gallop and S3.   Pulses:       Carotid pulses are 2+ on the right side, and 2+ on the left side.       Radial pulses are 2+ on the right side, and 2+ on the left side.        Femoral pulses are 2+ on the right side, and 2+ on the left side.       Popliteal pulses are 2+ on the right side, and 2+ on the left side.        Dorsalis pedis pulses are 2+ on the right side, and 2+ on the left side.        Posterior tibial pulses are 2+ on the right side, and 2+ on the left side.   Pulmonary/Chest: Effort normal and breath sounds normal. No respiratory distress. He has no wheezes.   Abdominal: Soft. Bowel sounds are normal. He exhibits no distension. There is no tenderness. There is no rebound.   Neurological: He is oriented to person, place, and time.   Skin: Skin is warm.   Nursing note and vitals reviewed.      Significant Labs:   BMP: No results for input(s): GLU, NA, K, CL, CO2, BUN, CREATININE, CALCIUM, MG in the last 48 hours., CMP No results for input(s): NA,  K, CL, CO2, GLU, BUN, CREATININE, CALCIUM, PROT, ALBUMIN, BILITOT, ALKPHOS, AST, ALT, ANIONGAP, ESTGFRAFRICA, EGFRNONAA in the last 48 hours., CBC   Recent Labs   Lab 01/07/19 2222   WBC 13.15*   HGB 10.9*   HCT 35.9*      , INR   Recent Labs   Lab 01/07/19  2222   INR 6.3*   , Lipid Panel No results for input(s): CHOL, HDL, LDLCALC, TRIG, CHOLHDL in the last 48 hours., Troponin No results for input(s): TROPONINI in the last 48 hours., All pertinent lab results from the last 24 hours have been reviewed. and   Recent Lab Results       01/07/19 2222        Immature Granulocytes 1.9     Immature Grans (Abs) 0.25  Comment:  Mild elevation in immature granulocytes is non specific and   can be seen in a variety of conditions including stress response,   acute inflammation, trauma and pregnancy. Correlation with other   laboratory and clinical findings is essential.       Baso # 0.08     Basophil% 0.6     Differential Method Automated     Eos # 0.0     Eosinophil% 0.1     Gran # (ANC) 10.1     Gran% 76.8     Hematocrit 35.9     Hemoglobin 10.9     Coumadin Monitoring INR 6.3  Comment:  Coumadin Therapy:  2.0 - 3.0 for INR for all indicators except mechanical heart valves  and antiphospholipid syndromes which should use 2.5 - 3.5.  INR critical result(s) called and verbal readback obtained from   ROX BRADY RN, 01/07/2019 22:53       Lymph # 0.9     Lymph% 7.1     MCH 26.1     MCHC 30.4     MCV 86     Mono # 1.8     Mono% 13.5     MPV 10.4     nRBC 0     Platelets 256     Protime 63.3     RBC 4.18     RDW 18.2     WBC 13.15           Significant Imaging: Echocardiogram:   2D echo with color flow doppler:   Results for orders placed or performed during the hospital encounter of 10/10/18   2D echo with color flow doppler   Result Value Ref Range    QEF 9 (A) 55 - 65    Mitral Valve Regurgitation MODERATE (A)     Diastolic Dysfunction Yes (A)     Est. PA Systolic Pressure 26.04     Tricuspid Valve Regurgitation  MILD     and Transthoracic echo (TTE) complete (Cupid Only): No results found for this or any previous visit.

## 2019-01-08 NOTE — CONSULTS
[facility]  Palliative Medicine  Consult Note    Patient Name: Bharat Coker  MRN: [id number]  Admission Date: 1/7/2019  Hospital Length of Stay: 1 days  Code Status: Partial Code   Attending Provider: Pauly Deshpande MD  Consulting Provider: DOMINIQUE Byrd  Primary Care Physician: Ronnie Richardson Jr, MD  Principal Problem:Biventricular congestive heart failure         Inpatient consult to Palliative Care  Consult performed by: DOMINIQUE Moss  Consult ordered by: Shashank Arcos MD  Reason for consult: end of life hospice   Assessment/Recommendations: Palliative care consult received.  Palliative care has attempted to reach primary team.  Awaiting return call.   Full consult to follow.   Thank you for consult and opportunity to participate in Mr. Coker's care   NICK Matson, ACNS-BC

## 2019-01-08 NOTE — ASSESSMENT & PLAN NOTE
Mr. Coker is a 74 yoM, admitted to medicine, cardiology consulted for Acute on chronic HF / cardiogenic shock on home palliative , inadvertently pump turned off for 24 hours.    - Not a candidate for advanced options    - End organ dysfunction with Bili 2.2 / Cr 2.1 / INR 6.2 /     - At length discussion with patient and spouse regarding goals of care, [facility] on [facility]/1/18. Patient wishes to be DNR / DNI / no aggressive measures, hence would not be suitable for ICU admission despite his cardiogenic shock, would have limited support to provide which can be done on the telemetry floors. Additionally, he wishes to be seen by [facility] care tomorrow.   - Discussed with medicine team, okay to admit to floors  - Continue  5mcg/kg/lisset  - Start Lasix 80mg IV pushed followed by 5mg/hr  - Replete electrolytes   - hold PTA Anti-HTN with soft BP   - hold Coumadin (Innr 6.2)  - Closely monitor UOP  - Cardiology / CCU team will continue to monitor

## 2019-01-08 NOTE — CONSULTS
[facility]  Cardiology  Consult Note    Patient Name: Bharat Coker  MRN: [id number]  Admission Date: 1/7/2019  Hospital Length of Stay: 1 days  Code Status: Partial Code   Attending Provider: No att. providers found   Consulting Provider: Rj Pacheco MD  Primary Care Physician: Ronnie Richardson Jr, MD  Principal Problem:Biventricular congestive heart failure    Patient information was obtained from patient and ER records.     Inpatient consult to Cardiology  Consult performed by: Rj Luna MD  Consult ordered by: Shashank Arcos MD        Subjective:     Chief Complaint:  I cant breath     HPI:   Mr. Coker is a 72 y/o male, Cardiology consulted for Acute on chronic heart failure / Cardiogenic shock, PMhx of CAD s/p CABG, ICM/HFrEF (LVef10%) on home  5mcg/kg/min (palliative) s/p Medtronic CRT-D, CAD([facility] 6/2018: LIMA-LAD patent, % occluded at ostium, SVG-% occluded, SVG-D 100% occluded, pLCx 30%, 100% occluded OM, 100% LAD occlusion after takeoff of D1, D1 is tiny with 80% disease, SVG-OM 80% proximal stenosis with 50% at site of anastomosis.An SVG-OM OKSANA was placed at that time), not a candidate for advanced options.     Patient is here with spouse, he had been doing well, had his  pump exchanged yesterday around 0900 (01/7/18), unfortunately pump was monae turned off, patient went ~ 24 hours w/o  ggt. Yesterday developed working SOB / SUMNER, increase LE edema and weight gain. Discussed with Dr. Posey, recommended additional 40mg PO (total of 80mg) without any improvement in UOP. Subsequently advised to seek medical attention.     ED evaluation:   Sinus tach HR 100s, BP 70/40s  INR 6.2 / K 6.1 / Mag 1.7  KASEY likely Cardiorenal (Cr 2.1)   Congestive hepatopathy elevated AST / ALT, Bilirubin 2.2  BNP >3000               Past Medical History:   Diagnosis Date    CHF (congestive heart failure)     Chronic atrial fibrillation 10/26/2018    CKD (chronic  kidney disease), stage II 11/12/2018    COPD (chronic obstructive pulmonary disease)     Coronary artery disease     Diabetes mellitus        Past Surgical History:   Procedure Laterality Date    ABLATION, AVN N/A 11/6/2018    Performed by Onesimo Willams MD at [facility] EP LAB    APPENDECTOMY      CARDIAC SURGERY      ECHOCARDIOGRAM,TRANSESOPHAGEAL N/A 10/19/2018    Performed by [facility] Diagnostic Provider at [facility] CATH LAB    EYE SURGERY      FRACTURE SURGERY      TONSILLECTOMY         Review of patient's allergies indicates:   Allergen Reactions    Ativan [lorazepam] Anxiety       No current facility-administered medications on file prior to encounter.      Current Outpatient Medications on File Prior to Encounter   Medication Sig    aspirin 81 MG Chew Take 81 mg by mouth once daily.    atorvastatin (LIPITOR) 80 MG tablet Take 80 mg by mouth nightly.    azelastine (ASTELIN) 137 mcg (0.1 %) nasal spray 1 spray by Nasal route as needed.     benzonatate (TESSALON) 200 MG capsule Take 200 mg by mouth 3 (three) times daily as needed for Cough.    cetirizine (ZYRTEC) 5 MG tablet Take 1 tablet (5 mg total) by mouth once daily.    digoxin (LANOXIN) 125 mcg tablet Take 1 tablet (0.125 mg total) by mouth once daily.    DOBUTamine (DOBUTREX) 500 mg/250 mL (2,000 mcg/mL) Inject 317.5 mcg/min into the vein continuous.    docusate sodium (COLACE) 100 MG capsule Take 100 mg by mouth 2 (two) times daily as needed for Constipation.    enalapril (VASOTEC) 2.5 MG tablet Take 2.5 mg by mouth every evening.    fluticasone (FLONASE) 50 mcg/actuation nasal spray 2 sprays by Each Nare route as needed.     furosemide (LASIX) 40 MG tablet Take 40 mg by mouth. As needed    guaiFENesin (MUCINEX) 600 mg 12 hr tablet Take 1,200 mg by mouth as needed for Congestion.    prasugrel (EFFIENT) 10 mg Tab Take 1 tablet (10 mg total) by mouth once daily.    ranitidine (ZANTAC) 150 MG tablet Take 150 mg by mouth once daily.     spironolactone (ALDACTONE) 25 MG tablet Take 0.5 tablets (12.5 mg total) by mouth once daily.    warfarin (COUMADIN) 7.5 MG tablet Take 1 tablet (7.5 mg total) by mouth Daily. (Patient taking differently: Take 7.5 mg by mouth Daily. 1 tablet 5 days per week, 1/2 tablet 2 days per week, followed by Coumadin clinic)     Family History     Problem Relation (Age of Onset)    Heart disease Father, Sister, Brother    Stroke Father        Tobacco Use    Smoking status: Former Smoker     Types: Cigarettes    Smokeless tobacco: Never Used   Substance and Sexual Activity    Alcohol use: No     Frequency: Never    Drug use: No    Sexual activity: Not on file     Review of Systems   Constitution: Positive for decreased appetite, weakness, malaise/fatigue, night sweats and weight gain. Negative for chills, diaphoresis, fever and weight loss.   Eyes: Negative for blurred vision.   Cardiovascular: Positive for dyspnea on exertion, leg swelling, orthopnea and palpitations. Negative for chest pain, irregular heartbeat and near-syncope.   Respiratory: Positive for shortness of breath. Negative for cough, snoring and wheezing.    Gastrointestinal: Positive for bloating. Negative for abdominal pain, nausea and vomiting.   Genitourinary: Negative for bladder incontinence and urgency.     Objective:     Vital Signs (Most Recent):  Temp: 98.8 °F (37.1 °C) (01/07/19 2024)  Pulse: 104 (01/08/19 0232)  Resp: 13 (01/07/19 2347)  BP: (!) 100/58 (01/08/19 0232)  SpO2: 95 % (01/08/19 0232) Vital Signs (24h Range):  Temp:  [98.8 °F (37.1 °C)] 98.8 °F (37.1 °C)  Pulse:  [] 104  Resp:  [13-24] 13  SpO2:  [95 %-100 %] 95 %  BP: ()/(51-61) 100/58        There is no height or weight on file to calculate BMI.    SpO2: 95 %  O2 Device (Oxygen Therapy): nasal cannula    No intake or output data in the 24 hours ending 01/08/19 0239    Lines/Drains/Airways     Peripherally Inserted Central Catheter Line                 PICC Double Lumen  11/07/18 1059 right basilic 61 days          Peripheral Intravenous Line                 Peripheral IV - Single Lumen 10/18/18 0500 Left;Posterior Forearm 81 days         Peripheral IV - Single Lumen 01/07/19 2203 Left Antecubital less than 1 day                Physical Exam   Constitutional: He is oriented to person, place, and time. He appears cachectic. He appears ill. He appears distressed.   Neck: JVD present.   Cardiovascular: Regular rhythm, normal heart sounds and intact distal pulses. Tachycardia present. Exam reveals no gallop and no friction rub.   No murmur heard.  Pulses:       Carotid pulses are 2+ on the right side, and 2+ on the left side.       Radial pulses are 2+ on the right side, and 2+ on the left side.        Femoral pulses are 2+ on the right side, and 2+ on the left side.       Popliteal pulses are 2+ on the right side, and 2+ on the left side.        Dorsalis pedis pulses are 2+ on the right side, and 2+ on the left side.        Posterior tibial pulses are 2+ on the right side, and 2+ on the left side.   Pulmonary/Chest: He is in respiratory distress. He has no wheezes. He has rales in the right middle field, the right lower field, the left middle field and the left lower field. He exhibits no tenderness.   Abdominal: Soft. Bowel sounds are normal. He exhibits no distension and no mass. There is no tenderness. There is no rebound and no guarding.   Musculoskeletal: Normal range of motion. He exhibits no edema.   Neurological: He is alert and oriented to person, place, and time.   Skin: Skin is warm and dry. He is not diaphoretic. No erythema.   Nursing note and vitals reviewed.      Significant Labs:   CMP   Recent Labs   Lab 01/07/19  2222   *   K 6.1*   CL 90*   CO2 22*   *   BUN 46*   CREATININE 2.1*   CALCIUM 9.4   PROT 8.5*   ALBUMIN 3.0*   BILITOT 2.2*   ALKPHOS 175*   AST 1,428*   *   ANIONGAP 15   ESTGFRAFRICA 35.3*   EGFRNONAA 30.5*   , CBC   Recent Labs   Lab  01/07/19  2222   WBC 13.15*   HGB 10.9*   HCT 35.9*      , INR   Recent Labs   Lab 01/07/19  2222   INR 6.3*   , Lipid Panel No results for input(s): CHOL, HDL, LDLCALC, TRIG, CHOLHDL in the last 48 hours., Troponin   Recent Labs   Lab 01/07/19  2222   TROPONINI 0.063*    and All pertinent lab results from the last 24 hours have been reviewed.    Significant Imaging: Echocardiogram:   2D echo with color flow doppler:   Results for orders placed or performed during the hospital encounter of 10/10/18   2D echo with color flow doppler   Result Value Ref Range    QEF 9 (A) 55 - 65    Mitral Valve Regurgitation MODERATE (A)     Diastolic Dysfunction Yes (A)     Est. PA Systolic Pressure 26.04     Tricuspid Valve Regurgitation MILD      Assessment and Plan:     Cardiogenic shock    Mr. Coker is a 74 yoM, admitted to medicine, cardiology consulted for Acute on chronic HF / cardiogenic shock on home palliative , inadvertently pump turned off for 24 hours.    - Not a candidate for advanced options    - End organ dysfunction with Bili 2.2 / Cr 2.1 / INR 6.2 /     - At length discussion with patient and spouse regarding goals of care, spouse [facility] palliative appointment on 02/1/18. Patient wishes to be DNR / DNI / no aggressive measures, hence would not be suitable for ICU admission despite his cardiogenic shock, would have limited support to provide which can be done on the telemetry floors. Additionally, he wishes to be seen by [facility] / [facility] care tomorrow.   - Discussed with medicine team, okay to admit to floors  - Continue  5mcg/kg/lisset  - Start Lasix 80mg IV pushed followed by 5mg/hr  - Replete electrolytes   - hold PTA Anti-HTN with soft BP   - hold Coumadin (Innr 6.2)  - Closely monitor UOP  - Cardiology / CCU team will continue to monitor                VTE Risk Mitigation (From admission, onward)        Ordered     Reason for No  Pharmacological VTE Prophylaxis  Once      01/08/19 0244     IP VTE HIGH RISK PATIENT  Once      01/08/19 0244          Thank you for your consult. I will follow-up with patient. Please contact us if you have any additional questions.    Rj Pacheco MD  Cardiology   [facility]

## 2019-01-08 NOTE — ASSESSMENT & PLAN NOTE
-Patient presenting with ADHF secondary to  pump being off for almost a day after wife exchanged the pump refill yesterday.   -He currently presents volume overloaded and signs of liver and renal congestion.   -Since he is currently not a candidate for advanced options, under palliative  infusion and DNR status will admit him to medicine Team C for lasix drip and re-start of the  drip  -Please give him lasix 80 mg IVP now and start drip at 10 mg/hour  -Monitor K and Mg q 8 hours while on lasix drip  -Resume  drip at 5 mcg/kg/hour  -Consult dietitian   -Hold off spirinolactone and lisinopril on the setting of KASEY and CR 2.2  -Cardiology will continue to follow along this patient with you

## 2019-01-08 NOTE — NURSING
New admit from ED with report of increased SOB/fatigue past 2-3 days. SOB noted with ambulation from stretcher to bed. 02 at 2l/n/c. Exp wheeze heard to RUL/RLL. Lasix infusing at 5ml (10mg) per hr. Dobutamine infusing at 5mcg/kg/min (9.5ml/hr) to right PICC. Denies pain. Linne[place] to room and POC. Wife requesting pt. To not be disturbed in order to get some rest. Wife requested any conversation needed by physicians or pallative care be done only when she is present. Place wife name and phone number on white board and sticky noted. Placed bed in low and locked position. Call light within reach. Side rails up x2. Wife leaving at this time and requested to be called for any reason.

## 2019-01-08 NOTE — HPI
74M with NYHA class IV biventricular systolic and diastolic heart failure on continuous home dobutamine infusion presents to ED complaining of shortness of breath, which began earlier in the day.  The previous night he took a dose of lasix 40mg due to increasing abdominal distension and appetite loss, which usually presages the beginning of decompensation, and took another dose this past morning but had only put out about 500mL prior to coming in.  Upon arrival in the ED it was noted that his dobutamine pump was off.  His wife changed the bag the prior night and it was working then but is not sure if she turned it back on afterwards.  He weighs himself daily and his weight is up 1 pound over the past 3 days.  He feels a little better since the dobutamine was restarted but is still uncomfortable.

## 2019-01-08 NOTE — ED TRIAGE NOTES
Pt reports SOB that began around 1600 today, pt has history of CHF with one pound weight gain the he noticed today. Has PICC line with dobutamine drip for CHF that he started a few months ago. Pt is on lasix PRN, was given 80mg after speaking with PCP about symptoms and was told to come here to possibly receive IV lasix. Pt reports no increase in activity besides some walking today. Pt on 2 LNC at home, bumped to 3L for comfort    Patient Identifiers for Bharat Coker checked and correct  LOC: The patient is awake, alert and aware of environment with an appropriate affect, the patient is oriented x 3 and speaking appropriate.  APPEARANCE: Patient resting comfortably and in no acute distress, patient is clean and well groomed, patient's clothing is properly fastened.  SKIN: The skin is warm and dry, patient has normal skin turgor and moist mucus membranes,no rashes or lesions.Skin Intact , No Breakdown Noted  Musculoskeletal :  Normal range of motion noted. Moves all extremeties well, No swelling or tenderness noted  RESPIRATORY: Airway is open and patent, respirations are spontaneous, patient has a normal effort and rate.  CARDIAC: Patient has a normal rate and rhythm, no periphreal edema noted, capillary refill < 3 seconds.   ABDOMEN: Soft and non tender to palpation, no distention noted.   PULSES: 2+  And symmetrical in all extremeties  NEUROLOGIC: PERRL. facial expression is symmetrical, patient moving all extremities, normal sensation in all extremities when touched with a finger.The patient is awake, alert and cooperative with a calm affect, patient is aware of environment.    Will continue to monitor

## 2019-01-08 NOTE — ASSESSMENT & PLAN NOTE
With chronic cardiogenic shock requiring dobutamine.  History suggestive of gradual start of decompensation over past couple of days and likely accelerated by not having dobutamine over past day.  Infusion restarted at 5mcg/kg/min.  BP at time of my evaluation was 70s/40s making ability to tolerate diuresis difficult, not to mention perilous to monitor on floor.  Case discussed with Cardiology fellow who will get patient admitted to CCU service instead for closer monitoring.

## 2019-01-08 NOTE — CONSULTS
[facility]  Cardiology  Consult Note    Patient Name: Bharat Coker  MRN: [id number]  Admission Date: 1/7/2019  Hospital Length of Stay: 0 days  Code Status: DNR   Attending Physician: Arnold Booker MD   Primary Care Physician: Ronnie Richardson Jr, MD  Expected Discharge Date:   Principal Problem:<principal problem not specified>    Subjective:     Hospital Course:   No notes on file    Interval History:      Mr. Coker is a 74 y/o male who presents to the ED with acute onset of SOB and weight gain started on the day of his admission.     His past medical history includes CAD s/p CABG, ICM/HFrEF (LVef10%) s/p [employer] CRT-D, CAD([facility] 6/2018: LIMA-LAD patent, % occluded at ostium, SVG-% occluded, SVG-D 100% occluded, pLCx 30%, 100% occluded OM, 100% LAD occlusion after takeoff of D1, D1 is tiny with 80% disease, SVG-OM 80% proximal stenosis with 50% at site of anastomosis.An SVG-OM OKSANA was placed at that time).   He was last hospitalized in November and deemed to be poor candidate for advanced options. He was started on  at 5 mcg/kg/hour and with follow up with cardiology. He talked to Dr Posey today who advised him to take extra lasix and if no improvement to come to the ED for clinical evaluation.   Labs on ED presentation showed KASEY from cardiorenal syndrome with elevated AST/ALT. BNP > 3000.  Wife reports that she had to exchange his  pump yesterday and probably did not realize but left the pump turned off.       Review of Systems   Constitution: Positive for decreased appetite, malaise/fatigue and weight loss.   HENT: Negative.    Eyes: Negative.    Cardiovascular: Positive for dyspnea on exertion, leg swelling, orthopnea and paroxysmal nocturnal dyspnea. Negative for chest pain, claudication, cyanosis, irregular heartbeat, near-syncope, palpitations and syncope.   Respiratory: Positive for cough and shortness of breath. Negative for hemoptysis, sleep  disturbances due to breathing, snoring, sputum production and wheezing.    Endocrine: Negative.    Hematologic/Lymphatic: Negative.    Genitourinary: Negative.      Objective:     Vital Signs (Most Recent):  Temp: 98.8 °F (37.1 °C) (01/07/19 2024)  Pulse: 104 (01/07/19 2231)  Resp: (!) 24 (01/07/19 2024)  BP: (!) 96/57 (01/07/19 2231)  SpO2: 97 % (01/07/19 2231) Vital Signs (24h Range):  Temp:  [98.8 °F (37.1 °C)] 98.8 °F (37.1 °C)  Pulse:  [104] 104  Resp:  [24] 24  SpO2:  [97 %-100 %] 97 %  BP: ()/(57-61) 96/57        There is no height or weight on file to calculate BMI.     SpO2: 97 %  O2 Device (Oxygen Therapy): nasal cannula    No intake or output data in the 24 hours ending 01/07/19 2258    Lines/Drains/Airways     Peripherally Inserted Central Catheter Line                 PICC Double Lumen 11/07/18 1059 right basilic 61 days          Peripheral Intravenous Line                 Peripheral IV - Single Lumen 10/18/18 0500 Left;Posterior Forearm 81 days         Peripheral IV - Single Lumen 01/07/19 2203 Left Antecubital less than 1 day                Physical Exam   Constitutional: He is oriented to person, place, and time. He appears well-developed and well-nourished.   HENT:   Head: Normocephalic and atraumatic.   Eyes: Conjunctivae are normal.   Neck: Neck supple. No JVD present. No thyromegaly present.   Cardiovascular: Normal rate and regular rhythm. Exam reveals gallop and S3.   Pulses:       Carotid pulses are 2+ on the right side, and 2+ on the left side.       Radial pulses are 2+ on the right side, and 2+ on the left side.        Femoral pulses are 2+ on the right side, and 2+ on the left side.       Popliteal pulses are 2+ on the right side, and 2+ on the left side.        Dorsalis pedis pulses are 2+ on the right side, and 2+ on the left side.        Posterior tibial pulses are 2+ on the right side, and 2+ on the left side.   Pulmonary/Chest: Effort normal and breath sounds normal. No  respiratory distress. He has no wheezes.   Abdominal: Soft. Bowel sounds are normal. He exhibits no distension. There is no tenderness. There is no rebound.   Neurological: He is oriented to person, place, and time.   Skin: Skin is warm.   Nursing note and vitals reviewed.      Significant Labs:   BMP: No results for input(s): GLU, NA, K, CL, CO2, BUN, CREATININE, CALCIUM, MG in the last 48 hours., CMP No results for input(s): NA, K, CL, CO2, GLU, BUN, CREATININE, CALCIUM, PROT, ALBUMIN, BILITOT, ALKPHOS, AST, ALT, ANIONGAP, ESTGFRAFRICA, EGFRNONAA in the last 48 hours., CBC   Recent Labs   Lab 01/07/19 2222   WBC 13.15*   HGB 10.9*   HCT 35.9*      , INR   Recent Labs   Lab 01/07/19 2222   INR 6.3*   , Lipid Panel No results for input(s): CHOL, HDL, LDLCALC, TRIG, CHOLHDL in the last 48 hours., Troponin No results for input(s): TROPONINI in the last 48 hours., All pertinent lab results from the last 24 hours have been reviewed. and   Recent Lab Results       01/07/19 2222        Immature Granulocytes 1.9     Immature Grans (Abs) 0.25  Comment:  Mild elevation in immature granulocytes is non specific and   can be seen in a variety of conditions including stress response,   acute inflammation, trauma and pregnancy. Correlation with other   laboratory and clinical findings is essential.       Baso # 0.08     Basophil% 0.6     Differential Method Automated     Eos # 0.0     Eosinophil% 0.1     Gran # (ANC) 10.1     Gran% 76.8     Hematocrit 35.9     Hemoglobin 10.9     Coumadin Monitoring INR 6.3  Comment:  Coumadin Therapy:  2.0 - 3.0 for INR for all indicators except mechanical heart valves  and antiphospholipid syndromes which should use 2.5 - 3.5.  INR critical result(s) called and verbal readback obtained from   ROX BRADY RN, 01/07/2019 22:53       Lymph # 0.9     Lymph% 7.1     MCH 26.1     MCHC 30.4     MCV 86     Mono # 1.8     Mono% 13.5     MPV 10.4     nRBC 0     Platelets 256     Protime 63.3      RBC 4.18     RDW 18.2     WBC 13.15           Significant Imaging: Echocardiogram:   2D echo with color flow doppler:   Results for orders placed or performed during the hospital encounter of 10/10/18   2D echo with color flow doppler   Result Value Ref Range    QEF 9 (A) 55 - 65    Mitral Valve Regurgitation MODERATE (A)     Diastolic Dysfunction Yes (A)     Est. PA Systolic Pressure 26.04     Tricuspid Valve Regurgitation MILD     and Transthoracic echo (TTE) complete (Cupid Only): No results found for this or any previous visit.    Assessment and Plan:         Chronic systolic congestive heart failure    -Patient presenting with ADHF secondary to  pump being off for almost a day after wife exchanged the pump refill yesterday.   -He currently presents volume overloaded and signs of liver and renal congestion.   -Since he is currently not a candidate for advanced options, under palliative  infusion and DNR status will admit him to medicine Team C for lasix drip and re-start of the  drip  -Please give him lasix 80 mg IVP now and start drip at 10 mg/hour  -Monitor K and Mg q 8 hours while on lasix drip  -Resume  drip at 5 mcg/kg/hour  -Consult dietitian   -Hold off spirinolactone and lisinopril on the setting of KASEY and CR 2.2  -Cardiology will continue to follow along this patient with you             VTE Risk Mitigation (From admission, onward)    None          Adair John MD  Cardiology  [facility]

## 2019-01-08 NOTE — ASSESSMENT & PLAN NOTE
Secondary to pulmonary edema in context of decompensated biventricular heart failure.  Wean oxygen as able while diuresing.

## 2019-01-08 NOTE — ED PROVIDER NOTES
"Encounter Date: 1/7/2019    SCRIBE #1 NOTE: I, Denisse Walker, am scribing for, and in the presence of,  Dr. Booker. I have scribed the entire note.       History     Chief Complaint   Patient presents with    Shortness of Breath     Presents to Ed via EMS c/o SOB since 1600. Pt on 2 L per NC at home. Home health nurse gave 80mg IV lasix prior to EMS arrivak     Time patient was seen by the provider: 10:10 PM      Mr. Coker is a 74 y.o. male with a history of CHF (on continuous dobutamine and 2 L NC at home), COPD, CAD, DM, chronic atrial fibrillation, and CKD, who presents to the ED via EMS with a complaint of SOB since 4pm. Patient reports he was sitting down when his SOB began. He endorses abdominal distention and nausea. He is on 2L nasal canula at home. He notes productive cough with clear sputum for 1 week and post nasal drip. Patient wife reports he has had increased frequency of heart burn and indigestion. She also notes the patient was "panting" in his sleep last night but was able to slow his breathing to normal; she states today he has not been able to control it. His wife also reports giving patient 80 mg of lasix with little urine output earlier today. On arrival patient's dobutamine pump was off, patient and wife unsure of how long it was off. Wife reports changing the pump last night.       The history is provided by the patient and the spouse.     Review of patient's allergies indicates:   Allergen Reactions    Ativan [lorazepam] Anxiety     Past Medical History:   Diagnosis Date    CHF (congestive heart failure)     Chronic atrial fibrillation 10/26/2018    CKD (chronic kidney disease), stage II 11/12/2018    COPD (chronic obstructive pulmonary disease)     Coronary artery disease     Diabetes mellitus      Past Surgical History:   Procedure Laterality Date    ABLATION, AVN N/A 11/6/2018    Performed by Onesimo Willams MD at [facility] EP LAB    APPENDECTOMY      CARDIAC SURGERY      " ECHOCARDIOGRAM,TRANSESOPHAGEAL N/A 10/19/2018    Performed by [facility] Diagnostic Provider at [facility] CATH LAB    EYE SURGERY      FRACTURE SURGERY      TONSILLECTOMY       Family History   Problem Relation Age of Onset    Stroke Father     Heart disease Father     Heart disease Sister     Heart disease Brother      Social History     Tobacco Use    Smoking status: Former Smoker     Types: Cigarettes    Smokeless tobacco: Never Used   Substance Use Topics    Alcohol use: No     Frequency: Never    Drug use: No     Review of Systems   Constitutional: Positive for fatigue. Negative for fever.   HENT: Positive for postnasal drip.    Respiratory: Positive for cough (productive) and shortness of breath.    Cardiovascular: Negative for chest pain and leg swelling.   Gastrointestinal: Positive for abdominal distention and nausea. Negative for abdominal pain, constipation, diarrhea and vomiting.   Genitourinary: Positive for decreased urine volume. Negative for dysuria.   Musculoskeletal: Negative.    Skin: Positive for color change. Negative for rash.   Neurological: Negative.    Hematological: Negative.    All other systems reviewed and are negative.      Physical Exam     Initial Vitals [01/07/19 2024]   BP Pulse Resp Temp SpO2   118/61 104 (!) 24 98.8 °F (37.1 °C) 100 %      MAP       --         Physical Exam    Nursing note and vitals reviewed.  Constitutional: He appears well-developed and well-nourished. He is not diaphoretic. No distress.   HENT:   Head: Normocephalic and atraumatic.   Right Ear: External ear normal.   Left Ear: External ear normal.   Neck: Neck supple. JVD present.   Cardiovascular: Regular rhythm, normal heart sounds and intact distal pulses.   Tachycardic. Unable to palpate radial or pedal pulses.   Pulmonary/Chest: No respiratory distress. He has no wheezes. He has no rhonchi. He has rales (in all fields bilaterally).   Tachypneic.    Abdominal: Soft. He exhibits no distension. There is  tenderness (epigastric).   Musculoskeletal: He exhibits no edema.   Extremities cool to touch.    Neurological: He is alert and oriented to person, place, and time. GCS score is 15. GCS eye subscore is 4. GCS verbal subscore is 5. GCS motor subscore is 6.   Skin: Capillary refill takes 2 to 3 seconds. No rash noted.   Right-sided PICC line in place without surrounding erythema, warmth, or tenderness.   Psychiatric: He has a normal mood and affect.         ED Course   Procedures  Labs Reviewed   CBC W/ AUTO DIFFERENTIAL - Abnormal; Notable for the following components:       Result Value    WBC 13.15 (*)     RBC 4.18 (*)     Hemoglobin 10.9 (*)     Hematocrit 35.9 (*)     MCH 26.1 (*)     MCHC 30.4 (*)     RDW 18.2 (*)     Immature Granulocytes 1.9 (*)     Gran # (ANC) 10.1 (*)     Immature Grans (Abs) 0.25 (*)     Lymph # 0.9 (*)     Mono # 1.8 (*)     Gran% 76.8 (*)     Lymph% 7.1 (*)     All other components within normal limits   COMPREHENSIVE METABOLIC PANEL - Abnormal; Notable for the following components:    Sodium 127 (*)     Potassium 6.1 (*)     Chloride 90 (*)     CO2 22 (*)     Glucose 142 (*)     BUN, Bld 46 (*)     Creatinine 2.1 (*)     Total Protein 8.5 (*)     Albumin 3.0 (*)     Total Bilirubin 2.2 (*)     Alkaline Phosphatase 175 (*)     AST 1,428 (*)      (*)     eGFR if  35.3 (*)     eGFR if non  30.5 (*)     All other components within normal limits   B-TYPE NATRIURETIC PEPTIDE - Abnormal; Notable for the following components:    BNP 3,568 (*)     All other components within normal limits   TROPONIN I - Abnormal; Notable for the following components:    Troponin I 0.063 (*)     All other components within normal limits   LIPASE - Abnormal; Notable for the following components:    Lipase 86 (*)     All other components within normal limits   PROTIME-INR - Abnormal; Notable for the following components:    Prothrombin Time 63.3 (*)     INR 6.3 (*)     All  other components within normal limits    Narrative:     INR critical result(s) called and verbal readback obtained from   ROX BRADY RN, 01/07/2019 22:53   CBC W/ AUTO DIFFERENTIAL - Abnormal; Notable for the following components:    WBC 14.86 (*)     RBC 3.94 (*)     Hemoglobin 10.0 (*)     Hematocrit 33.7 (*)     MCH 25.4 (*)     MCHC 29.7 (*)     RDW 17.8 (*)     Immature Granulocytes 1.8 (*)     Gran # (ANC) 12.3 (*)     Immature Grans (Abs) 0.27 (*)     Lymph # 0.7 (*)     Mono # 1.5 (*)     Gran% 83.0 (*)     Lymph% 4.7 (*)     All other components within normal limits   MAGNESIUM     EKG Readings: (Independently Interpreted)   Ventricular paced rhythm.  Sgarbossa criteria not met for STEMI.       Imaging Results          X-Ray Chest AP Portable (Final result)  Result time 01/07/19 22:38:30    Final result by Efrain Sadler MD (01/07/19 22:38:30)                 Impression:      Right-sided PICC line tip overlies the right subclavian vein. AICD leads overlie the heart.    Bilateral infiltrate/edema has increased.  Stable cardiomegaly.      Electronically signed by: Efrain Sadler MD  Date:    01/07/2019  Time:    22:38             Narrative:    EXAMINATION:  XR CHEST AP PORTABLE    CLINICAL HISTORY:  Shortness of breath    TECHNIQUE:  Single frontal view of the chest was performed.    COMPARISON:  November 7, 2018.    FINDINGS:  Right-sided PICC line tip overlies the right subclavian vein.  AICD leads overlie the heart.    Bilateral infiltrate/edema has increased.  Stable cardiomegaly.    Heart and lungs otherwise appear unchanged when allowing for differences in technique and positioning.                              X-Rays:   Independently Interpreted Readings:   Other Readings:  CXR reviewed.  Significant bilateral pulmonary edema present on my read.    Medical Decision Making:   History:   Old Medical Records: I decided to obtain old medical records.  Old Records Summarized: records from clinic  visits and records from previous admission(s).  Independently Interpreted Test(s):   I have ordered and independently interpreted X-rays - see prior notes.  I have ordered and independently interpreted EKG Reading(s) - see prior notes  Clinical Tests:   Lab Tests: Ordered and Reviewed  Radiological Study: Ordered and Reviewed  Medical Tests: Ordered and Reviewed  ED Management:  Tachycardic. BP at baseline for pt. Requiring only 2 L NC at the moment (home rate), but has labored breathing with significant rales. Clinically appears very ill. It appears that his dobutamine may have been off for 24 hours when wife changed the battery last night; restarted in ED. Clinically appears to be in CHF exacerbation; also has some epigastric TTP. EKG, CXR, CBC, CMP, lipase, troponin, BNP. Given dose 40 mg IV lasix on arrival. If he were to decompensate from a resp standpoint, would add on BiPAP.     Patient has standing DNR, but that is for cardiac and cardiopulmonary arrest only. If there was a reversible cause from pulmonary arrest alone, they would like interventions and are amenable to intubation with mechanical ventilation if suspected to be short term.     Labs reviewed. Significant for WBC 13.15, INR 6.3, Na 127, K 6.1, BUN 46, sCr 2.1, T bili 2.2, AST 1428, , tropnonin 0.063, BNP 3568. CXR w/ significant pulm edema bilaterally.    Cards consulted who evaluated patient. Discussed with them; they initially stated they would admit to CCU and place on lasix infusion. However, after they spoke with CCU attending, they recommended admission to IM-C on floor as diuresis could be achieved there because patient is a DNR. Further discussion about critically ill status of patient resulted as they still believe patient to be stable for floor. Discussed with internal medicine who will admit.       Other:   I have discussed this case with another health care provider.         Scribe Attestation:   Davidsonibe #1: I performed the  above scribed service and the documentation accurately describes the services I performed. I attest to the accuracy of the note.    Attending Attestation:         Attending Critical Care:   Critical Care Times:   ==============================================================  · Total Critical Care Time - exclusive of procedural time: 50 minutes.  ==============================================================  Critical care was necessary to treat or prevent imminent or life-threatening deterioration of the following conditions: congestive heart failure, hepatic failure and metabolic crisis.   The following critical care procedures were done by me (see procedure notes): pulse oximetry.   Critical care was time spent personally by me on the following activities: obtaining history from patient or relative, examination of patient, ordering lab, x-rays, and/or EKG, review of old charts, development of treatment plan with patient or relative, ordering and performing treatments and interventions, evaluation of patient's response to treatment, discussion with consultants, interpretation of cardiac measurements, re-evaluation of patient's conition and end of life discussions.   Critical Care Condition: life-threatening                  Clinical Impression:   The primary encounter diagnosis was Acute on chronic combined systolic and diastolic congestive heart failure. Diagnoses of Shortness of breath, Hyponatremia, Elevated INR, KASEY (acute kidney injury), Hyperkalemia, Leukocytosis, unspecified type, Acute liver failure without hepatic coma, and Hyperbilirubinemia were also pertinent to this visit.      Disposition:   Disposition: Admitted  Condition: Fair                        Arnold Booker MD  01/08/19 1600       Arnold Booker MD  01/08/19 1602

## 2019-01-08 NOTE — ED NOTES
Dobutamine infusing at 4.8 mL/hr. When assessing pump, family and this nurse realized the pump had not been turned on and bag that was replaced last night appears full. Pump turned on and infusion resumed

## 2019-01-09 PROBLEM — Z71.89 GOALS OF CARE, COUNSELING/DISCUSSION: Status: ACTIVE | Noted: 2019-01-09

## 2019-01-09 LAB
ALBUMIN SERPL BCP-MCNC: 2.7 G/DL
ALP SERPL-CCNC: 143 U/L
ALT SERPL W/O P-5'-P-CCNC: 1481 U/L
ANION GAP SERPL CALC-SCNC: 12 MMOL/L
ANION GAP SERPL CALC-SCNC: 13 MMOL/L
AST SERPL-CCNC: 1848 U/L
BASOPHILS # BLD AUTO: 0.06 K/UL
BASOPHILS NFR BLD: 0.5 %
BILIRUB SERPL-MCNC: 1.9 MG/DL
BILIRUB UR QL STRIP: NEGATIVE
BUN SERPL-MCNC: 62 MG/DL
BUN SERPL-MCNC: 66 MG/DL
CALCIUM SERPL-MCNC: 9 MG/DL
CALCIUM SERPL-MCNC: 9.3 MG/DL
CHLORIDE SERPL-SCNC: 87 MMOL/L
CHLORIDE SERPL-SCNC: 88 MMOL/L
CLARITY UR REFRACT.AUTO: ABNORMAL
CO2 SERPL-SCNC: 27 MMOL/L
CO2 SERPL-SCNC: 28 MMOL/L
COLOR UR AUTO: YELLOW
CREAT SERPL-MCNC: 2.6 MG/DL
CREAT SERPL-MCNC: 2.7 MG/DL
DIFFERENTIAL METHOD: ABNORMAL
EOSINOPHIL # BLD AUTO: 0.2 K/UL
EOSINOPHIL NFR BLD: 1.9 %
ERYTHROCYTE [DISTWIDTH] IN BLOOD BY AUTOMATED COUNT: 17.7 %
EST. GFR  (AFRICAN AMERICAN): 26 ML/MIN/1.73 M^2
EST. GFR  (AFRICAN AMERICAN): 27.3 ML/MIN/1.73 M^2
EST. GFR  (NON AFRICAN AMERICAN): 22.5 ML/MIN/1.73 M^2
EST. GFR  (NON AFRICAN AMERICAN): 23.6 ML/MIN/1.73 M^2
GLUCOSE SERPL-MCNC: 173 MG/DL
GLUCOSE SERPL-MCNC: 180 MG/DL
GLUCOSE UR QL STRIP: NEGATIVE
HCT VFR BLD AUTO: 30.7 %
HGB BLD-MCNC: 9.3 G/DL
HGB UR QL STRIP: ABNORMAL
HYALINE CASTS UR QL AUTO: 17 /LPF
IMM GRANULOCYTES # BLD AUTO: 0.1 K/UL
IMM GRANULOCYTES NFR BLD AUTO: 0.9 %
INR PPP: 6.9
KETONES UR QL STRIP: NEGATIVE
LEUKOCYTE ESTERASE UR QL STRIP: NEGATIVE
LYMPHOCYTES # BLD AUTO: 0.9 K/UL
LYMPHOCYTES NFR BLD: 8.4 %
MAGNESIUM SERPL-MCNC: 1.9 MG/DL
MCH RBC QN AUTO: 25.2 PG
MCHC RBC AUTO-ENTMCNC: 30.3 G/DL
MCV RBC AUTO: 83 FL
MICROSCOPIC COMMENT: ABNORMAL
MONOCYTES # BLD AUTO: 0.9 K/UL
MONOCYTES NFR BLD: 7.8 %
NEUTROPHILS # BLD AUTO: 8.8 K/UL
NEUTROPHILS NFR BLD: 80.5 %
NITRITE UR QL STRIP: NEGATIVE
NRBC BLD-RTO: 0 /100 WBC
PH UR STRIP: 5 [PH] (ref 5–8)
PLATELET # BLD AUTO: 210 K/UL
PMV BLD AUTO: 10.4 FL
POTASSIUM SERPL-SCNC: 4.3 MMOL/L
POTASSIUM SERPL-SCNC: 4.7 MMOL/L
PROT SERPL-MCNC: 7.3 G/DL
PROT UR QL STRIP: NEGATIVE
PROTHROMBIN TIME: 70.3 SEC
RBC # BLD AUTO: 3.69 M/UL
RBC #/AREA URNS AUTO: 6 /HPF (ref 0–4)
SODIUM SERPL-SCNC: 127 MMOL/L
SODIUM SERPL-SCNC: 128 MMOL/L
SP GR UR STRIP: 1.01 (ref 1–1.03)
URN SPEC COLLECT METH UR: ABNORMAL
WBC # BLD AUTO: 10.94 K/UL
WBC #/AREA URNS AUTO: 1 /HPF (ref 0–5)

## 2019-01-09 PROCEDURE — 80053 COMPREHEN METABOLIC PANEL: CPT

## 2019-01-09 PROCEDURE — 25000003 PHARM REV CODE 250: Performed by: HOSPITALIST

## 2019-01-09 PROCEDURE — 63600175 PHARM REV CODE 636 W HCPCS: Mod: JG | Performed by: HOSPITALIST

## 2019-01-09 PROCEDURE — 63600175 PHARM REV CODE 636 W HCPCS: Performed by: HOSPITALIST

## 2019-01-09 PROCEDURE — 25000003 PHARM REV CODE 250: Performed by: STUDENT IN AN ORGANIZED HEALTH CARE EDUCATION/TRAINING PROGRAM

## 2019-01-09 PROCEDURE — 25000242 PHARM REV CODE 250 ALT 637 W/ HCPCS: Performed by: HOSPITALIST

## 2019-01-09 PROCEDURE — 36415 COLL VENOUS BLD VENIPUNCTURE: CPT

## 2019-01-09 PROCEDURE — 63600175 PHARM REV CODE 636 W HCPCS: Performed by: STUDENT IN AN ORGANIZED HEALTH CARE EDUCATION/TRAINING PROGRAM

## 2019-01-09 PROCEDURE — 83735 ASSAY OF MAGNESIUM: CPT

## 2019-01-09 PROCEDURE — 99233 PR SUBSEQUENT HOSPITAL CARE,LEVL III: ICD-10-PCS | Mod: ,,, | Performed by: HOSPITALIST

## 2019-01-09 PROCEDURE — 20600001 HC STEP DOWN PRIVATE ROOM

## 2019-01-09 PROCEDURE — 80048 BASIC METABOLIC PNL TOTAL CA: CPT

## 2019-01-09 PROCEDURE — 99233 SBSQ HOSP IP/OBS HIGH 50: CPT | Mod: ,,, | Performed by: HOSPITALIST

## 2019-01-09 PROCEDURE — 99223 1ST HOSP IP/OBS HIGH 75: CPT | Mod: ,,, | Performed by: CLINICAL NURSE SPECIALIST

## 2019-01-09 PROCEDURE — 85025 COMPLETE CBC W/AUTO DIFF WBC: CPT

## 2019-01-09 PROCEDURE — 85610 PROTHROMBIN TIME: CPT

## 2019-01-09 PROCEDURE — 99223 PR INITIAL HOSPITAL CARE,LEVL III: ICD-10-PCS | Mod: ,,, | Performed by: CLINICAL NURSE SPECIALIST

## 2019-01-09 RX ORDER — FAMOTIDINE 20 MG/1
20 TABLET, FILM COATED ORAL DAILY
Status: DISCONTINUED | OUTPATIENT
Start: 2019-01-10 | End: 2019-01-15 | Stop reason: HOSPADM

## 2019-01-09 RX ORDER — FLUTICASONE PROPIONATE 50 MCG
2 SPRAY, SUSPENSION (ML) NASAL DAILY
Status: DISCONTINUED | OUTPATIENT
Start: 2019-01-09 | End: 2019-01-15 | Stop reason: HOSPADM

## 2019-01-09 RX ORDER — CETIRIZINE HYDROCHLORIDE 5 MG/1
5 TABLET ORAL DAILY
Status: DISCONTINUED | OUTPATIENT
Start: 2019-01-09 | End: 2019-01-15 | Stop reason: HOSPADM

## 2019-01-09 RX ADMIN — FAMOTIDINE 20 MG: 20 TABLET ORAL at 08:01

## 2019-01-09 RX ADMIN — FUROSEMIDE 15 MG/HR: 10 INJECTION, SOLUTION INTRAMUSCULAR; INTRAVENOUS at 08:01

## 2019-01-09 RX ADMIN — CETIRIZINE HYDROCHLORIDE 5 MG: 5 TABLET ORAL at 01:01

## 2019-01-09 RX ADMIN — FLUTICASONE PROPIONATE 100 MCG: 50 SPRAY, METERED NASAL at 02:01

## 2019-01-09 RX ADMIN — BENZONATATE 200 MG: 100 CAPSULE ORAL at 01:01

## 2019-01-09 RX ADMIN — DOBUTAMINE IN DEXTROSE 5 MCG/KG/MIN: 200 INJECTION, SOLUTION INTRAVENOUS at 06:01

## 2019-01-09 RX ADMIN — GUAIFENESIN AND DEXTROMETHORPHAN HYDROBROMIDE 1 TABLET: 600; 30 TABLET, EXTENDED RELEASE ORAL at 08:01

## 2019-01-09 RX ADMIN — ALTEPLASE 2 MG: 2.2 INJECTION, POWDER, LYOPHILIZED, FOR SOLUTION INTRAVENOUS at 04:01

## 2019-01-09 RX ADMIN — DIGOXIN 0.12 MG: 125 TABLET ORAL at 08:01

## 2019-01-09 RX ADMIN — ASPIRIN 81 MG CHEWABLE TABLET 81 MG: 81 TABLET CHEWABLE at 08:01

## 2019-01-09 NOTE — PLAN OF CARE
"Palliative Care Social Work   Assessment  Name: Bharat Coker  MRN: [id number]  Date of Birth/Age:  1945  Sex: male  Ethnicity: White    Primary Language:English   Needed: no    Attending Physician: Dr. Drew  Reason for Referral: assistance with clarification of goals of care  Consult Order Date: 1/8/19 0244  Primary CM/SW: Nasrin Saunders    Palliative Care Provider: NICK Turner, CNS    Present during Interview: pt, pt's wife Nava, Pal Care APRN and this SW    Past & Current Medical Situation:   Diagnosis: Biventricular congestive heart failure    PMH:   Past Medical History:   Diagnosis Date    CHF (congestive heart failure)     Chronic atrial fibrillation 10/26/2018    CKD (chronic kidney disease), stage II 11/12/2018    COPD (chronic obstructive pulmonary disease)     Coronary artery disease     Diabetes mellitus      Mental Health/Substance Use History: n/a  Non-traditional Health practices:     Understanding of diagnosis and prognosis: Has excellent  Understanding of pt's dx and px.     Patients Mental Status: Alert and Oriented    Socio-Economic Factors/Resources:  Address: [address] [place] [address]  Phone Number: [phone] (home)     Marital Status:  since 2012. Been together for over 30 years  Household Composition: Lives with wife, wife's dtr and dtr's 10 year old grandchild.   Children: 1 son in [place]. Wife has Dtr  Relationships with Family: Main support is pt's wife and dtr. Per wife, pt raised her daughter Jania since she was 3 years old. Pt's Dtr Jania is 32 years old. Dtr Jania's 10 year old child also lives with family in the home.     Per Wife, pt's son lives in [place] and is not part of the "caregiving team".     Emergency Contacts:   Wife: Nava Alfred: [phone]  Dtr: Jania Alfred: [phone]    Activities of Daily Living: Assistance with ADLs by wife and dtr.  Support Systems-Family & Community (Home Health, HME etc):  " [facility]: [facility], Medical equipment and Meds through [facility]. [facility] PCP.      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 100% Service Connected.    Financial Resources:[employer] Medicare. [facility]      Advanced Care Planning & Legal Concerns:   Advanced Directives/Living Will: no   Planning:  no    Power of : no  Surrogate Decision Maker: Wife: Nava Simon      Spirituality, Culture & Coping Mechanisms:  F- Lexus and Belief: Anabaptist     I - Importance: Wife stated that she has lexus. Pt stated that he is more spiritual, not a Mandaeism goer but believes.      C - Community/Culture Values:     A - Address in Care: Spiritual Care following      Strengths/Coping Strategies: Family Supportive  Self-Care Activities/Hobbies: Enjoyed camping, fishing, visiting plantations. Has 3 dogs, 1 cat, 2 gerbils, 2 fish tanks and 2 turtles at home.     Goals/Hopes/Expectations: Return home and receive Palliative Care through [facility]  Fears/Anxiety/Concerns: Wants symptoms to be controlled.         Preferences about EOL Environment: (own bed, family nearby, pets, music, etc)  Home    Complicated Bereavement Risk Assessment Tool (CBRAT)  Reference:  [facility] Clinical Practice Group (May 2016). Bereavement Risk Screening and Management Guidelines.  Retrieved from: http://www.[facility].com.au/wp-content/uploads//LXBMT-Eqzqcajgemr-Hxvsofacx-and-Management-Guideline-2016.pdf      Client Characteristics (Bereaved Client)  ? Under 18      yes  ? Was a Twin   no  ? Young Spouse   no  ? Elderly Spouse    no  ? Isolated    no  ? Lacks Meaningful Social Support   no  ? Dissatisfied with help available during illness   no  ? New to Financial [place] no  ? New to Decision-Making   no   History of Loss (Bereaved Client)  ? Cumulative Multiple Losses   no  ? Previous Mental Health Illnesses    "no  ? Current Mental Health Illness   no  ? Other Significant Health Issues   no   ? Migrant/Refugee   no    Illness  ? Inherited Disorder   no  ? Stigmatized Disease in the   no  ?  Family/Community   no  ? Lengthy/Burdensome   no Relationship with   ? Profound Lifelong Partner   yes  ? Highly Dependent    no  ? Antagonistic   no  ? Ambivalent   no  ? Deeply Connected   yes  ? Culturally Defined   no   Death  ? Sudden or Unexpected   no  ? Traumatic Circumstances Associated with Death   no  ? Significant Cultural/Social Burdens as a result of Death   no   Risk Factors Scores  0-2  Low  3-5  Moderate  5+  High  All persons scoring moderate to high presume to be at risk**    (** It is acknowledged that protective factors and resilience may outweigh apparent risk factors.      Total Risk Factors Score:   Mild to Moderate    Will benefit from Continued follow up and bereavement support. Pt lives with wife, wife's dtr (whom pt raised) and dtr's 10 year old child.       Discharge Planning Needs/Plan of Care:       Visit to bedside with [facility] NICK Medrano. Pt and Wife have excellent understanding of pt's clinical status and prognosis. Wife spoke about following up with [facility] and receiving most of pt's care through the VA.  Pt has outpatient Pal Care visit with VA on 19. Wife stated that pt is followed by [facility] because "they are the best".     Pt and Wife interest in continuing with appointment for Palliative Care at [facility]. Wife stated that she understood importance of  at home and stated that the reason pt's health declined was that  had stopped but unsure why.   Wife/Dtr keeps detailed records of all of pt's care.      Offered to contact VA to keep them posted on pt's admit and request either earlier date or home visit as this may be easier on the pt and wife.     Also spoke with wife about importance of self-care for herself while taking care of pt.  Wife " verbalized understanding.    Pt/Wife interested in continuing Home Health with Palliative Care through [facility] and continuing to see Cardiologists at [facility].  Emotional Support provided.      MELISSA spoke with Elke Miranda LCSW,  for [facility] ([phone] ext [phone]/fax [phone]). Elke stated that pt would be seeing Dr. Herrera on February 1st and the VA would like them to come to clinic for first visit then possible follow ups at home afterwards. MELISSA faxed face sheet, history and physical, progress notes, and [facility] progress note for update on pt's care.       MELISSA spoke with primary CM Ilene Butts RN, regarding pt/wife's wishes to return home with [facility] and follow up with Palliative Care at [facility].              Monse Hernandes LCSW, ACHP-SW

## 2019-01-09 NOTE — PLAN OF CARE
Problem: Adult Inpatient Plan of Care  Goal: Plan of Care Review  Outcome: Ongoing (interventions implemented as appropriate)  Pt AAOx4.VSS. Lasix gtt continues at 5 mL/Hr. Dobutamine gtt continues at 9.5 mL/Hr. Pt on 3L O2 NC. Cathflow infused into double lumen PICC, for blood return. Procedure not successful. No acute events during shift. Pt had consult with Palliative care. Bed locked and lowest position, Call bell in reach. WCTM.

## 2019-01-09 NOTE — ASSESSMENT & PLAN NOTE
Impression: 74 yr old gentlemen with acute on chronic heart failure with hypoxia, cardiorenal syndrome and supratherapeutic INR. Probable HF exacerbation secondary to home dobutamine pump being turned off unknown to patient and family.     Patient and his wife verbalize excellent understanding of the patient's clinical status and prognosis.  Patient and his wife would like to implement palliative care through the [facility].  Patient currently has outpatient visit scheduled for 2/1/19.      Recommendations/Plan: 1) Palliative care  to contact [facility] to assist with coordination of palliative care.  Patient is ideal for home based palliative care visits  2) Per patient's stated goals, [facility] and continue on home dobutamine  3) Agree with DNR status    Goals of Care:  Long discussion conducted by TOMAS Hernandes LCSW and this APRN with the patient and his wife to discuss pt's current clinical status, goals of care, long term expected outcomes and poor prognosis. After a lengthy discussion concerning the pt's values and wishes the pt's family verbalize excellent understanding and insight pertaining to the pt's clinical status the following goals of care were established:   1) Patient would like to be [facility] and continue on home dobutamine  2) Patient and his wife would like to implement palliative care through the [facility].  Patient currently has outpatient visit scheduled for     2/1/19.   3) Home based palliative care visits will be ideal for patient

## 2019-01-09 NOTE — PROGRESS NOTES
Paged MD to notify pt orders need clarification regarding code status.  Per night nurse, wife would not like to continue discussing code status as it should already be known their wishes are to have a DNR.  The current orders and paper indicate a partial code.  IM C team notified and MD will speak with pt and address code status.      STAFF ADDENDUM:  Spoke with patient/wife; cardiology co-management present. After further discussion patient would like to have directive of DNR in place. I have signed the form and enacted the order. Please see admission H&P for full details.

## 2019-01-09 NOTE — PLAN OF CARE
This CM conduct assessment per medical record. Family not available, patient asleep.   2:54 Informed by palliative care patient will go [facility] with [facility] and out-pt palliative [facility].      [facility] [id number] - MICHAEL GARCIA  [address] [facility] [address] [place]  [address]  [place] [address]  Phone: [phone] Fax: [phone]    Payor: HUMANA MANAGED MEDICARE / Plan: HUMANA MEDICARE HMO / Product Type: Capitation /         01/09/19 1426   Discharge Assessment   Assessment Type Discharge Planning Assessment   Confirmed/corrected address and phone number on facesheet? Yes   Assessment information obtained from? Medical Record   Expected Length of Stay (days) 3   Communicated expected length of stay with patient/caregiver no   Prior to hospitilization cognitive status: Alert/Oriented   Prior to hospitalization functional status: Needs Assistance   Current cognitive status: Alert/Oriented   Current Functional Status: Needs Assistance   Facility Arrived From: (via ambulance from home)   Lives With spouse   Able to Return to Prior Arrangements yes   Is patient able to care for self after discharge? Yes   Who are your caregiver(s) and their phone number(s)? (spouse Nava Simon [phone])   Patient's perception of discharge disposition hospice/home   Readmission Within the Last 30 Days no previous admission in last 30 days   Patient currently being followed by outpatient case management? No   Patient currently receives any other outside agency services? Yes   Name and contact number of agency or person providing outside services ([facility])   Is it the patient/care giver preference to resume care with the current outside agency? Yes   Equipment Currently Used at Home oxygen;medication pump   Do you have any problems affording any of your prescribed medications? No   Is the patient taking medications as prescribed? yes   Does the patient have transportation home? Yes   Transportation  Anticipated family or friend will provide   Does the patient receive services at the [facility]madin Clinic? Yes  (weekly lab work monitor by Cardiologist)   Discharge Plan A Hospice/home;Home with family   Discharge Plan B Home with family   DME Needed Upon Discharge  none   Patient/Family in Agreement with Plan unable to assess       Ilene Butts RN/BSN/CM  [facility]  [phone]  Ortho/IMK/IM[facility]

## 2019-01-09 NOTE — CONSULTS
[facility]  Palliative Medicine  Consult Note    Patient Name: Bharat Coker  MRN: [id number]  Admission Date: 1/7/2019  Hospital Length of Stay: 2 days  Code Status: DNR   Attending Provider: Laurent Drew MD  Consulting Provider: NICK Turner, CNS  Primary Care Physician: Ronnie Richardson Jr, MD  Principal Problem:Biventricular congestive heart failure    Patient information was obtained from patient, spouse/SO, past medical records and ER records.      Consults  Assessment/Plan:     Palliative care encounter    Impression: 74 yr old gentlemen with acute on chronic heart failure with hypoxia, cardiorenal syndrome and supratherapeutic INR. Probable HF exacerbation secondary to home dobutamine pump being turned off unknown to patient and family.     Patient and his wife verbalize excellent understanding of the patient's clinical status and prognosis.  Patient and his wife would like to implement palliative care [facility] the [facility].  Patient currently has outpatient visit scheduled for 2/1/19.      Recommendations/Plan: 1) Palliative care  to contact [facility] to assist with coordination of palliative care.  Patient is ideal for home based palliative care visits  2) Per patient's stated goals, patient would like [facility] and continue on home dobutamine  3) Agree with DNR status    Goals of Care:  Long discussion conducted by TOMAS Hernandes LCSW and this NICK with the patient and his wife to discuss pt's current clinical status, goals of care, long term expected outcomes and poor prognosis. After a lengthy discussion concerning the pt's values and wishes the pt's family verbalize excellent understanding and insight pertaining to the pt's clinical status the following goals of care were established:   1) Patient would like [facility] and continue on home dobutamine  2) Patient and his wife would like to implement palliative care  through the [facility].  Patient currently has outpatient visit scheduled for     2/1/19.   3) Home based palliative care visits will be ideal for patient               Thank you for your consult. I will follow-up with patient. Please contact us if you have any additional questions.    Subjective:     HPI:   74 yr old gentlemen with acute on chronic heart failure with hypoxia, cardiorenal syndrome and supratherapeutic INR. Pt presented to the ER with SOB. Patient noted  increasing abdominal distension, decreased appetite, 1# weight gain in last 3 days  and took prn doses of Lasix. In the ER it was noted that his dobutamine pump was off.   Significant  PMH of CAD s/p CABG, ICM/HFrEF (LVef10%) on home  5mcg/kg/min, and  s/p Medtronic CRT-D.      Hospital Course:  No notes on file        Past Medical History:   Diagnosis Date    CHF (congestive heart failure)     Chronic atrial fibrillation 10/26/2018    CKD (chronic kidney disease), stage II 11/12/2018    COPD (chronic obstructive pulmonary disease)     Coronary artery disease     Diabetes mellitus        Past Surgical History:   Procedure Laterality Date    ABLATION, AVN N/A 11/6/2018    Performed by On[facility]imo Willams MD at [facility] EP LAB    APPENDECTOMY      CARDIAC SURGERY      ECHOCARDIOGRAM,TRANSESOPHAGEAL N/A 10/19/2018    Performed by [facility] Diagnostic Provider at [facility] CATH LAB    EYE SURGERY      FRACTURE SURGERY      TONSILLECTOMY         Review of patient's allergies indicates:   Allergen Reactions    Ativan [lorazepam] Anxiety       Medications:  Continuous Infusions:   DOBUTamine 5 mcg/kg/min (01/09/19 0628)    furosemide (LASIX) 2 mg/mL infusion (non-titrating) 10 mg/hr (01/08/19 8710)     Scheduled Meds:   aspirin  81 mg Oral Daily    digoxin  0.125 mg Oral Daily    famotidine  20 mg Oral BID     PRN Meds:benzonatate, docusate sodium, sodium chloride 0.9%    Family History     Problem Relation (Age of Onset)    Heart disease Father, Sister,  Brother    Stroke Father        Tobacco Use    Smoking status: Former Smoker     Types: Cigarettes    Smokeless tobacco: Never Used   Substance and Sexual Activity    Alcohol use: No     Frequency: Never    Drug use: No    Sexual activity: Not on file       Review of Systems   Constitutional: Positive for appetite change.   HENT: Positive for nosebleeds.         Occasional minor nasal bleeds due to oxygen per n/c   Respiratory: Positive for shortness of breath.         Improved dobutamine restarted   Gastrointestinal: Positive for abdominal distention.        Improving with diuresis   Skin: Negative for color change and pallor.     Objective:     Vital Signs (Most Recent):  Temp: 97.8 °F (36.6 °C) (01/09/19 0751)  Pulse: 108 (01/09/19 0751)  Resp: 18 (01/09/19 0751)  BP: (!) 84/55 (01/09/19 0751)  SpO2: 96 % (01/09/19 0751) Vital Signs (24h Range):  Temp:  [96.4 °F (35.8 °C)-97.8 °F (36.6 °C)] 97.8 °F (36.6 °C)  Pulse:  [] 108  Resp:  [16-24] 18  SpO2:  [94 %-97 %] 96 %  BP: ()/(42-58) 84/55     Weight: 75.8 kg (167 lb 1.7 oz)  Body mass index is 26.97 kg/m².    Review of Symptoms  Symptom Assessment (ESAS 0-10 scale)   ESAS 0 1 2 3 4 5 6 7 8 9 10   Pain x             Dyspnea x             Anxiety x             Nausea              Depression               Anorexia              Fatigue    x          Insomnia              Restlessness  x             Agitation x               Bowel Management Plan (BMP): No    Performance Status: 60    Physical Exam   Constitutional: He is oriented to person, place, and time. He appears well-developed and well-nourished. No distress.   HENT:   Head: Normocephalic and atraumatic.   Neck: Normal range of motion.   Pulmonary/Chest: Effort normal.   Musculoskeletal: Normal range of motion.   Neurological: He is alert and oriented to person, place, and time.   Short term memory loss reported per wife   Skin: Skin is warm and dry.   Psychiatric: He has a normal mood and  "affect. His behavior is normal. Judgment and thought content normal.     CBC:   Recent Labs   Lab 01/09/19  0540   WBC 10.94   HGB 9.3*   HCT 30.7*   MCV 83        BMP:  Recent Labs   Lab 01/09/19  0539   *   *   K 4.7   CL 87*   CO2 28   BUN 62*   CREATININE 2.7*   CALCIUM 9.0   MG 1.9     LFT:  Lab Results   Component Value Date    AST 1,848 (H) 01/09/2019    ALKPHOS 143 (H) 01/09/2019    BILITOT 1.9 (H) 01/09/2019     Albumin:   Albumin   Date Value Ref Range Status   01/09/2019 2.7 (L) 3.5 - 5.2 g/dL Final     Protein:   Total Protein   Date Value Ref Range Status   01/09/2019 7.3 6.0 - 8.4 g/dL Final     Lactic acid:   No results found for: LACTATE    Advanced Directives::  Living Will: No  LaPOST: No  Do Not Resuscitate Status: Yes  Medical Power of : No.  Patient's wife is his medical decision maker    Decision-Making Capacity: Patient answered questions, Family answered questions    Living Arrangements: Lives with spouse    Psychosocial/Cultural: per TOMAS Hernandes [facility], palliative care     Spiritual:     F- Lexus and Belief: no Latter day affiliation.  "spiritual"    I - Importance: not very important  .  C - Community: none    A - Address in Care: is ok with  visits      > 50% of 70 min visit spent in chart review, face to face discussion of goals of care,  symptom assessment, coordination of care and emotional support.    NICK Turner, CNS, [facility]  Palliative Medicine  [facility]            "

## 2019-01-09 NOTE — SUBJECTIVE & OBJECTIVE
Past Medical History:   Diagnosis Date    CHF (congestive heart failure)     Chronic atrial fibrillation 10/26/2018    CKD (chronic kidney disease), stage II 11/12/2018    COPD (chronic obstructive pulmonary disease)     Coronary artery disease     Diabetes mellitus        Past Surgical History:   Procedure Laterality Date    ABLATION, AVN N/A 11/6/2018    [facility] EP [facility]    APPENDECTOMY      CARDIAC SURGERY      ECHOCARDIOGRAM,TRANSESOPHAGEAL N/A 10/19/2018    Performed by [facility] Provider at [facility] CATH LAB    EYE SURGERY      FRACTURE SURGERY      TONSILLECTOMY         Review of patient's allergies indicates:   Allergen Reactions    Ativan [lorazepam] Anxiety       Medications:  Continuous Infusions:   DOBUTamine 5 mcg/kg/min (01/09/19 0628)    furosemide (LASIX) 2 mg/mL infusion (non-titrating) 10 mg/hr (01/08/19 2151)     Scheduled Meds:   aspirin  81 mg Oral Daily    digoxin  0.125 mg Oral Daily    famotidine  20 mg Oral BID     PRN Meds:benzonatate, docusate sodium, sodium chloride 0.9%    Family History     Problem Relation (Age of Onset)    Heart disease Father, Sister, Brother    Stroke Father        Tobacco Use    Smoking status: Former Smoker     Types: Cigarettes    Smokeless tobacco: Never Used   Substance and Sexual Activity    Alcohol use: No     Frequency: Never    Drug use: No    Sexual activity: Not on file       Review of Systems   Constitutional: Positive for appetite change.   HENT: Positive for nosebleeds.         Occasional minor nasal bleeds due to oxygen per n/c   Respiratory: Positive for shortness of breath.         Improved dobutamine restarted   Gastrointestinal: Positive for abdominal distention.        Improving with diuresis   Skin: Negative for color change and pallor.     Objective:     Vital Signs (Most Recent):  Temp: 97.8 °F (36.6 °C) (01/09/19 0751)  Pulse: 108 (01/09/19 0751)  Resp: 18 (01/09/19 0751)  BP:  (!) 84/55 (01/09/19 0751)  SpO2: 96 % (01/09/19 0751) Vital Signs (24h Range):  Temp:  [96.4 °F (35.8 °C)-97.8 °F (36.6 °C)] 97.8 °F (36.6 °C)  Pulse:  [] 108  Resp:  [16-24] 18  SpO2:  [94 %-97 %] 96 %  BP: ()/(42-58) 84/55     Weight: 75.8 kg (167 lb 1.7 oz)  Body mass index is 26.97 kg/m².    Review of Symptoms  Symptom Assessment (ESAS 0-10 scale)   ESAS 0 1 2 3 4 5 6 7 8 9 10   Pain x             Dyspnea x             Anxiety x             Nausea              Depression               Anorexia              Fatigue    x          Insomnia              Restlessness  x             Agitation x               Bowel Management Plan (BMP): No    Performance Status: 60    Physical Exam   Constitutional: He is oriented to person, place, and time. He appears well-developed and well-nourished. No distress.   HENT:   Head: Normocephalic and atraumatic.   Neck: Normal range of motion.   Pulmonary/Chest: Effort normal.   Musculoskeletal: Normal range of motion.   Neurological: He is alert and oriented to person, place, and time.   Short term memory loss reported per wife   Skin: Skin is warm and dry.   Psychiatric: He has a normal mood and affect. His behavior is normal. Judgment and thought content normal.     CBC:   Recent Labs   Lab 01/09/19  0540   WBC 10.94   HGB 9.3*   HCT 30.7*   MCV 83        BMP:  Recent Labs   Lab 01/09/19  0539   *   *   K 4.7   CL 87*   CO2 28   BUN 62*   CREATININE 2.7*   CALCIUM 9.0   MG 1.9     LFT:  Lab Results   Component Value Date    AST 1,848 (H) 01/09/2019    ALKPHOS 143 (H) 01/09/2019    BILITOT 1.9 (H) 01/09/2019     Albumin:   Albumin   Date Value Ref Range Status   01/09/2019 2.7 (L) 3.5 - 5.2 g/dL Final     Protein:   Total Protein   Date Value Ref Range Status   01/09/2019 7.3 6.0 - 8.4 g/dL Final     Lactic acid:   No results found for: LACTATE    Advanced Directives::  Living Will: No  LaPOST: No  Do Not Resuscitate Status: Yes  Medical Power of  ": No.  Patient's wife is his medical decision maker    Decision-Making Capacity: Patient answered questions, Family answered questions    Living Arrangements: Lives with spouse    Psychosocial/Cultural: per TOMAS Hernandes LCSW, palliative care     Spiritual:     F- Lexus and Belief: no Moravian affiliation.  "spiritual"    I - Importance: not very important  .  C - Community: none    A - Address in Care: is ok with  visits  "

## 2019-01-09 NOTE — HPI
74 yr old gentlemen with acute on chronic heart failure with hypoxia, cardiorenal syndrome and supratherapeutic INR. Pt presented to the ER with SOB. Patient noted  increasing abdominal distension, decreased appetite, 1# weight gain in last 3 days  and took prn doses of Lasix. In the ER it was noted that his dobutamine pump was off.  Significant  PMH of CAD s/p CABG, ICM/HFrEF (LVef10%) on home  5mcg/kg/min, and  s/p Medtronic CRT-D.

## 2019-01-09 NOTE — PROGRESS NOTES
Progress Note  Hospital Medicine    [place]    [facility] [place] CARDIOLOGY CONSULT STEPDOWN  [facility] [place] [facility]  Admit Date: 1/7/2019  Encounter Date: 01/09/2019     SUBJECTIVE:     Follow-up Visit for: Biventricular congestive heart failure    HPI (See H&P for complete P,F,SHx):   74M with NYHA class IV biventricular systolic and diastolic heart failure on continuous home dobutamine infusion presents to ED complaining of shortness of breath, which began earlier in the day.  The previous night he took a dose of lasix 40mg due to increasing abdominal distension and appetite loss, which usually presages the beginning of decompensation, and took another dose this past morning but had only put out about 500mL prior to coming in.  Upon arrival in the ED it was noted that his dobutamine pump was off.  His wife changed the bag the prior night and it was working then but is not sure if she turned it back on afterwards.  He weighs himself daily and his weight is up 1 pound over the past 3 days.  He feels a little better since the dobutamine was restarted but is still uncomfortable.    Interval history:  Patient has been started back on  infusion and put on lasix infusion. He is steadily improving, not quite at baseline. He has abdominal distension still and still lacking in appetite. He does not really feel SOB though, only mild dyspnea, but not exerting. He doesn't have any pain.    Last night, as documented, through long discussion >30 min with family and cardiology present, decision made that patient has DNR directive and patient/family interested in pursuing palliative options. Palliative care to see patient today.    Appropriately net negative. Weight stagnant.    Review of Systems:  Review of Systems   Constitutional: Negative for chills and fever.   HENT: Negative for congestion and sore throat.    Eyes: Negative for photophobia, pain and discharge.   Respiratory: Positive for shortness of breath.  "Negative for cough, hemoptysis and sputum production.    Cardiovascular: Negative for chest pain, palpitations and leg swelling.   Gastrointestinal: Positive for abdominal pain. Negative for diarrhea, nausea and vomiting.   Genitourinary: Negative for dysuria and urgency.   Musculoskeletal: Negative for myalgias and neck pain.   Skin: Negative for itching and rash.   Neurological: Negative for sensory change, focal weakness and headaches.   Endo/Heme/Allergies: Negative for polydipsia. Does not bruise/bleed easily.   Psychiatric/Behavioral: Negative for depression and suicidal ideas.       OBJECTIVE:       Intake/Output Summary (Last 24 hours) at 1/9/2019 0930  Last data filed at 1/9/2019 0629  Gross per 24 hour   Intake 1150.61 ml   Output 1305 ml   Net -154.39 ml     Vital Signs Range (Last 24H):  Temp:  [96.4 °F (35.8 °C)-97.8 °F (36.6 °C)]   Pulse:  []   Resp:  [16-24]   BP: ()/(42-58)   SpO2:  [94 %-97 %]   Body mass index is 26.97 kg/m².    Objective:  General Appearance:  Ill-appearing, in no acute distress, not in pain and comfortable.    Vital signs: (most recent): Blood pressure (!) 96/54, pulse (!) 59, temperature 96.6 °F (35.9 °C), temperature source Axillary, resp. rate 18, height 5' 6" (1.676 m), weight 75.8 kg (167 lb 1.7 oz), SpO2 95 %.  No fever.    Output: Producing urine and producing stool.    HEENT: Normal HEENT exam.    Lungs:  Normal effort.  Breath sounds clear to auscultation.  He is not in respiratory distress.  There are rales.  No wheezes.    Heart: Normal rate.  Regular rhythm.  S1 normal and S2 normal.  No murmur.   Chest: Symmetric chest wall expansion.   Abdomen: Abdomen is soft and distended.  Bowel sounds are normal.   There is no abdominal tenderness.     Extremities: Normal range of motion.  There is venous stasis and dependent edema.  There is no deformity, effusion or local swelling.    Pulses: Distal pulses are intact.    Neurological: Patient is alert and oriented " to person, place and time.  Normal strength.    Pupils:  Pupils are equal, round, and reactive to light.    Skin:  Dry and cool.      Medications:  Medication list was reviewed in EPIC and changes noted under Assessment/Plan and MAR.    Laboratory:  Recent Labs     01/09/19  0540   WBC 10.94   RBC 3.69*   HGB 9.3*   HCT 30.7*      MCV 83   MCH 25.2*   MCHC 30.3*   GRAN 80.5*  8.8*   LYMPH 8.4*  0.9*   MONO 7.8  0.9   EOS 0.2      Recent Labs     01/09/19  0539   *   *   K 4.7   CL 87*   CO2 28   BUN 62*   CREATININE 2.7*   CALCIUM 9.0   ANIONGAP 12   MG 1.9       ASSESSMENT/PLAN:     Active Hospital Problems    Diagnosis  POA    *Biventricular congestive heart failure [I50.82]  Yes    Cardiorenal syndrome [I13.10]  Yes    Supratherapeutic INR [R79.1]  Yes    Hyperkalemia, diminished renal excretion [E87.5]  Yes    Cardiogenic shock [R57.0]  Unknown    Acute on chronic respiratory failure with hypoxia [J96.21]  Yes     Chronic    Chronic atrial fibrillation [I48.2]  Yes    Hepatic congestion [K76.1]  Yes      Resolved Hospital Problems   No resolved problems to display.        * Biventricular congestive heart failure     With chronic cardiogenic shock requiring dobutamine.  History suggestive of gradual start of decompensation over past couple of days and likely accelerated by not having dobutamine over past day.  Infusion restarted at 5mcg/kg/min.  Increase lasix infusion to 15 mg/hr to target weight loss as tolerated by BP.         Supratherapeutic INR     Likely secondary to congestive hepatopathy.  Hold coumadin and trend out INR.         Cardiorenal syndrome     Acute elevation in creatinine likely due to decompensated pump failure.  Hold ACEI for now.         Acute on chronic respiratory failure with hypoxia     Secondary to pulmonary edema in context of decompensated biventricular heart failure.  Wean oxygen as able while diuresing.         Chronic atrial fibrillation     S/p  ablation; on coumadin.  Holding for now due to elevated INR.         Hepatic congestion     Acutely elevated LFTs due to congestive hepatopathy from right-sided failure.  Trend CMP.     Anticipated discharge date and disposition:   Pending diuresis.  Laurent Drew MD  [facility] Medicine

## 2019-01-10 LAB
ALBUMIN SERPL BCP-MCNC: 2.9 G/DL
ALP SERPL-CCNC: 150 U/L
ALT SERPL W/O P-5'-P-CCNC: 1124 U/L
ANION GAP SERPL CALC-SCNC: 10 MMOL/L
ANION GAP SERPL CALC-SCNC: 14 MMOL/L
AST SERPL-CCNC: 853 U/L
BASOPHILS # BLD AUTO: 0.06 K/UL
BASOPHILS NFR BLD: 0.6 %
BILIRUB SERPL-MCNC: 2.3 MG/DL
BUN SERPL-MCNC: 62 MG/DL
BUN SERPL-MCNC: 62 MG/DL
CALCIUM SERPL-MCNC: 9 MG/DL
CALCIUM SERPL-MCNC: 9.3 MG/DL
CHLORIDE SERPL-SCNC: 86 MMOL/L
CHLORIDE SERPL-SCNC: 88 MMOL/L
CO2 SERPL-SCNC: 26 MMOL/L
CO2 SERPL-SCNC: 31 MMOL/L
CREAT SERPL-MCNC: 2.5 MG/DL
CREAT SERPL-MCNC: 2.6 MG/DL
DIFFERENTIAL METHOD: ABNORMAL
EOSINOPHIL # BLD AUTO: 0.2 K/UL
EOSINOPHIL NFR BLD: 1.8 %
ERYTHROCYTE [DISTWIDTH] IN BLOOD BY AUTOMATED COUNT: 17.6 %
EST. GFR  (AFRICAN AMERICAN): 27.3 ML/MIN/1.73 M^2
EST. GFR  (AFRICAN AMERICAN): 28.6 ML/MIN/1.73 M^2
EST. GFR  (NON AFRICAN AMERICAN): 23.6 ML/MIN/1.73 M^2
EST. GFR  (NON AFRICAN AMERICAN): 24.7 ML/MIN/1.73 M^2
GLUCOSE SERPL-MCNC: 180 MG/DL
GLUCOSE SERPL-MCNC: 212 MG/DL
HCT VFR BLD AUTO: 31.4 %
HGB BLD-MCNC: 9.5 G/DL
IMM GRANULOCYTES # BLD AUTO: 0.08 K/UL
IMM GRANULOCYTES NFR BLD AUTO: 0.8 %
INR PPP: 4.1
LYMPHOCYTES # BLD AUTO: 0.9 K/UL
LYMPHOCYTES NFR BLD: 9.7 %
MCH RBC QN AUTO: 25.2 PG
MCHC RBC AUTO-ENTMCNC: 30.3 G/DL
MCV RBC AUTO: 83 FL
MONOCYTES # BLD AUTO: 0.7 K/UL
MONOCYTES NFR BLD: 7.8 %
NEUTROPHILS # BLD AUTO: 7.5 K/UL
NEUTROPHILS NFR BLD: 79.3 %
NRBC BLD-RTO: 0 /100 WBC
PLATELET # BLD AUTO: 209 K/UL
PMV BLD AUTO: 10.9 FL
POTASSIUM SERPL-SCNC: 3.6 MMOL/L
POTASSIUM SERPL-SCNC: 4.4 MMOL/L
PROT SERPL-MCNC: 7.9 G/DL
PROTHROMBIN TIME: 40.6 SEC
RBC # BLD AUTO: 3.77 M/UL
SODIUM SERPL-SCNC: 127 MMOL/L
SODIUM SERPL-SCNC: 128 MMOL/L
WBC # BLD AUTO: 9.51 K/UL

## 2019-01-10 PROCEDURE — 25000003 PHARM REV CODE 250: Performed by: HOSPITALIST

## 2019-01-10 PROCEDURE — 80053 COMPREHEN METABOLIC PANEL: CPT

## 2019-01-10 PROCEDURE — 25000003 PHARM REV CODE 250: Performed by: STUDENT IN AN ORGANIZED HEALTH CARE EDUCATION/TRAINING PROGRAM

## 2019-01-10 PROCEDURE — 36415 COLL VENOUS BLD VENIPUNCTURE: CPT

## 2019-01-10 PROCEDURE — 63600175 PHARM REV CODE 636 W HCPCS: Performed by: HOSPITALIST

## 2019-01-10 PROCEDURE — 20600001 HC STEP DOWN PRIVATE ROOM

## 2019-01-10 PROCEDURE — 99233 PR SUBSEQUENT HOSPITAL CARE,LEVL III: ICD-10-PCS | Mod: ,,, | Performed by: HOSPITALIST

## 2019-01-10 PROCEDURE — 99233 SBSQ HOSP IP/OBS HIGH 50: CPT | Mod: ,,, | Performed by: HOSPITALIST

## 2019-01-10 PROCEDURE — 63600175 PHARM REV CODE 636 W HCPCS: Performed by: STUDENT IN AN ORGANIZED HEALTH CARE EDUCATION/TRAINING PROGRAM

## 2019-01-10 PROCEDURE — 80048 BASIC METABOLIC PNL TOTAL CA: CPT

## 2019-01-10 PROCEDURE — 99233 PR SUBSEQUENT HOSPITAL CARE,LEVL III: ICD-10-PCS | Mod: ,,, | Performed by: CLINICAL NURSE SPECIALIST

## 2019-01-10 PROCEDURE — 99233 SBSQ HOSP IP/OBS HIGH 50: CPT | Mod: ,,, | Performed by: CLINICAL NURSE SPECIALIST

## 2019-01-10 PROCEDURE — 25000242 PHARM REV CODE 250 ALT 637 W/ HCPCS: Performed by: HOSPITALIST

## 2019-01-10 PROCEDURE — 85610 PROTHROMBIN TIME: CPT

## 2019-01-10 PROCEDURE — 85025 COMPLETE CBC W/AUTO DIFF WBC: CPT

## 2019-01-10 RX ORDER — ONDANSETRON 4 MG/1
4 TABLET, FILM COATED ORAL EVERY 8 HOURS PRN
Status: DISCONTINUED | OUTPATIENT
Start: 2019-01-10 | End: 2019-01-15 | Stop reason: HOSPADM

## 2019-01-10 RX ADMIN — FUROSEMIDE 15 MG/HR: 10 INJECTION, SOLUTION INTRAMUSCULAR; INTRAVENOUS at 08:01

## 2019-01-10 RX ADMIN — CETIRIZINE HYDROCHLORIDE 5 MG: 5 TABLET ORAL at 08:01

## 2019-01-10 RX ADMIN — GUAIFENESIN AND DEXTROMETHORPHAN HYDROBROMIDE 1 TABLET: 600; 30 TABLET, EXTENDED RELEASE ORAL at 08:01

## 2019-01-10 RX ADMIN — FAMOTIDINE 20 MG: 20 TABLET ORAL at 08:01

## 2019-01-10 RX ADMIN — ONDANSETRON 4 MG: 4 TABLET, FILM COATED ORAL at 10:01

## 2019-01-10 RX ADMIN — DOBUTAMINE IN DEXTROSE 5 MCG/KG/MIN: 200 INJECTION, SOLUTION INTRAVENOUS at 10:01

## 2019-01-10 RX ADMIN — FLUTICASONE PROPIONATE 100 MCG: 50 SPRAY, METERED NASAL at 08:01

## 2019-01-10 RX ADMIN — FUROSEMIDE 15 MG/HR: 10 INJECTION, SOLUTION INTRAMUSCULAR; INTRAVENOUS at 10:01

## 2019-01-10 RX ADMIN — ASPIRIN 81 MG CHEWABLE TABLET 81 MG: 81 TABLET CHEWABLE at 08:01

## 2019-01-10 RX ADMIN — DIGOXIN 0.12 MG: 125 TABLET ORAL at 08:01

## 2019-01-10 NOTE — CARE UPDATE
RN Proactive Rounding Note  Time of Visit: 1210    Admit Date: 2019  LOS: 3  Code Status: DNR   Date of Visit: 01/10/2019  : 1945  Age: 74 y.o.  Sex: male  Race: White  Bed: 8092/8092 A:   MRN: [id number]  Was the patient discharged from an ICU this admission? no   Was the patient discharged from a PACU within last 24 hours?  no  Did the patient receive conscious sedation/general anesthesia in last 24 hours?  no  Was the patient in the ED within the past 24 hours?  no  Was the patient started on NIPPV within the past 24 hours?  no  Attending Physician: Laurent Drew MD  [place] [facility] [place] [facility]       ASSESSMENT:     Abnormal Vital Signs:  Clinical Issues: Circulatory     INTERVENTIONS/ RECOMMENDATIONS:     FL performed for hypotension 70/40's. BP rechecked, 85/60 resulted. Per primary RN, patient, and patient's family, this is patient's baseline BP. Pt AAOx4. Denies complaints. NAD noted. Dobutamine infusing. Monitor VS closely. Notify primary team of changes in pt's condition.     Discussed plan of care with RN, Shayan.    PHYSICIAN ESCALATION:     Yes/No  no    Orders received and case discussed with Dr. APONTE/gabi.    Disposition: Remain in room 8092 A.    FOLLOW-UP/CONTINGENCY:     Call back the Rapid Response Nurse at x [phone] for additional questions or concerns.

## 2019-01-10 NOTE — SUBJECTIVE & OBJECTIVE
Interval History: Pt feeling much better and shortness of breath has improved.     Review of Systems   All other systems reviewed and are negative.    Objective:     Vital Signs (Most Recent):  Temp: 97.7 °F (36.5 °C) (01/10/19 1147)  Pulse: 108 (01/10/19 1147)  Resp: 18 (01/10/19 1147)  BP: (!) 85/58 (01/10/19 1225)  SpO2: 99 % (01/10/19 1147) Vital Signs (24h Range):  Temp:  [96.6 °F (35.9 °C)-98.2 °F (36.8 °C)] 97.7 °F (36.5 °C)  Pulse:  [] 108  Resp:  [18-24] 18  SpO2:  [94 %-99 %] 99 %  BP: (70-96)/(43-58) 85/58     Weight: 74.9 kg (165 lb 2 oz)  Body mass index is 26.65 kg/m².     SpO2: 99 %  O2 Device (Oxygen Therapy): room air      Intake/Output Summary (Last 24 hours) at 1/10/2019 1333  Last data filed at 1/10/2019 1041  Gross per 24 hour   Intake 880 ml   Output 1725 ml   Net -845 ml       Lines/Drains/Airways     Peripherally Inserted Central Catheter Line                 PICC Double Lumen 11/07/18 1059 right basilic 64 days          Peripheral Intravenous Line                 Peripheral IV - Single Lumen 10/18/18 0500 Left;Posterior Forearm 84 days         Peripheral IV - Single Lumen 01/07/19 2203 Left Antecubital 2 days                Physical Exam  GEN: Alert and oriented in NAD  NECK: + JVD appreciated   CVS: RRR, s1/s2, no MRG  PULM: rales bilaterally.   ABD: NT/ND BS +  Extremities: warm and dry, palpable pulses, mild edema  NEURO: Alert and oriented x 3  PSYCH: appropriate affect.         Significant Labs:   Recent Lab Results       01/10/19  0505   01/09/19  1606        Immature Granulocytes 0.8       Immature Grans (Abs) 0.08  Comment:  Mild elevation in immature granulocytes is non specific and   can be seen in a variety of conditions including stress response,   acute inflammation, trauma and pregnancy. Correlation with other   laboratory and clinical findings is essential.         Albumin 2.9       Alkaline Phosphatase 150       ALT 1,124       Anion Gap 14 13            Baso #  0.06       Basophil% 0.6       Total Bilirubin 2.3  Comment:  For infants and newborns, interpretation of results should be based  on gestational age, weight and in agreement with clinical  observations.  Premature Infant recommended reference ranges:  Up to 24 hours.............<8.0 mg/dL  Up to 48 hours............<12.0 mg/dL  3-5 days..................<15.0 mg/dL  6-29 days.................<15.0 mg/dL         BUN, Bld 62 66     Calcium 9.3 9.3     Chloride 88 88     CO2 26 27     Creatinine 2.6 2.6     Differential Method Automated       eGFR if  27.3 27.3     eGFR if non  23.6  Comment:  Calculation used to obtain the estimated glomerular filtration  rate (eGFR) is the CKD-EPI equation.    23.6  Comment:  Calculation used to obtain the estimated glomerular filtration  rate (eGFR) is the CKD-EPI equation.        Eos # 0.2       Eosinophil% 1.8       Glucose 180 173     Gran # (ANC) 7.5       Gran% 79.3       Hematocrit 31.4       Hemoglobin 9.5       Coumadin Monitoring INR 4.1  Comment:  Coumadin Therapy:  2.0 - 3.0 for INR for all indicators except mechanical heart valves  and antiphospholipid syndromes which should use 2.5 - 3.5.         Lymph # 0.9       Lymph% 9.7       MCH 25.2       MCHC 30.3       MCV 83       Mono # 0.7       Mono% 7.8       MPV 10.9       nRBC 0       Platelets 209       Potassium 4.4 4.3     Total Protein 7.9       Protime 40.6       RBC 3.77       RDW 17.6       Sodium 128 128     WBC 9.51             Significant Imaging: reviewed

## 2019-01-10 NOTE — PROGRESS NOTES
Progress Note  Hospital Medicine    Provider team:    [facility] [place] - CARDIOLOGY CONSULT STEPDOWN  [facility] [place] [facility]  Admit Date: 1/7/2019  Encounter Date: 01/10/2019     SUBJECTIVE:     Follow-up Visit for: Biventricular congestive heart failure    HPI (See H&P for complete P,F,SHx):   74M with NYHA class IV biventricular systolic and diastolic heart failure on continuous home dobutamine infusion presents to ED complaining of shortness of breath, which began earlier in the day.  The previous night he took a dose of lasix 40mg due to increasing abdominal distension and appetite loss, which usually presages the beginning of decompensation, and took another dose this past morning but had only put out about 500mL prior to coming in.  Upon arrival in the ED it was noted that his dobutamine pump was off.  His wife changed the bag the prior night and it was working then but is not sure if she turned it back on afterwards.  He weighs himself daily and his weight is up 1 pound over the past 3 days.  He feels a little better since the dobutamine was restarted but is still uncomfortable.    Hospital course:  Patient has been started back on  infusion and put on lasix infusion. He is steadily improving, not quite at baseline. His abdominal distension is improving as is appetite. He does not really feel SOB though, only mild dyspnea, but not exerting. He doesn't have any pain.    As documented, through long discussion >30 min with family and cardiology present, decision made that patient has DNR directive and patient/family interested in pursuing palliative options. Palliative care has seen and [facility] Patient currently has outpatient visit scheduled for 2/1/2019.    Interval history:  Appropriately net negative. Weight down 2 lb. Dry weight seems to be a few pounds (160-162) lighter. Patient continues on lasix and  gtt. Cardiology will see patient today  "(co-management) and patient is pleased with this plan. Patient's wife to arrive later in day.    Review of Systems:  Review of Systems   Constitutional: Negative for chills and fever.   HENT: Negative for congestion and sore throat.    Eyes: Negative for photophobia, pain and discharge.   Respiratory: Positive for shortness of breath. Negative for cough, hemoptysis and sputum production.    Cardiovascular: Negative for chest pain, palpitations and leg swelling.   Gastrointestinal: Positive for abdominal pain. Negative for diarrhea, nausea and vomiting.   Genitourinary: Negative for dysuria and urgency.   Musculoskeletal: Negative for myalgias and neck pain.   Skin: Negative for itching and rash.   Neurological: Negative for sensory change, focal weakness and headaches.   Endo/Heme/Allergies: Negative for polydipsia. Does not bruise/bleed easily.   Psychiatric/Behavioral: Negative for depression and suicidal ideas.     OBJECTIVE:       Intake/Output Summary (Last 24 hours) at 1/10/2019 0830  Last data filed at 1/10/2019 0746  Gross per 24 hour   Intake 1400 ml   Output 2025 ml   Net -625 ml     Vital Signs Range (Last 24H):  Temp:  [96.6 °F (35.9 °C)-98.2 °F (36.8 °C)]   Pulse:  []   Resp:  [18-24]   BP: (78-96)/(52-54)   SpO2:  [94 %-95 %]   Body mass index is 26.65 kg/m².    Objective:  General Appearance:  Ill-appearing, in no acute distress, not in pain and comfortable.    Vital signs: (most recent): Blood pressure (!) 88/52, pulse (!) 117, temperature 98.2 °F (36.8 °C), temperature source Oral, resp. rate (!) 24, height 5' 6" (1.676 m), weight 74.9 kg (165 lb 2 oz), SpO2 (!) 94 %.  No fever.    Output: Producing urine and producing stool.    HEENT: Normal HEENT exam.    Lungs:  Normal effort.  Breath sounds clear to auscultation.  He is not in respiratory distress.  There are rales.  No wheezes.    Heart: Normal rate.  Regular rhythm.  S1 normal and S2 normal.  No murmur.   Chest: Symmetric chest wall " expansion.   Abdomen: Abdomen is soft.  Bowel sounds are normal.   There is no abdominal tenderness.     Extremities: Normal range of motion.  There is venous stasis.  There is no deformity, effusion, dependent edema or local swelling.    Pulses: Distal pulses are intact.    Neurological: Patient is alert and oriented to person, place and time.  Normal strength.    Pupils:  Pupils are equal, round, and reactive to light.    Skin:  Dry and cool.      Medications:  Medication list was reviewed in EPIC and changes noted under Assessment/Plan and MAR.    Laboratory:  Recent Labs     01/10/19  0505   WBC 9.51   RBC 3.77*   HGB 9.5*   HCT 31.4*      MCV 83   MCH 25.2*   MCHC 30.3*   GRAN 79.3*  7.5   LYMPH 9.7*  0.9*   MONO 7.8  0.7   EOS 0.2      Recent Labs     01/09/19  0539  01/10/19  0505   *   < > 180*   *   < > 128*   K 4.7   < > 4.4   CL 87*   < > 88*   CO2 28   < > 26   BUN 62*   < > 62*   CREATININE 2.7*   < > 2.6*   CALCIUM 9.0   < > 9.3   ANIONGAP 12   < > 14   MG 1.9  --   --     < > = values in this interval not displayed.       ASSESSMENT/PLAN:     Active Hospital Problems    Diagnosis  POA    *Biventricular congestive heart failure [I50.82]  Yes    Goals of care, counseling/discussion [Z71.89]  Not Applicable    DNR (do not resuscitate) [Z66]  Yes    Cardiorenal syndrome [I13.10]  Yes    Supratherapeutic INR [R79.1]  Yes    Hyperkalemia, diminished renal excretion [E87.5]  Yes    Cardiogenic shock [R57.0]  Unknown    Acute on chronic respiratory failure with hypoxia [J96.21]  Yes     Chronic    Chronic atrial fibrillation [I48.2]  Yes    Palliative care encounter [Z51.5]  Not Applicable    Hepatic congestion [K76.1]  Yes      Resolved Hospital Problems   No resolved problems to display.          * Biventricular congestive heart failure     With chronic cardiogenic shock requiring dobutamine.  History suggestive of gradual start of decompensation over past couple of days  and likely accelerated by not having dobutamine over past day.  Infusion restarted at 5mcg/kg/min.  Increase lasix infusion to 15 mg/hr to target weight loss as tolerated by BP.  - Cardiology co-management will see today.         Supratherapeutic INR     Likely secondary to congestive hepatopathy.  Hold coumadin and trend out INR.         Cardiorenal syndrome     Acute elevation in creatinine likely due to decompensated pump failure.  Hold ACEI for now.         Acute on chronic respiratory failure with hypoxia     Secondary to pulmonary edema in context of decompensated biventricular heart failure.  Wean oxygen as able while diuresing.         Chronic atrial fibrillation     S/p ablation; on coumadin.  Holding for now due to elevated INR.         Hepatic congestion     Acutely elevated LFTs due to congestive hepatopathy from right-sided failure.  Trend CMP.     DVT PPx - coumadin (holding due to supratherapeutic INR)  Cardiac diet with 1500 cc fluid restriction.  DNR (paperwork in chart); determine after conference with cardiology, Dr. Flores.    Anticipated discharge date and disposition:   Pending diuresis.  Laurent Drew MD  [facility]

## 2019-01-10 NOTE — SUBJECTIVE & OBJECTIVE
Medications:  Continuous Infusions:   DOBUTamine 5 mcg/kg/min (01/10/19 1041)    furosemide (LASIX) 2 mg/mL infusion (non-titrating) 15 mg/hr (01/10/19 1041)     Scheduled Meds:   aspirin  81 mg Oral Daily    cetirizine  5 mg Oral Daily    dextromethorphan-guaifenesin  mg  1 tablet Oral BID    digoxin  0.125 mg Oral Daily    famotidine  20 mg Oral Daily    fluticasone  2 spray Each Nare Daily     PRN Meds:benzonatate, diphenhydrAMINE-zinc acetate 1-0.1%, docusate sodium, ondansetron, sodium chloride, sodium chloride 0.9%    Objective:     Vital Signs (Most Recent):  Temp: 97.7 °F (36.5 °C) (01/10/19 1147)  Pulse: 108 (01/10/19 1147)  Resp: 18 (01/10/19 1147)  BP: (!) 85/58 (01/10/19 1225)  SpO2: 99 % (01/10/19 1147) Vital Signs (24h Range):  Temp:  [96.6 °F (35.9 °C)-98.2 °F (36.8 °C)] 97.7 °F (36.5 °C)  Pulse:  [] 108  Resp:  [18-24] 18  SpO2:  [94 %-99 %] 99 %  BP: (70-96)/(43-58) 85/58     Weight: 74.9 kg (165 lb 2 oz)  Body mass index is 26.65 kg/m².    Review of Symptoms  Symptom Assessment (ESAS 0-10 scale)  ESAS 0 1 2 3 4 5 6 7 8 9 10   Pain x             Dyspnea x             Anxiety x             Nausea    x          Depression  x             Anorexia x             Fatigue x             Insomnia x             Restlessness  x             Agitation x             CAM / Delirium _x_ --  ___+   Constipation     _x_ --  ___+   Diarrhea           _x_ --  ___+    Comments: patient c/o itching at mid back.  Small dry areas noted    Performance Status: 50    Physical Exam   Constitutional: He is oriented to person, place, and time. He appears well-developed and well-nourished.   HENT:   Head: Normocephalic and atraumatic.   Neck: Normal range of motion.   Neurological: He is alert and oriented to person, place, and time.   Skin: Skin is warm and dry.   Psychiatric: He has a normal mood and affect. His behavior is normal. Judgment and thought content normal.     CBC:   Recent Labs   Lab  01/10/19  0505   WBC 9.51   HGB 9.5*   HCT 31.4*   MCV 83        BMP:  Recent Labs   Lab 01/10/19  0505   *   *   K 4.4   CL 88*   CO2 26   BUN 62*   CREATININE 2.6*   CALCIUM 9.3     LFT:  Lab Results   Component Value Date     (H) 01/10/2019    ALKPHOS 150 (H) 01/10/2019    BILITOT 2.3 (H) 01/10/2019     Albumin:   Albumin   Date Value Ref Range Status   01/10/2019 2.9 (L) 3.5 - 5.2 g/dL Final     Protein:   Total Protein   Date Value Ref Range Status   01/10/2019 7.9 6.0 - 8.4 g/dL Final     Lactic acid:   No results found for: LACTATE

## 2019-01-10 NOTE — ASSESSMENT & PLAN NOTE
Impression: 74 yr old gentlemen with acute on chronic heart failure with hypoxia, cardiorenal syndrome and supratherapeutic INR. Probable HF exacerbation secondary to home dobutamine pump being turned off unknown to patient and family.     Patient and his wife verbalize excellent understanding of the patient's clinical status and prognosis.  Patient and his wife would [facility]  Patient currently has outpatient visit scheduled for 2/1/19.      Recommendations/Plan: 1) Palliative care  contacted [facility] coordinator,Tim . Patient currently has outpatient visit scheduled for 2/1/19.  [facility] inter[facility] team to discuss patient and consider palliative care recommendation and will provide follow-up contact with patient's wife.    Patient is ideal for home based palliative care visits.    2) TOMAS Hernandes LCSW will follow-up with patient tomorrow.     Goals of Care:  Follow-up discussion with patient and his wife. Informed them of the update on [facility] palliative care and that [facility] coordinator will contact the patient's wife with update.    Home based palliative care visits will be ideal for patient.  Patient c/o itching on his back.  Benadryl cream prn ordered.  Discussed [employer] document with patient and his wife.  Pt's report that they have completed a [employer] document with Dr. Posey.  EMR researched, no document found.

## 2019-01-10 NOTE — ASSESSMENT & PLAN NOTE
Mr. Coker is a 74 yoM, admitted to medicine, cardiology consulted for Acute on chronic HF / cardiogenic shock on home palliative , inadvertently pump turned off for 24 hours.  Not a candidate for advanced options     Recommendations.   Still overloaded on exam   Would continue  and lasix gtt at 15/hr   Will reassess tomorrow

## 2019-01-10 NOTE — PROGRESS NOTES
[facility]  Cardiology  Progress Note    Patient Name: Bharat Coker  MRN: [id number]  Admission Date: 1/7/2019  Hospital Length of Stay: 3 days  Code Status: DNR   Attending Physician: Laurent Drew MD   Primary Care Physician: Ronnie Richardson Jr, MD  Expected Discharge Date: 1/11/2019  Principal Problem:Biventricular congestive heart failure    Subjective:     Hospital Course:   No notes on file    Interval History: Pt feeling much better and shortness of breath has improved.     Review of Systems   All other systems reviewed and are negative.    Objective:     Vital Signs (Most Recent):  Temp: 97.7 °F (36.5 °C) (01/10/19 1147)  Pulse: 108 (01/10/19 1147)  Resp: 18 (01/10/19 1147)  BP: (!) 85/58 (01/10/19 1225)  SpO2: 99 % (01/10/19 1147) Vital Signs (24h Range):  Temp:  [96.6 °F (35.9 °C)-98.2 °F (36.8 °C)] 97.7 °F (36.5 °C)  Pulse:  [] 108  Resp:  [18-24] 18  SpO2:  [94 %-99 %] 99 %  BP: (70-96)/(43-58) 85/58     Weight: 74.9 kg (165 lb 2 oz)  Body mass index is 26.65 kg/m².     SpO2: 99 %  O2 Device (Oxygen Therapy): room air      Intake/Output Summary (Last 24 hours) at 1/10/2019 1333  Last data filed at 1/10/2019 1041  Gross per 24 hour   Intake 880 ml   Output 1725 ml   Net -845 ml       Lines/Drains/Airways     Peripherally Inserted Central Catheter Line                 PICC Double Lumen 11/07/18 1059 right basilic 64 days          Peripheral Intravenous Line                 Peripheral IV - Single Lumen 10/18/18 0500 Left;Posterior Forearm 84 days         Peripheral IV - Single Lumen 01/07/19 2203 Left Antecubital 2 days                Physical Exam  GEN: Alert and oriented in NAD  NECK: + JVD appreciated   CVS: RRR, s1/s2, no MRG  PULM: rales bilaterally.   ABD: NT/ND BS +  Extremities: warm and dry, palpable pulses, mild edema  NEURO: Alert and oriented x 3  PSYCH: appropriate affect.         Significant Labs:   Recent Lab Results       01/10/19  0505   01/09/19  1608         Immature Granulocytes 0.8       Immature Grans (Abs) 0.08  Comment:  Mild elevation in immature granulocytes is non specific and   can be seen in a variety of conditions including stress response,   acute inflammation, trauma and pregnancy. Correlation with other   laboratory and clinical findings is essential.         Albumin 2.9       Alkaline Phosphatase 150       ALT 1,124       Anion Gap 14 13            Baso # 0.06       Basophil% 0.6       Total Bilirubin 2.3  Comment:  For infants and newborns, interpretation of results should be based  on gestational age, weight and in agreement with clinical  observations.  Premature Infant recommended reference ranges:  Up to 24 hours.............<8.0 mg/dL  Up to 48 hours............<12.0 mg/dL  3-5 days..................<15.0 mg/dL  6-29 days.................<15.0 mg/dL         BUN, Bld 62 66     Calcium 9.3 9.3     Chloride 88 88     CO2 26 27     Creatinine 2.6 2.6     Differential Method Automated       eGFR if  27.3 27.3     eGFR if non  23.6  Comment:  Calculation used to obtain the estimated glomerular filtration  rate (eGFR) is the CKD-EPI equation.    23.6  Comment:  Calculation used to obtain the estimated glomerular filtration  rate (eGFR) is the CKD-EPI equation.        Eos # 0.2       Eosinophil% 1.8       Glucose 180 173     Gran # (ANC) 7.5       Gran% 79.3       Hematocrit 31.4       Hemoglobin 9.5       Coumadin Monitoring INR 4.1  Comment:  Coumadin Therapy:  2.0 - 3.0 for INR for all indicators except mechanical heart valves  and antiphospholipid syndromes which should use 2.5 - 3.5.         Lymph # 0.9       Lymph% 9.7       MCH 25.2       MCHC 30.3       MCV 83       Mono # 0.7       Mono% 7.8       MPV 10.9       nRBC 0       Platelets 209       Potassium 4.4 4.3     Total Protein 7.9       Protime 40.6       RBC 3.77       RDW 17.6       Sodium 128 128     WBC 9.51             Significant Imaging:  reviewed    Assessment and Plan:       * Biventricular congestive heart failure    Mr. Coker is a 74 yoM, admitted to medicine, cardiology consulted for Acute on chronic HF / cardiogenic shock on home palliative , inadvertently pump turned off for 24 hours.  Not a candidate for advanced options     Recommendations.   Still overloaded on exam   Would continue  and lasix gtt at 15/hr   Will reassess tomorrow                        VTE Risk Mitigation (From admission, onward)        Ordered     Reason for No Pharmacological VTE Prophylaxis  Once      01/08/19 0244     IP VTE HIGH RISK PATIENT  Once      01/08/19 0244          Aaron Chamberlain MD  Cardiology  [facility]

## 2019-01-10 NOTE — PLAN OF CARE
Problem: Adult Inpatient Plan of Care  Goal: Plan of Care Review  Pt is AAOx4, vital signs are stable. Pt has Dobutamine gtt at 9.5ml/hr and Lasix gtt at 7.5ml/hr. Pt was reminded to call prior to ambulating.  Updated wife on pt care over the phone. Call light and personal belongings within reach. Plan of care reviewed with pt, all questions and concerns were addressed. Will continue to monitor.

## 2019-01-10 NOTE — PLAN OF CARE
MELISSA spoke with Monse EDWARDS in pall care. She said this pt will resume care with [facility] and [facility].    Trice Amezquita LCSW  [id number]

## 2019-01-10 NOTE — PROGRESS NOTES
[facility]  Palliative Medicine  Progress Note    Patient Name: Bharat Coker  MRN: [id number]  Admission Date: 1/7/2019  Hospital Length of Stay: 3 days  Code Status: DNR   Attending Provider: Laurent Drew MD  Consulting Provider: NICK Turner, CNS  Primary Care Physician: Ronnie Richardson Jr, MD  Principal Problem:Biventricular congestive heart failure    Patient information was obtained from medical records, patient, and his wife .      Assessment/Plan:     Palliative care encounter    Impression: 74 yr old gentlemen with acute on chronic heart failure with hypoxia, cardiorenal syndrome and supratherapeutic INR. Probable HF exacerbation secondary to home dobutamine pump being turned off unknown to patient and family.     Patient and his wife verbalize excellent understanding of the patient's clinical status and prognosis.  Patient and his wife would [facility]  Patient currently has outpatient visit scheduled for 2/1/19.      Recommendations/Plan: 1) Palliative care  contacted [facility] coordinator,Tim . Patient currently has outpatient visit scheduled for 2/1/19.  [facility] interdisciplinary team to discuss patient and consider palliative care recommendation and will provide follow-up contact with patient's wife.    Patient is ideal for home based palliative care visits.    2) TOMAS Hernandes LCSW will follow-up with patient tomorrow.     Goals of Care:  Follow-up discussion with patient and his wife. Informed them of the update on [facility] palliative care and that [facility] coordinator will contact the patient's wife with update.    Home based palliative care visits will be ideal for patient.  Patient c/o itching on his back.  Benadryl cream prn ordered.  Discussed [place] document with patient and his wife.  Pt's report that they have completed a [place] document with Dr. Posey.  EMR researched, no document found.                 I will  follow-up with patient. Please contact us if you have any additional questions.    Subjective:     Chief Complaint:   Chief Complaint   Patient presents with    Shortness of Breath     Presents to Ed via EMS c/o SOB since 1600. Pt on 2 L per NC at home. Home health nurse gave 80mg IV lasix prior to EMS arrivak       HPI:   74 yr old gentlemen with acute on chronic heart failure with hypoxia, cardiorenal syndrome and supratherapeutic INR. Pt presented to the ER with SOB. Patient noted  increasing abdominal distension, decreased appetite, 1# weight gain in last 3 days  and took prn doses of Lasix. In the ER it was noted that his dobutamine pump was off.   Significant  PMH of CAD s/p CABG, ICM/HFrEF (LVef10%) on home  5mcg/kg/min, and  s/p [employer]tr[employer] CRT-D.      Hospital Course:  No notes on file    Medications:  Continuous Infusions:   DOBUTamine 5 mcg/kg/min (01/10/19 1041)    furosemide (LASIX) 2 mg/mL infusion (non-titrating) 15 mg/hr (01/10/19 1041)     Scheduled Meds:   aspirin  81 mg Oral Daily    cetirizine  5 mg Oral Daily    dextromethorphan-guaifenesin  mg  1 tablet Oral BID    digoxin  0.125 mg Oral Daily    famotidine  20 mg Oral Daily    fluticasone  2 spray Each Nare Daily     PRN Meds:benzonatate, diphenhydrAMINE-zinc acetate 1-0.1%, docusate sodium, ondansetron, sodium chloride, sodium chloride 0.9%    Objective:     Vital Signs (Most Recent):  Temp: 97.7 °F (36.5 °C) (01/10/19 1147)  Pulse: 108 (01/10/19 1147)  Resp: 18 (01/10/19 1147)  BP: (!) 85/58 (01/10/19 1225)  SpO2: 99 % (01/10/19 1147) Vital Signs (24h Range):  Temp:  [96.6 °F (35.9 °C)-98.2 °F (36.8 °C)] 97.7 °F (36.5 °C)  Pulse:  [] 108  Resp:  [18-24] 18  SpO2:  [94 %-99 %] 99 %  BP: (70-96)/(43-58) 85/58     Weight: 74.9 kg (165 lb 2 oz)  Body mass index is 26.65 kg/m².    Review of Symptoms  Symptom Assessment (ESAS 0-10 scale)  ESAS 0 1 2 3 4 5 6 7 8 9 10   Pain x             Dyspnea x             Anxiety x              Nausea    x          Depression  x             Anorexia x             Fatigue x             Insomnia x             Restlessness  x             Agitation x             CAM / Delirium _x_ --  ___+   Constipation     _x_ --  ___+   Diarrhea           _x_ --  ___+    Comments: patient c/o itching at mid back.  Small dry areas noted    Performance Status: 50    Physical Exam   Constitutional: He is oriented to person, place, and time. He appears well-developed and well-nourished.   HENT:   Head: Normocephalic and atraumatic.   Neck: Normal range of motion.   Neurological: He is alert and oriented to person, place, and time.   Skin: Skin is warm and dry.   Psychiatric: He has a normal mood and affect. His behavior is normal. Judgment and thought content normal.     CBC:   Recent Labs   Lab 01/10/19  0505   WBC 9.51   HGB 9.5*   HCT 31.4*   MCV 83        BMP:  Recent Labs   Lab 01/10/19  0505   *   *   K 4.4   CL 88*   CO2 26   BUN 62*   CREATININE 2.6*   CALCIUM 9.3     LFT:  Lab Results   Component Value Date     (H) 01/10/2019    ALKPHOS 150 (H) 01/10/2019    BILITOT 2.3 (H) 01/10/2019     Albumin:   Albumin   Date Value Ref Range Status   01/10/2019 2.9 (L) 3.5 - 5.2 g/dL Final     Protein:   Total Protein   Date Value Ref Range Status   01/10/2019 7.9 6.0 - 8.4 g/dL Final     Lactic acid:   No results found for: LACTATE            > 50% of 35 min visit spent in chart review, face to face discussion of goals of care,  symptom assessment, coordination of care and emotional support.    María Medrano, APRN, CNS, ACHPN  Palliative Medicine  [facility]

## 2019-01-11 LAB
ALBUMIN SERPL BCP-MCNC: 2.7 G/DL
ALP SERPL-CCNC: 141 U/L
ALT SERPL W/O P-5'-P-CCNC: 851 U/L
ANION GAP SERPL CALC-SCNC: 10 MMOL/L
ANION GAP SERPL CALC-SCNC: 11 MMOL/L
AST SERPL-CCNC: 459 U/L
BASOPHILS # BLD AUTO: 0.06 K/UL
BASOPHILS NFR BLD: 0.6 %
BILIRUB SERPL-MCNC: 2.1 MG/DL
BUN SERPL-MCNC: 66 MG/DL
BUN SERPL-MCNC: 68 MG/DL
CALCIUM SERPL-MCNC: 9.1 MG/DL
CALCIUM SERPL-MCNC: 9.3 MG/DL
CHLORIDE SERPL-SCNC: 86 MMOL/L
CHLORIDE SERPL-SCNC: 86 MMOL/L
CO2 SERPL-SCNC: 29 MMOL/L
CO2 SERPL-SCNC: 30 MMOL/L
CREAT SERPL-MCNC: 2.4 MG/DL
CREAT SERPL-MCNC: 2.4 MG/DL
DIFFERENTIAL METHOD: ABNORMAL
EOSINOPHIL # BLD AUTO: 0.2 K/UL
EOSINOPHIL NFR BLD: 1.9 %
ERYTHROCYTE [DISTWIDTH] IN BLOOD BY AUTOMATED COUNT: 17.7 %
EST. GFR  (AFRICAN AMERICAN): 30 ML/MIN/1.73 M^2
EST. GFR  (AFRICAN AMERICAN): 30 ML/MIN/1.73 M^2
EST. GFR  (NON AFRICAN AMERICAN): 26 ML/MIN/1.73 M^2
EST. GFR  (NON AFRICAN AMERICAN): 26 ML/MIN/1.73 M^2
GLUCOSE SERPL-MCNC: 205 MG/DL
GLUCOSE SERPL-MCNC: 210 MG/DL
HCT VFR BLD AUTO: 31.3 %
HGB BLD-MCNC: 9.6 G/DL
IMM GRANULOCYTES # BLD AUTO: 0.11 K/UL
IMM GRANULOCYTES NFR BLD AUTO: 1 %
INR PPP: 3.5
LYMPHOCYTES # BLD AUTO: 1.1 K/UL
LYMPHOCYTES NFR BLD: 10.7 %
MCH RBC QN AUTO: 25.9 PG
MCHC RBC AUTO-ENTMCNC: 30.7 G/DL
MCV RBC AUTO: 84 FL
MONOCYTES # BLD AUTO: 1 K/UL
MONOCYTES NFR BLD: 9.7 %
NEUTROPHILS # BLD AUTO: 8 K/UL
NEUTROPHILS NFR BLD: 76.1 %
NRBC BLD-RTO: 0 /100 WBC
PLATELET # BLD AUTO: 211 K/UL
PMV BLD AUTO: 10.7 FL
POTASSIUM SERPL-SCNC: 4.1 MMOL/L
POTASSIUM SERPL-SCNC: 4.4 MMOL/L
PROT SERPL-MCNC: 7.6 G/DL
PROTHROMBIN TIME: 34.3 SEC
RBC # BLD AUTO: 3.71 M/UL
SODIUM SERPL-SCNC: 125 MMOL/L
SODIUM SERPL-SCNC: 127 MMOL/L
WBC # BLD AUTO: 10.55 K/UL

## 2019-01-11 PROCEDURE — 85610 PROTHROMBIN TIME: CPT

## 2019-01-11 PROCEDURE — 85025 COMPLETE CBC W/AUTO DIFF WBC: CPT

## 2019-01-11 PROCEDURE — 80053 COMPREHEN METABOLIC PANEL: CPT

## 2019-01-11 PROCEDURE — 99233 PR SUBSEQUENT HOSPITAL CARE,LEVL III: ICD-10-PCS | Mod: ,,, | Performed by: HOSPITALIST

## 2019-01-11 PROCEDURE — 25000003 PHARM REV CODE 250: Performed by: HOSPITALIST

## 2019-01-11 PROCEDURE — 25000003 PHARM REV CODE 250: Performed by: STUDENT IN AN ORGANIZED HEALTH CARE EDUCATION/TRAINING PROGRAM

## 2019-01-11 PROCEDURE — 36415 COLL VENOUS BLD VENIPUNCTURE: CPT

## 2019-01-11 PROCEDURE — 99233 SBSQ HOSP IP/OBS HIGH 50: CPT | Mod: ,,, | Performed by: HOSPITALIST

## 2019-01-11 PROCEDURE — 80048 BASIC METABOLIC PNL TOTAL CA: CPT

## 2019-01-11 PROCEDURE — 63600175 PHARM REV CODE 636 W HCPCS: Performed by: HOSPITALIST

## 2019-01-11 PROCEDURE — 63600175 PHARM REV CODE 636 W HCPCS: Performed by: STUDENT IN AN ORGANIZED HEALTH CARE EDUCATION/TRAINING PROGRAM

## 2019-01-11 PROCEDURE — 20600001 HC STEP DOWN PRIVATE ROOM

## 2019-01-11 RX ORDER — FUROSEMIDE 10 MG/ML
80 INJECTION INTRAMUSCULAR; INTRAVENOUS 2 TIMES DAILY
Status: DISCONTINUED | OUTPATIENT
Start: 2019-01-11 | End: 2019-01-13

## 2019-01-11 RX ORDER — FUROSEMIDE 10 MG/ML
40 INJECTION INTRAMUSCULAR; INTRAVENOUS 2 TIMES DAILY
Status: DISCONTINUED | OUTPATIENT
Start: 2019-01-11 | End: 2019-01-11

## 2019-01-11 RX ADMIN — ASPIRIN 81 MG CHEWABLE TABLET 81 MG: 81 TABLET CHEWABLE at 10:01

## 2019-01-11 RX ADMIN — GUAIFENESIN AND DEXTROMETHORPHAN HYDROBROMIDE 1 TABLET: 600; 30 TABLET, EXTENDED RELEASE ORAL at 10:01

## 2019-01-11 RX ADMIN — DOBUTAMINE IN DEXTROSE 5 MCG/KG/MIN: 200 INJECTION, SOLUTION INTRAVENOUS at 11:01

## 2019-01-11 RX ADMIN — GUAIFENESIN AND DEXTROMETHORPHAN HYDROBROMIDE 1 TABLET: 600; 30 TABLET, EXTENDED RELEASE ORAL at 09:01

## 2019-01-11 RX ADMIN — FUROSEMIDE 80 MG: 10 INJECTION, SOLUTION INTRAVENOUS at 02:01

## 2019-01-11 RX ADMIN — DOCUSATE SODIUM 100 MG: 100 CAPSULE, LIQUID FILLED ORAL at 10:01

## 2019-01-11 RX ADMIN — DIGOXIN 0.12 MG: 125 TABLET ORAL at 10:01

## 2019-01-11 RX ADMIN — FLUTICASONE PROPIONATE 100 MCG: 50 SPRAY, METERED NASAL at 10:01

## 2019-01-11 RX ADMIN — ONDANSETRON 4 MG: 4 TABLET, FILM COATED ORAL at 10:01

## 2019-01-11 RX ADMIN — FAMOTIDINE 20 MG: 20 TABLET ORAL at 10:01

## 2019-01-11 RX ADMIN — CETIRIZINE HYDROCHLORIDE 5 MG: 5 TABLET ORAL at 10:01

## 2019-01-11 NOTE — PROGRESS NOTES
Progress Note  Hospital Medicine    Provider team:    [facility] CARDIOLOGY TEAM A - CARDIOLOGY CONSULT STEPDOWN  [facility] HOSP MED C  Admit Date: 1/7/2019  Encounter Date: 01/11/2019     SUBJECTIVE:     Follow-up Visit for: Biventricular congestive heart failure    HPI (See H&P for complete P,F,SHx):   74M with NYHA class IV biventricular systolic and diastolic heart failure on continuous home dobutamine infusion presents to ED complaining of shortness of breath, which began earlier in the day.  The previous night he took a dose of lasix 40mg due to increasing abdominal distension and appetite loss, which usually presages the beginning of decompensation, and took another dose this past morning but had only put out about 500mL prior to coming in.  Upon arrival in the ED it was noted that his dobutamine pump was off.  His wife changed the bag the prior night and it was working then but is not sure if she turned it back on afterwards.  He weighs himself daily and his weight is up 1 pound over the past 3 days.  He feels a little better since the dobutamine was restarted but is still uncomfortable.    Hospital course:  Patient has been started back on  infusion and put on lasix infusion. He is steadily improving, not quite at baseline. His abdominal distension is improving as is appetite. He does not really feel SOB though, only mild dyspnea, but not exerting. He doesn't have any pain.  As documented, through long discussion >30 min with family and cardiology present, decision made that patient has DNR directive and patient/family interested in pursuing palliative options. Palliative care has seen and plan is for [facility]. Patient currently has outpatient visit scheduled for 2/1/2019.    Interval history:  Appropriately net negative. Weight down 6 lb. Dry weight seems to be a 160-162. Patient had episode of hypoTN this AM, called by RN. Advised that we will give lasix holiday with possible  "transition to IVP (versus reinitiation of infusion).    Review of Systems:  Review of Systems   Constitutional: Negative for chills and fever.   HENT: Negative for congestion and sore throat.    Eyes: Negative for photophobia, pain and discharge.   Respiratory: Positive for shortness of breath. Negative for cough, hemoptysis and sputum production.    Cardiovascular: Negative for chest pain, palpitations and leg swelling.   Gastrointestinal: Positive for abdominal pain. Negative for diarrhea, nausea and vomiting.   Genitourinary: Negative for dysuria and urgency.   Musculoskeletal: Negative for myalgias and neck pain.   Skin: Negative for itching and rash.   Neurological: Negative for sensory change, focal weakness and headaches.   Endo/Heme/Allergies: Negative for polydipsia. Does not bruise/bleed easily.   Psychiatric/Behavioral: Negative for depression and suicidal ideas.     OBJECTIVE:       Intake/Output Summary (Last 24 hours) at 1/11/2019 0853  Last data filed at 1/11/2019 0600  Gross per 24 hour   Intake 860 ml   Output 1080 ml   Net -220 ml     Vital Signs Range (Last 24H):  Temp:  [96.1 °F (35.6 °C)-98.6 °F (37 °C)]   Pulse:  [105-116]   Resp:  [2-20]   BP: (70-94)/(43-63)   SpO2:  [92 %-99 %]   Body mass index is 25.69 kg/m².    Objective:  General Appearance:  Ill-appearing, in no acute distress, not in pain and comfortable.    Vital signs: (most recent): Blood pressure (!) 94/59, pulse (!) 116, temperature 97.3 °F (36.3 °C), temperature source Oral, resp. rate 16, height 5' 6" (1.676 m), weight 72.2 kg (159 lb 2.8 oz), SpO2 (!) 92 %.  No fever.    Output: Producing urine and producing stool.    HEENT: Normal HEENT exam.    Lungs:  Normal effort.  Breath sounds clear to auscultation.  He is not in respiratory distress.  There are rales.  No wheezes.    Heart: Normal rate.  Regular rhythm.  S1 normal and S2 normal.  No murmur.   Chest: Symmetric chest wall expansion.   Abdomen: Abdomen is soft.  Bowel " sounds are normal.   There is no abdominal tenderness.     Extremities: Normal range of motion.  There is venous stasis.  There is no deformity, effusion, dependent edema or local swelling.    Pulses: Distal pulses are intact.    Neurological: Patient is alert and oriented to person, place and time.  Normal strength.    Pupils:  Pupils are equal, round, and reactive to light.    Skin:  Dry and cool.      Medications:  Medication list was reviewed in EPIC and changes noted under Assessment/Plan and MAR.    Laboratory:  Recent Labs     01/11/19  0711   WBC 10.55   RBC 3.71*   HGB 9.6*   HCT 31.3*      MCV 84   MCH 25.9*   MCHC 30.7*   GRAN 76.1*  8.0*   LYMPH 10.7*  1.1   MONO 9.7  1.0   EOS 0.2      Recent Labs     01/09/19  0539  01/11/19  0711   *   < > 205*   *   < > 127*   K 4.7   < > 4.4   CL 87*   < > 86*   CO2 28   < > 30*   BUN 62*   < > 66*   CREATININE 2.7*   < > 2.4*   CALCIUM 9.0   < > 9.1   ANIONGAP 12   < > 11   MG 1.9  --   --     < > = values in this interval not displayed.       ASSESSMENT/PLAN:     Active Hospital Problems    Diagnosis  POA    *Biventricular congestive heart failure [I50.82]  Yes    Goals of care, counseling/discussion [Z71.89]  Not Applicable    DNR (do not resuscitate) [Z66]  Yes    Cardiorenal syndrome [I13.10]  Yes    Supratherapeutic INR [R79.1]  Yes    Hyperkalemia, diminished renal excretion [E87.5]  Yes    Cardiogenic shock [R57.0]  Unknown    Acute on chronic respiratory failure with hypoxia [J96.21]  Yes     Chronic    Chronic atrial fibrillation [I48.2]  Yes    Palliative care encounter [Z51.5]  Not Applicable    Hepatic congestion [K76.1]  Yes      Resolved Hospital Problems   No resolved problems to display.          * Biventricular congestive heart failure     With chronic cardiogenic shock requiring dobutamine.  History suggestive of gradual start of decompensation over past couple of days and likely accelerated by not having  dobutamine over past day.  Infusion restarted at 5mcg/kg/min.    - Lasix holiday due to hypoTN this AM  - Restart lasix at 80 mg IV BID and assess need for transition back to lasix gtt  - Cardiology co-management following         Supratherapeutic INR     Likely secondary to congestive hepatopathy.  Hold coumadin and trend out INR.         Cardiorenal syndrome     Acute elevation in creatinine likely due to decompensated pump failure.  Hold ACEI for now.         Acute on chronic respiratory failure with hypoxia     Secondary to pulmonary edema in context of decompensated biventricular heart failure.  Wean oxygen as able while diuresing.         Chronic atrial fibrillation     S/p ablation; on coumadin.  Holding for now due to elevated INR.         Hepatic congestion     Acutely elevated LFTs due to congestive hepatopathy from right-sided failure.  Trend CMP.     DVT PPx - coumadin (holding due to supratherapeutic INR)  Cardiac diet with 1500 cc fluid restriction.  DNR (paperwork in chart); determine after conference with cardiology, Dr. Flores.    Anticipated discharge date and disposition:   Pending diuresis. Likely home with home health with transition to home hospice.  Laurent Drew MD  [facility] Medicine

## 2019-01-11 NOTE — PLAN OF CARE
Problem: COPD Comorbidity  Goal: Maintenance of COPD Symptom Control    Intervention: Maintain COPD-Symptom Control  Lasix infusion held for hypotension.

## 2019-01-11 NOTE — PROGRESS NOTES
Contacted at 0805 by bedside Leo CAMARENA [id number] to evaluate Mr. Bharat Coker for hypotesion. Upon arrival, the patient was in trendelenburg position on 3L NC. The patient reported being light headed and dizzy when hypotension was recorded. Patient is on a lasix and dobutamine drip. BP pressure taken x 2 and last one was 95/59. Patient is stable and reports no dizziness. Primary team paged. NICOL Bailey instructed to call  and activate a rapid response if patient's condition changes prior to arrival.

## 2019-01-11 NOTE — NURSING
"Rn called to bedside for pt c/o light-headedness, stated "I feel like my blood pressure is low." Initial bp taken 72/49, manual taken, 76/50. Pt O2 74% on RA, stated he removed his O2 because his nose was dry and sat up to eat. O2 reapplied at 4L, pt placed in trendelenberg and legs elevated. RR nurse called to bedside, Annie, charge nurse notified, MD stat paged.     RR nurse to bedside, charge nurse to bedside, bp rechecked 94/59. O2 maintaining >94%. MD return page, stated hold lasix gtt at this time, he would be to bedside to reassess pt.     Pt VS stable, stated he wanted to rest and recover. Bed low and locked, call light in reach, O2 in place, pt stated symptoms resolving. WCTM.   "

## 2019-01-11 NOTE — PLAN OF CARE
Problem: Adult Inpatient Plan of Care  Goal: Plan of Care Review  Outcome: Ongoing (interventions implemented as appropriate)  Pt AAOX4. VSS, episodes of hypotension at times with bp consistently running 80s-90s/50s-60s. No c/o pain during shift. Nausea with morning meds. GI cocktail ordered once for indigestion. Dobutamine gtt continued. Lasix gtt d/c, started on lasix IV push. UOP decreased. Pt safety maintained. Hourly rounding performed.

## 2019-01-11 NOTE — PLAN OF CARE
Problem: Adult Inpatient Plan of Care  Goal: Plan of Care Review  Outcome: Ongoing (interventions implemented as appropriate)  Pt AAOX4 with some forgetfulness. Nausea medication given x 1 with relief verbalized. No c/o pain during shift. Dobutamine and lasix gtt continued. Pt safety maintained. Hourly rounding performed.

## 2019-01-11 NOTE — PLAN OF CARE
01/11/19 1343   Discharge Assessment   Assessment Type Discharge Planning Reassessment   Discharge Plan A Hospice/home   Discharge Plan B Home with family

## 2019-01-11 NOTE — NURSING
MD Viki notified of pt increased bp, 95/65. Stated ok to give 1200 80mg lasix now, recheck BP in one hour. Pt and family member requesting boost with meals, Viki stated ok for pt to have with meals. Boost ordered.

## 2019-01-11 NOTE — PLAN OF CARE
Problem: Adult Inpatient Plan of Care  Goal: Plan of Care Review  Outcome: Ongoing (interventions implemented as appropriate)  No acute events this shift. O2 titrated from 3 LPM to 2 LPM SPO2 WDL.  . No complaints of SOB at this time. Care clustered for rest periods. Callbell placed within reach and use encouraged.

## 2019-01-12 LAB
ALBUMIN SERPL BCP-MCNC: 2.7 G/DL
ALP SERPL-CCNC: 130 U/L
ALT SERPL W/O P-5'-P-CCNC: 688 U/L
ANION GAP SERPL CALC-SCNC: 14 MMOL/L
ANION GAP SERPL CALC-SCNC: 14 MMOL/L
AST SERPL-CCNC: 316 U/L
BASOPHILS # BLD AUTO: 0.05 K/UL
BASOPHILS NFR BLD: 0.5 %
BILIRUB SERPL-MCNC: 2 MG/DL
BUN SERPL-MCNC: 71 MG/DL
BUN SERPL-MCNC: 76 MG/DL
CALCIUM SERPL-MCNC: 8.8 MG/DL
CALCIUM SERPL-MCNC: 8.9 MG/DL
CHLORIDE SERPL-SCNC: 84 MMOL/L
CHLORIDE SERPL-SCNC: 84 MMOL/L
CO2 SERPL-SCNC: 27 MMOL/L
CO2 SERPL-SCNC: 29 MMOL/L
CREAT SERPL-MCNC: 2.6 MG/DL
CREAT SERPL-MCNC: 2.6 MG/DL
DIFFERENTIAL METHOD: ABNORMAL
DIGOXIN SERPL-MCNC: 1.8 NG/ML
EOSINOPHIL # BLD AUTO: 0.2 K/UL
EOSINOPHIL NFR BLD: 1.7 %
ERYTHROCYTE [DISTWIDTH] IN BLOOD BY AUTOMATED COUNT: 17.6 %
EST. GFR  (AFRICAN AMERICAN): 27.3 ML/MIN/1.73 M^2
EST. GFR  (AFRICAN AMERICAN): 27.3 ML/MIN/1.73 M^2
EST. GFR  (NON AFRICAN AMERICAN): 23.6 ML/MIN/1.73 M^2
EST. GFR  (NON AFRICAN AMERICAN): 23.6 ML/MIN/1.73 M^2
GLUCOSE SERPL-MCNC: 216 MG/DL
GLUCOSE SERPL-MCNC: 265 MG/DL
HCT VFR BLD AUTO: 30.4 %
HGB BLD-MCNC: 9.2 G/DL
IMM GRANULOCYTES # BLD AUTO: 0.09 K/UL
IMM GRANULOCYTES NFR BLD AUTO: 0.9 %
INR PPP: 2.9
LYMPHOCYTES # BLD AUTO: 0.9 K/UL
LYMPHOCYTES NFR BLD: 9.9 %
MCH RBC QN AUTO: 25.3 PG
MCHC RBC AUTO-ENTMCNC: 30.3 G/DL
MCV RBC AUTO: 84 FL
MONOCYTES # BLD AUTO: 0.9 K/UL
MONOCYTES NFR BLD: 9.1 %
NEUTROPHILS # BLD AUTO: 7.4 K/UL
NEUTROPHILS NFR BLD: 77.9 %
NRBC BLD-RTO: 1 /100 WBC
PLATELET # BLD AUTO: 199 K/UL
PMV BLD AUTO: 10.7 FL
POTASSIUM SERPL-SCNC: 4.2 MMOL/L
POTASSIUM SERPL-SCNC: 4.3 MMOL/L
PROT SERPL-MCNC: 7.4 G/DL
PROTHROMBIN TIME: 28.3 SEC
RBC # BLD AUTO: 3.63 M/UL
SODIUM SERPL-SCNC: 125 MMOL/L
SODIUM SERPL-SCNC: 127 MMOL/L
WBC # BLD AUTO: 9.51 K/UL

## 2019-01-12 PROCEDURE — 63600175 PHARM REV CODE 636 W HCPCS: Performed by: HOSPITALIST

## 2019-01-12 PROCEDURE — 20600001 HC STEP DOWN PRIVATE ROOM

## 2019-01-12 PROCEDURE — 80053 COMPREHEN METABOLIC PANEL: CPT

## 2019-01-12 PROCEDURE — 85610 PROTHROMBIN TIME: CPT

## 2019-01-12 PROCEDURE — 36415 COLL VENOUS BLD VENIPUNCTURE: CPT

## 2019-01-12 PROCEDURE — 25000003 PHARM REV CODE 250: Performed by: HOSPITALIST

## 2019-01-12 PROCEDURE — 99233 PR SUBSEQUENT HOSPITAL CARE,LEVL III: ICD-10-PCS | Mod: ,,, | Performed by: HOSPITALIST

## 2019-01-12 PROCEDURE — 25000003 PHARM REV CODE 250: Performed by: STUDENT IN AN ORGANIZED HEALTH CARE EDUCATION/TRAINING PROGRAM

## 2019-01-12 PROCEDURE — 80162 ASSAY OF DIGOXIN TOTAL: CPT

## 2019-01-12 PROCEDURE — 85025 COMPLETE CBC W/AUTO DIFF WBC: CPT

## 2019-01-12 PROCEDURE — 99233 SBSQ HOSP IP/OBS HIGH 50: CPT | Mod: ,,, | Performed by: HOSPITALIST

## 2019-01-12 PROCEDURE — 80048 BASIC METABOLIC PNL TOTAL CA: CPT

## 2019-01-12 PROCEDURE — 63600175 PHARM REV CODE 636 W HCPCS: Performed by: STUDENT IN AN ORGANIZED HEALTH CARE EDUCATION/TRAINING PROGRAM

## 2019-01-12 RX ORDER — WARFARIN 2.5 MG/1
2.5 TABLET ORAL DAILY
Status: DISCONTINUED | OUTPATIENT
Start: 2019-01-12 | End: 2019-01-13

## 2019-01-12 RX ADMIN — CETIRIZINE HYDROCHLORIDE 5 MG: 5 TABLET ORAL at 09:01

## 2019-01-12 RX ADMIN — ASPIRIN 81 MG CHEWABLE TABLET 81 MG: 81 TABLET CHEWABLE at 09:01

## 2019-01-12 RX ADMIN — DIGOXIN 0.12 MG: 125 TABLET ORAL at 09:01

## 2019-01-12 RX ADMIN — ALUMINUM HYDROXIDE, MAGNESIUM HYDROXIDE, AND SIMETHICONE 50 ML: 200; 200; 20 SUSPENSION ORAL at 06:01

## 2019-01-12 RX ADMIN — DOBUTAMINE IN DEXTROSE 5 MCG/KG/MIN: 200 INJECTION, SOLUTION INTRAVENOUS at 01:01

## 2019-01-12 RX ADMIN — FUROSEMIDE 80 MG: 10 INJECTION, SOLUTION INTRAVENOUS at 09:01

## 2019-01-12 RX ADMIN — GUAIFENESIN AND DEXTROMETHORPHAN HYDROBROMIDE 1 TABLET: 600; 30 TABLET, EXTENDED RELEASE ORAL at 08:01

## 2019-01-12 RX ADMIN — FLUTICASONE PROPIONATE 100 MCG: 50 SPRAY, METERED NASAL at 09:01

## 2019-01-12 RX ADMIN — FUROSEMIDE 80 MG: 10 INJECTION, SOLUTION INTRAVENOUS at 05:01

## 2019-01-12 RX ADMIN — GUAIFENESIN AND DEXTROMETHORPHAN HYDROBROMIDE 1 TABLET: 600; 30 TABLET, EXTENDED RELEASE ORAL at 09:01

## 2019-01-12 RX ADMIN — WARFARIN SODIUM 2.5 MG: 2.5 TABLET ORAL at 05:01

## 2019-01-12 RX ADMIN — FAMOTIDINE 20 MG: 20 TABLET ORAL at 09:01

## 2019-01-12 NOTE — NURSING
Kathryn from Med team C made aware that unable to get temp on pt at 2300. Pt AAO x4, no complaints at this time. Bearhugger placed. Temp now WNL. Pt continues with asymptomatic hypotension. SPO2 WDL , denies SOB decreased O2 from 3LPM to 2LPM this AM WCTM. Callbell placed within reach and use encouraged.

## 2019-01-12 NOTE — CARE UPDATE
Rapid Response Follow-up Note    Followed up with patient for proactive rounding.   No acute issues at this time. Reviewed plan of care with charge RN, Annie.   Please call Rapid Response RN with any questions or concerns at  [phone].

## 2019-01-12 NOTE — CARE UPDATE
RN Proactive Rounding Note  Time of Visit:     Admit Date: 2019  LOS: 4  Code Status: DNR   Date of Visit: 2019  : 1945  Age: 74 y.o.  Sex: male  Race: White  Bed: 8092/8092 A:   MRN: [id number]  Was the patient discharged from an ICU this admission? no   Was the patient discharged from a PACU within last 24 hours?  no  Did the patient receive conscious sedation/general anesthesia in last 24 hours?  no  Was the patient in the ED within the past 24 hours?  no  Was the patient started on NIPPV within the past 24 hours?  no  Attending Physician: Laurent Drew MD  Primary Service: [facility]OS[facility] MED C      ASSESSMENT:     Abnormal Vital Signs:  Clinical Issues: Circulatory     INTERVENTIONS/ RECOMMENDATIONS:     Pt evaluated for c/o hypotension. Spoke with nurse about POC. Nurse states verbal order from physician to refrain from obtaining VS over night. POC [facility]. Pt remains hypotensive but otherwise asymptomatic. No aggressive care measures being taken at this time per medical team. Pt and family in agreement with POC. Nurse verbalizes no needs at this time.     Discussed plan of care with Fauzia CAMARENA.    PHYSICIAN ESCALATION:     Yes/No  no    Orders received and case discussed with Dr. APONTE/ADITI .    Disposition: Remain in room 8092.    FOLLOW-UP/CONTINGENCY:     Call back the Rapid Response Nurse at [phone] for additional questions or concerns.

## 2019-01-12 NOTE — PROGRESS NOTES
Progress Note  Hospital Medicine    Provider team:    [facility] [place] - CARDIOLOGY CONSULT STEPDOWN  [facility] [place] [facility]  Admit Date: 1/7/2019  Encounter Date: 01/12/2019     SUBJECTIVE:     Follow-up Visit for: Biventricular congestive heart failure    HPI (See H&P for complete P,F,SHx):   74M with NYHA class IV biventricular systolic and diastolic heart failure on continuous home dobutamine infusion presents to ED complaining of shortness of breath, which began earlier in the day.  The previous night he took a dose of lasix 40mg due to increasing abdominal distension and appetite loss, which usually presages the beginning of decompensation, and took another dose this past morning but had only put out about 500mL prior to coming in.  Upon arrival in the ED it was noted that his dobutamine pump was off.  His wife changed the bag the prior night and it was working then but is not sure if she turned it back on afterwards.  He weighs himself daily and his weight is up 1 pound over the past 3 days.  He feels a little better since the dobutamine was restarted but is still uncomfortable.    Hospital course:  Patient has been started back on  infusion and put on lasix infusion. He is steadily improving, not quite at baseline. His abdominal distension is improving as is appetite. He does not really feel SOB though, only mild dyspnea, but not exerting. He doesn't have any pain.  As documented, through long discussion >30 min with family and cardiology present, decision made that patient has DNR directive and patient/family interested in pursuing palliative options. Palliative care has seen and [facility] Patient currently has outpatient visit scheduled for 2/1/2019.    Interval history:  Appropriately net negative Dry weight seems to be a 160-162. Patient had episode of hypoTN overnight which has been issue, as well as hypothermia. All is consistent with cardiogenic  "shock. Continuing with  and lasix; likely to have cardiology f/u over weekend to assess for optimization to go home with palliative services in this patient with poor prognosis.    Review of Systems:  Review of Systems   Constitutional: Negative for chills and fever.   HENT: Negative for congestion and sore throat.    Eyes: Negative for photophobia, pain and discharge.   Respiratory: Positive for shortness of breath. Negative for cough, hemoptysis and sputum production.    Cardiovascular: Negative for chest pain, palpitations and leg swelling.   Gastrointestinal: Positive for abdominal pain. Negative for diarrhea, nausea and vomiting.   Genitourinary: Negative for dysuria and urgency.   Musculoskeletal: Negative for myalgias and neck pain.   Skin: Negative for itching and rash.   Neurological: Negative for sensory change, focal weakness and headaches.   Endo/Heme/Allergies: Negative for polydipsia. Does not bruise/bleed easily.   Psychiatric/Behavioral: Negative for depression and suicidal ideas.     OBJECTIVE:       Intake/Output Summary (Last 24 hours) at 1/12/2019 0946  Last data filed at 1/12/2019 0900  Gross per 24 hour   Intake 1115 ml   Output 750 ml   Net 365 ml     Vital Signs Range (Last 24H):  Temp:  [96.2 °F (35.7 °C)-97.6 °F (36.4 °C)]   Pulse:  [104-113]   Resp:  [16-20]   BP: ()/(40-65)   SpO2:  [92 %-100 %]   Body mass index is 25.8 kg/m².    Objective:  General Appearance:  Ill-appearing, in no acute distress, not in pain and comfortable.    Vital signs: (most recent): Blood pressure (!) 88/62, pulse (!) 111, temperature 96.3 °F (35.7 °C), temperature source Axillary, resp. rate 16, height 5' 6" (1.676 m), weight 72.5 kg (159 lb 13.3 oz), SpO2 95 %.  No fever.    Output: Producing urine and producing stool.    HEENT: Normal HEENT exam.    Lungs:  Normal effort.  Breath sounds clear to auscultation.  He is not in respiratory distress.  There are rales.  No wheezes.    Heart: Normal rate.  " Regular rhythm.  S1 normal and S2 normal.  No murmur.   Chest: Symmetric chest wall expansion.   Abdomen: Abdomen is soft.  Bowel sounds are normal.   There is no abdominal tenderness.     Extremities: Normal range of motion.  There is venous stasis.  There is no deformity, effusion, dependent edema or local swelling.    Pulses: Distal pulses are intact.    Neurological: Patient is alert and oriented to person, place and time.  Normal strength.    Pupils:  Pupils are equal, round, and reactive to light.    Skin:  Dry and cool.      Medications:  Medication list was reviewed in EPIC and changes noted under Assessment/Plan and MAR.    Laboratory:  Recent Labs     01/12/19 0429   WBC 9.51   RBC 3.63*   HGB 9.2*   HCT 30.4*      MCV 84   MCH 25.3*   MCHC 30.3*   GRAN 77.9*  7.4   LYMPH 9.9*  0.9*   MONO 9.1  0.9   EOS 0.2      Recent Labs     01/12/19 0429   *   *   K 4.2   CL 84*   CO2 27   BUN 71*   CREATININE 2.6*   CALCIUM 8.9   ANIONGAP 14       ASSESSMENT/PLAN:     Active Hospital Problems    Diagnosis  POA    *Biventricular congestive heart failure [I50.82]  Yes    Goals of care, counseling/discussion [Z71.89]  Not Applicable    DNR (do not resuscitate) [Z66]  Yes    Cardiorenal syndrome [I13.10]  Yes    Supratherapeutic INR [R79.1]  Yes    Hyperkalemia, diminished renal excretion [E87.5]  Yes    Cardiogenic shock [R57.0]  Unknown    Acute on chronic respiratory failure with hypoxia [J96.21]  Yes     Chronic    Chronic atrial fibrillation [I48.2]  Yes    Palliative care encounter [Z51.5]  Not Applicable    Hepatic congestion [K76.1]  Yes      Resolved Hospital Problems   No resolved problems to display.          * Biventricular congestive heart failure     With chronic cardiogenic shock requiring dobutamine.  History suggestive of gradual start of decompensation over past couple of days and likely accelerated by not having dobutamine over past day.  Infusion restarted at  5mcg/kg/min.    - Lasix holiday due to hypoTN  - Restart lasix at 80 mg IV BID and assess need for transition back to lasix gtt  - Cardiology co-management following         Supratherapeutic INR     Likely secondary to congestive hepatopathy. Resume coumadin at lower dose.         Cardiorenal syndrome     Acute elevation in creatinine likely due to decompensated pump failure.  Hold ACEI for now.         Acute on chronic respiratory failure with hypoxia     Secondary to pulmonary edema in context of decompensated biventricular heart failure.  Wean oxygen as able while diuresing.         Chronic atrial fibrillation     S/p ablation; on coumadin. Resume coumadin at lower dose.         Hepatic congestion     Acutely elevated LFTs due to congestive hepatopathy from right-sided failure.  Trend CMP.     DVT PPx - coumadin (resume at lower dose)  Cardiac diet with 1500 cc fluid restriction.  DNR (paperwork in chart); determine after conference with cardiology, Dr. Flores.    Anticipated discharge date and disposition:   Pending diuresis. Likely home with home health with transition to home hospice.  Laurent Drew MD  [facility] Medicine

## 2019-01-12 NOTE — PLAN OF CARE
Problem: Adult Inpatient Plan of Care  Goal: Plan of Care Review  Outcome: Ongoing (interventions implemented as appropriate)  Pt AAOX4. AVSS, intermittent hypotermia, bearhugger applied as needed. Digoxin d/c. Lasix iv push therapy continued. Dobutamine gtt continued. Coumadin restarted for INR 2.9. Pt safety maintained, hourly rounding performed.

## 2019-01-13 LAB
ALBUMIN SERPL BCP-MCNC: 2.7 G/DL
ALP SERPL-CCNC: 137 U/L
ALT SERPL W/O P-5'-P-CCNC: 586 U/L
ANION GAP SERPL CALC-SCNC: 12 MMOL/L
ANION GAP SERPL CALC-SCNC: 15 MMOL/L
AST SERPL-CCNC: 217 U/L
BASOPHILS # BLD AUTO: 0.04 K/UL
BASOPHILS NFR BLD: 0.4 %
BILIRUB SERPL-MCNC: 2.1 MG/DL
BUN SERPL-MCNC: 85 MG/DL
BUN SERPL-MCNC: 93 MG/DL
CALCIUM SERPL-MCNC: 9.1 MG/DL
CALCIUM SERPL-MCNC: 9.2 MG/DL
CHLORIDE SERPL-SCNC: 81 MMOL/L
CHLORIDE SERPL-SCNC: 83 MMOL/L
CO2 SERPL-SCNC: 28 MMOL/L
CO2 SERPL-SCNC: 30 MMOL/L
CREAT SERPL-MCNC: 2.9 MG/DL
CREAT SERPL-MCNC: 3 MG/DL
DIFFERENTIAL METHOD: ABNORMAL
EOSINOPHIL # BLD AUTO: 0 K/UL
EOSINOPHIL NFR BLD: 0.2 %
ERYTHROCYTE [DISTWIDTH] IN BLOOD BY AUTOMATED COUNT: 17.9 %
EST. GFR  (AFRICAN AMERICAN): 22.9 ML/MIN/1.73 M^2
EST. GFR  (AFRICAN AMERICAN): 23.9 ML/MIN/1.73 M^2
EST. GFR  (NON AFRICAN AMERICAN): 19.8 ML/MIN/1.73 M^2
EST. GFR  (NON AFRICAN AMERICAN): 20.7 ML/MIN/1.73 M^2
GLUCOSE SERPL-MCNC: 288 MG/DL
GLUCOSE SERPL-MCNC: 290 MG/DL
HCT VFR BLD AUTO: 34.1 %
HGB BLD-MCNC: 10.1 G/DL
IMM GRANULOCYTES # BLD AUTO: 0.17 K/UL
IMM GRANULOCYTES NFR BLD AUTO: 1.6 %
INR PPP: 3
LYMPHOCYTES # BLD AUTO: 0.8 K/UL
LYMPHOCYTES NFR BLD: 7.4 %
MCH RBC QN AUTO: 25.6 PG
MCHC RBC AUTO-ENTMCNC: 29.6 G/DL
MCV RBC AUTO: 86 FL
MONOCYTES # BLD AUTO: 0.8 K/UL
MONOCYTES NFR BLD: 7.4 %
NEUTROPHILS # BLD AUTO: 8.9 K/UL
NEUTROPHILS NFR BLD: 83 %
NRBC BLD-RTO: 1 /100 WBC
PLATELET # BLD AUTO: 251 K/UL
PMV BLD AUTO: 10.3 FL
POTASSIUM SERPL-SCNC: 4.7 MMOL/L
POTASSIUM SERPL-SCNC: 4.9 MMOL/L
PROT SERPL-MCNC: 7.7 G/DL
PROTHROMBIN TIME: 28.9 SEC
RBC # BLD AUTO: 3.95 M/UL
SODIUM SERPL-SCNC: 124 MMOL/L
SODIUM SERPL-SCNC: 125 MMOL/L
WBC # BLD AUTO: 10.73 K/UL

## 2019-01-13 PROCEDURE — 80048 BASIC METABOLIC PNL TOTAL CA: CPT

## 2019-01-13 PROCEDURE — 36415 COLL VENOUS BLD VENIPUNCTURE: CPT

## 2019-01-13 PROCEDURE — 25000003 PHARM REV CODE 250: Performed by: STUDENT IN AN ORGANIZED HEALTH CARE EDUCATION/TRAINING PROGRAM

## 2019-01-13 PROCEDURE — 63600175 PHARM REV CODE 636 W HCPCS: Performed by: STUDENT IN AN ORGANIZED HEALTH CARE EDUCATION/TRAINING PROGRAM

## 2019-01-13 PROCEDURE — 99233 SBSQ HOSP IP/OBS HIGH 50: CPT | Mod: ,,, | Performed by: HOSPITALIST

## 2019-01-13 PROCEDURE — 63600175 PHARM REV CODE 636 W HCPCS: Performed by: HOSPITALIST

## 2019-01-13 PROCEDURE — 85610 PROTHROMBIN TIME: CPT

## 2019-01-13 PROCEDURE — 25000003 PHARM REV CODE 250: Performed by: HOSPITALIST

## 2019-01-13 PROCEDURE — 99233 PR SUBSEQUENT HOSPITAL CARE,LEVL III: ICD-10-PCS | Mod: ,,, | Performed by: HOSPITALIST

## 2019-01-13 PROCEDURE — 20600001 HC STEP DOWN PRIVATE ROOM

## 2019-01-13 PROCEDURE — 85025 COMPLETE CBC W/AUTO DIFF WBC: CPT

## 2019-01-13 PROCEDURE — 80053 COMPREHEN METABOLIC PANEL: CPT

## 2019-01-13 RX ORDER — WARFARIN 2 MG/1
2 TABLET ORAL DAILY
Status: DISCONTINUED | OUTPATIENT
Start: 2019-01-13 | End: 2019-01-14

## 2019-01-13 RX ADMIN — DOCUSATE SODIUM 100 MG: 100 CAPSULE, LIQUID FILLED ORAL at 06:01

## 2019-01-13 RX ADMIN — GUAIFENESIN AND DEXTROMETHORPHAN HYDROBROMIDE 1 TABLET: 600; 30 TABLET, EXTENDED RELEASE ORAL at 08:01

## 2019-01-13 RX ADMIN — FLUTICASONE PROPIONATE 100 MCG: 50 SPRAY, METERED NASAL at 08:01

## 2019-01-13 RX ADMIN — DOCUSATE SODIUM 100 MG: 100 CAPSULE, LIQUID FILLED ORAL at 03:01

## 2019-01-13 RX ADMIN — DOBUTAMINE IN DEXTROSE 5 MCG/KG/MIN: 200 INJECTION, SOLUTION INTRAVENOUS at 08:01

## 2019-01-13 RX ADMIN — CETIRIZINE HYDROCHLORIDE 5 MG: 5 TABLET ORAL at 08:01

## 2019-01-13 RX ADMIN — FAMOTIDINE 20 MG: 20 TABLET ORAL at 08:01

## 2019-01-13 RX ADMIN — WARFARIN SODIUM 2 MG: 2 TABLET ORAL at 04:01

## 2019-01-13 RX ADMIN — FUROSEMIDE 80 MG: 10 INJECTION, SOLUTION INTRAVENOUS at 08:01

## 2019-01-13 RX ADMIN — ASPIRIN 81 MG CHEWABLE TABLET 81 MG: 81 TABLET CHEWABLE at 08:01

## 2019-01-13 NOTE — PLAN OF CARE
Problem: Adult Inpatient Plan of Care  Goal: Plan of Care Review  Outcome: Ongoing (interventions implemented as appropriate)  VSS. A/Ox4, can be forgetful.  No complaints of pain.  Dobutamine gtt maintained.  No acute events overnight. Safety maintained.  All questions answered. Patient updated on plan of care.  Will continue to monitor.

## 2019-01-13 NOTE — PLAN OF CARE
Problem: Adult Inpatient Plan of Care  Goal: Plan of Care Review  Outcome: Ongoing (interventions implemented as appropriate)  POC reviewed with Pt and spouse. VSS. Temp of 95.9 F. Bear hugger in use. No acute changes. A&Ox4. Wife at bedside. Will continue to monitor.

## 2019-01-13 NOTE — PROGRESS NOTES
Progress Note  Hospital Medicine    Provider team:    [facility] [place]ARDIOLOGY TEAM A - CARDIOLOGY CONSULT STEPDOWN  [facility] [place] [facility]P MED C  Admit Date: 1/7/2019  Encounter Date: 01/13/2019     SUBJECTIVE:     Follow-up Visit for: Biventricular congestive heart failure    HPI (See H&P for complete P,F,SHx):   74M with NYHA class IV biventricular systolic and diastolic heart failure on continuous home dobutamine infusion presents to ED complaining of shortness of breath, which began earlier in the day.  The previous night he took a dose of lasix 40mg due to increasing abdominal distension and appetite loss, which usually presages the beginning of decompensation, and took another dose this past morning but had only put out about 500mL prior to coming in.  Upon arrival in the ED it was noted that his dobutamine pump was off.  His wife changed the bag the prior night and it was working then but is not sure if she turned it back on afterwards.  He weighs himself daily and his weight is up 1 pound over the past 3 days.  He feels a little better since the dobutamine was restarted but is still uncomfortable.    Hospital course:  Patient has been started back on  infusion and put on lasix infusion. He is steadily improving, not quite at baseline. His abdominal distension is improving as is appetite. He does not really feel SOB though, only mild dyspnea, but not exerting. He doesn't have any pain.  As documented, through long discussion >30 min with family and cardiology present, decision made that patient has DNR directive and patient/family interested in pursuing palliative options. Palliative care has seen and [facility] Patient currently has outpatient visit scheduled for 2/1/2019.    Interval history:  Patient without robust o/p. Had to do trial of lasix holiday as worsening Cr and Na. He appears slightly below dry weight: dry weight seems to be a 160-162. Patient has had hypoTN  "overnight which has been issue, as well as hypothermia. All is consistent with cardiogenic shock. Continuing with . I spoke with cardiology co-management and they will see the patient today.    Review of Systems:  Review of Systems   Constitutional: Negative for chills and fever.   HENT: Negative for congestion and sore throat.    Eyes: Negative for photophobia, pain and discharge.   Respiratory: Positive for shortness of breath. Negative for cough, hemoptysis and sputum production.    Cardiovascular: Negative for chest pain, palpitations and leg swelling.   Gastrointestinal: Positive for abdominal pain. Negative for diarrhea, nausea and vomiting.   Genitourinary: Negative for dysuria and urgency.   Musculoskeletal: Negative for myalgias and neck pain.   Skin: Negative for itching and rash.   Neurological: Negative for sensory change, focal weakness and headaches.   Endo/Heme/Allergies: Negative for polydipsia. Does not bruise/bleed easily.   Psychiatric/Behavioral: Negative for depression and suicidal ideas.     OBJECTIVE:       Intake/Output Summary (Last 24 hours) at 1/13/2019 0922  Last data filed at 1/13/2019 0600  Gross per 24 hour   Intake 935 ml   Output 375 ml   Net 560 ml     Vital Signs Range (Last 24H):  Temp:  [95.7 °F (35.4 °C)-97.5 °F (36.4 °C)]   Pulse:  []   Resp:  [18]   BP: (83-97)/(51-60)   SpO2:  [92 %-97 %]   Body mass index is 25.8 kg/m².    Objective:  General Appearance:  Ill-appearing, in no acute distress, not in pain and comfortable.    Vital signs: (most recent): Blood pressure 97/60, pulse 90, temperature 97.5 °F (36.4 °C), temperature source Axillary, resp. rate 18, height 5' 6" (1.676 m), weight 72.5 kg (159 lb 13.3 oz), SpO2 96 %.  No fever.    Output: Producing urine and producing stool.    HEENT: Normal HEENT exam.    Lungs:  Normal effort.  Breath sounds clear to auscultation.  He is not in respiratory distress.  There are rales.  No wheezes.    Heart: Normal rate.  " Regular rhythm.  S1 normal and S2 normal.  No murmur.   Chest: Symmetric chest wall expansion.   Abdomen: Abdomen is soft.  Bowel sounds are normal.   There is no abdominal tenderness.     Extremities: Normal range of motion.  There is venous stasis.  There is no deformity, effusion, dependent edema or local swelling.    Pulses: Distal pulses are intact.    Neurological: Patient is alert and oriented to person, place and time.  Normal strength.    Pupils:  Pupils are equal, round, and reactive to light.    Skin:  Dry and cool.      Medications:  Medication list was reviewed in EPIC and changes noted under Assessment/Plan and MAR.    Laboratory:  Recent Labs     01/13/19  0511   WBC 10.73   RBC 3.95*   HGB 10.1*   HCT 34.1*      MCV 86   MCH 25.6*   MCHC 29.6*   GRAN 83.0*  8.9*   LYMPH 7.4*  0.8*   MONO 7.4  0.8   EOS 0.0      Recent Labs     01/13/19  0511   *   *   K 4.7   CL 81*   CO2 28   BUN 85*   CREATININE 2.9*   CALCIUM 9.1   ANIONGAP 15       ASSESSMENT/PLAN:     Active Hospital Problems    Diagnosis  POA    *Biventricular congestive heart failure [I50.82]  Yes    Goals of care, counseling/discussion [Z71.89]  Not Applicable    DNR (do not resuscitate) [Z66]  Yes    Cardiorenal syndrome [I13.10]  Yes    Supratherapeutic INR [R79.1]  Yes    Hyperkalemia, diminished renal excretion [E87.5]  Yes    Cardiogenic shock [R57.0]  Unknown    Acute on chronic respiratory failure with hypoxia [J96.21]  Yes     Chronic    Chronic atrial fibrillation [I48.2]  Yes    Palliative care encounter [Z51.5]  Not Applicable    Hepatic congestion [K76.1]  Yes      Resolved Hospital Problems   No resolved problems to display.          * Biventricular congestive heart failure     With chronic cardiogenic shock requiring dobutamine.  History suggestive of gradual start of decompensation over past couple of days and likely accelerated by not having dobutamine over past day.  Infusion restarted at  5mcg/kg/min.    - Lasix holiday due to hypoTN and hyponatremia  - Cardiology co-management following         Supratherapeutic INR     Likely secondary to congestive hepatopathy. Resume coumadin at lower dose, d/w PharmD.         Cardiorenal syndrome     Acute elevation in creatinine likely due to decompensated pump failure.  Hold ACEI for now.         Acute on chronic respiratory failure with hypoxia     Secondary to pulmonary edema in context of decompensated biventricular heart failure.  Wean oxygen as able while diuresing.         Chronic atrial fibrillation     S/p ablation; on coumadin. Resume coumadin at lower dose.         Hepatic congestion     Acutely elevated LFTs due to congestive hepatopathy from right-sided failure.  Trend CMP.     DVT PPx - coumadin (resume at lower dose)  Cardiac diet with 1500 cc fluid restriction.  DNR (paperwork in chart); determine after conference with cardiology, Dr. Flores.    Anticipated discharge date and disposition:   Pending diuresis. Likely home with home health with transition to home hospice.  Laurent Drew MD  [facility]

## 2019-01-14 LAB
ALBUMIN SERPL BCP-MCNC: 3 G/DL
ALP SERPL-CCNC: 142 U/L
ALT SERPL W/O P-5'-P-CCNC: 492 U/L
ANION GAP SERPL CALC-SCNC: 13 MMOL/L
ANION GAP SERPL CALC-SCNC: 9 MMOL/L
AST SERPL-CCNC: 150 U/L
BASOPHILS # BLD AUTO: 0.04 K/UL
BASOPHILS NFR BLD: 0.4 %
BILIRUB SERPL-MCNC: 1.9 MG/DL
BUN SERPL-MCNC: 96 MG/DL
BUN SERPL-MCNC: 96 MG/DL
CALCIUM SERPL-MCNC: 9 MG/DL
CALCIUM SERPL-MCNC: 9.5 MG/DL
CHLORIDE SERPL-SCNC: 81 MMOL/L
CHLORIDE SERPL-SCNC: 84 MMOL/L
CO2 SERPL-SCNC: 30 MMOL/L
CO2 SERPL-SCNC: 30 MMOL/L
CREAT SERPL-MCNC: 2.8 MG/DL
CREAT SERPL-MCNC: 3 MG/DL
DIFFERENTIAL METHOD: ABNORMAL
DIGOXIN SERPL-MCNC: 2.2 NG/ML
EOSINOPHIL # BLD AUTO: 0.1 K/UL
EOSINOPHIL NFR BLD: 0.6 %
ERYTHROCYTE [DISTWIDTH] IN BLOOD BY AUTOMATED COUNT: 17.9 %
EST. GFR  (AFRICAN AMERICAN): 22.9 ML/MIN/1.73 M^2
EST. GFR  (AFRICAN AMERICAN): 24.9 ML/MIN/1.73 M^2
EST. GFR  (NON AFRICAN AMERICAN): 19.8 ML/MIN/1.73 M^2
EST. GFR  (NON AFRICAN AMERICAN): 21.6 ML/MIN/1.73 M^2
GLUCOSE SERPL-MCNC: 278 MG/DL
GLUCOSE SERPL-MCNC: 296 MG/DL
HCT VFR BLD AUTO: 34.5 %
HGB BLD-MCNC: 10.4 G/DL
IMM GRANULOCYTES # BLD AUTO: 0.11 K/UL
IMM GRANULOCYTES NFR BLD AUTO: 1.1 %
INR PPP: 2.3
LYMPHOCYTES # BLD AUTO: 0.9 K/UL
LYMPHOCYTES NFR BLD: 9.1 %
MCH RBC QN AUTO: 25 PG
MCHC RBC AUTO-ENTMCNC: 30.1 G/DL
MCV RBC AUTO: 83 FL
MONOCYTES # BLD AUTO: 0.7 K/UL
MONOCYTES NFR BLD: 7 %
NEUTROPHILS # BLD AUTO: 8.5 K/UL
NEUTROPHILS NFR BLD: 81.8 %
NRBC BLD-RTO: 1 /100 WBC
PLATELET # BLD AUTO: 201 K/UL
PMV BLD AUTO: 10.6 FL
POTASSIUM SERPL-SCNC: 4.9 MMOL/L
POTASSIUM SERPL-SCNC: 5.1 MMOL/L
PROT SERPL-MCNC: 8.3 G/DL
PROTHROMBIN TIME: 22.6 SEC
RBC # BLD AUTO: 4.16 M/UL
SODIUM SERPL-SCNC: 123 MMOL/L
SODIUM SERPL-SCNC: 124 MMOL/L
WBC # BLD AUTO: 10.32 K/UL

## 2019-01-14 PROCEDURE — 80053 COMPREHEN METABOLIC PANEL: CPT

## 2019-01-14 PROCEDURE — 85610 PROTHROMBIN TIME: CPT

## 2019-01-14 PROCEDURE — 25000003 PHARM REV CODE 250: Performed by: STUDENT IN AN ORGANIZED HEALTH CARE EDUCATION/TRAINING PROGRAM

## 2019-01-14 PROCEDURE — 85025 COMPLETE CBC W/AUTO DIFF WBC: CPT

## 2019-01-14 PROCEDURE — 63600175 PHARM REV CODE 636 W HCPCS: Performed by: STUDENT IN AN ORGANIZED HEALTH CARE EDUCATION/TRAINING PROGRAM

## 2019-01-14 PROCEDURE — 80162 ASSAY OF DIGOXIN TOTAL: CPT

## 2019-01-14 PROCEDURE — 99233 SBSQ HOSP IP/OBS HIGH 50: CPT | Mod: ,,, | Performed by: HOSPITALIST

## 2019-01-14 PROCEDURE — 99233 SBSQ HOSP IP/OBS HIGH 50: CPT | Mod: ,,, | Performed by: CLINICAL NURSE SPECIALIST

## 2019-01-14 PROCEDURE — 36415 COLL VENOUS BLD VENIPUNCTURE: CPT

## 2019-01-14 PROCEDURE — 80048 BASIC METABOLIC PNL TOTAL CA: CPT

## 2019-01-14 PROCEDURE — 99233 PR SUBSEQUENT HOSPITAL CARE,LEVL III: ICD-10-PCS | Mod: ,,, | Performed by: HOSPITALIST

## 2019-01-14 PROCEDURE — 20600001 HC STEP DOWN PRIVATE ROOM

## 2019-01-14 PROCEDURE — 25000003 PHARM REV CODE 250: Performed by: HOSPITALIST

## 2019-01-14 PROCEDURE — 99233 PR SUBSEQUENT HOSPITAL CARE,LEVL III: ICD-10-PCS | Mod: ,,, | Performed by: CLINICAL NURSE SPECIALIST

## 2019-01-14 RX ORDER — FUROSEMIDE 40 MG/1
40 TABLET ORAL 2 TIMES DAILY
Status: DISCONTINUED | OUTPATIENT
Start: 2019-01-14 | End: 2019-01-15 | Stop reason: HOSPADM

## 2019-01-14 RX ORDER — WARFARIN 2.5 MG/1
2.5 TABLET ORAL DAILY
Status: DISCONTINUED | OUTPATIENT
Start: 2019-01-14 | End: 2019-01-15 | Stop reason: HOSPADM

## 2019-01-14 RX ORDER — FUROSEMIDE 40 MG/1
40 TABLET ORAL 2 TIMES DAILY
Qty: 60 TABLET | Refills: 2 | Status: SHIPPED | OUTPATIENT
Start: 2019-01-15 | End: 2019-04-15

## 2019-01-14 RX ORDER — WARFARIN 2.5 MG/1
2.5 TABLET ORAL DAILY
Qty: 30 TABLET | Refills: 1 | Status: SHIPPED | OUTPATIENT
Start: 2019-01-14 | End: 2019-03-15

## 2019-01-14 RX ADMIN — FUROSEMIDE 40 MG: 40 TABLET ORAL at 02:01

## 2019-01-14 RX ADMIN — GUAIFENESIN AND DEXTROMETHORPHAN HYDROBROMIDE 1 TABLET: 600; 30 TABLET, EXTENDED RELEASE ORAL at 08:01

## 2019-01-14 RX ADMIN — FLUTICASONE PROPIONATE 100 MCG: 50 SPRAY, METERED NASAL at 08:01

## 2019-01-14 RX ADMIN — GUAIFENESIN AND DEXTROMETHORPHAN HYDROBROMIDE 1 TABLET: 600; 30 TABLET, EXTENDED RELEASE ORAL at 09:01

## 2019-01-14 RX ADMIN — FAMOTIDINE 20 MG: 20 TABLET ORAL at 08:01

## 2019-01-14 RX ADMIN — CETIRIZINE HYDROCHLORIDE 5 MG: 5 TABLET ORAL at 08:01

## 2019-01-14 RX ADMIN — WARFARIN SODIUM 2.5 MG: 2.5 TABLET ORAL at 06:01

## 2019-01-14 RX ADMIN — ASPIRIN 81 MG CHEWABLE TABLET 81 MG: 81 TABLET CHEWABLE at 08:01

## 2019-01-14 RX ADMIN — DOBUTAMINE IN DEXTROSE 5 MCG/KG/MIN: 200 INJECTION, SOLUTION INTRAVENOUS at 09:01

## 2019-01-14 NOTE — PLAN OF CARE
Cardiology   Plan of Care  [facility] [place]    Bharat Coker  [id number]   1945        Admitted: 1/7/2019  Attending Physician: Laurent Drew MD       Hospital Problems     *Biventricular congestive heart failure [I50.82]  Yes    Goals of care, counseling/discussion [Z71.89]  Not Applicable    DNR (do not resuscitate) [Z66]  Yes    Cardiorenal syndrome [I13.10]  Yes    Supratherapeutic INR [R79.1]  Yes    Hyperkalemia, diminished renal excretion [E87.5]  Yes    Cardiogenic shock [R57.0]  Yes    Acute on chronic respiratory failure with hypoxia [J96.21]  Yes     Chronic    Chronic atrial fibrillation [I48.2]  Yes    Palliative care encounter [Z51.5]  Not Applicable    Hepatic congestion [K76.1]  Yes       Asked by Dr. Drew to review regimen, progress.    74-year-old man with end-stage CHF, not a candidate for advanced options, presented with congestion and cardiogenic shock after outpatient dobutamine was discontinued for 24 hours, now with progressive azotemia and volume overload despite ongoing IV dobutamine and furosemide.    Presentation and lack of progress consistent with disease stage.  Would use degree of congestion to guide diuretic use, independent of renal function - an essentially palliative approach.  If the patient is comfortable, also observe response to diuretic holidays.      We will continue to follow on request.        Jason Naayk MD  Physician, Cardiology  [facility] [place]    1/13/2019  15:00 CST

## 2019-01-14 NOTE — PLAN OF CARE
Palliative Care:    Visit to bedside after pt's wife called Palliative Care office. Wife stated that Dr. Drew spoke with her and her  and recommended home with hospice.     Hospice education completed with pt and wife.  Wife open to using hospice that [facility] has used prior.     MELISSA left message for Elke Miranda LCSW with [facility] ([phone] ext [phone]) regarding family's wishes.  Received message that [facility] but family can use whichever agency they want. Per Wife, family has no preference except one used by [facility].      MELISSA spoke with primary  Nasrin Saunders, and informed her of above. Nasrin to follow up for discharge.      Monse Hernandes LCSW, ACHP-SW

## 2019-01-14 NOTE — PLAN OF CARE
Problem: Adult Inpatient Plan of Care  Goal: Plan of Care Review  Outcome: Ongoing (interventions implemented as appropriate)  VSS. A/Ox4.  No complaints of pain.  Bear hugger used on and off throughout the night.  No acute events overnight. Safety maintained.  All questions answered. Patient updated on plan of care.  Will continue to monitor.

## 2019-01-14 NOTE — PLAN OF CARE
01/14/19 0816   Discharge Reassessment   Assessment Type Discharge Planning Reassessment   Do you have any problems affording any of your prescribed medications? No   Discharge Plan A Hospice/home   Discharge Plan B Home with family

## 2019-01-14 NOTE — ASSESSMENT & PLAN NOTE
Impression: 74 yr old gentlemen with acute on chronic heart failure with hypoxia, cardiorenal syndrome.. Pt on home dobutamine. Pt siting up in side if the bed. Pitting edema noted to bilateral lower extremities. Pt alert, oriented to person, place, and situation.       Goals of Care:  Spoke with Dr. Drew. Per MD, pt hospice appropriate. MD would like [facility] care to discuss with pt and his wife.     Monse Hernandes and this TALI met with pt and pt's wife. Pt and wife in agreement to home hospice care. Education done with pt.     Monse Hernandes LCSW [facility] concerning [facility] per wife's request. Awaiting call-back.     Plan:   Pt to d/c home with home hospice care when attending MD ready to d/c.

## 2019-01-14 NOTE — SUBJECTIVE & OBJECTIVE
Medications:  Continuous Infusions:   DOBUTamine 5 mcg/kg/min (01/13/19 2016)     Scheduled Meds:   aspirin  81 mg Oral Daily    cetirizine  5 mg Oral Daily    dextromethorphan-guaifenesin  mg  1 tablet Oral BID    famotidine  20 mg Oral Daily    fluticasone  2 spray Each Nare Daily    furosemide  40 mg Oral BID    warfarin  2.5 mg Oral Daily     PRN Meds:benzonatate, diphenhydrAMINE-zinc acetate 1-0.1%, docusate sodium, ondansetron, sodium chloride, sodium chloride 0.9%    Objective:     Vital Signs (Most Recent):  Temp: 98.6 °F (37 °C) (01/14/19 1139)  Pulse: 96 (01/14/19 1139)  Resp: 16 (01/14/19 1139)  BP: (!) 90/51 (01/14/19 1139)  SpO2: 97 % (01/14/19 1139) Vital Signs (24h Range):  Temp:  [95.9 °F (35.5 °C)-98.6 °F (37 °C)] 98.6 °F (37 °C)  Pulse:  [84-97] 96  Resp:  [16-18] 16  SpO2:  [93 %-98 %] 97 %  BP: ()/(51-63) 90/51     Weight: 72.5 kg (159 lb 13.3 oz)  Body mass index is 25.8 kg/m².    Review of Symptoms  Symptom Assessment (ESAS 0-10 scale)  ESAS 0 1 2 3 4 5 6 7 8 9 10   Pain x             Dyspnea x             Anxiety x             Nausea x             Depression  x             Anorexia x             Fatigue x             Insomnia x             Restlessness  x             Agitation x             CAM / Delirium _x_ --  ___+   Constipation     _x_ --  ___+   Diarrhea           _x_ --  ___+    Comments: patient c/o itching at mid back.  Small dry areas noted    Performance Status: 50    Physical Exam   Constitutional: He is oriented to person, place, and time. He appears well-developed and well-nourished.   HENT:   Head: Normocephalic and atraumatic.   Neck: Normal range of motion.   Cardiovascular:   Pitting edema noted to bilateral legs   Pulmonary/Chest: Effort normal and breath sounds normal.   Neurological: He is alert and oriented to person, place, and time.   Skin: Skin is warm and dry.   Psychiatric: He has a normal mood and affect. His behavior is normal. Judgment and  thought content normal.     CBC:   Recent Labs   Lab 01/14/19  0349   WBC 10.32   HGB 10.4*   HCT 34.5*   MCV 83        BMP:  Recent Labs   Lab 01/14/19  0349   *   *   K 4.9   CL 81*   CO2 30*   BUN 96*   CREATININE 3.0*   CALCIUM 9.5     LFT:  Lab Results   Component Value Date     (H) 01/14/2019    ALKPHOS 142 (H) 01/14/2019    BILITOT 1.9 (H) 01/14/2019     Albumin:   Albumin   Date Value Ref Range Status   01/14/2019 3.0 (L) 3.5 - 5.2 g/dL Final     Protein:   Total Protein   Date Value Ref Range Status   01/14/2019 8.3 6.0 - 8.4 g/dL Final     Lactic acid:   No results found for: LACTATE

## 2019-01-14 NOTE — PROGRESS NOTES
Progress Note  Hospital Medicine    Provider team:    [facility] CARDIOLOGY TEAM A - CARDIOLOGY CONSULT STEPDOWN  [facility] HOSP MED C  Admit Date: 1/7/2019  Encounter Date: 01/14/2019     SUBJECTIVE:     Follow-up Visit for: Biventricular congestive heart failure    HPI (See H&P for complete P,F,SHx):   74M with NYHA class IV biventricular systolic and diastolic heart failure on continuous home dobutamine infusion presents to ED complaining of shortness of breath, which began earlier in the day.  The previous night he took a dose of lasix 40mg due to increasing abdominal distension and appetite loss, which usually presages the beginning of decompensation, and took another dose this past morning but had only put out about 500mL prior to coming in.  Upon arrival in the ED it was noted that his dobutamine pump was off.  His wife changed the bag the prior night and it was working then but is not sure if she turned it back on afterwards.  He weighs himself daily and his weight is up 1 pound over the past 3 days.  He feels a little better since the dobutamine was restarted but is still uncomfortable.    Hospital course:  Patient has been started back on  infusion and put on lasix infusion. He is steadily improving, not quite at baseline. His abdominal distension is improving as is appetite. He does not really feel SOB though, only mild dyspnea, but not exerting. He doesn't have any pain.  As documented, through long discussion >30 min with family and cardiology present, decision made that patient has DNR directive and patient/family interested in pursuing palliative options. Palliative care has seen and [facility] Patient currently has outpatient visit scheduled for 2/1/2019.    Interval history:  Patient without robust o/p. Had to do trial of lasix holiday as worsening Cr and Na. He appears slightly below dry weight: dry weight seems to be a 160-162. Patient has had hypoTN  "overnight which has been issue, as well as hypothermia. All is consistent with cardiogenic shock. Continuing with . I spoke with cardiology co-management and they have seen the patient. He has a bit more peripheral edema so will start some maintenance diuretics.  Per co-management, no advanced therapies can be offered and home hospice appropriate. Patient ready to go home with home hospice.    Review of Systems:  Review of Systems   Constitutional: Negative for chills and fever.   HENT: Negative for congestion and sore throat.    Eyes: Negative for photophobia, pain and discharge.   Respiratory: Positive for shortness of breath. Negative for cough, hemoptysis and sputum production.    Cardiovascular: Negative for chest pain, palpitations and leg swelling.   Gastrointestinal: Positive for abdominal pain. Negative for diarrhea, nausea and vomiting.   Genitourinary: Negative for dysuria and urgency.   Musculoskeletal: Negative for myalgias and neck pain.   Skin: Negative for itching and rash.   Neurological: Negative for sensory change, focal weakness and headaches.   Endo/Heme/Allergies: Negative for polydipsia. Does not bruise/bleed easily.   Psychiatric/Behavioral: Negative for depression and suicidal ideas.     OBJECTIVE:       Intake/Output Summary (Last 24 hours) at 1/14/2019 0806  Last data filed at 1/13/2019 1643  Gross per 24 hour   Intake --   Output 175 ml   Net -175 ml     Vital Signs Range (Last 24H):  Temp:  [95.9 °F (35.5 °C)-98 °F (36.7 °C)]   Pulse:  [84-97]   Resp:  [16-18]   BP: ()/(51-63)   SpO2:  [93 %-98 %]   Body mass index is 25.8 kg/m².    Objective:  General Appearance:  Ill-appearing, in no acute distress, not in pain and comfortable.    Vital signs: (most recent): Blood pressure (!) 75/51, pulse 92, temperature 96.3 °F (35.7 °C), temperature source Oral, resp. rate 16, height 5' 6" (1.676 m), weight 72.5 kg (159 lb 13.3 oz), SpO2 95 %.  No fever.    Output: Producing urine and " producing stool.    HEENT: Normal HEENT exam.    Lungs:  Normal effort.  Breath sounds clear to auscultation.  He is not in respiratory distress.  There are rales.  No wheezes.    Heart: Normal rate.  Regular rhythm.  S1 normal and S2 normal.  No murmur.   Chest: Symmetric chest wall expansion.   Abdomen: Abdomen is soft.  Bowel sounds are normal.   There is no abdominal tenderness.     Extremities: Normal range of motion.  There is venous stasis.  There is no deformity, effusion, dependent edema or local swelling.    Pulses: Distal pulses are intact.    Neurological: Patient is alert and oriented to person, place and time.  Normal strength.    Pupils:  Pupils are equal, round, and reactive to light.    Skin:  Dry and cool.      Medications:  Medication list was reviewed in EPIC and changes noted under Assessment/Plan and MAR.    Laboratory:  Recent Labs     01/14/19  0349   WBC 10.32   RBC 4.16*   HGB 10.4*   HCT 34.5*      MCV 83   MCH 25.0*   MCHC 30.1*   GRAN 81.8*  8.5*   LYMPH 9.1*  0.9*   MONO 7.0  0.7   EOS 0.1      Recent Labs     01/14/19  0349   *   *   K 4.9   CL 81*   CO2 30*   BUN 96*   CREATININE 3.0*   CALCIUM 9.5   ANIONGAP 13       ASSESSMENT/PLAN:     Active Hospital Problems    Diagnosis  POA    *Biventricular congestive heart failure [I50.82]  Yes    Goals of care, counseling/discussion [Z71.89]  Not Applicable    DNR (do not resuscitate) [Z66]  Yes    Cardiorenal syndrome [I13.10]  Yes    Supratherapeutic INR [R79.1]  Yes    Hyperkalemia, diminished renal excretion [E87.5]  Yes    Cardiogenic shock [R57.0]  Yes    Acute on chronic respiratory failure with hypoxia [J96.21]  Yes     Chronic    Chronic atrial fibrillation [I48.2]  Yes    Palliative care encounter [Z51.5]  Not Applicable    Hepatic congestion [K76.1]  Yes      Resolved Hospital Problems   No resolved problems to display.          * Biventricular congestive heart failure     With chronic cardiogenic  shock requiring dobutamine.  History suggestive of gradual start of decompensation over past couple of days and likely accelerated by not having dobutamine over past day.  Infusion restarted at 5mcg/kg/min.    - Trial of PO lasix maintenance due to peripheral edema  - Cardiology co-management following         Supratherapeutic INR     Likely secondary to congestive hepatopathy. Resume coumadin at lower dose, d/w PharmD.         Cardiorenal syndrome     Acute elevation in creatinine likely due to decompensated pump failure.  Hold ACEI for now.         Acute on chronic respiratory failure with hypoxia     Secondary to pulmonary edema in context of decompensated biventricular heart failure.  Wean oxygen as able while diuresing.         Chronic atrial fibrillation     S/p ablation; on coumadin. Resume coumadin at lower dose.         Hepatic congestion     Acutely elevated LFTs due to congestive hepatopathy from right-sided failure.  Trend CMP.     DVT PPx - coumadin (resume at lower dose)  Cardiac diet with 1500 cc fluid restriction.  DNR (paperwork in chart); determine after conference with cardiology, Dr. Flores.    Anticipated discharge date and disposition:   Patient is medically ready for home with hospice. Working with palliative care team and SW directly.  Made myself available to them for any needs at [id number]. Will place hospice plan of care orders for home.  Laurent Drew MD  [facility] Medicine

## 2019-01-14 NOTE — CONSULTS
Palliative Care Acknowledgement of Consult - .date    Consult received. Re-consult from 1/8/18 3708. Palliative Care Provider:  will touch base with team prior to seeing patient. Full consult to follow.    Thank you for allowing us to be a part of the care of this patient.          Monse Hernandes, GRACE, ACHP-SW

## 2019-01-14 NOTE — PLAN OF CARE
[facility]     Department of Hospital Medicine     [address] [place] [address]     [phone] after hours  [phone] fax                                   HOSPICE  ORDERS     01/14/2019    Admit to Hospice:  Home Service    Diagnoses:  Active Hospital Problems    Diagnosis  POA    *Biventricular congestive heart failure [I50.82]  Yes    Goals of care, counseling/discussion [Z71.89]  Not Applicable    DNR (do not resuscitate) [Z66]  Yes    Cardiorenal syndrome [I13.10]  Yes    Supratherapeutic INR [R79.1]  Yes    Hyperkalemia, diminished renal excretion [E87.5]  Yes    Cardiogenic shock [R57.0]  Yes    Acute on chronic respiratory failure with hypoxia [J96.21]  Yes     Chronic    Chronic atrial fibrillation [I48.2]  Yes    Palliative care encounter [Z51.5]  Not Applicable    Hepatic congestion [K76.1]  Yes      Resolved Hospital Problems   No resolved problems to display.       Hospice Qualifying Diagnoses: NYHA Class IV Biventricular Heart Failure       Patient has a life expectancy < 6 months due to these conditions.    Vital Signs: Routine per Hospice Protocol.    Blood draws: Please draw INR 1/16 and then 1/18; further blood draws at discretion of medical director. Ambulatory referral placed to coumadin monitoring clinic at [facility].    Allergies:  Review of patient's allergies indicates:   Allergen Reactions    Ativan [lorazepam] Anxiety     Diet: Cardiac diet with 1500 cc fluid restriction    Activities: As tolerated    Nursing: Per Hospice Routine    Future Orders:  Hospice Medical Director may dictate new orders for comfortable care measures & sign death certificate.    Medications:    Bharat Coker   Home Medication Instructions RAMA:[id number]    Printed on:01/14/19 1551   Medication Information                      aspirin 81 MG Chew  Take 81 mg by mouth once daily.             atorvastatin (LIPITOR) 80 MG tablet  Take 80 mg by mouth  nightly.             azelastine (ASTELIN) 137 mcg (0.1 %) nasal spray  1 spray by Nasal route as needed.              benzonatate (TESSALON) 200 MG capsule  Take 200 mg by mouth 3 (three) times daily as needed for Cough.             cetirizine (ZYRTEC) 5 MG tablet  Take 1 tablet (5 mg total) by mouth once daily.             DOBUTamine (DOBUTREX) 500 mg/250 mL (2,000 mcg/mL)  Inject 317.5 mcg/min into the vein continuous.    5 mcg/kg/min  Dosing weight: 72.5 kg             docusate sodium (COLACE) 100 MG capsule  Take 100 mg by mouth 2 (two) times daily as needed for Constipation.             fluticasone (FLONASE) 50 mcg/actuation nasal spray  2 sprays by Each Nare route as needed.              furosemide (LASIX) 40 MG tablet  Take 1 tablet (40 mg total) by mouth 2 (two) times daily.             guaiFENesin (MUCINEX) 600 mg 12 hr tablet  Take 1,200 mg by mouth as needed for Congestion.             ranitidine (ZANTAC) 150 MG tablet  Take 150 mg by mouth once daily.                        warfarin (COUMADIN) 2.5 MG tablet  Take 1 tablet (2.5 mg total) by mouth Daily.                     _________________________________  Laurent Drew MD  01/14/2019

## 2019-01-14 NOTE — PROGRESS NOTES
Neuro Interventional s/p Thrombectomy    Procedure: Cerebral angiogram for mechanical thrombectomy 3/1/18    Chief Complaint: aphasia and right sided weakness    Assessment:  · Occlusion of L MCA s/p mechanical thrombectomy with TICI 2b by Dr. Mcdonnell    Plan:  1. Left MCA stroke s/p thrombectomy, embolic stroke possibly from fat emboli during orthopedic surgery  · Remove angio dressing - d/w RN Lorena  · Continue statin  · Xarelto per Ortho  · Stroke team following  · PT/OT/ST    Diagnostic Studies:  CT head 3/5/18: no change in Left MCA distrubution ischemia but with small amount of cortical petechial hemorrhage.    PACKERS data:  NIHSS pre-procedure: 18  NIHSS day # 5: 14  NIHSS day # 7:   NIHSS day of discharge:  ICH within 5 days s/p thrombectomy: no  Increase in NIHSS > 4 points r/t hemorrhage: no    Subjective: Unable to provide secondary to expressive aphasia.     * utilized during entire visit.    Physical Exam:  Vitals:    03/06/18 1200   BP: 120/60   Pulse: 98   Resp: 18   Temp: 99.6 °F (37.6 °C)     General: diaphoretic, dangling at side of bed with PT, appears stated age  Neurologic: awake and alert, improving receptive aphasia, right arm remains flaccid with posturing movements only, right leg weak but mobility improving with therapy, LUE/LLE movement, sensation, strength intact.   Skin: No rashes, skin warm and dry  Extremities: +2 radial and pedal pulses, no edema noted  Groin: No signs of hematoma, bruising around groin site    Care discussed with Dr. Evans.    3/6/2018     GUERITA Moreno  Neuro Interventional (Arline Mcdonnell and Nathan)  [facility]  Pager [phone]  Answering service: [phone]           [facility]  Palliative Medicine  Progress Note    Patient Name: Bharat Coker  MRN: [id number]  Admission Date: 1/7/2019  Hospital Length of Stay: 7 days  Code Status: DNR   Attending Provider: Laurent Drew MD  Consulting Provider: DOMINIQUE Denis  Primary Care Physician: Ronnie Richardson Jr, MD  Principal Problem:Biventricular congestive heart failure    Patient information was obtained from patient, spouse/SO and ER records.      Assessment/Plan:     Palliative care encounter    Impression: 74 yr old gentlemen with acute on chronic heart failure with hypoxia, cardiorenal syndrome.. Pt on home dobutamine. Pt siting up in side if the bed. Pitting edema noted to bilateral lower extremities. Pt alert, oriented to person, place, and situation.       Goals of Care:  Spoke with Dr. Drew. Per MD, pt hospice appropriate. MD would like Pal care to discuss with pt and his wife.     Monse Hernandes and this TALI met with pt and pt's wife. Pt and wife in agreement to home hospice care. Education done with pt.     Monse Hernandes LCSW [facility] per wife's request. Awaiting call-back.     Plan:   Pt to d/c home with home hospice care when attending MD ready to d/c.                  I will follow-up with patient. Please contact us if you have any additional questions.    Subjective:     Chief Complaint:   Chief Complaint   Patient presents with    Shortness of Breath     Presents to Ed via EMS c/o SOB since 1600. Pt on 2 L per NC at home. Home health nurse gave 80mg IV lasix prior to EMS arrivak       HPI:   74 yr old gentlemen with acute on chronic heart failure with hypoxia, cardiorenal syndrome and supratherapeutic INR. Pt presented to the ER with SOB. Patient noted  increasing abdominal distension, decreased appetite, 1# weight gain in last 3 days  and took prn doses of Lasix. In the ER it was noted that his dobutamine pump was off.   Significant  PMH  of CAD s/p CABG, ICM/HFrEF (LVef10%) on home  5mcg/kg/min, and  s/p [employer] CRT-D.      Hospital Course:  No notes on file    Medications:  Continuous Infusions:   DOBUTamine 5 mcg/kg/min (01/13/19 2016)     Scheduled Meds:   aspirin  81 mg Oral Daily    cetirizine  5 mg Oral Daily    dextromethorphan-guaifenesin  mg  1 tablet Oral BID    famotidine  20 mg Oral Daily    fluticasone  2 spray Each Nare Daily    furosemide  40 mg Oral BID    warfarin  2.5 mg Oral Daily     PRN Meds:benzonatate, diphenhydrAMINE-zinc acetate 1-0.1%, docusate sodium, ondansetron, sodium chloride, sodium chloride 0.9%    Objective:     Vital Signs (Most Recent):  Temp: 98.6 °F (37 °C) (01/14/19 1139)  Pulse: 96 (01/14/19 1139)  Resp: 16 (01/14/19 1139)  BP: (!) 90/51 (01/14/19 1139)  SpO2: 97 % (01/14/19 1139) Vital Signs (24h Range):  Temp:  [95.9 °F (35.5 °C)-98.6 °F (37 °C)] 98.6 °F (37 °C)  Pulse:  [84-97] 96  Resp:  [16-18] 16  SpO2:  [93 %-98 %] 97 %  BP: ()/(51-63) 90/51     Weight: 72.5 kg (159 lb 13.3 oz)  Body mass index is 25.8 kg/m².    Review of Symptoms  Symptom Assessment (ESAS 0-10 scale)  ESAS 0 1 2 3 4 5 6 7 8 9 10   Pain x             Dyspnea x             Anxiety x             Nausea x             Depression  x             Anorexia x             Fatigue x             Insomnia x             Restlessness  x             Agitation x             CAM / Delirium _x_ --  ___+   Constipation     _x_ --  ___+   Diarrhea           _x_ --  ___+    Comments: patient c/o itching at mid back.  Small dry areas noted    Performance Status: 50    Physical Exam   Constitutional: He is oriented to person, place, and time. He appears well-developed and well-nourished.   HENT:   Head: Normocephalic and atraumatic.   Neck: Normal range of motion.   Cardiovascular:   Pitting edema noted to bilateral legs   Pulmonary/Chest: Effort normal and breath sounds normal.   Neurological: He is alert and oriented to person,  place, and time.   Skin: Skin is warm and dry.   Psychiatric: He has a normal mood and affect. His behavior is normal. Judgment and thought content normal.     CBC:   Recent Labs   Lab 01/14/19  0349   WBC 10.32   HGB 10.4*   HCT 34.5*   MCV 83        BMP:  Recent Labs   Lab 01/14/19 0349   *   *   K 4.9   CL 81*   CO2 30*   BUN 96*   CREATININE 3.0*   CALCIUM 9.5     LFT:  Lab Results   Component Value Date     (H) 01/14/2019    ALKPHOS 142 (H) 01/14/2019    BILITOT 1.9 (H) 01/14/2019     Albumin:   Albumin   Date Value Ref Range Status   01/14/2019 3.0 (L) 3.5 - 5.2 g/dL Final     Protein:   Total Protein   Date Value Ref Range Status   01/14/2019 8.3 6.0 - 8.4 g/dL Final     Lactic acid:   No results found for: LACTATE              > 50% of 35 min visit spent in chart review, face to face discussion of goals of care,  symptom assessment, coordination of care and emotional support.    Skye Slaughter, CNS  Palliative Medicine  [facility]

## 2019-01-15 VITALS
OXYGEN SATURATION: 99 % | BODY MASS INDEX: 25.68 KG/M2 | HEIGHT: 66 IN | SYSTOLIC BLOOD PRESSURE: 90 MMHG | RESPIRATION RATE: 18 BRPM | HEART RATE: 91 BPM | DIASTOLIC BLOOD PRESSURE: 54 MMHG | WEIGHT: 159.81 LBS | TEMPERATURE: 98 F

## 2019-01-15 LAB
ALBUMIN SERPL BCP-MCNC: 2.8 G/DL
ALP SERPL-CCNC: 134 U/L
ALT SERPL W/O P-5'-P-CCNC: 363 U/L
ANION GAP SERPL CALC-SCNC: 11 MMOL/L
ANISOCYTOSIS BLD QL SMEAR: SLIGHT
AST SERPL-CCNC: 85 U/L
BASOPHILS # BLD AUTO: 0.03 K/UL
BASOPHILS NFR BLD: 0.1 %
BILIRUB SERPL-MCNC: 2.3 MG/DL
BUN SERPL-MCNC: 101 MG/DL
BURR CELLS BLD QL SMEAR: ABNORMAL
CALCIUM SERPL-MCNC: 8.9 MG/DL
CHLORIDE SERPL-SCNC: 81 MMOL/L
CO2 SERPL-SCNC: 30 MMOL/L
CREAT SERPL-MCNC: 2.9 MG/DL
DIFFERENTIAL METHOD: ABNORMAL
EOSINOPHIL # BLD AUTO: 0 K/UL
EOSINOPHIL NFR BLD: 0 %
ERYTHROCYTE [DISTWIDTH] IN BLOOD BY AUTOMATED COUNT: 17.9 %
EST. GFR  (AFRICAN AMERICAN): 23.9 ML/MIN/1.73 M^2
EST. GFR  (NON AFRICAN AMERICAN): 20.7 ML/MIN/1.73 M^2
GLUCOSE SERPL-MCNC: 326 MG/DL
HCT VFR BLD AUTO: 35.2 %
HGB BLD-MCNC: 10.6 G/DL
HYPOCHROMIA BLD QL SMEAR: ABNORMAL
IMM GRANULOCYTES # BLD AUTO: 0.26 K/UL
IMM GRANULOCYTES NFR BLD AUTO: 1 %
INR PPP: 2.4
LYMPHOCYTES # BLD AUTO: 0.3 K/UL
LYMPHOCYTES NFR BLD: 1.2 %
MCH RBC QN AUTO: 25.6 PG
MCHC RBC AUTO-ENTMCNC: 30.1 G/DL
MCV RBC AUTO: 85 FL
MONOCYTES # BLD AUTO: 1.2 K/UL
MONOCYTES NFR BLD: 4.4 %
NEUTROPHILS # BLD AUTO: 25 K/UL
NEUTROPHILS NFR BLD: 93.3 %
NRBC BLD-RTO: 0 /100 WBC
OVALOCYTES BLD QL SMEAR: ABNORMAL
PLATELET # BLD AUTO: 215 K/UL
PMV BLD AUTO: 11.3 FL
POIKILOCYTOSIS BLD QL SMEAR: SLIGHT
POLYCHROMASIA BLD QL SMEAR: ABNORMAL
POTASSIUM SERPL-SCNC: 5.2 MMOL/L
PROT SERPL-MCNC: 7.7 G/DL
PROTHROMBIN TIME: 23.1 SEC
RBC # BLD AUTO: 4.14 M/UL
SCHISTOCYTES BLD QL SMEAR: ABNORMAL
SODIUM SERPL-SCNC: 122 MMOL/L
WBC # BLD AUTO: 26.76 K/UL

## 2019-01-15 PROCEDURE — 36415 COLL VENOUS BLD VENIPUNCTURE: CPT

## 2019-01-15 PROCEDURE — 25000003 PHARM REV CODE 250: Performed by: HOSPITALIST

## 2019-01-15 PROCEDURE — 99238 PR HOSPITAL DISCHARGE DAY,<30 MIN: ICD-10-PCS | Mod: ,,, | Performed by: HOSPITALIST

## 2019-01-15 PROCEDURE — 85610 PROTHROMBIN TIME: CPT

## 2019-01-15 PROCEDURE — 25000003 PHARM REV CODE 250: Performed by: STUDENT IN AN ORGANIZED HEALTH CARE EDUCATION/TRAINING PROGRAM

## 2019-01-15 PROCEDURE — 99238 HOSP IP/OBS DSCHRG MGMT 30/<: CPT | Mod: ,,, | Performed by: HOSPITALIST

## 2019-01-15 PROCEDURE — 85025 COMPLETE CBC W/AUTO DIFF WBC: CPT

## 2019-01-15 PROCEDURE — 80053 COMPREHEN METABOLIC PANEL: CPT

## 2019-01-15 RX ADMIN — SODIUM POLYSTYRENE SULFONATE 30 G: 15 SUSPENSION ORAL; RECTAL at 09:01

## 2019-01-15 RX ADMIN — FAMOTIDINE 20 MG: 20 TABLET ORAL at 09:01

## 2019-01-15 RX ADMIN — ASPIRIN 81 MG CHEWABLE TABLET 81 MG: 81 TABLET CHEWABLE at 09:01

## 2019-01-15 RX ADMIN — GUAIFENESIN AND DEXTROMETHORPHAN HYDROBROMIDE 1 TABLET: 600; 30 TABLET, EXTENDED RELEASE ORAL at 09:01

## 2019-01-15 RX ADMIN — FUROSEMIDE 40 MG: 40 TABLET ORAL at 09:01

## 2019-01-15 RX ADMIN — FLUTICASONE PROPIONATE 100 MCG: 50 SPRAY, METERED NASAL at 09:01

## 2019-01-15 RX ADMIN — CETIRIZINE HYDROCHLORIDE 5 MG: 5 TABLET ORAL at 09:01

## 2019-01-15 NOTE — NURSING
Patient discharged home . Transported via wheelchair to personal vehicle with wife. Patient with portable home O2 in tow. IV medication delivered bedside and discharge instructions in hand.

## 2019-01-15 NOTE — PLAN OF CARE
Hospice referral was sent via [facility] to [facility] for home hospice.  pts wife sign paperwork yesterday.  [facility] arranged home dobutamine with [facility].  pts wife will transport pt home by car.  She is in agreement with arrangements.     01/15/19 1523   Post-Acute Status   Post-Acute Authorization Home Health/Hospice   Home Health/Hospice Status Referrals Sent

## 2019-01-15 NOTE — PLAN OF CARE
Problem: Adult Inpatient Plan of Care  Goal: Plan of Care Review  Pt A&Ox3. Pt hard of hearing, no hearing aid. Pt on continuous Dobutamine gtt running at 9.5. VSS. Pt continues to use bare hugger at bedside. Pt remains free of falls. Will continue to monitor.

## 2019-01-16 ENCOUNTER — ANTI-COAG VISIT (OUTPATIENT)
Dept: CARDIOLOGY | Facility: CLINIC | Age: 74
End: 2019-01-16

## 2019-01-16 DIAGNOSIS — Z79.01 LONG TERM (CURRENT) USE OF ANTICOAGULANTS: ICD-10-CM

## 2019-01-16 DIAGNOSIS — I48.20 CHRONIC ATRIAL FIBRILLATION: ICD-10-CM

## 2019-01-16 NOTE — DISCHARGE SUMMARY
DISCHARGE SUMMARY  Hospital Medicine    Team: [facility]    Patient Name: Bharat Coker  YOB: 1945    Admit Date: 1/7/2019    Discharge Date: 1/15/2019    Discharge Attending Physician: Pauly Deshpande MD    Principal Diagnoses:  Active Hospital Problems    Diagnosis  POA    *Biventricular congestive heart failure [I50.82]  Yes    Goals of care, counseling/discussion [Z71.89]  Not Applicable    DNR (do not resuscitate) [Z66]  Yes    Cardiorenal syndrome [I13.10]  Yes    Supratherapeutic INR [R79.1]  Yes    Hyperkalemia, diminished renal excretion [E87.5]  Yes    Cardiogenic shock [R57.0]  Yes    Acute on chronic respiratory failure with hypoxia [J96.21]  Yes     Chronic    Chronic atrial fibrillation [I48.2]  Yes    Palliative care encounter [Z51.5]  Not Applicable    Hepatic congestion [K76.1]  Yes      Resolved Hospital Problems   No resolved problems to display.       Discharged Condition: stable    HOSPITAL COURSE:      Initial Presentation:    74M with NYHA class IV biventricular systolic and diastolic heart failure on continuous home dobutamine infusion presents to ED complaining of shortness of breath, which began earlier in the day.  The previous night he took a dose of lasix 40mg due to increasing abdominal distension and appetite loss, which usually presages the beginning of decompensation, and took another dose this past morning but had only put out about 500mL prior to coming in.  Upon arrival in the ED it was noted that his dobutamine pump was off.  His wife changed the bag the prior night and it was working then but is not sure if she turned it back on afterwards.  He weighs himself daily and his weight is up 1 pound over the past 3 days.  He feels a little better since the dobutamine was restarted but is still uncomfortable.    Course of Principle Problem for Admission:    Pt was admitted to [facility] for further evaluation.  Even though pump was only off a short period of  having been off, pt went into cardiogenic shock. He was started on  and lasix, with lasix having to be held or modified on occasion due to severe hypotension with worsening creatinine and sodium. He has also had hypothermia in keeping with cardiogenic shock.  After multiple discussions with primary hospitalist Dr. Drew, Palliative Care, and Cardiology comanagement teams, pt and family decided to take pt home with hospice care.     Other Medical Problems Addressed in the Hospital:    Biventricular congestive heart failure     With chronic cardiogenic shock requiring dobutamine.  History suggestive of gradual start of decompensation over past couple of days and likely accelerated by not having dobutamine over past day.  Infusion restarted at 5mcg/kg/min.    - Discharged with PO maintenance Lasix      Supratherapeutic INR     Likely secondary to congestive hepatopathy. Resume coumadin at lower dose      Cardiorenal syndrome     Acute elevation in creatinine likely due to decompensated pump failure.  Hold ACEI for now.         Acute on chronic respiratory failure with hypoxia     Secondary to pulmonary edema in context of decompensated biventricular heart failure.  Wean oxygen as able      Chronic atrial fibrillation     S/p ablation; on coumadin. Resume coumadin at lower dose.      Hepatic congestion     Acutely elevated LFTs due to congestive hepatopathy from right-sided failure       Consults: Cardiology    Last CBC/BMP:    CBC/Anemia Labs: Coags:    Recent Labs   Lab 01/13/19  0511 01/14/19  0349 01/15/19  0649   WBC 10.73 10.32 26.76*   HGB 10.1* 10.4* 10.6*   HCT 34.1* 34.5* 35.2*    201 215   MCV 86 83 85   RDW 17.9* 17.9* 17.9*    Recent Labs   Lab 01/13/19  0511 01/14/19  0349 01/15/19  0649   INR 3.0* 2.3* 2.4*        Chemistries:   Recent Labs   Lab 01/13/19  0511  01/14/19  0349 01/14/19  1619 01/15/19  0649   *   < > 124* 123* 122*   K 4.7   < > 4.9 5.1 5.2*   CL 81*   < > 81* 84* 81*    CO2 28   < > 30* 30* 30*   BUN 85*   < > 96* 96* 101*   CREATININE 2.9*   < > 3.0* 2.8* 2.9*   CALCIUM 9.1   < > 9.5 9.0 8.9   PROT 7.7  --  8.3  --  7.7   BILITOT 2.1*  --  1.9*  --  2.3*   ALKPHOS 137*  --  142*  --  134   *  --  492*  --  363*   *  --  150*  --  85*    < > = values in this interval not displayed.          Significant Diagnostic Studies: as above    Special Treatments/Procedures:   * No surgery found *     Disposition: Hospice/Home      Future Scheduled Appointments:  Future Appointments   Date Time Provider Department Center   1/18/2019 11:00 AM EKG, APPT [facility] EKG [facility]   1/18/2019 11:40 AM Onesimo Willams MD [facility] ARRHYTH [facility]   2/19/2019 10:00 AM Vel Posey MD [facility] CARDIO [place]       Discharge Medication List:       Bharat Coker   Home Medication Instructions RAMA:[id number]    Printed on:01/16/19 1801   Medication Information                      aspirin 81 MG Chew  Take 81 mg by mouth once daily.             atorvastatin (LIPITOR) 80 MG tablet  Take 80 mg by mouth nightly.             azelastine (ASTELIN) 137 mcg (0.1 %) nasal spray  1 spray by Nasal route as needed.              benzonatate (TESSALON) 200 MG capsule  Take 200 mg by mouth 3 (three) times daily as needed for Cough.             cetirizine (ZYRTEC) 5 MG tablet  Take 1 tablet (5 mg total) by mouth once daily.             DOBUTamine (DOBUTREX) 500 mg/250 mL (2,000 mcg/mL)  Inject 317.5 mcg/min into the vein continuous.             docusate sodium (COLACE) 100 MG capsule  Take 100 mg by mouth 2 (two) times daily as needed for Constipation.             fluticasone (FLONASE) 50 mcg/actuation nasal spray  2 sprays by Each Nare route as needed.              furosemide (LASIX) 40 MG tablet  Take 1 tablet (40 mg total) by mouth 2 (two) times daily.             guaiFENesin (MUCINEX) 600 mg 12 hr tablet  Take 1,200 mg by mouth as needed for Congestion.             ranitidine (ZANTAC) 150 MG  tablet  Take 150 mg by mouth once daily.             warfarin (COUMADIN) 2.5 MG tablet  Take 1 tablet (2.5 mg total) by mouth Daily.                 Patient Instructions:  Discharge Procedure Orders   Ambulatory consult to Cardiology   Referral Priority: Routine Referral Type: Consultation   Referral Reason: Specialty Services Required   Requested Specialty: Cardiology   Number of Visits Requested: 1     Ambulatory consult to Anticoagulation Monitoring   Referral Priority: Routine Referral Type: Consultation   Referral Reason: Specialty Services Required   Requested Specialty: Cardiology   Number of Visits Requested: 1     Activity as tolerated       At the time of discharge patient was told to take all medications as prescribed, to keep all followup appointments, and to call their primary care physician or return to the emergency room if they have any worsening or concerning symptoms.    Signing Physician:  Pauly Deshpande MD

## 2019-01-16 NOTE — PLAN OF CARE
01/16/19 0702   Final Note   Assessment Type Final Discharge Note   Anticipated Discharge Disposition [facility] Follow Up  Appt(s) scheduled? Yes

## 2019-01-16 NOTE — PROGRESS NOTES
Encounter created in error due to receiving a second referral from the hospital (as patient is already enrolled with [facility]) when the patient was discharge on now hospice with [facility].  He was previously on home health with [facility].  The issue is that the patient's PCP Ronnie Richardson who is a [facility] physician was assigned on the first referral.  I sent a message in Epic to Lizabeth Ruiz RN for Dr Posey to see if he would accept responsibility for the patient per our protocol of it needing to be an [facility] physician.  I am waiting for a response.    Patient was in the hospital admitted on 1/7/19 -1/15/19  for CHF exacerbation, elevated INR,  and other multiple issues which is why he missed his last INR with us.  Patient discharged on new dose Coumadin 2.5 mg tab daily.  I faxed [facility] an order to draw INR on 1/17/19.    Coumadin given during admit:  1/12/19  2.5 mg  1/13/19  2 mg (only two mg)  1/14/19 2.5 mg       UPDATE  1/18/19   I spoke with Greg Cheng patient's nurse at [facility] and INR was not drawn on 1/17/19.  I faxed order to [facility] to draw INR on 1/21/19 which is their next home visit.

## 2019-01-17 NOTE — PROGRESS NOTES
"Subjective:      Patient ID: Bharat Coker is a 73 y.o. male.    Chief Complaint: No chief complaint on file.      HPI:  Bharat Coker is a 73 y.o. male who presents with PMH of CAD s/p CABG, ICM, HFrEF who presents as a transfer from OSH after he presented there for [facility], weight gain and palpitations. He presented to OSH after being discharged from the same facility 4 days prior to Oct 8. He was there initially for ADHF and KASEY. The patient recently had a revision of his CRT-D. The patient became SOB on Oct 7 but did not have CP. The patient felt his pulse and noted it to be rapid. He was brought to the ED at OSH by EMS; his HR was ~170. The ECG was c/w WCT (later documentation notes SVT with aberrancy after interrogation was negative for VT), and he was given one dose of adenosine. The HR came down to 110. His CXR was c/w pulmonary edema. A pro-BNP was at one point 20084.The patient's WBC was 17 and sCr 1.6. tBili was 1.5. An ECHO on 10/8: LVIDd 6.5 cm, EF 15%, IVC normal collapsibility and normal IVC size. An undated stress test notes: "mild reversibility noted in the cardiac apex and there lateral wall of the heart from cardiac apex to approximately mid wall suspicion for ischemia..mild reversibility noted in the mid to basilar inferior/inferoseptal wall of the heart also suspicious for ischemia". He had a LHC 6/2018: LIMA-LAD patent, % occluded at ostium, SVG-% occluded, SVG-D 100% occluded, pLCx 30%, 100% occluded OM, 100% LAD occlusion after takeoff of D1, D1 is tiny with 80% disease, SVG-OM 80% proximal stenosis with 50% at site of anastomosis. An SVG-OM OKSANA was placed at that time.      Family and social history reviewed    Review of patient's allergies indicates:   Allergen Reactions    Amiodarone analogues Anaphylaxis    Ativan [lorazepam] Anxiety     Past Medical History:   Diagnosis Date    CHF (congestive heart failure)     COPD (chronic obstructive pulmonary disease)     Coronary " EDT contact to perform EKG.    artery disease     Diabetes mellitus      Past Surgical History:   Procedure Laterality Date    APPENDECTOMY      CARDIAC SURGERY      ECHOCARDIOGRAM,TRANSESOPHAGEAL N/A 10/19/2018    Performed by [facility] Diagnostic Provider at [facility] CATH LAB    EYE SURGERY      FRACTURE SURGERY      TONSILLECTOMY       Family History     Problem Relation (Age of Onset)    Heart disease Father, Sister, Brother    Stroke Father        Social History     Socioeconomic History    Marital status:      Spouse name: Not on file    Number of children: Not on file    Years of education: Not on file    Highest education level: Not on file   Social Needs    Financial resource strain: Not on file    Food insecurity - worry: Not on file    Food insecurity - inability: Not on file    Transportation needs - medical: Not on file    Transportation needs - non-medical: Not on file   Occupational History    Not on file   Tobacco Use    Smoking status: Former Smoker     Types: Cigarettes    Smokeless tobacco: Never Used   Substance and Sexual Activity    Alcohol use: No     Frequency: Never    Drug use: No    Sexual activity: Not on file   Other Topics Concern    Not on file   Social History Narrative    Not on file       Current medications Reviewed    Review of Systems   Constitutional: Positive for fatigue. Negative for activity change.   Respiratory: Positive for shortness of breath.    Cardiovascular: Negative for chest pain, palpitations and leg swelling.   Gastrointestinal: Negative for abdominal pain, diarrhea and vomiting.   Endocrine: Negative for polydipsia, polyphagia and polyuria.   Genitourinary: Negative for dysuria.   Musculoskeletal: Negative for gait problem.   Skin: Negative for rash.   Allergic/Immunologic: Negative for immunocompromised state.   Neurological: Negative for dizziness, syncope and weakness.   Hematological: Does not bruise/bleed easily.   Psychiatric/Behavioral: Negative for behavioral  problems.     Objective:   Physical Exam   Constitutional: He is oriented to person, place, and time. He appears well-developed and well-nourished.   HENT:   Head: Normocephalic.   Eyes: Pupils are equal, round, and reactive to light.   Neck: Normal range of motion.   Cardiovascular: Normal rate, regular rhythm and normal heart sounds.   Pulmonary/Chest: Effort normal and breath sounds normal.   Abdominal: Soft.   Musculoskeletal: Normal range of motion.   Neurological: He is alert and oriented to person, place, and time.   Skin: Skin is warm, dry and intact.   Psychiatric: He has a normal mood and affect.     Diagnostic Results:   ECHO CONCLUSIONS     1 - Severely depressed left ventricular systolic function (EF <10%).     2 - Eccentric hypertrophy.     3 - Biatrial enlargement.     4 - Impaired LV relaxation, elevated LAP (grade 2 diastolic dysfunction).     5 - Right ventricular enlargement with moderately depressed systolic function.     6 - The estimated PA systolic pressure is 26 mmHg.     7 - Moderate mitral regurgitation.     8 - Mild tricuspid regurgitation.   LVEDD 7.8cm  TAPSE 1.3cm    CT reviewed   Assessment:   1. ICM  Plan:   Concerns with diffuse calcification. Possible partial support , provided patient is lot more physically tuned up. Family and patient understands.

## 2019-01-22 ENCOUNTER — ANTI-COAG VISIT (OUTPATIENT)
Dept: CARDIOLOGY | Facility: CLINIC | Age: 74
End: 2019-01-22

## 2019-01-22 DIAGNOSIS — Z79.01 LONG TERM (CURRENT) USE OF ANTICOAGULANTS: ICD-10-CM

## 2019-01-22 DIAGNOSIS — I48.20 CHRONIC ATRIAL FIBRILLATION: ICD-10-CM

## 2019-01-22 NOTE — PROGRESS NOTES
Called [facility] to retreive pts PT/INR results from ; patient britta states, patiets now  as of .

## 2019-04-08 NOTE — ASSESSMENT & PLAN NOTE
----- Message from Keagan Escobar CNP sent at 4/7/2019  4:19 PM CDT -----  Please inform patient that urine culture results show no urinary tract infection.   D/t ADHF / Cardiogenic shock:   - Hold PTA anti-HTNs  - may need [place] for close HD monitoring

## 2021-02-19 NOTE — NURSING
10 beats of Vtach noted. Patient asymptomatic, Adelaida ODELL notified. Will continue to monitor.    Patient expressed no known problems or needs

## 2021-10-13 NOTE — CONSULTS
[address] [facility]  Palliative Medicine  Consult Note    Patient Name: Bharat Coker  MRN: [id number]  Admission Date: 10/10/2018  Hospital Length of Stay: 7 days  Code Status: Full Code   Attending Provider: Jony Hendrickson Jr.,*  Consulting Provider: NICK Turner, CNS  Primary Care Physician: Ronnie Richardson Jr, MD  Principal Problem:HFrEF (heart failure with reduced ejection fraction)    Patient information was obtained from past medical records and ER records.      Consults  Assessment/Plan:     Palliative care encounter    Discussed patient with Dr. Montes. Palliative care consult for pre-LVAD goals of care cancelled.  Patient will be seen at later date for further evaluation.  Please reconsult if we can be of further assistance.              Thank you for your consult. I will sign off. Please contact us if you have any additional questions.    Subjective:     HPI:   Palliative Care team consulted for pre-LVAD goals of care for this 74 y/o male with PMH of DM, CAD s/p CABG, ICM, HFrEF 10%, AICD 9/2018 transfered from OSH for cardiogenic shock and evaluation for advanced cardiac therapies.        Hospital Course:  No notes on file    No new subjective & objective note has been filed under this hospital service since the last note was generated.      NICK Turner, DOMINIQUE, [facility]  Palliative Medicine  [facility]

## 2024-09-24 NOTE — ASSESSMENT & PLAN NOTE
Mr. Coker is a 73 y.o. M with a history of CAD s/p CABG, ICM (EF >10, LVEDD 7.8cm), who was [place] [facility] [place]ensated HF, +/- cardiogenic shock, MODS & evaluation for adanced tx options.                - BNP 4674    - 2D Echo (10/10/18) EF <10%, Diastolic dysfunction elevated LAP (grade 2), RV 4.5cm & TAPSE 1.3, LVEDD 7.8 cm                 - Admit to HTS service, Dr. Mathis   - decrease Lasix ggt 10mg/hr  - Will need RHC once euvolemic; planning for Monday  - Pathway 2 to LVAD (not a transplant candidate secondary to age)   - Consulted CTS surgery for transplant/LVAD, pending evaluation   - Cardiac diet 2g NS, 1.5L fluid restriction, daily weights, strict I/Os  - BMP, keep K>4.0, Mg>2.0  - Resume HF medications   - Maintain on telemetry and daily EKGs   - SCDs, TEDs, Nursing communication to elevated LE      None

## 2024-09-25 NOTE — ASSESSMENT & PLAN NOTE
-  SBP around 90, tolerating enalapril so far, 2.25 on 11/9 <- 1.25 mg bid started on 11/7.  - Will continue with digoxin.   - No lasix     - [On 11/5 -Patient got hypotensive after receiving Valsartan 40 mg on 11/5. See plan of care . At home, patient had episodes of hypotension with SBP to 70-80 with metoprolol. Had similar events with medications in the past. Sensitive to medications. Received 250 ml - NS - 2 bolus , placed in Trendelenberg position. Manual SBP 88-92, higher than cuff pressure. Monitor systolic blood pressure.Received 500 ml NS over 6 hours slowly as patient with EF 10% Likely from medication side effect.]     [As Noted in HPI] : as noted in HPI [Eyesight Problems] : eyesight problems [Negative] : Musculoskeletal

## (undated) DEVICE — CATH SUPREME QPLR CRD-2 6F 120

## (undated) DEVICE — Device

## (undated) DEVICE — INTRODUCER HEMOSTASIS 6.5FR

## (undated) DEVICE — SHEATH HEMOSTASIS 8.5FR

## (undated) DEVICE — PAD DEFIB CADENCE ADULT R2

## (undated) DEVICE — ELECTRODE POLYHESIVEPRE-ATTACH

## (undated) DEVICE — INTRO 8.5FR 63CM SRO

## (undated) DEVICE — PACK EP DRAPE